# Patient Record
Sex: FEMALE | Race: WHITE | NOT HISPANIC OR LATINO | Employment: FULL TIME | URBAN - METROPOLITAN AREA
[De-identification: names, ages, dates, MRNs, and addresses within clinical notes are randomized per-mention and may not be internally consistent; named-entity substitution may affect disease eponyms.]

---

## 2023-11-16 ENCOUNTER — OFFICE VISIT (OUTPATIENT)
Dept: GASTROENTEROLOGY | Facility: CLINIC | Age: 23
End: 2023-11-16
Payer: COMMERCIAL

## 2023-11-16 VITALS — HEIGHT: 63 IN | HEART RATE: 78 BPM | WEIGHT: 127 LBS | BODY MASS INDEX: 22.5 KG/M2

## 2023-11-16 DIAGNOSIS — K62.5 RECTAL BLEEDING: Primary | ICD-10-CM

## 2023-11-16 DIAGNOSIS — K92.1 HEMATOCHEZIA: ICD-10-CM

## 2023-11-16 PROCEDURE — 1036F TOBACCO NON-USER: CPT | Performed by: INTERNAL MEDICINE

## 2023-11-16 PROCEDURE — 99204 OFFICE O/P NEW MOD 45 MIN: CPT | Performed by: INTERNAL MEDICINE

## 2023-11-16 RX ORDER — POLYETHYLENE GLYCOL 3350 17 G/17G
17 POWDER, FOR SOLUTION ORAL ONCE
COMMUNITY
Start: 2023-02-16 | End: 2023-11-16 | Stop reason: ALTCHOICE

## 2023-11-16 RX ORDER — MULTIVITAMIN
1 TABLET ORAL
COMMUNITY
End: 2023-11-16 | Stop reason: ALTCHOICE

## 2023-11-16 RX ORDER — HYDROXYZINE HYDROCHLORIDE 50 MG/1
50 TABLET, FILM COATED ORAL EVERY 8 HOURS PRN
COMMUNITY
Start: 2023-11-07 | End: 2024-02-01 | Stop reason: WASHOUT

## 2023-11-16 ASSESSMENT — ENCOUNTER SYMPTOMS: SHORTNESS OF BREATH: 0

## 2023-11-16 NOTE — PROGRESS NOTES
REASON FOR VISIT:  Hemorrhoids  PCP (requesting provider): No primary care provider on file..    HPI:  Kyra Behnfeldt is a 23 y.o. female being evaluated for hemorrhoids.    The patient notes that since January she has been having bright red blood in her stool. She went to see GI NP and was felt to have internal hemorrhoids. She notes that she continued to have intermittent red blood in the stool. She now notes that the blood is now daily. She eats high fiber diet, drinks plenty of water, and avoids straining. Blood is in toilet bowel and also with wiping. It is bright red blood. She does not feel hemorrhoids. Occasional rectal pain. No abdominal pain. She has a daily bowel movement. No prior colonoscopy. No regular NSAIDs. She has been using Miralax and prior mild constipation. The daily bleeding had been ongoing for 2 months. Weight is stable. She is a RN at [employer].    PSurgHx: No abdominal surgeries     FamHx: No GI cancer    PAST MEDICAL HISTORY  No past medical history on file.    PAST SURGICAL HISTORY  No past surgical history on file.    FAMILY HISTORY  No family history on file.    SOCIAL HISTORY   has no history on file for tobacco use, alcohol use, and drug use.    REVIEW OF SYSTEMS  Review of Systems   Respiratory:  Negative for shortness of breath.    Cardiovascular:  Negative for chest pain.   All other systems reviewed and are negative.    A 10+ point review of systems was otherwise negative except as noted and per HPI.    ALLERGIES  Not on File    MEDICATIONS  Current Outpatient Medications   Medication Instructions    Gavilax 17 g, oral, Once    hydrOXYzine HCL (ATARAX) 50 mg, oral, Every 8 hours PRN    multivitamin tablet 1 tablet, oral, Daily RT       VITALS  There were no vitals filed for this visit.   There is no height or weight on file to calculate BMI.    PHYSICAL EXAM  CONSTITUTIONAL: NAD, appears stated age  EYES: anicteric sclera, sclera clear  HEAD: normocephalic, atraumatic   NECK:  "supple   PULMONARY: CTAB  CARDIOVASCULAR: RRR, no M/R/G appreciated   ABDOMEN: soft, NTND, +BS, no rebound or guarding   MUSCULOSKELETAL: no edema  SKIN: no jaundice   PSYCHIATRIC: AOx3, appropriate insight and judgement  RECTAL: Sandra chaperone, no visible hemorrhoids or fistula or fissure, normal rectal tone, internal fullness possibly hemorrhoid, no hard palpable lesions, clean vault    LABS  No results found for: \"WBC\", \"HGB\", \"PLT\"  No results found for: \"NA\", \"K\", \"CL\", \"CO2\", \"BUN\", \"CREATININE\", \"CALCIUM\", \"PROT\", \"BILITOT\", \"ALKPHOS\", \"ALT\", \"AST\", \"GLUCOSE\"  No results found for: \"LIPASE\", \"CRP\"    ASSESSMENT/PLAN  Kyra Behnfeldt is a 23 y.o. female being evaluated for hemorrhoids. She has persistent daily rectal bleeding despite medical management of hemorrhoids. She may have some palpable internal hemorrhoids on rectal exam today but nothing obvious to explain this amount of bleeding. Other differential would include colitis, juvenile polyp, and less likely malignancy given her age. She will need a colonoscopy for further evaluation.    -Plan for colonoscopy  -The procedure(s) including risks/benefits, diet restrictions, prep, and sedation were discussed with the patient    Follow-up will be at the time of the colonoscopy.    Signature: Mane Wang MD  "

## 2023-11-27 ENCOUNTER — APPOINTMENT (OUTPATIENT)
Dept: GASTROENTEROLOGY | Facility: CLINIC | Age: 23
End: 2023-11-27
Payer: COMMERCIAL

## 2023-12-07 ENCOUNTER — OFFICE VISIT (OUTPATIENT)
Dept: GASTROENTEROLOGY | Facility: EXTERNAL LOCATION | Age: 23
End: 2023-12-07
Payer: COMMERCIAL

## 2023-12-07 DIAGNOSIS — D12.0 BENIGN NEOPLASM OF CECUM: Primary | ICD-10-CM

## 2023-12-07 DIAGNOSIS — K52.9 NONINFECTIOUS GASTROENTERITIS, UNSPECIFIED TYPE: ICD-10-CM

## 2023-12-07 DIAGNOSIS — K62.5 RECTAL BLEEDING: ICD-10-CM

## 2023-12-07 DIAGNOSIS — K92.1 HEMATOCHEZIA: ICD-10-CM

## 2023-12-07 PROCEDURE — 45380 COLONOSCOPY AND BIOPSY: CPT | Performed by: INTERNAL MEDICINE

## 2023-12-07 PROCEDURE — 45385 COLONOSCOPY W/LESION REMOVAL: CPT | Performed by: INTERNAL MEDICINE

## 2023-12-07 PROCEDURE — 1036F TOBACCO NON-USER: CPT | Performed by: INTERNAL MEDICINE

## 2023-12-07 PROCEDURE — 88305 TISSUE EXAM BY PATHOLOGIST: CPT | Performed by: STUDENT IN AN ORGANIZED HEALTH CARE EDUCATION/TRAINING PROGRAM

## 2023-12-07 PROCEDURE — 88305 TISSUE EXAM BY PATHOLOGIST: CPT

## 2023-12-11 ENCOUNTER — LAB REQUISITION (OUTPATIENT)
Dept: LAB | Facility: HOSPITAL | Age: 23
End: 2023-12-11
Payer: COMMERCIAL

## 2023-12-18 ENCOUNTER — OFFICE VISIT (OUTPATIENT)
Dept: GASTROENTEROLOGY | Facility: CLINIC | Age: 23
End: 2023-12-18
Payer: COMMERCIAL

## 2023-12-18 ENCOUNTER — LAB (OUTPATIENT)
Dept: LAB | Facility: LAB | Age: 23
End: 2023-12-18
Payer: COMMERCIAL

## 2023-12-18 VITALS — HEART RATE: 98 BPM | HEIGHT: 63 IN | BODY MASS INDEX: 21.97 KG/M2 | WEIGHT: 124 LBS

## 2023-12-18 DIAGNOSIS — K62.5 RECTAL BLEEDING: ICD-10-CM

## 2023-12-18 DIAGNOSIS — Z51.81 THERAPEUTIC DRUG MONITORING: ICD-10-CM

## 2023-12-18 DIAGNOSIS — K50.10 CROHN'S DISEASE OF COLON WITHOUT COMPLICATION (MULTI): ICD-10-CM

## 2023-12-18 DIAGNOSIS — K50.10 CROHN'S DISEASE OF COLON WITHOUT COMPLICATION (MULTI): Primary | ICD-10-CM

## 2023-12-18 LAB
ALBUMIN SERPL BCP-MCNC: 4.4 G/DL (ref 3.4–5)
ALP SERPL-CCNC: 73 U/L (ref 33–110)
ALT SERPL W P-5'-P-CCNC: 16 U/L (ref 7–45)
ANION GAP SERPL CALC-SCNC: 10 MMOL/L (ref 10–20)
AST SERPL W P-5'-P-CCNC: 19 U/L (ref 9–39)
BASOPHILS # BLD AUTO: 0.07 X10*3/UL (ref 0–0.1)
BASOPHILS NFR BLD AUTO: 1.1 %
BILIRUB SERPL-MCNC: 0.5 MG/DL (ref 0–1.2)
BUN SERPL-MCNC: 10 MG/DL (ref 6–23)
CALCIUM SERPL-MCNC: 9.7 MG/DL (ref 8.6–10.3)
CHLORIDE SERPL-SCNC: 106 MMOL/L (ref 98–107)
CO2 SERPL-SCNC: 27 MMOL/L (ref 21–32)
CREAT SERPL-MCNC: 0.86 MG/DL (ref 0.5–1.05)
CRP SERPL-MCNC: <0.1 MG/DL
EOSINOPHIL # BLD AUTO: 0.09 X10*3/UL (ref 0–0.7)
EOSINOPHIL NFR BLD AUTO: 1.4 %
ERYTHROCYTE [DISTWIDTH] IN BLOOD BY AUTOMATED COUNT: 12.3 % (ref 11.5–14.5)
GFR SERPL CREATININE-BSD FRML MDRD: >90 ML/MIN/1.73M*2
GLUCOSE SERPL-MCNC: 85 MG/DL (ref 74–99)
HCT VFR BLD AUTO: 45.3 % (ref 36–46)
HGB BLD-MCNC: 15 G/DL (ref 12–16)
IMM GRANULOCYTES # BLD AUTO: 0.02 X10*3/UL (ref 0–0.7)
IMM GRANULOCYTES NFR BLD AUTO: 0.3 % (ref 0–0.9)
LABORATORY COMMENT REPORT: NORMAL
LYMPHOCYTES # BLD AUTO: 1.48 X10*3/UL (ref 1.2–4.8)
LYMPHOCYTES NFR BLD AUTO: 22.3 %
MCH RBC QN AUTO: 28.6 PG (ref 26–34)
MCHC RBC AUTO-ENTMCNC: 33.1 G/DL (ref 32–36)
MCV RBC AUTO: 87 FL (ref 80–100)
MONOCYTES # BLD AUTO: 0.38 X10*3/UL (ref 0.1–1)
MONOCYTES NFR BLD AUTO: 5.7 %
NEUTROPHILS # BLD AUTO: 4.6 X10*3/UL (ref 1.2–7.7)
NEUTROPHILS NFR BLD AUTO: 69.2 %
NRBC BLD-RTO: 0 /100 WBCS (ref 0–0)
PATH REPORT.COMMENTS IMP SPEC: NORMAL
PATH REPORT.FINAL DX SPEC: NORMAL
PATH REPORT.GROSS SPEC: NORMAL
PATH REPORT.RELEVANT HX SPEC: NORMAL
PATH REPORT.TOTAL CANCER: NORMAL
PLATELET # BLD AUTO: 217 X10*3/UL (ref 150–450)
POTASSIUM SERPL-SCNC: 4.1 MMOL/L (ref 3.5–5.3)
PROT SERPL-MCNC: 6.7 G/DL (ref 6.4–8.2)
RBC # BLD AUTO: 5.24 X10*6/UL (ref 4–5.2)
SODIUM SERPL-SCNC: 139 MMOL/L (ref 136–145)
WBC # BLD AUTO: 6.6 X10*3/UL (ref 4.4–11.3)

## 2023-12-18 PROCEDURE — 86140 C-REACTIVE PROTEIN: CPT

## 2023-12-18 PROCEDURE — 99215 OFFICE O/P EST HI 40 MIN: CPT | Performed by: INTERNAL MEDICINE

## 2023-12-18 PROCEDURE — 85025 COMPLETE CBC W/AUTO DIFF WBC: CPT

## 2023-12-18 PROCEDURE — 1036F TOBACCO NON-USER: CPT | Performed by: INTERNAL MEDICINE

## 2023-12-18 PROCEDURE — 83516 IMMUNOASSAY NONANTIBODY: CPT

## 2023-12-18 PROCEDURE — 86706 HEP B SURFACE ANTIBODY: CPT

## 2023-12-18 PROCEDURE — 87340 HEPATITIS B SURFACE AG IA: CPT

## 2023-12-18 PROCEDURE — 36415 COLL VENOUS BLD VENIPUNCTURE: CPT

## 2023-12-18 PROCEDURE — 86704 HEP B CORE ANTIBODY TOTAL: CPT

## 2023-12-18 PROCEDURE — 80053 COMPREHEN METABOLIC PANEL: CPT

## 2023-12-18 PROCEDURE — 86481 TB AG RESPONSE T-CELL SUSP: CPT

## 2023-12-18 RX ORDER — PREDNISONE 10 MG/1
TABLET ORAL
Qty: 82 TABLET | Refills: 0 | Status: SHIPPED | OUTPATIENT
Start: 2023-12-18 | End: 2024-01-18

## 2023-12-18 ASSESSMENT — ENCOUNTER SYMPTOMS: SHORTNESS OF BREATH: 0

## 2023-12-18 NOTE — PROGRESS NOTES
REASON FOR VISIT:  Crohn's disease  PCP (requesting provider): No primary care provider on file..    HPI:  Kyra Behnfeldt is a 23 y.o. female following for Crohn's colitis (diagnosed 12/2023). Colonoscopy showed moderate inflammation at hepatic flexure and rectum with biopsies showing chronic active colitis consistent with IBD as well as single SSA (12/2023). Given the distribution and skip lesions, the diagnosis is most consistent with Crohn's colitis.    The patient reports daily blood in the stool. She has now started to notice dull rectal pain.  She did have some abdominal cramping as well. Bowel movements have increased to 2-3 times per day.  Stool is more loose.  No NSAIDs.  No weight loss.  No EIMs. No family history of IBD. No recent blood work.    PSurgHx: No abdominal surgeries      FamHx: No GI cancer    Prior Endoscopy:  -Colonoscopy (12/2023): Excellent prep, normal TI (normal TI biopsies), 7 mm cecal polyp (SSA, locazlied moderate inflammation in proximal transverse distal to hepatic flexure (path showed chronic active colitis) and distal 5 cm of rectum (path showed chronic active colitis), otherwise normal colon (other segmental colonic biopsies normal).    PAST MEDICAL HISTORY  No past medical history on file.    PAST SURGICAL HISTORY  No past surgical history on file.    FAMILY HISTORY  No family history on file.    SOCIAL HISTORY   reports that she has never smoked. She has never been exposed to tobacco smoke. She has never used smokeless tobacco. No history on file for alcohol use and drug use.    REVIEW OF SYSTEMS  Review of Systems   Respiratory:  Negative for shortness of breath.    Cardiovascular:  Negative for chest pain.   All other systems reviewed and are negative.    A 10+ point review of systems was otherwise negative except as noted and per HPI.    ALLERGIES  No Known Allergies    MEDICATIONS  Current Outpatient Medications   Medication Instructions    hydrOXYzine HCL (ATARAX) 50 mg,  "oral, Every 8 hours PRN       VITALS  Vitals:    12/18/23 1422   Pulse: 98      Body mass index is 21.97 kg/m².    PHYSICAL EXAM  CONSTITUTIONAL: NAD, appears stated age  EYES: anicteric sclera, sclera clear  HEAD: normocephalic, atraumatic   NECK: supple   PULMONARY: CTAB  CARDIOVASCULAR: RRR, no M/R/G appreciated   ABDOMEN: soft, NTND, +BS, no rebound or guarding   MUSCULOSKELETAL: no edema  SKIN: no jaundice   PSYCHIATRIC: AOx3, appropriate insight and judgement    LABS  No results found for: \"WBC\", \"HGB\", \"PLT\"  No results found for: \"NA\", \"K\", \"CL\", \"CO2\", \"BUN\", \"CREATININE\", \"CALCIUM\", \"PROT\", \"BILITOT\", \"ALKPHOS\", \"ALT\", \"AST\", \"GLUCOSE\"  No results found for: \"LIPASE\", \"CRP\"    ASSESSMENT/PLAN  Kyra Behnfeldt is a 23 y.o. female with a past medical history of Crohn's colitis (diagnosed 12/2023). Colonoscopy showed moderate inflammation at hepatic flexure and rectum with biopsies showing chronic active colitis consistent with IBD as well as single SSA (12/2023). Given the distribution and skip lesions, the diagnosis is most consistent with Crohn's colitis.  She is having more pronounced GI symptoms of IBD so will start steroid therapy.  We discussed maintenance therapy options including anti-TNF (Humira/Remicade) versus Entyvio and she plans to give this some thought.  Either option is reasonable especially since this is a colitis phenotype of Crohn's.  We will obtain pre-biologic labs and pursue initiation of a biologic once back.    -Check labs (CBC, CMP, CRP, Tspot, hepatitis B serologies, TTG IgA)  -Start Prednisone taper  -Discussed biologic therapy as above including risks of infusion/injection reaction, immunosuppression, increased risk of skin cancer or lymphoma, theoretical risk of PML, hepatotoxicity, and neurotoxicity (will plan to make decision regarding initiation of therapy once labs return)    Follow-up in the office in 3-4 months.    Signature: Mane Wang MD  "

## 2023-12-18 NOTE — PATIENT INSTRUCTIONS
Check labs including labs in preparation for initiation of biologic therapy.  Once these labs are back, then we will move forward with getting insurance approval for biologic therapy (either anti-TNF with injection Humira or infusion Remicade versus integrin blocker infusion Entyvio).  Start Prednisone taper.  Follow-up in the office in 3-4 months after induction therapy complete.  Call or message with questions or concerns in the interim.

## 2023-12-19 LAB
HBV CORE AB SER QL: NONREACTIVE
HBV SURFACE AB SER-ACNC: 295.6 MIU/ML
HBV SURFACE AG SERPL QL IA: NONREACTIVE
TTG IGA SER IA-ACNC: <1 U/ML

## 2023-12-21 LAB
NIL(NEG) CONTROL SPOT COUNT: NORMAL
PANEL A SPOT COUNT: 0
PANEL B SPOT COUNT: 1
POS CONTROL SPOT COUNT: NORMAL
T-SPOT. TB INTERPRETATION: NEGATIVE

## 2023-12-28 ENCOUNTER — TELEPHONE (OUTPATIENT)
Dept: GASTROENTEROLOGY | Facility: CLINIC | Age: 23
End: 2023-12-28
Payer: COMMERCIAL

## 2024-01-02 ENCOUNTER — TELEPHONE (OUTPATIENT)
Dept: GASTROENTEROLOGY | Facility: CLINIC | Age: 24
End: 2024-01-02
Payer: COMMERCIAL

## 2024-01-02 ENCOUNTER — DOCUMENTATION (OUTPATIENT)
Dept: GASTROENTEROLOGY | Facility: CLINIC | Age: 24
End: 2024-01-02
Payer: COMMERCIAL

## 2024-01-02 DIAGNOSIS — K50.10 CROHN'S DISEASE OF LARGE INTESTINE WITHOUT COMPLICATION (MULTI): Primary | ICD-10-CM

## 2024-01-02 NOTE — TELEPHONE ENCOUNTER
----- Message from Ruthie Braxton MA sent at 1/2/2024 11:30 AM EST -----  Regarding: FW: YARELIS  Hi Dr. Malin,  I hope your holidays were good! I'm sorry to bother you with this but could you please help out with this patient?     Thanks so much!         ----- Message -----  From: Ronald Fuentes MD  Sent: 1/2/2024  10:28 AM EST  To: Mane Wang MD; Ruthie Braxton MA  Subject: RE: HUMIRA                                       Good morning!    I am on call this week - please send this to another one of our partners.    Thanks    ----- Message -----  From: Ruthie Braxton MA  Sent: 1/2/2024   9:53 AM EST  To: Ronald Fuentes MD; Mane Wang MD  Subject: HUMIRA                                            Good Morning,    This patient was seen on 12/18 in the office and a Colonoscopy on 12/7. She called stating she is not sleeping since she's been on the Predisone and the Hydroxyzine is not working. She wanted to know if she can get something to help her sleep and also start Humira?     Please advise.    Thank You so much,    [place]

## 2024-01-02 NOTE — PROGRESS NOTES
Phone - dx'd in December with Crohn's colitis, now on Prednisone taper (goes from 30mg daily to 20 mg daily tomorrow).   Only mild relief in symptoms, and has significant insomnia.   Dr. Wang had discussed Humira with her, and hepatitis serologies and T spot negative - I will inquire if Humira has been approved

## 2024-01-03 ENCOUNTER — SPECIALTY PHARMACY (OUTPATIENT)
Dept: PHARMACY | Facility: CLINIC | Age: 24
End: 2024-01-03

## 2024-01-03 RX ORDER — ADALIMUMAB 80MG/0.8ML
KIT SUBCUTANEOUS
Qty: 3 EACH | Refills: 0 | Status: SHIPPED | OUTPATIENT
Start: 2024-01-03 | End: 2024-01-17 | Stop reason: SDUPTHER

## 2024-01-10 ENCOUNTER — SPECIALTY PHARMACY (OUTPATIENT)
Dept: PHARMACY | Facility: CLINIC | Age: 24
End: 2024-01-10

## 2024-01-11 DIAGNOSIS — K50.10 CROHN'S DISEASE OF LARGE INTESTINE WITHOUT COMPLICATION (MULTI): ICD-10-CM

## 2024-01-11 RX ORDER — ADALIMUMAB 80MG/0.8ML
KIT SUBCUTANEOUS
Qty: 3 EACH | Refills: 0 | Status: SHIPPED | OUTPATIENT
Start: 2024-01-11 | End: 2024-02-09

## 2024-01-12 ENCOUNTER — TELEPHONE (OUTPATIENT)
Dept: GASTROENTEROLOGY | Facility: CLINIC | Age: 24
End: 2024-01-12
Payer: COMMERCIAL

## 2024-01-12 DIAGNOSIS — K50.10 CROHN'S DISEASE OF COLON WITHOUT COMPLICATION (MULTI): Primary | ICD-10-CM

## 2024-01-12 RX ORDER — BUDESONIDE 3 MG/1
CAPSULE, COATED PELLETS ORAL
Qty: 180 CAPSULE | Refills: 0 | Status: SHIPPED | OUTPATIENT
Start: 2024-01-12 | End: 2024-02-01 | Stop reason: WASHOUT

## 2024-01-12 NOTE — TELEPHONE ENCOUNTER
Amanda Nunez called stating she does not think the steroid is helping anymore due to having symptoms again. She stated she has less than a week left. Please advise       Thank You

## 2024-01-17 DIAGNOSIS — K50.10 CROHN'S DISEASE OF LARGE INTESTINE WITHOUT COMPLICATION (MULTI): ICD-10-CM

## 2024-01-17 RX ORDER — ADALIMUMAB 80MG/0.8ML
KIT SUBCUTANEOUS
Qty: 3 EACH | Refills: 0 | Status: SHIPPED | OUTPATIENT
Start: 2024-01-17 | End: 2024-01-19 | Stop reason: SDUPTHER

## 2024-01-19 ENCOUNTER — SPECIALTY PHARMACY (OUTPATIENT)
Dept: PHARMACY | Facility: CLINIC | Age: 24
End: 2024-01-19

## 2024-01-19 DIAGNOSIS — K50.10 CROHN'S DISEASE OF LARGE INTESTINE WITHOUT COMPLICATION (MULTI): ICD-10-CM

## 2024-01-19 RX ORDER — ADALIMUMAB 80MG/0.8ML
KIT SUBCUTANEOUS
Qty: 3 EACH | Refills: 0 | Status: ON HOLD | OUTPATIENT
Start: 2024-01-19 | End: 2024-02-26

## 2024-01-22 ENCOUNTER — SPECIALTY PHARMACY (OUTPATIENT)
Dept: PHARMACY | Facility: CLINIC | Age: 24
End: 2024-01-22

## 2024-01-30 ENCOUNTER — HOSPITAL ENCOUNTER (EMERGENCY)
Facility: HOSPITAL | Age: 24
Discharge: HOME | End: 2024-01-30
Attending: STUDENT IN AN ORGANIZED HEALTH CARE EDUCATION/TRAINING PROGRAM
Payer: COMMERCIAL

## 2024-01-30 VITALS
SYSTOLIC BLOOD PRESSURE: 135 MMHG | HEART RATE: 92 BPM | RESPIRATION RATE: 16 BRPM | DIASTOLIC BLOOD PRESSURE: 94 MMHG | TEMPERATURE: 98.8 F | HEIGHT: 63 IN | BODY MASS INDEX: 22.15 KG/M2 | WEIGHT: 125 LBS | OXYGEN SATURATION: 100 %

## 2024-01-30 DIAGNOSIS — R19.7 DIARRHEA, UNSPECIFIED TYPE: Primary | ICD-10-CM

## 2024-01-30 DIAGNOSIS — K50.911 ACUTE CROHN'S DISEASE WITH RECTAL BLEEDING (MULTI): Primary | ICD-10-CM

## 2024-01-30 LAB
ALBUMIN SERPL BCP-MCNC: 3.8 G/DL (ref 3.4–5)
ALP SERPL-CCNC: 79 U/L (ref 33–110)
ALT SERPL W P-5'-P-CCNC: 10 U/L (ref 7–45)
ANION GAP SERPL CALC-SCNC: 11 MMOL/L (ref 10–20)
APPEARANCE UR: CLEAR
AST SERPL W P-5'-P-CCNC: 11 U/L (ref 9–39)
BASOPHILS # BLD AUTO: 0.04 X10*3/UL (ref 0–0.1)
BASOPHILS NFR BLD AUTO: 0.3 %
BILIRUB SERPL-MCNC: 0.4 MG/DL (ref 0–1.2)
BILIRUB UR STRIP.AUTO-MCNC: NEGATIVE MG/DL
BUN SERPL-MCNC: 9 MG/DL (ref 6–23)
CALCIUM SERPL-MCNC: 9.1 MG/DL (ref 8.6–10.3)
CHLORIDE SERPL-SCNC: 103 MMOL/L (ref 98–107)
CO2 SERPL-SCNC: 28 MMOL/L (ref 21–32)
COLOR UR: ABNORMAL
CREAT SERPL-MCNC: 0.98 MG/DL (ref 0.5–1.05)
EGFRCR SERPLBLD CKD-EPI 2021: 83 ML/MIN/1.73M*2
EOSINOPHIL # BLD AUTO: 0.15 X10*3/UL (ref 0–0.7)
EOSINOPHIL NFR BLD AUTO: 1.2 %
ERYTHROCYTE [DISTWIDTH] IN BLOOD BY AUTOMATED COUNT: 12.3 % (ref 11.5–14.5)
FLUAV RNA RESP QL NAA+PROBE: NOT DETECTED
FLUBV RNA RESP QL NAA+PROBE: NOT DETECTED
GLUCOSE SERPL-MCNC: 148 MG/DL (ref 74–99)
GLUCOSE UR STRIP.AUTO-MCNC: NEGATIVE MG/DL
HCG UR QL IA.RAPID: NEGATIVE
HCT VFR BLD AUTO: 40.1 % (ref 36–46)
HGB BLD-MCNC: 13.8 G/DL (ref 12–16)
IMM GRANULOCYTES # BLD AUTO: 0.05 X10*3/UL (ref 0–0.7)
IMM GRANULOCYTES NFR BLD AUTO: 0.4 % (ref 0–0.9)
KETONES UR STRIP.AUTO-MCNC: NEGATIVE MG/DL
LACTATE SERPL-SCNC: 1 MMOL/L (ref 0.4–2)
LEUKOCYTE ESTERASE UR QL STRIP.AUTO: NEGATIVE
LYMPHOCYTES # BLD AUTO: 1.52 X10*3/UL (ref 1.2–4.8)
LYMPHOCYTES NFR BLD AUTO: 12.6 %
MAGNESIUM SERPL-MCNC: 1.82 MG/DL (ref 1.6–2.4)
MCH RBC QN AUTO: 29.5 PG (ref 26–34)
MCHC RBC AUTO-ENTMCNC: 34.4 G/DL (ref 32–36)
MCV RBC AUTO: 86 FL (ref 80–100)
MONOCYTES # BLD AUTO: 1.28 X10*3/UL (ref 0.1–1)
MONOCYTES NFR BLD AUTO: 10.6 %
NEUTROPHILS # BLD AUTO: 9.05 X10*3/UL (ref 1.2–7.7)
NEUTROPHILS NFR BLD AUTO: 74.9 %
NITRITE UR QL STRIP.AUTO: NEGATIVE
NRBC BLD-RTO: 0 /100 WBCS (ref 0–0)
PH UR STRIP.AUTO: 6 [PH]
PHOSPHATE SERPL-MCNC: 3.3 MG/DL (ref 2.5–4.9)
PLATELET # BLD AUTO: 311 X10*3/UL (ref 150–450)
POTASSIUM SERPL-SCNC: 3.7 MMOL/L (ref 3.5–5.3)
PROT SERPL-MCNC: 6.8 G/DL (ref 6.4–8.2)
PROT UR STRIP.AUTO-MCNC: NEGATIVE MG/DL
RBC # BLD AUTO: 4.68 X10*6/UL (ref 4–5.2)
RBC # UR STRIP.AUTO: NEGATIVE /UL
SARS-COV-2 RNA RESP QL NAA+PROBE: NOT DETECTED
SODIUM SERPL-SCNC: 138 MMOL/L (ref 136–145)
SP GR UR STRIP.AUTO: 1
UROBILINOGEN UR STRIP.AUTO-MCNC: <2 MG/DL
WBC # BLD AUTO: 12.1 X10*3/UL (ref 4.4–11.3)

## 2024-01-30 PROCEDURE — 2500000004 HC RX 250 GENERAL PHARMACY W/ HCPCS (ALT 636 FOR OP/ED): Performed by: STUDENT IN AN ORGANIZED HEALTH CARE EDUCATION/TRAINING PROGRAM

## 2024-01-30 PROCEDURE — 83735 ASSAY OF MAGNESIUM: CPT | Performed by: STUDENT IN AN ORGANIZED HEALTH CARE EDUCATION/TRAINING PROGRAM

## 2024-01-30 PROCEDURE — 81003 URINALYSIS AUTO W/O SCOPE: CPT | Performed by: STUDENT IN AN ORGANIZED HEALTH CARE EDUCATION/TRAINING PROGRAM

## 2024-01-30 PROCEDURE — 99284 EMERGENCY DEPT VISIT MOD MDM: CPT | Mod: 25

## 2024-01-30 PROCEDURE — 83605 ASSAY OF LACTIC ACID: CPT | Performed by: STUDENT IN AN ORGANIZED HEALTH CARE EDUCATION/TRAINING PROGRAM

## 2024-01-30 PROCEDURE — 87636 SARSCOV2 & INF A&B AMP PRB: CPT | Performed by: STUDENT IN AN ORGANIZED HEALTH CARE EDUCATION/TRAINING PROGRAM

## 2024-01-30 PROCEDURE — 36415 COLL VENOUS BLD VENIPUNCTURE: CPT | Performed by: STUDENT IN AN ORGANIZED HEALTH CARE EDUCATION/TRAINING PROGRAM

## 2024-01-30 PROCEDURE — 85025 COMPLETE CBC W/AUTO DIFF WBC: CPT | Performed by: STUDENT IN AN ORGANIZED HEALTH CARE EDUCATION/TRAINING PROGRAM

## 2024-01-30 PROCEDURE — 96374 THER/PROPH/DIAG INJ IV PUSH: CPT

## 2024-01-30 PROCEDURE — 81025 URINE PREGNANCY TEST: CPT | Performed by: STUDENT IN AN ORGANIZED HEALTH CARE EDUCATION/TRAINING PROGRAM

## 2024-01-30 PROCEDURE — 84100 ASSAY OF PHOSPHORUS: CPT | Performed by: STUDENT IN AN ORGANIZED HEALTH CARE EDUCATION/TRAINING PROGRAM

## 2024-01-30 PROCEDURE — 80053 COMPREHEN METABOLIC PANEL: CPT | Performed by: STUDENT IN AN ORGANIZED HEALTH CARE EDUCATION/TRAINING PROGRAM

## 2024-01-30 PROCEDURE — 96361 HYDRATE IV INFUSION ADD-ON: CPT

## 2024-01-30 RX ORDER — CYCLOBENZAPRINE HCL 10 MG
5 TABLET ORAL 2 TIMES DAILY PRN
Qty: 5 TABLET | Refills: 0 | Status: ON HOLD | OUTPATIENT
Start: 2024-01-30 | End: 2024-02-05 | Stop reason: SDUPTHER

## 2024-01-30 RX ORDER — PREDNISONE 20 MG/1
40 TABLET ORAL DAILY
Qty: 14 TABLET | Refills: 0 | Status: SHIPPED | OUTPATIENT
Start: 2024-01-30 | End: 2024-02-01 | Stop reason: WASHOUT

## 2024-01-30 RX ADMIN — METHYLPREDNISOLONE SODIUM SUCCINATE 125 MG: 125 INJECTION, POWDER, FOR SOLUTION INTRAMUSCULAR; INTRAVENOUS at 20:34

## 2024-01-30 RX ADMIN — SODIUM CHLORIDE, POTASSIUM CHLORIDE, SODIUM LACTATE AND CALCIUM CHLORIDE 1000 ML: 600; 310; 30; 20 INJECTION, SOLUTION INTRAVENOUS at 19:18

## 2024-01-30 ASSESSMENT — PAIN DESCRIPTION - PAIN TYPE: TYPE: ACUTE PAIN

## 2024-01-30 ASSESSMENT — PAIN SCALES - GENERAL
PAINLEVEL_OUTOF10: 7
PAINLEVEL_OUTOF10: 4

## 2024-01-30 ASSESSMENT — LIFESTYLE VARIABLES
EVER HAD A DRINK FIRST THING IN THE MORNING TO STEADY YOUR NERVES TO GET RID OF A HANGOVER: NO
EVER FELT BAD OR GUILTY ABOUT YOUR DRINKING: NO
HAVE YOU EVER FELT YOU SHOULD CUT DOWN ON YOUR DRINKING: NO
HAVE PEOPLE ANNOYED YOU BY CRITICIZING YOUR DRINKING: NO

## 2024-01-30 ASSESSMENT — PAIN - FUNCTIONAL ASSESSMENT: PAIN_FUNCTIONAL_ASSESSMENT: 0-10

## 2024-01-30 ASSESSMENT — PAIN DESCRIPTION - DESCRIPTORS: DESCRIPTORS: SHARP;CRAMPING

## 2024-01-30 ASSESSMENT — PAIN DESCRIPTION - LOCATION: LOCATION: ABDOMEN

## 2024-01-31 NOTE — DISCHARGE INSTRUCTIONS
Please return to the emergency department if you have uncontrolled pain, lightheadedness, or shortness of breath.  Please follow-up with your GI doctor tomorrow.

## 2024-01-31 NOTE — ED PROVIDER NOTES
External Records Reviewed: GI note from 12/18/2023    HPI  Kyra Behnfeldt is a 23 y.o. female with history of Crohn's disease who is presenting with concern for Crohn's flare.  Patient states she has had worsening lower abdominal cramping as well as diarrhea every few hours for the last 2 days.  Patient states there is blood in her diarrhea.  Patient notes she was started on prednisone in the middle of December and had improvement in similar symptoms however when she started to taper the prednisone down she noted return of the blood in her diarrhea.  Patient states about a week and a half ago she was started on budesonide because she was not tolerating the prednisone well.  Patient states she thinks her insurance has just approved her for Humira to start in 3 days.  Patient notes over the last 2 days the diarrhea has become overwhelming and she was not sure she was can make it until Friday with her symptoms.  She denies any fevers or chills, chest pain, shortness of breath.  She denies any nausea or vomiting.    PMH  No past medical history on file.    Meds  Current Outpatient Medications   Medication Instructions    adalimumab (HUMIRA PEN) 40 mg, subcutaneous, Every 14 days    adalimumab (HUMIRA PEN) 40 mg, subcutaneous, Every 14 days    adalimumab (Humira,CF, Pen Crohns-UC-HS) 80 mg/0.8 mL pen injector kit pen-injector adalimumab (Humira) dose instructions: On day 1, inject 160 mg under the skin. On day 15, inject 80 mg under skin. Change sites each time.    adalimumab (Humira,CF, Pen Crohns-UC-HS) 80 mg/0.8 mL pen injector kit pen-injector adalimumab (Humira) dose instructions: On day 1, inject 160 mg under the skin. On day 15, inject 80 mg under skin. Change sites each time.    budesonide EC (Entocort EC) 3 mg 24 hr capsule Take 3 capsules (9 mg) by mouth once daily in the morning for 30 days, THEN 2 capsules (6 mg) once daily in the morning for 30 days, THEN 1 capsule (3 mg) once daily in the morning.     "hydrOXYzine HCL (ATARAX) 50 mg, oral, Every 8 hours PRN       Allergies  No Known Allergies     SHx  Social History     Tobacco Use    Smoking status: Never     Passive exposure: Never    Smokeless tobacco: Never       ------------------------------------------------------------------------------------------------------------------------------------------    /86 (BP Location: Right arm, Patient Position: Sitting)   Pulse (!) 112   Temp 37.1 °C (98.8 °F) (Temporal)   Resp 18   Ht 1.6 m (5' 3\")   Wt 56.7 kg (125 lb)   SpO2 97%   BMI 22.14 kg/m²     Physical Exam  Constitutional:       General: She is not in acute distress.  HENT:      Head: Normocephalic and atraumatic.      Mouth/Throat:      Mouth: Mucous membranes are moist.   Eyes:      Extraocular Movements: Extraocular movements intact.      Conjunctiva/sclera: Conjunctivae normal.      Pupils: Pupils are equal, round, and reactive to light.   Cardiovascular:      Rate and Rhythm: Normal rate and regular rhythm.      Heart sounds: No murmur heard.     No gallop.   Pulmonary:      Effort: Pulmonary effort is normal. No respiratory distress.      Breath sounds: Normal breath sounds. No wheezing.   Abdominal:      General: There is no distension.      Palpations: Abdomen is soft.      Tenderness: There is no abdominal tenderness. There is no guarding.   Musculoskeletal:         General: No swelling or signs of injury. Normal range of motion.   Skin:     General: Skin is warm and dry.      Findings: No lesion or rash.   Neurological:      General: No focal deficit present.      Mental Status: She is alert and oriented to person, place, and time. Mental status is at baseline.   Psychiatric:         Mood and Affect: Mood normal.         Judgment: Judgment normal.          ------------------------------------------------------------------------------------------------------------------------------------------    Medical Decision Making: Patient is a " 23-year-old female presenting with concern for Crohn's flare.  Patient is tachycardic but otherwise hemodynamically stable and nontoxic-appearing on arrival.  She has no tenderness to palpation of the abdomen which is very reassuring.  Patient's diarrhea with blood is most consistent with a Crohn's flare especially given the change in her medications.  Patient will be given fluids and lab work will be ordered especially evaluate her hemoglobin as she has had quite a bit of blood in her stool.  Patient has no anemia symptoms including lightheadedness or shortness of breath which is reassuring.  Other considerations include viral infection like COVID or flu, diverticulitis, C. difficile.  Patient's lab work is fairly reassuring.  She does have a mild leukocytosis with a white blood cell count of 12.1 however other lab work is unremarkable including normal lactate normal magnesium and normal phosphorus.  She has no IGNACIA with a normal creatinine.  Flu and COVID swabs were negative and UA showed no signs of infection.  Urine pregnancy test was negative.  Patient will be given Solu-Medrol for her Crohn's flare.  She will also be provided a prescription for prednisone and a muscle relaxer to help with her cramps.  Patient plans to follow-up with her GI in the morning as she is hoping to start Humira in the next few days.  Patient felt comfortable with this plan and return precautions were discussed.  Tachycardia improved with fluids and she otherwise remained hemodynamically stable.  Patient was discharged home in improved condition.    Diagnoses as of 01/30/24 2016   Acute Crohn's disease with rectal bleeding (CMS/[facility])     Marylou Hastings MD  Emergency Medicine       Marylou Hastings MD  01/30/24 2018

## 2024-02-01 ENCOUNTER — OFFICE VISIT (OUTPATIENT)
Dept: GASTROENTEROLOGY | Facility: CLINIC | Age: 24
End: 2024-02-01
Payer: COMMERCIAL

## 2024-02-01 ENCOUNTER — HOSPITAL ENCOUNTER (OUTPATIENT)
Dept: RADIOLOGY | Facility: HOSPITAL | Age: 24
Discharge: HOME | End: 2024-02-01
Payer: COMMERCIAL

## 2024-02-01 VITALS — HEART RATE: 82 BPM | HEIGHT: 63 IN | WEIGHT: 122 LBS | BODY MASS INDEX: 21.62 KG/M2

## 2024-02-01 DIAGNOSIS — R19.7 DIARRHEA, UNSPECIFIED TYPE: ICD-10-CM

## 2024-02-01 DIAGNOSIS — K50.10 CROHN'S DISEASE OF COLON WITHOUT COMPLICATION (MULTI): Primary | ICD-10-CM

## 2024-02-01 DIAGNOSIS — R10.9 ABDOMINAL PAIN, UNSPECIFIED ABDOMINAL LOCATION: ICD-10-CM

## 2024-02-01 DIAGNOSIS — K50.10 CROHN'S DISEASE OF COLON WITHOUT COMPLICATION (MULTI): ICD-10-CM

## 2024-02-01 PROCEDURE — 2550000001 HC RX 255 CONTRASTS: Performed by: INTERNAL MEDICINE

## 2024-02-01 PROCEDURE — 74177 CT ABD & PELVIS W/CONTRAST: CPT | Performed by: RADIOLOGY

## 2024-02-01 PROCEDURE — 74177 CT ABD & PELVIS W/CONTRAST: CPT

## 2024-02-01 PROCEDURE — 99215 OFFICE O/P EST HI 40 MIN: CPT | Performed by: INTERNAL MEDICINE

## 2024-02-01 PROCEDURE — 1036F TOBACCO NON-USER: CPT | Performed by: INTERNAL MEDICINE

## 2024-02-01 RX ORDER — PREDNISONE 10 MG/1
TABLET ORAL
Qty: 98 TABLET | Refills: 0 | Status: ON HOLD | OUTPATIENT
Start: 2024-02-01 | End: 2024-02-05 | Stop reason: SDUPTHER

## 2024-02-01 RX ORDER — MESALAMINE 1000 MG/1
1000 SUPPOSITORY RECTAL 2 TIMES DAILY
Qty: 60 SUPPOSITORY | Refills: 11 | Status: SHIPPED | OUTPATIENT
Start: 2024-02-01 | End: 2024-02-26 | Stop reason: HOSPADM

## 2024-02-01 RX ADMIN — IOHEXOL 75 ML: 350 INJECTION, SOLUTION INTRAVENOUS at 17:13

## 2024-02-01 ASSESSMENT — ENCOUNTER SYMPTOMS: SHORTNESS OF BREATH: 0

## 2024-02-01 NOTE — PROGRESS NOTES
REASON FOR VISIT:  Crohn's   PCP (requesting provider): No primary care provider on file..    HPI:  Kyra Behnfeldt is a 23 y.o. female following for Crohn's colitis (diagnosed 12/2023). Colonoscopy showed moderate inflammation at hepatic flexure and rectum with biopsies showing chronic active colitis consistent with IBD as well as single SSA (12/2023). Given the distribution and skip lesions, the diagnosis is most consistent with Crohn's colitis.  She is planned for initiation of Humira but unfortunately has been having ongoing flare symptoms and was initially on Budeonide now on Prednisone. Recent ED visit with dose of Solumedrol.    The patient reports she has been having a lot of abdominal cramping as well.  The stool is loose and there is blood with the stool. She has lost about 5 lbs of weight. She got a dose of Solumedrol in the ED as above. She is on Prednisone 40 mg daily. She has not had any imaging. She stopped the Budesonide. She reports she will be receiving the Humira tomorrow. She has not had stool testing for C. Diff. She does tenesmus. She has not tried topical therapy. No NSAIDs.Chec    PSurgHx: No abdominal surgeries      FamHx: No GI cancer     Prior Endoscopy:  -Colonoscopy (12/2023): Excellent prep, normal TI (normal TI biopsies), 7 mm cecal polyp (SSA, locazlied moderate inflammation in proximal transverse distal to hepatic flexure (path showed chronic active colitis) and distal 5 cm of rectum (path showed chronic active colitis), otherwise normal colon (other segmental colonic biopsies normal).    PAST MEDICAL HISTORY  No past medical history on file.    PAST SURGICAL HISTORY  No past surgical history on file.    FAMILY HISTORY  No family history on file.    SOCIAL HISTORY   reports that she has never smoked. She has never been exposed to tobacco smoke. She has never used smokeless tobacco. No history on file for alcohol use and drug use.    REVIEW OF SYSTEMS  Review of Systems   Respiratory:   Negative for shortness of breath.    Cardiovascular:  Negative for chest pain.   All other systems reviewed and are negative.    A 10+ point review of systems was otherwise negative except as noted and per HPI.    ALLERGIES  No Known Allergies    MEDICATIONS  Current Outpatient Medications   Medication Instructions    adalimumab (HUMIRA PEN) 40 mg, subcutaneous, Every 14 days    adalimumab (HUMIRA PEN) 40 mg, subcutaneous, Every 14 days    adalimumab (Humira,CF, Pen Crohns-UC-HS) 80 mg/0.8 mL pen injector kit pen-injector adalimumab (Humira) dose instructions: On day 1, inject 160 mg under the skin. On day 15, inject 80 mg under skin. Change sites each time.    adalimumab (Humira,CF, Pen Crohns-UC-HS) 80 mg/0.8 mL pen injector kit pen-injector adalimumab (Humira) dose instructions: On day 1, inject 160 mg under the skin. On day 15, inject 80 mg under skin. Change sites each time.    budesonide EC (Entocort EC) 3 mg 24 hr capsule Take 3 capsules (9 mg) by mouth once daily in the morning for 30 days, THEN 2 capsules (6 mg) once daily in the morning for 30 days, THEN 1 capsule (3 mg) once daily in the morning.    cyclobenzaprine (FLEXERIL) 5 mg, oral, 2 times daily PRN    hydrOXYzine HCL (ATARAX) 50 mg, oral, Every 8 hours PRN    predniSONE (DELTASONE) 40 mg, oral, Daily       VITALS  Vitals:    02/01/24 1445   Pulse: 82      Body mass index is 21.61 kg/m².    PHYSICAL EXAM  CONSTITUTIONAL: NAD, appears stated age  EYES: anicteric sclera, sclera clear  HEAD: normocephalic, atraumatic   NECK: supple   PULMONARY: CTAB  CARDIOVASCULAR: RRR, no M/R/G appreciated   ABDOMEN: soft, minimal TTP lower abdomen, +BS, no rebound or guarding   MUSCULOSKELETAL: no edema  SKIN: no jaundice   PSYCHIATRIC: AOx3, appropriate insight and judgement    LABS  WBC (x10*3/uL)   Date Value   01/30/2024 12.1 (H)   12/18/2023 6.6     Hemoglobin (g/dL)   Date Value   01/30/2024 13.8   12/18/2023 15.0     Platelets (x10*3/uL)   Date Value    01/30/2024 311   12/18/2023 217     Sodium (mmol/L)   Date Value   01/30/2024 138   12/18/2023 139     Potassium (mmol/L)   Date Value   01/30/2024 3.7   12/18/2023 4.1     Chloride (mmol/L)   Date Value   01/30/2024 103   12/18/2023 106     Bicarbonate (mmol/L)   Date Value   01/30/2024 28   12/18/2023 27     Urea Nitrogen (mg/dL)   Date Value   01/30/2024 9   12/18/2023 10     Creatinine (mg/dL)   Date Value   01/30/2024 0.98   12/18/2023 0.86     Calcium (mg/dL)   Date Value   01/30/2024 9.1   12/18/2023 9.7     Total Protein (g/dL)   Date Value   01/30/2024 6.8   12/18/2023 6.7     Bilirubin, Total (mg/dL)   Date Value   01/30/2024 0.4   12/18/2023 0.5     Alkaline Phosphatase (U/L)   Date Value   01/30/2024 79   12/18/2023 73     ALT (U/L)   Date Value   01/30/2024 10   12/18/2023 16     AST (U/L)   Date Value   01/30/2024 11   12/18/2023 19     Glucose (mg/dL)   Date Value   01/30/2024 148 (H)   12/18/2023 85     C-Reactive Protein (mg/dL)   Date Value   12/18/2023 <0.10       ASSESSMENT/PLAN  Kyra Behnfeldt is a 23 y.o. female following for Crohn's colitis (diagnosed 12/2023). Colonoscopy showed moderate inflammation at hepatic flexure and rectum with biopsies showing chronic active colitis consistent with IBD as well as single SSA (12/2023). Given the distribution and skip lesions, the diagnosis is most consistent with Crohn's colitis.  She will be starting Humira hopefully tomorrow as we have now been struggling this past month with worsening flare symptoms requiring ED visit for IV steroids.  We have her on high dose Prednisone and will trial adding Canasa suppositories to help with some of the rectal symptoms even though there is some disease higher up in the colon as well.  She has never had imaging and given our struggles this is indicated to ensure there are no significant complications such as fistulizing disease or abscess.  We will also ensure she does not have superimposed C. Diff and get a  baseline fecal calprotecin.  If her condition worsens, then she will likely need hospitalization for IV steroids.    -Check stat CT A/P w/ contrast  -Continue Prednisone 40 mg for 2 weeks and then decrease by 10 mg every week thereafter  -Start Canasa 1 g suppositories BID x 30 days   -Check stool studies (fecal calprotectin, C. Diff, stool pathogen panel, stool O&P)  -Patient reports she will receive Humira tomorrow and advised her to start this ASAP  -If clinically worsens, then will likely need admission with IV steroids     Follow-up in the office in 1 month.    Signature: Mane Wang MD

## 2024-02-01 NOTE — PATIENT INSTRUCTIONS
Check stool testing for infection and inflammation.    Start Humira as soon as able.    Continue Prednisone 40 mg daily for 2 weeks then decrease by 10 mg daily thereafter.    Start Canasa suppositories twice daily for the next 1 month.    Schedule CT scan.    Follow-up in the office in 1 month.  If worsening despite the above, then we will need to have you go back to the hospital for admission with IV steroids.

## 2024-02-02 ENCOUNTER — HOSPITAL ENCOUNTER (INPATIENT)
Facility: HOSPITAL | Age: 24
LOS: 4 days | Discharge: HOME | DRG: 387 | End: 2024-02-06
Attending: EMERGENCY MEDICINE | Admitting: INTERNAL MEDICINE
Payer: COMMERCIAL

## 2024-02-02 ENCOUNTER — APPOINTMENT (OUTPATIENT)
Dept: RADIOLOGY | Facility: HOSPITAL | Age: 24
End: 2024-02-02
Payer: COMMERCIAL

## 2024-02-02 DIAGNOSIS — R10.84 GENERALIZED ABDOMINAL PAIN: ICD-10-CM

## 2024-02-02 DIAGNOSIS — K50.10 CROHN'S DISEASE OF COLON WITHOUT COMPLICATION (MULTI): Primary | ICD-10-CM

## 2024-02-02 DIAGNOSIS — R19.7 DIARRHEA, UNSPECIFIED TYPE: ICD-10-CM

## 2024-02-02 DIAGNOSIS — Z78.9 FAILURE OF OUTPATIENT TREATMENT: ICD-10-CM

## 2024-02-02 DIAGNOSIS — R10.9 ABDOMINAL PAIN, UNSPECIFIED ABDOMINAL LOCATION: ICD-10-CM

## 2024-02-02 DIAGNOSIS — K50.111 CROHN'S DISEASE OF COLON WITH RECTAL BLEEDING (MULTI): ICD-10-CM

## 2024-02-02 DIAGNOSIS — K50.911 ACUTE CROHN'S DISEASE WITH RECTAL BLEEDING (MULTI): ICD-10-CM

## 2024-02-02 PROBLEM — E87.6 HYPOKALEMIA: Status: ACTIVE | Noted: 2024-02-02

## 2024-02-02 LAB
ALBUMIN SERPL BCP-MCNC: 3.6 G/DL (ref 3.4–5)
ALP SERPL-CCNC: 73 U/L (ref 33–110)
ALT SERPL W P-5'-P-CCNC: 11 U/L (ref 7–45)
ANION GAP SERPL CALC-SCNC: 11 MMOL/L (ref 10–20)
APPEARANCE UR: CLEAR
AST SERPL W P-5'-P-CCNC: 11 U/L (ref 9–39)
BASOPHILS # BLD AUTO: 0.07 X10*3/UL (ref 0–0.1)
BASOPHILS NFR BLD AUTO: 0.6 %
BILIRUB SERPL-MCNC: 0.2 MG/DL (ref 0–1.2)
BILIRUB UR STRIP.AUTO-MCNC: NEGATIVE MG/DL
BUN SERPL-MCNC: 13 MG/DL (ref 6–23)
C DIF TOX TCDA+TCDB STL QL NAA+PROBE: NOT DETECTED
CALCIUM SERPL-MCNC: 8.8 MG/DL (ref 8.6–10.3)
CHLORIDE SERPL-SCNC: 103 MMOL/L (ref 98–107)
CO2 SERPL-SCNC: 27 MMOL/L (ref 21–32)
COLOR UR: YELLOW
CREAT SERPL-MCNC: 0.87 MG/DL (ref 0.5–1.05)
CRP SERPL-MCNC: 1.31 MG/DL
EGFRCR SERPLBLD CKD-EPI 2021: >90 ML/MIN/1.73M*2
EOSINOPHIL # BLD AUTO: 0.15 X10*3/UL (ref 0–0.7)
EOSINOPHIL NFR BLD AUTO: 1.3 %
ERYTHROCYTE [DISTWIDTH] IN BLOOD BY AUTOMATED COUNT: 12.2 % (ref 11.5–14.5)
GLUCOSE SERPL-MCNC: 94 MG/DL (ref 74–99)
GLUCOSE UR STRIP.AUTO-MCNC: NEGATIVE MG/DL
HCT VFR BLD AUTO: 36 % (ref 36–46)
HGB BLD-MCNC: 12.1 G/DL (ref 12–16)
HOLD SPECIMEN: NORMAL
IMM GRANULOCYTES # BLD AUTO: 0.05 X10*3/UL (ref 0–0.7)
IMM GRANULOCYTES NFR BLD AUTO: 0.4 % (ref 0–0.9)
KETONES UR STRIP.AUTO-MCNC: NEGATIVE MG/DL
LACTATE SERPL-SCNC: 1.4 MMOL/L (ref 0.4–2)
LEUKOCYTE ESTERASE UR QL STRIP.AUTO: NEGATIVE
LYMPHOCYTES # BLD AUTO: 1.5 X10*3/UL (ref 1.2–4.8)
LYMPHOCYTES NFR BLD AUTO: 13.2 %
MAGNESIUM SERPL-MCNC: 1.78 MG/DL (ref 1.6–2.4)
MCH RBC QN AUTO: 29.2 PG (ref 26–34)
MCHC RBC AUTO-ENTMCNC: 33.6 G/DL (ref 32–36)
MCV RBC AUTO: 87 FL (ref 80–100)
MONOCYTES # BLD AUTO: 1.01 X10*3/UL (ref 0.1–1)
MONOCYTES NFR BLD AUTO: 8.9 %
MUCOUS THREADS #/AREA URNS AUTO: NORMAL /LPF
NEUTROPHILS # BLD AUTO: 8.61 X10*3/UL (ref 1.2–7.7)
NEUTROPHILS NFR BLD AUTO: 75.6 %
NITRITE UR QL STRIP.AUTO: NEGATIVE
NRBC BLD-RTO: 0 /100 WBCS (ref 0–0)
PH UR STRIP.AUTO: 5 [PH]
PLATELET # BLD AUTO: 312 X10*3/UL (ref 150–450)
POTASSIUM SERPL-SCNC: 3 MMOL/L (ref 3.5–5.3)
PROT SERPL-MCNC: 6.6 G/DL (ref 6.4–8.2)
PROT UR STRIP.AUTO-MCNC: NEGATIVE MG/DL
RBC # BLD AUTO: 4.15 X10*6/UL (ref 4–5.2)
RBC # UR STRIP.AUTO: ABNORMAL /UL
RBC #/AREA URNS AUTO: NORMAL /HPF
SODIUM SERPL-SCNC: 138 MMOL/L (ref 136–145)
SP GR UR STRIP.AUTO: 1.02
SQUAMOUS #/AREA URNS AUTO: NORMAL /HPF
UROBILINOGEN UR STRIP.AUTO-MCNC: <2 MG/DL
WBC # BLD AUTO: 11.4 X10*3/UL (ref 4.4–11.3)
WBC #/AREA URNS AUTO: NORMAL /HPF

## 2024-02-02 PROCEDURE — 85025 COMPLETE CBC W/AUTO DIFF WBC: CPT | Performed by: EMERGENCY MEDICINE

## 2024-02-02 PROCEDURE — 99222 1ST HOSP IP/OBS MODERATE 55: CPT | Performed by: INTERNAL MEDICINE

## 2024-02-02 PROCEDURE — 83605 ASSAY OF LACTIC ACID: CPT | Performed by: EMERGENCY MEDICINE

## 2024-02-02 PROCEDURE — 99285 EMERGENCY DEPT VISIT HI MDM: CPT | Performed by: EMERGENCY MEDICINE

## 2024-02-02 PROCEDURE — 1200000002 HC GENERAL ROOM WITH TELEMETRY DAILY

## 2024-02-02 PROCEDURE — 86140 C-REACTIVE PROTEIN: CPT | Performed by: INTERNAL MEDICINE

## 2024-02-02 PROCEDURE — 36415 COLL VENOUS BLD VENIPUNCTURE: CPT | Performed by: EMERGENCY MEDICINE

## 2024-02-02 PROCEDURE — 2500000004 HC RX 250 GENERAL PHARMACY W/ HCPCS (ALT 636 FOR OP/ED): Performed by: EMERGENCY MEDICINE

## 2024-02-02 PROCEDURE — 87329 GIARDIA AG IA: CPT | Performed by: INTERNAL MEDICINE

## 2024-02-02 PROCEDURE — 87328 CRYPTOSPORIDIUM AG IA: CPT | Performed by: INTERNAL MEDICINE

## 2024-02-02 PROCEDURE — 83735 ASSAY OF MAGNESIUM: CPT | Performed by: STUDENT IN AN ORGANIZED HEALTH CARE EDUCATION/TRAINING PROGRAM

## 2024-02-02 PROCEDURE — 83993 ASSAY FOR CALPROTECTIN FECAL: CPT | Performed by: INTERNAL MEDICINE

## 2024-02-02 PROCEDURE — 96374 THER/PROPH/DIAG INJ IV PUSH: CPT

## 2024-02-02 PROCEDURE — 87506 IADNA-DNA/RNA PROBE TQ 6-11: CPT | Mod: PORLAB | Performed by: EMERGENCY MEDICINE

## 2024-02-02 PROCEDURE — 96361 HYDRATE IV INFUSION ADD-ON: CPT

## 2024-02-02 PROCEDURE — 81001 URINALYSIS AUTO W/SCOPE: CPT | Performed by: EMERGENCY MEDICINE

## 2024-02-02 PROCEDURE — 2500000004 HC RX 250 GENERAL PHARMACY W/ HCPCS (ALT 636 FOR OP/ED): Performed by: STUDENT IN AN ORGANIZED HEALTH CARE EDUCATION/TRAINING PROGRAM

## 2024-02-02 PROCEDURE — 99223 1ST HOSP IP/OBS HIGH 75: CPT | Performed by: STUDENT IN AN ORGANIZED HEALTH CARE EDUCATION/TRAINING PROGRAM

## 2024-02-02 PROCEDURE — 87493 C DIFF AMPLIFIED PROBE: CPT | Performed by: EMERGENCY MEDICINE

## 2024-02-02 PROCEDURE — 2500000001 HC RX 250 WO HCPCS SELF ADMINISTERED DRUGS (ALT 637 FOR MEDICARE OP): Performed by: STUDENT IN AN ORGANIZED HEALTH CARE EDUCATION/TRAINING PROGRAM

## 2024-02-02 PROCEDURE — 80053 COMPREHEN METABOLIC PANEL: CPT | Performed by: EMERGENCY MEDICINE

## 2024-02-02 RX ORDER — GUAIFENESIN 600 MG/1
600 TABLET, EXTENDED RELEASE ORAL EVERY 12 HOURS PRN
Status: DISCONTINUED | OUTPATIENT
Start: 2024-02-02 | End: 2024-02-03

## 2024-02-02 RX ORDER — ACETAMINOPHEN 325 MG/1
650 TABLET ORAL EVERY 4 HOURS PRN
Status: DISCONTINUED | OUTPATIENT
Start: 2024-02-02 | End: 2024-02-06 | Stop reason: HOSPADM

## 2024-02-02 RX ORDER — DICYCLOMINE HYDROCHLORIDE 10 MG/1
10 CAPSULE ORAL 4 TIMES DAILY PRN
Status: DISCONTINUED | OUTPATIENT
Start: 2024-02-02 | End: 2024-02-06 | Stop reason: HOSPADM

## 2024-02-02 RX ORDER — ONDANSETRON 4 MG/1
4 TABLET, FILM COATED ORAL EVERY 8 HOURS PRN
Status: DISCONTINUED | OUTPATIENT
Start: 2024-02-02 | End: 2024-02-06 | Stop reason: HOSPADM

## 2024-02-02 RX ORDER — PANTOPRAZOLE SODIUM 40 MG/1
40 TABLET, DELAYED RELEASE ORAL
Status: DISCONTINUED | OUTPATIENT
Start: 2024-02-03 | End: 2024-02-06 | Stop reason: HOSPADM

## 2024-02-02 RX ORDER — BISACODYL 5 MG
10 TABLET, DELAYED RELEASE (ENTERIC COATED) ORAL DAILY PRN
Status: DISCONTINUED | OUTPATIENT
Start: 2024-02-02 | End: 2024-02-06 | Stop reason: HOSPADM

## 2024-02-02 RX ORDER — ONDANSETRON HYDROCHLORIDE 2 MG/ML
4 INJECTION, SOLUTION INTRAVENOUS EVERY 8 HOURS PRN
Status: DISCONTINUED | OUTPATIENT
Start: 2024-02-02 | End: 2024-02-06 | Stop reason: HOSPADM

## 2024-02-02 RX ORDER — HYDROXYZINE HYDROCHLORIDE 50 MG/1
50 TABLET, FILM COATED ORAL EVERY 8 HOURS PRN
COMMUNITY
End: 2024-03-18 | Stop reason: HOSPADM

## 2024-02-02 RX ORDER — POLYETHYLENE GLYCOL 3350 17 G/17G
17 POWDER, FOR SOLUTION ORAL DAILY
Status: DISCONTINUED | OUTPATIENT
Start: 2024-02-02 | End: 2024-02-03

## 2024-02-02 RX ORDER — BISACODYL 10 MG/1
10 SUPPOSITORY RECTAL DAILY PRN
Status: DISCONTINUED | OUTPATIENT
Start: 2024-02-02 | End: 2024-02-06 | Stop reason: HOSPADM

## 2024-02-02 RX ORDER — POTASSIUM CHLORIDE 20 MEQ/1
40 TABLET, EXTENDED RELEASE ORAL ONCE
Status: COMPLETED | OUTPATIENT
Start: 2024-02-02 | End: 2024-02-02

## 2024-02-02 RX ORDER — PANTOPRAZOLE SODIUM 40 MG/10ML
40 INJECTION, POWDER, LYOPHILIZED, FOR SOLUTION INTRAVENOUS
Status: DISCONTINUED | OUTPATIENT
Start: 2024-02-03 | End: 2024-02-06 | Stop reason: HOSPADM

## 2024-02-02 RX ORDER — TALC
3 POWDER (GRAM) TOPICAL DAILY
Status: DISCONTINUED | OUTPATIENT
Start: 2024-02-02 | End: 2024-02-06 | Stop reason: HOSPADM

## 2024-02-02 RX ORDER — MESALAMINE 1000 MG/1
1000 SUPPOSITORY RECTAL 2 TIMES DAILY
Status: ON HOLD | COMMUNITY
End: 2024-02-02 | Stop reason: ENTERED-IN-ERROR

## 2024-02-02 RX ADMIN — POTASSIUM CHLORIDE 40 MEQ: 1500 TABLET, EXTENDED RELEASE ORAL at 16:09

## 2024-02-02 RX ADMIN — Medication 3 MG: at 22:12

## 2024-02-02 RX ADMIN — METHYLPREDNISOLONE SODIUM SUCCINATE 125 MG: 125 INJECTION, POWDER, FOR SOLUTION INTRAMUSCULAR; INTRAVENOUS at 10:36

## 2024-02-02 RX ADMIN — SODIUM CHLORIDE 1000 ML: 9 INJECTION, SOLUTION INTRAVENOUS at 10:37

## 2024-02-02 SDOH — SOCIAL STABILITY: SOCIAL INSECURITY: WERE YOU ABLE TO COMPLETE ALL THE BEHAVIORAL HEALTH SCREENINGS?: YES

## 2024-02-02 SDOH — SOCIAL STABILITY: SOCIAL INSECURITY: HAVE YOU HAD THOUGHTS OF HARMING ANYONE ELSE?: NO

## 2024-02-02 ASSESSMENT — LIFESTYLE VARIABLES
HOW MANY STANDARD DRINKS CONTAINING ALCOHOL DO YOU HAVE ON A TYPICAL DAY: 1 OR 2
HOW OFTEN DO YOU HAVE A DRINK CONTAINING ALCOHOL: 2-4 TIMES A MONTH
SKIP TO QUESTIONS 9-10: 1
SUBSTANCE_ABUSE_PAST_12_MONTHS: YES
AUDIT-C TOTAL SCORE: 2
AUDIT-C TOTAL SCORE: 2
HOW OFTEN DO YOU HAVE 6 OR MORE DRINKS ON ONE OCCASION: NEVER
PRESCIPTION_ABUSE_PAST_12_MONTHS: NO

## 2024-02-02 ASSESSMENT — COGNITIVE AND FUNCTIONAL STATUS - GENERAL
MOBILITY SCORE: 24
PATIENT BASELINE BEDBOUND: NO
DAILY ACTIVITIY SCORE: 24

## 2024-02-02 ASSESSMENT — ENCOUNTER SYMPTOMS
ARTHRALGIAS: 0
EYE PAIN: 0
HEMATURIA: 0
BLOOD IN STOOL: 1
SORE THROAT: 0
SHORTNESS OF BREATH: 0
SLEEP DISTURBANCE: 0
BACK PAIN: 0
AGITATION: 0
COLOR CHANGE: 0
CONSTIPATION: 0
LIGHT-HEADEDNESS: 0
VOMITING: 0
DIZZINESS: 0
COUGH: 0
CHILLS: 0
DYSURIA: 0
DIARRHEA: 1
RECTAL PAIN: 1
TREMORS: 0
CONFUSION: 0
UNEXPECTED WEIGHT CHANGE: 1
ABDOMINAL DISTENTION: 0
WEAKNESS: 0
SEIZURES: 0
POLYDIPSIA: 0
FEVER: 0
PHOTOPHOBIA: 0
NAUSEA: 0
ABDOMINAL PAIN: 1
PALPITATIONS: 0

## 2024-02-02 ASSESSMENT — ACTIVITIES OF DAILY LIVING (ADL)
JUDGMENT_ADEQUATE_SAFELY_COMPLETE_DAILY_ACTIVITIES: YES
DRESSING YOURSELF: INDEPENDENT
TOILETING: INDEPENDENT
WALKS IN HOME: INDEPENDENT
HEARING - RIGHT EAR: FUNCTIONAL
FEEDING YOURSELF: INDEPENDENT
ADEQUATE_TO_COMPLETE_ADL: YES
GROOMING: INDEPENDENT
PATIENT'S MEMORY ADEQUATE TO SAFELY COMPLETE DAILY ACTIVITIES?: YES
BATHING: INDEPENDENT
HEARING - LEFT EAR: FUNCTIONAL
LACK_OF_TRANSPORTATION: NO

## 2024-02-02 ASSESSMENT — PATIENT HEALTH QUESTIONNAIRE - PHQ9
1. LITTLE INTEREST OR PLEASURE IN DOING THINGS: NOT AT ALL
SUM OF ALL RESPONSES TO PHQ9 QUESTIONS 1 & 2: 0
2. FEELING DOWN, DEPRESSED OR HOPELESS: NOT AT ALL

## 2024-02-02 ASSESSMENT — COLUMBIA-SUICIDE SEVERITY RATING SCALE - C-SSRS
2. HAVE YOU ACTUALLY HAD ANY THOUGHTS OF KILLING YOURSELF?: NO
1. IN THE PAST MONTH, HAVE YOU WISHED YOU WERE DEAD OR WISHED YOU COULD GO TO SLEEP AND NOT WAKE UP?: NO
6. HAVE YOU EVER DONE ANYTHING, STARTED TO DO ANYTHING, OR PREPARED TO DO ANYTHING TO END YOUR LIFE?: NO

## 2024-02-02 ASSESSMENT — PAIN - FUNCTIONAL ASSESSMENT
PAIN_FUNCTIONAL_ASSESSMENT: 0-10
PAIN_FUNCTIONAL_ASSESSMENT: 0-10

## 2024-02-02 ASSESSMENT — PAIN SCALES - GENERAL
PAINLEVEL_OUTOF10: 3
PAINLEVEL_OUTOF10: 8

## 2024-02-02 ASSESSMENT — PAIN DESCRIPTION - LOCATION: LOCATION: ABDOMEN

## 2024-02-02 NOTE — H&P
History Of Present Illness:  Kyra Behnfeldt is a 23 y.o. female with PMHx s/f semi-recent diagnosis of Crohn's disease, presenting with continued symptoms despite PO steroids. Patient reports that she was diagnosed with Crohn's in December of last year following multiple evaluations and eventually a colonoscopy which revealed inflammatory changes consistent with Crohn's.  Patient reports that following diagnosis in December, she was started on prednisone; most recently she was in the ER on Tuesday and after a dose of IV steroid she noted marked improvement and was discharged home.  She was started on an oral steroid taper and had close follow-up with GI (Dr. Wang).  She reports that following this appointment, she was going to start Humira; however, it had not arrived yet.  Her symptoms became more significant over the last few days, associated with upwards of 20 bowel movements per day, with each having blood associated.  She has lost approximately 7 pounds over the last week.  She is not having any nausea, vomiting; does have some mild diffuse abdominal pain/cramping.  No fevers, chills.  Patient is feeling better following steroids given in the ED and did want to try some soft foods.  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 She was resting comfortably in bed during my evaluation with friends at bedside.     ED Course (Summary):   Vitals on presentation: T97.7, /79, HR 98, RR 18, SpO2 100% RA  CBC: WBC 11.4, Hgb 12.1, platelets 312  CMP: Glucose 94, sodium 138, testing 3.0, BUN 13, serum creatinine 0.87, mag 1.78  Lactate 1.4  CRP 1.31  UA negative for infection.  C. difficile PCR negative.  Stool pathogen panel is in process.  CT on 2/1 showed wall thickening of multiple colonic loops with no other acute findings     ED Course:  Diagnoses as of 02/02/24 2047   Crohn's disease of colon with rectal bleeding (CMS/HCC)   Failure of outpatient treatment      Relevant Results  Results for orders placed or performed during the hospital encounter of 02/02/24 (from the past 24 hour(s))   CBC and Auto Differential   Result Value Ref Range    WBC 11.4 (H) 4.4 - 11.3 x10*3/uL    nRBC 0.0 0.0 - 0.0 /100 WBCs    RBC 4.15 4.00 - 5.20 x10*6/uL    Hemoglobin 12.1 12.0 - 16.0 g/dL    Hematocrit 36.0 36.0 - 46.0 %    MCV 87 80 - 100 fL    MCH 29.2 26.0 - 34.0 pg    MCHC 33.6 32.0 - 36.0 g/dL    RDW 12.2 11.5 - 14.5 %    Platelets 312 150 - 450 x10*3/uL    Neutrophils % 75.6 40.0 - 80.0 %    Immature Granulocytes %, Automated 0.4 0.0 - 0.9 %    Lymphocytes % 13.2 13.0 - 44.0 %    Monocytes % 8.9 2.0 - 10.0 %    Eosinophils % 1.3 0.0 - 6.0 %    Basophils % 0.6 0.0 - 2.0 %    Neutrophils Absolute 8.61 (H) 1.20 - 7.70 x10*3/uL    Immature Granulocytes Absolute, Automated 0.05 0.00 - 0.70 x10*3/uL    Lymphocytes Absolute 1.50 1.20 - 4.80 x10*3/uL    Monocytes Absolute 1.01 (H) 0.10 - 1.00 x10*3/uL    Eosinophils Absolute 0.15 0.00 - 0.70 x10*3/uL    Basophils Absolute 0.07 0.00 - 0.10 x10*3/uL   Comprehensive metabolic panel   Result Value Ref Range    Glucose 94 74 - 99 mg/dL    Sodium 138 136 - 145 mmol/L    Potassium 3.0 (L) 3.5 - 5.3 mmol/L    Chloride 103 98 - 107 mmol/L    Bicarbonate 27 21 - 32 mmol/L    Anion Gap 11 10 - 20 mmol/L    Urea Nitrogen 13 6 - 23 mg/dL    Creatinine 0.87 0.50 - 1.05 mg/dL    eGFR >90 >60 mL/min/1.73m*2    Calcium 8.8 8.6 - 10.3 mg/dL    Albumin 3.6 3.4 - 5.0 g/dL    Alkaline Phosphatase 73 33 - 110 U/L    Total Protein 6.6 6.4 - 8.2 g/dL    AST 11 9 - 39 U/L    Bilirubin, Total 0.2 0.0 - 1.2 mg/dL    ALT 11 7 - 45 U/L   Lactate   Result Value Ref Range    Lactate 1.4 0.4 - 2.0 mmol/L   C-reactive protein   Result Value Ref Range    C-Reactive Protein 1.31 (H) <1.00 mg/dL   Magnesium   Result Value Ref Range    Magnesium 1.78 1.60 - 2.40 mg/dL   Urinalysis with Reflex Culture and Microscopic   Result Value Ref Range    Color, Urine Yellow Straw,  Yellow    Appearance, Urine Clear Clear    Specific Gravity, Urine 1.024 1.005 - 1.035    pH, Urine 5.0 5.0, 5.5, 6.0, 6.5, 7.0, 7.5, 8.0    Protein, Urine NEGATIVE NEGATIVE mg/dL    Glucose, Urine NEGATIVE NEGATIVE mg/dL    Blood, Urine SMALL (1+) (A) NEGATIVE    Ketones, Urine NEGATIVE NEGATIVE mg/dL    Bilirubin, Urine NEGATIVE NEGATIVE    Urobilinogen, Urine <2.0 <2.0 mg/dL    Nitrite, Urine NEGATIVE NEGATIVE    Leukocyte Esterase, Urine NEGATIVE NEGATIVE   Urinalysis Microscopic   Result Value Ref Range    WBC, Urine 1-5 1-5, NONE /HPF    RBC, Urine 1-2 NONE, 1-2, 3-5 /HPF    Squamous Epithelial Cells, Urine 1-9 (SPARSE) Reference range not established. /HPF    Mucus, Urine 1+ Reference range not established. /LPF   Stool   Result Value Ref Range    Extra Tube Hold for add-ons.    Stool   Result Value Ref Range    Extra Tube Hold for add-ons.    Stool   Result Value Ref Range    Extra Tube Hold for add-ons.    C. difficile, PCR    Specimen: Stool   Result Value Ref Range    C. difficile, PCR Not Detected Not Detected      CT abdomen pelvis w IV contrast    Result Date: 2/1/2024  Interpreted By:  Gregorio Jones, STUDY: CT ABDOMEN PELVIS W IV CONTRAST;  2/1/2024 5:12 pm   INDICATION: Signs/Symptoms:Crohn's disease, worsening diarrhea and abdominal pain.   COMPARISON: None.   ACCESSION NUMBER(S): [id number]   ORDERING CLINICIAN: WALDEMAR BABIN   TECHNIQUE: CT of the abdomen and pelvis was performed.  Standard contiguous axial images were obtained at 3 mm slice thickness through the abdomen and pelvis. Coronal and sagittal reconstructions at 3 mm slice thickness were performed.   75 ml of contrast Omnipaque 350 were administered intravenously without immediate complication.   FINDINGS: Lung bases clear.   Unremarkable liver, spleen, pancreas, adrenals, and bilateral kidneys.   No definite stone in the gallbladder.   Normal appendix.   Multiple fluid-filled small bowel loops are noted in the mid to lower abdomen.  There is also apparent circumferential wall thickening of multiple colonic loops. These are nonspecific finding which can be seen with acute enterocolitis.   No free air or free fluid in the abdomen.   No abdominal aortic aneurysm.   No adenopathy.   Sections through the pelvis demonstrate a grossly unremarkable urinary bladder. Trace free fluid in the cul-de-sac. No adnexal mass.   No acute osseous abnormality in the regional skeleton. Bilateral pars defects of L5 with grade 1-2 anterolisthesis of L5 on S1.       1. Questionable acute enterocolitis. 2. No other acute finding in the abdomen and pelvis. 3. Normal appendix.   Signed by: Gregorio Jones 2/1/2024 6:21 PM Dictation workstation:   [id number]     Scheduled medications:  melatonin, 3 mg, oral, Daily  [START ON 2/3/2024] methylPREDNISolone sodium succinate (PF), 60 mg, intravenous, q24h  [START ON 2/3/2024] pantoprazole, 40 mg, oral, Daily before breakfast   Or  [START ON 2/3/2024] pantoprazole, 40 mg, intravenous, Daily before breakfast  polyethylene glycol, 17 g, oral, Daily      Continuous medications:     PRN medications:  PRN medications: acetaminophen, bisacodyl, bisacodyl, dicyclomine, guaiFENesin, ondansetron **OR** ondansetron      Past Medical History  She has no past medical history on file.    Surgical History  She has no past surgical history on file.     Social History  She reports that she has never smoked. She has never been exposed to tobacco smoke. She has never used smokeless tobacco. No history on file for alcohol use and drug use.    Family History  No family history on file.     Allergies  Patient has no known allergies.    Code Status  Full Code     Review of Systems   Constitutional:  Positive for unexpected weight change (~7 lb weight loss). Negative for chills and fever.   Eyes:  Negative for photophobia and visual disturbance.   Respiratory:  Negative for cough and shortness of breath.    Cardiovascular:  Negative for chest pain,  palpitations and leg swelling.   Gastrointestinal:  Positive for abdominal pain and blood in stool. Negative for nausea and vomiting.   Endocrine: Negative for polydipsia and polyuria.   Genitourinary:  Negative for decreased urine volume, dysuria, hematuria and urgency.   Skin:  Negative for color change and rash.   Neurological:  Negative for dizziness, tremors, syncope, weakness and light-headedness.   Psychiatric/Behavioral:  Negative for confusion and sleep disturbance.    All other systems reviewed and are negative.      Last Recorded Vitals  /80 (BP Location: Right arm, Patient Position: Lying)   Pulse 89   Temp 37.1 °C (98.7 °F) (Temporal)   Resp 18   Wt 53.5 kg (118 lb)   SpO2 99%      Physical Exam  Vitals and nursing note reviewed.   Constitutional:       General: She is awake. She is not in acute distress.     Appearance: Normal appearance. She is well-developed. She is not ill-appearing or toxic-appearing.   HENT:      Head: Normocephalic and atraumatic.      Right Ear: External ear normal.      Left Ear: External ear normal.      Nose: Nose normal.      Mouth/Throat:      Mouth: Mucous membranes are moist.   Eyes:      Extraocular Movements: Extraocular movements intact.      Pupils: Pupils are equal, round, and reactive to light.   Cardiovascular:      Rate and Rhythm: Normal rate and regular rhythm.      Pulses: Normal pulses.      Heart sounds: No murmur heard.     No friction rub. No gallop.   Pulmonary:      Effort: Pulmonary effort is normal. No respiratory distress.      Breath sounds: No wheezing, rhonchi or rales.   Abdominal:      General: Bowel sounds are normal. There is no distension.      Palpations: Abdomen is soft. There is no hepatomegaly, splenomegaly or mass.      Tenderness: There is no abdominal tenderness.   Musculoskeletal:         General: No deformity or signs of injury. Normal range of motion.      Cervical back: Normal range of motion and neck supple. No rigidity  or tenderness.      Right lower leg: No edema.      Left lower leg: No edema.   Skin:     General: Skin is warm and dry.      Coloration: Skin is not pale.      Findings: No erythema, lesion or rash.   Neurological:      General: No focal deficit present.      Mental Status: She is alert and oriented to person, place, and time.      Cranial Nerves: No cranial nerve deficit.   Psychiatric:         Mood and Affect: Mood normal.         Behavior: Behavior normal. Behavior is cooperative.         Thought Content: Thought content normal.         Judgment: Judgment normal.       Assessment/Plan   Principal Problem:    Crohn's disease of colon without complication (CMS/HCC)  Active Problems:    Hypokalemia    CC (Crohn's colitis), with rectal bleeding (CMS/HCC)    Generalized abdominal pain    Crohn's disease with persistent symptoms (despite PO steroids); generalized abdominal pain  -Patient was given 125 mg IV Solu-Medrol in the ED.  Will be started on 60 mg IV tomorrow.   -If remainder of patient's stool pathogen panels and labs reveal no acute infection, patient is okay to start on Humira tomorrow  -Continue supportive care.  Patient is okay to have a diet at this time.  -Appreciate GI input    Weight loss 2/2 above  -Patient has lost approximately 7 pounds since onset of symptoms    Hypokalemia  -Potassium 3.0 on presentation, ordered replacement  -Magnesium checked, within normal limits.         Daniela Hand PA-C    Dragon dictation software was used to dictate this note and thus there may be minor errors in translation/transcription including garbled speech or misspellings. Please contact for clarification if needed.

## 2024-02-02 NOTE — CONSULTS
"Inpatient consult to Gastroenterology  Consult performed by: Dayton Cruz MD  Consult ordered by: Zoe Daugherty MD        Reason For Consult  \"Crohn's flare failing prednisone\"        [facility] Gastroenterology Consultation Note    ASSESSMENT and PLAN:       Kyra Behnfeldt is a 23 y.o. female with a significant past medical history of Crohn's disease who presented with abdominal pain and rectal bleeding. GI was consulted for \"Crohn's flare failing prednisone\".       Crohn's disease  Recent diagnosis of Crohn's and she has had persistent symptoms since the time of diagnosis that have not responded to outpatient management (PO steroids). She was planned to start on Humira, but the medication has not arrived yet. No evidence of infectious complications on available labs and recent CT had not other significant findings. Recommend IV steroids and initiation of Humira once the medication is available. Will also order additional labs including inflammatory markers (to establish baseline) and stool studies to further rule out infections. She was already given a high dose of solumedrol (125 mg) by the ER today. She should start on 60 mg of solumedrol per day tomorrow. If she is doing well with IV steroids then eventually could transition to PO steroids and then consider discharge (likely early next week) to complete steroid taper and continue Humira.  - labs ordered  - start IV steroids (Methylprednisolone 60 mg per day) tomorrow, defer to [facility] to order this medication tomorrow  - start Humira as soon as the medication is available assuming that no infectious process has been discovered (patient may bring own from home)  - if symptoms fail to improve on IV steroids or worsen then repeat imaging should be done and transfer to [facility] should also be considered        There is no inpatient GI coverage overnight (after 1600 today) and this weekend. Dr. Galeano will provide coverage again starting on 2/5/2024, but if " "there is a need for further GI evaluation or procedure prior to that then the patient should be transferred.        Dayton Cruz MD        Gastroenterology    [facility]    Clinical   [facility]        HISTORY OF PRESENT ILLNESS:     History Of Present Illness:    Kyra Behnfeldt is a 23 y.o. female with a significant past medical history of Crohn's disease who presented with abdominal pain and rectal bleeding. GI was consulted for \"Crohn's flare failing prednisone\".      Today she says that since the time of diagnosis she has not had much improvement in her symptoms. She continues to have diarrhea, blood in her stool, as well as abdominal pain. These had briefly/mildly improved while on Prednisone, but returned once she tapered below 20 mg per day and ultimately her symptoms never went away. She describes diarrhea with upwards of 20 BMs per day that are mostly small. There is red blood with every BM and BMs are usually accompanied by rectal pain. She also has pain across the upper part of her abdomen which has been aching. No fevers/chills. She has lost weight over the last couple weeks.    She was initially seen by GI (Maris Mendoza) in February 2023 for rectal bleeding that was suspected to be due to hemorrhoids. She was then seen by GI (Dr. Wang) again in November 2023 for continued bleeding. Colonoscopy was recommended and this showed inflammatory changes consistent with Crohn's disease as detailed below. She was then started on steroids. She was subsequently seen in the ER on 1/30 and was given a dose of IV methylprednisolone and discharged. She then followed with Dr. Wang yesterday who reported that she would be starting Humira. He had ordered a CT as well as stool testing and started her on topical steroids (Canasa) as well as the prednisone that she was already on.    CT on 2/1 showed wall thickening " "of multiple colonic loops with no other acute findings. Labs have shown a negative C diff. Other stool studies to rule out infectious causes (Pathogen panel, Giardia, O&P) have been ordered multiple times, but have not been done. CRP was normal in December. Calprotectin was ordered multiple time, but has not been done. Testing for Hep B and TB were negative.      Endoscopy History:  12/7/2023 - Colonoscopy: normal TI (normal mucosa on path), 7 mm cecal polyp (SSA on path), moderate inflammatory changes with edema/erosions/erythema/friability/deep ulcerations in the proximal transverse colon and distal rectum with normal mucosa in the remainder of the colon (chronic/active colitis without granulomas on path)      Review of systems:     Patient denies any heartburn/GERD, N/V, dysphagia, odynophagia, constipation, hematemesis, or melena.    I performed a complete 10 point review of systems and it is negative except as noted in HPI or above. All other systems have been reviewed and are negative.        PAST HISTORIES:       Past Medical History:  Crohn's disease    Past Surgical History:  She has no past surgical history on file.      Social History:  She reports that she has never smoked. She has never been exposed to tobacco smoke. She has never used smokeless tobacco. No history on file for alcohol use and drug use.    Family History:  No known GI disease, specifically denies pancreatitis, Crohn's, colon cancer, gastroesophageal cancer, or ulcerative colitis.    No family history on file.     Allergies:  Patient has no known allergies.      OBJECTIVE:       Last Recorded Vitals:  Vitals:    02/02/24 0945   BP: 132/79   Pulse: 98   Resp: 18   Temp: 36.5 °C (97.7 °F)   TempSrc: Temporal   SpO2: 100%   Weight: 53.5 kg (118 lb)   Height: 1.6 m (5' 3\")     /79   Pulse 98   Temp 36.5 °C (97.7 °F) (Temporal)   Resp 18   Ht 1.6 m (5' 3\")   Wt 53.5 kg (118 lb)   LMP 01/30/2024   SpO2 100%   BMI 20.90 kg/m²  "     Physical Exam:    Physical Exam  Vitals reviewed.   Constitutional:       General: She is not in acute distress.     Appearance: She is not ill-appearing.   HENT:      Head: Normocephalic and atraumatic.   Eyes:      General: No scleral icterus.  Cardiovascular:      Rate and Rhythm: Regular rhythm. Tachycardia present.      Pulses: Normal pulses.      Heart sounds: Normal heart sounds. No murmur heard.  Pulmonary:      Effort: Pulmonary effort is normal. No respiratory distress.      Breath sounds: Normal breath sounds. No wheezing.   Abdominal:      General: Bowel sounds are normal.      Palpations: Abdomen is soft.      Tenderness: There is abdominal tenderness (diffuse). There is no guarding or rebound.   Musculoskeletal:         General: No swelling or deformity.   Skin:     General: Skin is warm and dry.      Coloration: Skin is not jaundiced.      Findings: No rash.   Neurological:      General: No focal deficit present.      Mental Status: She is alert and oriented to person, place, and time.   Psychiatric:         Mood and Affect: Mood normal.         Behavior: Behavior normal.         Thought Content: Thought content normal.         Judgment: Judgment normal.           Inpatient Medications:     PRN medications: acetaminophen    Outpatient Medications:  Prior to Admission medications    Medication Sig Start Date End Date Taking? Authorizing Provider   adalimumab (Humira Pen) 40 mg/0.4 mL pen injector kit pen-injector Inject 1 Pen (40 mg) under the skin every 14 (fourteen) days. 1/17/24   Mane Wang MD   adalimumab (Humira Pen) 40 mg/0.4 mL pen injector kit pen-injector Inject 1 Pen (40 mg) under the skin every 14 (fourteen) days. 1/19/24   Mane Wang MD   adalimumab (Humira,CF, Pen Crohns-UC-HS) 80 mg/0.8 mL pen injector kit pen-injector adalimumab (Humira) dose instructions: On day 1, inject 160 mg under the skin. On day 15, inject 80 mg under skin. Change sites each time. 1/11/24 2/9/24   Mane Wang MD   adalimumab (Humira,CF, Pen Crohns-UC-HS) 80 mg/0.8 mL pen injector kit pen-injector adalimumab (Humira) dose instructions: On day 1, inject 160 mg under the skin. On day 15, inject 80 mg under skin. Change sites each time. 1/19/24 2/17/24  Mane Wang MD   cyclobenzaprine (Flexeril) 10 mg tablet Take 0.5 tablets (5 mg) by mouth 2 times a day as needed for muscle spasms for up to 10 doses. 1/30/24   Marylou Hastings MD   mesalamine (Canasa) 1,000 mg suppository Insert 1 suppository (1,000 mg) into the rectum 2 times a day. Use as directed 2/1/24 1/31/25  Mane Wang MD   predniSONE (Deltasone) 10 mg tablet Take 4 tablets (40 mg) by mouth once daily for 14 days, THEN 3 tablets (30 mg) once daily for 7 days, THEN 2 tablets (20 mg) once daily for 7 days, THEN 1 tablet (10 mg) once daily for 7 days. Take by mouth as directed. 2/1/24 3/7/24  Mane Wang MD   budesonide EC (Entocort EC) 3 mg 24 hr capsule Take 3 capsules (9 mg) by mouth once daily in the morning for 30 days, THEN 2 capsules (6 mg) once daily in the morning for 30 days, THEN 1 capsule (3 mg) once daily in the morning.  Patient not taking: Reported on 2/1/2024 1/12/24 2/1/24  Mane Wang MD   hydrOXYzine HCL (Atarax) 50 mg tablet Take 1 tablet (50 mg) by mouth every 8 hours if needed. 11/7/23 2/1/24  Historical Provider, MD   predniSONE (Deltasone) 20 mg tablet Take 2 tablets (40 mg) by mouth once daily for 7 days. 1/30/24 2/1/24  Marylou Hastings MD       LABS AND IMAGING:     Labs:  Recent labs reviewed in the EMR.    Lab Results   Component Value Date    WBC 11.4 (H) 02/02/2024    HGB 12.1 02/02/2024    MCV 87 02/02/2024     02/02/2024       Lab Results   Component Value Date     02/02/2024    K 3.0 (L) 02/02/2024     02/02/2024    BUN 13 02/02/2024    CREATININE 0.87 02/02/2024       Lab Results   Component Value Date    BILITOT 0.2 02/02/2024    ALKPHOS 73 02/02/2024    AST 11 02/02/2024    ALT 11  "02/02/2024       No results found for: \"CRP\", \"CALPS\"      Imaging:  CT abdomen pelvis w IV contrast    Result Date: 2/1/2024  Interpreted By:  Gregorio Jones, STUDY: CT ABDOMEN PELVIS W IV CONTRAST;  2/1/2024 5:12 pm   INDICATION: Signs/Symptoms:Crohn's disease, worsening diarrhea and abdominal pain.   COMPARISON: None.   ACCESSION NUMBER(S): [id number]   ORDERING CLINICIAN: WALDEMAR BABIN   TECHNIQUE: CT of the abdomen and pelvis was performed.  Standard contiguous axial images were obtained at 3 mm slice thickness through the abdomen and pelvis. Coronal and sagittal reconstructions at 3 mm slice thickness were performed.   75 ml of contrast Omnipaque 350 were administered intravenously without immediate complication.   FINDINGS: Lung bases clear.   Unremarkable liver, spleen, pancreas, adrenals, and bilateral kidneys.   No definite stone in the gallbladder.   Normal appendix.   Multiple fluid-filled small bowel loops are noted in the mid to lower abdomen. There is also apparent circumferential wall thickening of multiple colonic loops. These are nonspecific finding which can be seen with acute enterocolitis.   No free air or free fluid in the abdomen.   No abdominal aortic aneurysm.   No adenopathy.   Sections through the pelvis demonstrate a grossly unremarkable urinary bladder. Trace free fluid in the cul-de-sac. No adnexal mass.   No acute osseous abnormality in the regional skeleton. Bilateral pars defects of L5 with grade 1-2 anterolisthesis of L5 on S1.       1. Questionable acute enterocolitis. 2. No other acute finding in the abdomen and pelvis. 3. Normal appendix.   Signed by: Gregorio Jones 2/1/2024 6:21 PM Dictation workstation:   [id number]         "

## 2024-02-02 NOTE — PROGRESS NOTES
Kyra Behnfeldt is a 23 y.o. female admitted for Crohn's disease of colon without complication (CMS/[facility]). Pharmacy reviewed the patient's [id number] medications and allergies for accuracy.    The list below reflects the PTA list prior to pharmacy medication history. A summary a changes to the PTA medication list has been listed below. Please review each medication in order reconciliation for additional clarification and justification.    Medications added: HUMIRA PEN 40MG EVERY 14 DAYS,     Medications modified: mesalamine prn    Medications to be removed: BUDESONIDE  Medications of concern: Multiple humira doses on record, patient has not started yet      Prior to Admission Medications   Prescriptions Last Dose Informant Patient Reported? Taking?   adalimumab (Humira Pen) 40 mg/0.4 mL pen injector kit pen-injector   No No   Sig: Inject 1 Pen (40 mg) under the skin every 14 (fourteen) days.   adalimumab (Humira Pen) 40 mg/0.4 mL pen injector kit pen-injector   No No   Sig: Inject 1 Pen (40 mg) under the skin every 14 (fourteen) days.   adalimumab (Humira,CF, Pen Crohns-UC-HS) 80 mg/0.8 mL pen injector kit pen-injector   No No   Sig: adalimumab (Humira) dose instructions: On day 1, inject 160 mg under the skin. On day 15, inject 80 mg under skin. Change sites each time.   adalimumab (Humira,CF, Pen Crohns-UC-HS) 80 mg/0.8 mL pen injector kit pen-injector   No No   Sig: adalimumab (Humira) dose instructions: On day 1, inject 160 mg under the skin. On day 15, inject 80 mg under skin. Change sites each time.   cyclobenzaprine (Flexeril) 10 mg tablet   No No   Sig: Take 0.5 tablets (5 mg) by mouth 2 times a day as needed for muscle spasms for up to 10 doses.   mesalamine (Canasa) 1,000 mg suppository   No No   Sig: Insert 1 suppository (1,000 mg) into the rectum 2 times a day. Use as directed   predniSONE (Deltasone) 10 mg tablet   No No   Sig: Take 4 tablets (40 mg) by mouth once daily for 14 days, THEN 3 tablets  (30 mg) once daily for 7 days, THEN 2 tablets (20 mg) once daily for 7 days, THEN 1 tablet (10 mg) once daily for 7 days. Take by mouth as directed.      Facility-Administered Medications: None       Юлия Suero CPhT

## 2024-02-02 NOTE — CARE PLAN
Problem: Pain  Goal: Takes deep breaths with improved pain control throughout the shift  Outcome: Progressing  Goal: Turns in bed with improved pain control throughout the shift  Outcome: Progressing  Goal: Walks with improved pain control throughout the shift  Outcome: Progressing  Goal: Performs ADL's with improved pain control throughout shift  Outcome: Progressing  Goal: Participates in PT with improved pain control throughout the shift  Outcome: Progressing  Goal: Free from opioid side effects throughout the shift  Outcome: Progressing  Goal: Free from acute confusion related to pain meds throughout the shift  Outcome: Progressing     Problem: Safety - Adult  Goal: Free from fall injury  Outcome: Progressing     Problem: Discharge Planning  Goal: Discharge to home or other facility with appropriate resources  Outcome: Progressing     Problem: Chronic Conditions and Co-morbidities  Goal: Patient's chronic conditions and co-morbidity symptoms are monitored and maintained or improved  Outcome: Progressing

## 2024-02-02 NOTE — ED PROVIDER NOTES
HPI   Chief Complaint   Patient presents with    Abdominal Pain     Crohn's flare up x 1 week       HPI  Kyra Behnfeldt is a 23 y.o. female who presents for Crohns flare up. States that she has been having bloody bowel movements 2-3 times every hour with associated 8/10 cramping pain. She has had a 8lbs weight lose since Sunday with no improvement in her symptoms even after being on prednisone for the past 3 days. States that she just saw her gastroenterologist yesterday 02/01/24 and was suppose to start Humira today to see if symptoms could be controlled. The pain so bad today she decided to come in for further treatment and evaluation.  She is unable to eat solid food secondary to the symptoms and has had a 5 to 7 pound weight loss over the course of the symptoms.                  Stambaugh Coma Scale Score: 15                  Current Facility-Administered Medications on File Prior to Encounter   Medication Dose Route Frequency Provider Last Rate Last Admin    [COMPLETED] iohexol (OMNIPaque) 350 mg iodine/mL solution 75 mL  75 mL intravenous Once in imaging Mane Wang MD   75 mL at 02/01/24 1716     Current Outpatient Medications on File Prior to Encounter   Medication Sig Dispense Refill    adalimumab (Humira Pen) 40 mg/0.4 mL pen injector kit pen-injector Inject 1 Pen (40 mg) under the skin every 14 (fourteen) days. 2 each 5    adalimumab (Humira Pen) 40 mg/0.4 mL pen injector kit pen-injector Inject 1 Pen (40 mg) under the skin every 14 (fourteen) days. 2 each 5    adalimumab (Humira,CF, Pen Crohns-UC-HS) 80 mg/0.8 mL pen injector kit pen-injector adalimumab (Humira) dose instructions: On day 1, inject 160 mg under the skin. On day 15, inject 80 mg under skin. Change sites each time. 3 each 0    adalimumab (Humira,CF, Pen Crohns-UC-HS) 80 mg/0.8 mL pen injector kit pen-injector adalimumab (Humira) dose instructions: On day 1, inject 160 mg under the skin. On day 15, inject 80 mg under skin. Change sites  each time. 3 each 0    cyclobenzaprine (Flexeril) 10 mg tablet Take 0.5 tablets (5 mg) by mouth 2 times a day as needed for muscle spasms for up to 10 doses. 5 tablet 0    mesalamine (Canasa) 1,000 mg suppository Insert 1 suppository (1,000 mg) into the rectum 2 times a day. Use as directed 60 suppository 11    predniSONE (Deltasone) 10 mg tablet Take 4 tablets (40 mg) by mouth once daily for 14 days, THEN 3 tablets (30 mg) once daily for 7 days, THEN 2 tablets (20 mg) once daily for 7 days, THEN 1 tablet (10 mg) once daily for 7 days. Take by mouth as directed. 98 tablet 0    [DISCONTINUED] budesonide EC (Entocort EC) 3 mg 24 hr capsule Take 3 capsules (9 mg) by mouth once daily in the morning for 30 days, THEN 2 capsules (6 mg) once daily in the morning for 30 days, THEN 1 capsule (3 mg) once daily in the morning. (Patient not taking: Reported on 2/1/2024) 180 capsule 0    [DISCONTINUED] hydrOXYzine HCL (Atarax) 50 mg tablet Take 1 tablet (50 mg) by mouth every 8 hours if needed.      [DISCONTINUED] predniSONE (Deltasone) 20 mg tablet Take 2 tablets (40 mg) by mouth once daily for 7 days. 14 tablet 0        No Known Allergies    Review of Systems   Constitutional:  Positive for unexpected weight change (8 lbs weight loss since Sunday 1/28/24). Negative for chills and fever.   HENT:  Negative for ear pain and sore throat.    Eyes:  Negative for pain and visual disturbance.   Respiratory:  Negative for cough and shortness of breath.    Cardiovascular:  Negative for chest pain and palpitations.   Gastrointestinal:  Positive for abdominal pain, blood in stool, diarrhea and rectal pain. Negative for abdominal distention, constipation, nausea and vomiting.   Genitourinary:  Negative for dysuria and hematuria.   Musculoskeletal:  Negative for arthralgias and back pain.   Skin:  Negative for color change and rash.   Neurological:  Negative for dizziness, seizures, syncope and light-headedness.   Psychiatric/Behavioral:   Negative for agitation, behavioral problems and confusion.    All other systems reviewed and are negative.        PMH: Reviewed, documented below in note. Pertinents in HPI  PSH: Reviewed and documented below in note. Pertinents in HPI  SH: No tobacco alcohol or illicits   Fam: Reviewed, noncontributory to patients current complaint  MEDS: Reviewed and documented below in note. Pertinents in HPI  ALLERGIES: Reviewed and documented below in note.    Patient History   No past medical history on file.  No past surgical history on file.  No family history on file.  Social History     Tobacco Use    Smoking status: Never     Passive exposure: Never    Smokeless tobacco: Never   Substance Use Topics    Alcohol use: Not on file    Drug use: Not on file       Physical Exam   ED Triage Vitals [02/02/24 0945]   Temperature Heart Rate Respirations BP   36.5 °C (97.7 °F) 98 18 132/79      Pulse Ox Temp Source Heart Rate Source Patient Position   100 % Temporal -- --      BP Location FiO2 (%)     -- --       Physical Exam  Vitals and nursing note reviewed.   Constitutional:       General: She is not in acute distress.     Appearance: She is well-developed.   HENT:      Head: Normocephalic and atraumatic.   Eyes:      Extraocular Movements: Extraocular movements intact.      Conjunctiva/sclera: Conjunctivae normal.   Cardiovascular:      Rate and Rhythm: Normal rate and regular rhythm.      Heart sounds: No murmur heard.  Pulmonary:      Effort: Pulmonary effort is normal. No respiratory distress.      Breath sounds: Normal breath sounds.   Abdominal:      Palpations: Abdomen is soft.      Tenderness: There is abdominal tenderness in the epigastric area, suprapubic area and left lower quadrant. There is no guarding. Negative signs include Brown's sign and McBurney's sign.   Musculoskeletal:         General: No swelling.      Cervical back: Neck supple.   Skin:     General: Skin is warm and dry.      Capillary Refill: Capillary  refill takes less than 2 seconds.   Neurological:      General: No focal deficit present.      Mental Status: She is alert and oriented to person, place, and time.   Psychiatric:         Mood and Affect: Mood normal.       Labs Reviewed   CBC WITH AUTO DIFFERENTIAL - Abnormal       Result Value    WBC 11.4 (*)     nRBC 0.0      RBC 4.15      Hemoglobin 12.1      Hematocrit 36.0      MCV 87      MCH 29.2      MCHC 33.6      RDW 12.2      Platelets 312      Neutrophils % 75.6      Immature Granulocytes %, Automated 0.4      Lymphocytes % 13.2      Monocytes % 8.9      Eosinophils % 1.3      Basophils % 0.6      Neutrophils Absolute 8.61 (*)     Immature Granulocytes Absolute, Automated 0.05      Lymphocytes Absolute 1.50      Monocytes Absolute 1.01 (*)     Eosinophils Absolute 0.15      Basophils Absolute 0.07     COMPREHENSIVE METABOLIC PANEL - Abnormal    Glucose 94      Sodium 138      Potassium 3.0 (*)     Chloride 103      Bicarbonate 27      Anion Gap 11      Urea Nitrogen 13      Creatinine 0.87      eGFR >90      Calcium 8.8      Albumin 3.6      Alkaline Phosphatase 73      Total Protein 6.6      AST 11      Bilirubin, Total 0.2      ALT 11     URINALYSIS WITH REFLEX CULTURE AND MICROSCOPIC - Abnormal    Color, Urine Yellow      Appearance, Urine Clear      Specific Gravity, Urine 1.024      pH, Urine 5.0      Protein, Urine NEGATIVE      Glucose, Urine NEGATIVE      Blood, Urine SMALL (1+) (*)     Ketones, Urine NEGATIVE      Bilirubin, Urine NEGATIVE      Urobilinogen, Urine <2.0      Nitrite, Urine NEGATIVE      Leukocyte Esterase, Urine NEGATIVE     C. DIFFICILE, PCR - Normal    C. difficile, PCR Not Detected      Narrative:     This test is an FDA-cleared real-time PCR assay for detection of toxigenic C. difficile DNA from unprocessed liquid or unformed stool specimens that have not undergone nucleic acid extraction in symptomatic patients with potential C. difficile infection (CDI). A positive result  may indicate colonization, and clinical assessment is required for the diagnosis of CDI. This test cannot be performed on formed stools or used as a test of cure, and should not be performed more than once per 7 days.   LACTATE - Normal    Lactate 1.4      Narrative:     Venipuncture immediately after or during the administration of Metamizole may lead to falsely low results. Testing should be performed immediately                  prior to Metamizole dosing.   STOOL PATHOGEN PANEL, PCR   STOOL    Extra Tube Hold for add-ons.     STOOL    Extra Tube Hold for add-ons.     STOOL    Extra Tube Hold for add-ons.     URINALYSIS WITH REFLEX CULTURE AND MICROSCOPIC    Narrative:     The following orders were created for panel order Urinalysis with Reflex Culture and Microscopic.                  Procedure                               Abnormality         Status                                     ---------                               -----------         ------                                     Urinalysis with Reflex C...[[id number]]  Abnormal            Final result                               Extra Urine Gray Tube[[id number]]                            In process                                                   Please view results for these tests on the individual orders.   EXTRA URINE GRAY TUBE   CALPROTECTIN STOOL   OVA/PARA + GIARDIA/CRYPTOSPORIDIUM ANTIGEN    Narrative:     The following orders were created for panel order Ova/Para + Giardia/Cryptosporidium Antigen.                  Procedure                               Abnormality         Status                                     ---------                               -----------         ------                                     Ova and Parasite Examina...[[id number]]                      In process                                 Giardia Antigen[[id number]]                                  In process                                 Cryptosporidium  Antigen,...[[id number]]                      In process                                                   Please view results for these tests on the individual orders.   OVA AND PARASITE EXAMINATION   GIARDIA ANTIGEN   CRYPTOSPORIDIUM ANTIGEN, STOOL   URINALYSIS MICROSCOPIC WITH REFLEX CULTURE    WBC, Urine 1-5      RBC, Urine 1-2      Squamous Epithelial Cells, Urine 1-9 (SPARSE)      Mucus, Urine 1+       Pain Management Panel           No data to display              No orders to display     ED Course & MDM   Diagnoses as of 02/02/24 1329   Crohn's disease of colon with rectal bleeding (CMS/HCC)   Failure of outpatient treatment           Medical Decision Making    Patient presents to the emergency department secondary to continued symptoms despite outpatient management of her Crohn's disease.  This is her third visit to a healthcare professional for the same symptoms.  She is unable to take in solid foods without increased pain and diarrhea.  On my exam patient is afebrile and stable.  Laboratory workup was performed to evaluate for acute electrolyte abnormality.  This shows a mild leukocytosis.  C. difficile and stool PCR are sent.  C. difficile is negative at this time.  Patient was given IV Solu-Medrol.  At this time patient has failed outpatient management of her Crohn's disease.  She would benefit from hospitalization and IV steroids.  Patient was discussed with the hospitalist for admission.  Procedure  Procedures            The diagnosis and plan of care was also discussed with the patient. All questions were answered. The patient was receptive and agreeable to the plan of care. The patient was instructed to return to the emergency department if any symptoms recurred, worsened, or if there were any additional concerns.    *Disclaimer: This note was dictated by speech recognition. Minor errors in transcription may be present. Please call with questions.    Gunnar Malloy [facility]  Medical student on  Emergency Medicine service    Patient was examined by myself.  Note was edited by myself.  Medical student did perform initial history of present illness and physical exam.  Medical decision making was dictated by myself and plan of care was formed by myself with discussion with the medical student.  Zoe Daugherty MD  02/02/24 [id number]       Zoe Daugherty MD  02/26/24 9847

## 2024-02-03 LAB
ANION GAP SERPL CALC-SCNC: 11 MMOL/L (ref 10–20)
BUN SERPL-MCNC: 12 MG/DL (ref 6–23)
C COLI+JEJ+UPSA DNA STL QL NAA+PROBE: NOT DETECTED
CALCIUM SERPL-MCNC: 8.5 MG/DL (ref 8.6–10.3)
CHLORIDE SERPL-SCNC: 108 MMOL/L (ref 98–107)
CO2 SERPL-SCNC: 25 MMOL/L (ref 21–32)
CREAT SERPL-MCNC: 0.7 MG/DL (ref 0.5–1.05)
EC STX1 GENE STL QL NAA+PROBE: NOT DETECTED
EC STX2 GENE STL QL NAA+PROBE: NOT DETECTED
EGFRCR SERPLBLD CKD-EPI 2021: >90 ML/MIN/1.73M*2
ERYTHROCYTE [DISTWIDTH] IN BLOOD BY AUTOMATED COUNT: 12 % (ref 11.5–14.5)
GLUCOSE SERPL-MCNC: 103 MG/DL (ref 74–99)
HCT VFR BLD AUTO: 32.1 % (ref 36–46)
HGB BLD-MCNC: 10.6 G/DL (ref 12–16)
IRON SATN MFR SERPL: 6 % (ref 25–45)
IRON SERPL-MCNC: 18 UG/DL (ref 35–150)
MAGNESIUM SERPL-MCNC: 1.85 MG/DL (ref 1.6–2.4)
MCH RBC QN AUTO: 28.6 PG (ref 26–34)
MCHC RBC AUTO-ENTMCNC: 33 G/DL (ref 32–36)
MCV RBC AUTO: 87 FL (ref 80–100)
NOROVIRUS GI + GII RNA STL NAA+PROBE: NOT DETECTED
NRBC BLD-RTO: 0 /100 WBCS (ref 0–0)
PLATELET # BLD AUTO: 304 X10*3/UL (ref 150–450)
POTASSIUM SERPL-SCNC: 3.6 MMOL/L (ref 3.5–5.3)
RBC # BLD AUTO: 3.71 X10*6/UL (ref 4–5.2)
RV RNA STL NAA+PROBE: NOT DETECTED
SALMONELLA DNA STL QL NAA+PROBE: NOT DETECTED
SHIGELLA DNA SPEC QL NAA+PROBE: NOT DETECTED
SODIUM SERPL-SCNC: 140 MMOL/L (ref 136–145)
TIBC SERPL-MCNC: 278 UG/DL (ref 240–445)
UIBC SERPL-MCNC: 260 UG/DL (ref 110–370)
V CHOLERAE DNA STL QL NAA+PROBE: NOT DETECTED
WBC # BLD AUTO: 9.6 X10*3/UL (ref 4.4–11.3)
Y ENTEROCOL DNA STL QL NAA+PROBE: NOT DETECTED

## 2024-02-03 PROCEDURE — 2500000004 HC RX 250 GENERAL PHARMACY W/ HCPCS (ALT 636 FOR OP/ED): Performed by: STUDENT IN AN ORGANIZED HEALTH CARE EDUCATION/TRAINING PROGRAM

## 2024-02-03 PROCEDURE — 2500000004 HC RX 250 GENERAL PHARMACY W/ HCPCS (ALT 636 FOR OP/ED): Performed by: INTERNAL MEDICINE

## 2024-02-03 PROCEDURE — 80048 BASIC METABOLIC PNL TOTAL CA: CPT | Performed by: STUDENT IN AN ORGANIZED HEALTH CARE EDUCATION/TRAINING PROGRAM

## 2024-02-03 PROCEDURE — 2500000001 HC RX 250 WO HCPCS SELF ADMINISTERED DRUGS (ALT 637 FOR MEDICARE OP): Performed by: STUDENT IN AN ORGANIZED HEALTH CARE EDUCATION/TRAINING PROGRAM

## 2024-02-03 PROCEDURE — 1200000002 HC GENERAL ROOM WITH TELEMETRY DAILY

## 2024-02-03 PROCEDURE — 83540 ASSAY OF IRON: CPT | Performed by: INTERNAL MEDICINE

## 2024-02-03 PROCEDURE — 36415 COLL VENOUS BLD VENIPUNCTURE: CPT | Performed by: STUDENT IN AN ORGANIZED HEALTH CARE EDUCATION/TRAINING PROGRAM

## 2024-02-03 PROCEDURE — 83735 ASSAY OF MAGNESIUM: CPT | Performed by: STUDENT IN AN ORGANIZED HEALTH CARE EDUCATION/TRAINING PROGRAM

## 2024-02-03 PROCEDURE — 85027 COMPLETE CBC AUTOMATED: CPT | Performed by: STUDENT IN AN ORGANIZED HEALTH CARE EDUCATION/TRAINING PROGRAM

## 2024-02-03 PROCEDURE — 99232 SBSQ HOSP IP/OBS MODERATE 35: CPT | Performed by: INTERNAL MEDICINE

## 2024-02-03 RX ORDER — POTASSIUM CHLORIDE 750 MG/1
40 TABLET, FILM COATED, EXTENDED RELEASE ORAL ONCE
Status: DISCONTINUED | OUTPATIENT
Start: 2024-02-03 | End: 2024-02-03

## 2024-02-03 RX ORDER — POTASSIUM CHLORIDE 750 MG/1
40 TABLET, FILM COATED, EXTENDED RELEASE ORAL ONCE
Status: COMPLETED | OUTPATIENT
Start: 2024-02-03 | End: 2024-02-03

## 2024-02-03 RX ADMIN — DICYCLOMINE HYDROCHLORIDE 10 MG: 10 CAPSULE ORAL at 09:47

## 2024-02-03 RX ADMIN — Medication 3 MG: at 22:40

## 2024-02-03 RX ADMIN — PANTOPRAZOLE SODIUM 40 MG: 40 TABLET, DELAYED RELEASE ORAL at 05:52

## 2024-02-03 RX ADMIN — METHYLPREDNISOLONE SODIUM SUCCINATE 60 MG: 125 INJECTION, POWDER, FOR SOLUTION INTRAMUSCULAR; INTRAVENOUS at 09:47

## 2024-02-03 RX ADMIN — POTASSIUM CHLORIDE 40 MEQ: 750 TABLET, FILM COATED, EXTENDED RELEASE ORAL at 09:47

## 2024-02-03 RX ADMIN — DICYCLOMINE HYDROCHLORIDE 10 MG: 10 CAPSULE ORAL at 00:16

## 2024-02-03 ASSESSMENT — COGNITIVE AND FUNCTIONAL STATUS - GENERAL
MOBILITY SCORE: 24
DAILY ACTIVITIY SCORE: 24

## 2024-02-03 ASSESSMENT — PAIN SCALES - GENERAL
PAINLEVEL_OUTOF10: 0 - NO PAIN
PAINLEVEL_OUTOF10: 3

## 2024-02-03 ASSESSMENT — PAIN - FUNCTIONAL ASSESSMENT: PAIN_FUNCTIONAL_ASSESSMENT: 0-10

## 2024-02-03 NOTE — CARE PLAN
The patient's goals for the shift include    Problem: Pain  Goal: Takes deep breaths with improved pain control throughout the shift  Outcome: Progressing  Goal: Turns in bed with improved pain control throughout the shift  Outcome: Progressing  Goal: Walks with improved pain control throughout the shift  Outcome: Progressing  Goal: Performs ADL's with improved pain control throughout shift  Outcome: Progressing  Goal: Participates in PT with improved pain control throughout the shift  Outcome: Progressing  Goal: Free from opioid side effects throughout the shift  Outcome: Progressing  Goal: Free from acute confusion related to pain meds throughout the shift  Outcome: Progressing     Problem: Safety - Adult  Goal: Free from fall injury  Outcome: Progressing     Problem: Discharge Planning  Goal: Discharge to home or other facility with appropriate resources  Outcome: Progressing     Problem: Chronic Conditions and Co-morbidities  Goal: Patient's chronic conditions and co-morbidity symptoms are monitored and maintained or improved  Outcome: Progressing

## 2024-02-03 NOTE — PROGRESS NOTES
Kyra Behnfeldt is a 23 y.o. female on day 1 of admission presenting with Crohn's disease of colon without complication (CMS/HCC).    Subjective   Kyra Behnfeldt is a 23 y.o. female with PMHx s/f semi-recent diagnosis of Crohn's disease, presenting with continued symptoms despite PO steroids. Patient reports that she was diagnosed with Crohn's in December of last year following multiple evaluations and eventually a colonoscopy which revealed inflammatory changes consistent with Crohn's.  Patient reports that following diagnosis in December, she was started on prednisone; most recently she was in the ER on Tuesday and after a dose of IV steroid she noted marked improvement and was discharged home.  She was started on an oral steroid taper and had close follow-up with GI (Dr. Wang).  She reports that following this appointment, she was going to start Humira; however, it had not arrived yet.  Her symptoms became more significant over the last few days, associated with upwards of 20 bowel movements per day, with each having blood associated.  She has lost approximately 7 pounds over the last week.  She is not having any nausea, vomiting; does have some mild diffuse abdominal pain/cramping.  No fevers, chills.  Patient is feeling better following steroids given in the ED and did want to try some soft foods.  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 She was resting comfortably in bed during my evaluation with friends at bedside.      ED Course (Summary):   Vitals on presentation: T97.7, /79, HR 98, RR 18, SpO2 100% RA  CBC: WBC 11.4, Hgb 12.1, platelets 312  CMP: Glucose 94, sodium 138, testing 3.0, BUN 13, serum creatinine 0.87, mag 1.78  Lactate 1.4  CRP 1.31  UA negative for infection.  C. difficile PCR negative.  Stool pathogen panel is in process.  CT on 2/1 showed wall thickening of multiple colonic loops with no other acute findings     2/3: Patient still with  "jovita bloody stools painful cramping.  Down to about 7 out of 10 in terms of pain and symptoms according to the patient still pretty significant.  Continue IV Solu-Medrol 60 mg daily.  Also patient stool studies so far negative C. difficile and antigen panels.  Low likelihood of Giardia and O&P of being positive so would be okay with starting the Humira.  Patient concerned about dropping hemoglobin we will continue to monitor this if hemoglobin continues to drop may use some IV iron.  Will check iron studies.  Continue with supportive care GI to round on patient on Monday.       Objective     Physical Exam    EXAM:    Constitutional: Patient slightly pale in coloration    Head and facial: No abnormalities    Neck: Normal range of motion no JVD noted    Lungs: Normal breath sounds    Heart: Regular heart rate and junior    Abdomen: Tenderness to gentle palpation.  Positive bowel sounds slightly hyperactive.    Pelvis/: No flank tenderness noted    Extremities: No gross abnormalities noted    Neurologic: No focal deficits    Dermatologic: Skin normal in appearance perhaps slightly pale    Psychiatric/behavioral: Pleasant and appropriate      Last Recorded Vitals  Blood pressure 111/75, pulse 81, temperature 36.4 °C (97.5 °F), temperature source Temporal, resp. rate 16, height 1.6 m (5' 3\"), weight 52.7 kg (116 lb 3.2 oz), last menstrual period 01/30/2024, SpO2 99 %.  Intake/Output last 3 Shifts:  I/O last 3 completed shifts:  In: 1590 (30.2 mL/kg) [P.O.:590; IV Piggyback:1000]  Out: - (0 mL/kg)   Weight: 52.7 kg     Relevant Results  Scheduled medications  melatonin, 3 mg, oral, Daily  methylPREDNISolone sodium succinate (PF), 60 mg, intravenous, q24h  pantoprazole, 40 mg, oral, Daily before breakfast   Or  pantoprazole, 40 mg, intravenous, Daily before breakfast  potassium chloride CR, 40 mEq, oral, Once      Continuous medications     PRN medications  PRN medications: acetaminophen, bisacodyl, bisacodyl, " dicyclomine, ondansetron **OR** ondansetron       Labs from the last few days have been reviewed.    Assessment/Plan   Principal Problem:    Crohn's disease of colon without complication (CMS/HCC)  Active Problems:    Hypokalemia    CC (Crohn's colitis), with rectal bleeding (CMS/HCC)    Generalized abdominal pain   Anemia of blood loss    GI consultation  Chart reviewed  Follows outpatient with Dr. Wang of GI  IV Solu-Medrol 60 mg daily  Patient may begin Humira  Check iron studies  Protonix daily  Supportive care  Supplemental electrolytes  Melatonin at bedtime  As needed Tylenol, Zofran, Bentyl, Dulcolax,  Anticipate in hospital through the weekend  Stool studies negative for PCR and C. difficile  Follow Giardia and O&P studies  No overt evidence of infection   check labs in a.m.  See orders for complete plan         I spent 30 minutes in the professional and overall care of this patient.      Ari Harris MD

## 2024-02-04 LAB
ANION GAP SERPL CALC-SCNC: 10 MMOL/L (ref 10–20)
BASOPHILS # BLD AUTO: 0.06 X10*3/UL (ref 0–0.1)
BASOPHILS NFR BLD AUTO: 0.6 %
BUN SERPL-MCNC: 11 MG/DL (ref 6–23)
CALCIUM SERPL-MCNC: 8.5 MG/DL (ref 8.6–10.3)
CHLORIDE SERPL-SCNC: 105 MMOL/L (ref 98–107)
CO2 SERPL-SCNC: 26 MMOL/L (ref 21–32)
CREAT SERPL-MCNC: 0.71 MG/DL (ref 0.5–1.05)
EGFRCR SERPLBLD CKD-EPI 2021: >90 ML/MIN/1.73M*2
EOSINOPHIL # BLD AUTO: 0.11 X10*3/UL (ref 0–0.7)
EOSINOPHIL NFR BLD AUTO: 1.1 %
ERYTHROCYTE [DISTWIDTH] IN BLOOD BY AUTOMATED COUNT: 12 % (ref 11.5–14.5)
GLUCOSE SERPL-MCNC: 85 MG/DL (ref 74–99)
HCT VFR BLD AUTO: 34.7 % (ref 36–46)
HGB BLD-MCNC: 11.7 G/DL (ref 12–16)
IMM GRANULOCYTES # BLD AUTO: 0.15 X10*3/UL (ref 0–0.7)
IMM GRANULOCYTES NFR BLD AUTO: 1.5 % (ref 0–0.9)
LYMPHOCYTES # BLD AUTO: 3.06 X10*3/UL (ref 1.2–4.8)
LYMPHOCYTES NFR BLD AUTO: 31.5 %
MAGNESIUM SERPL-MCNC: 1.9 MG/DL (ref 1.6–2.4)
MCH RBC QN AUTO: 28.9 PG (ref 26–34)
MCHC RBC AUTO-ENTMCNC: 33.7 G/DL (ref 32–36)
MCV RBC AUTO: 86 FL (ref 80–100)
MONOCYTES # BLD AUTO: 0.83 X10*3/UL (ref 0.1–1)
MONOCYTES NFR BLD AUTO: 8.5 %
NEUTROPHILS # BLD AUTO: 5.51 X10*3/UL (ref 1.2–7.7)
NEUTROPHILS NFR BLD AUTO: 56.8 %
NRBC BLD-RTO: 0 /100 WBCS (ref 0–0)
PLATELET # BLD AUTO: 305 X10*3/UL (ref 150–450)
POTASSIUM SERPL-SCNC: 3.3 MMOL/L (ref 3.5–5.3)
RBC # BLD AUTO: 4.05 X10*6/UL (ref 4–5.2)
SODIUM SERPL-SCNC: 138 MMOL/L (ref 136–145)
WBC # BLD AUTO: 9.7 X10*3/UL (ref 4.4–11.3)

## 2024-02-04 PROCEDURE — 2500000001 HC RX 250 WO HCPCS SELF ADMINISTERED DRUGS (ALT 637 FOR MEDICARE OP): Performed by: STUDENT IN AN ORGANIZED HEALTH CARE EDUCATION/TRAINING PROGRAM

## 2024-02-04 PROCEDURE — 99233 SBSQ HOSP IP/OBS HIGH 50: CPT | Performed by: INTERNAL MEDICINE

## 2024-02-04 PROCEDURE — 80048 BASIC METABOLIC PNL TOTAL CA: CPT | Performed by: INTERNAL MEDICINE

## 2024-02-04 PROCEDURE — 85025 COMPLETE CBC W/AUTO DIFF WBC: CPT | Performed by: INTERNAL MEDICINE

## 2024-02-04 PROCEDURE — 2500000004 HC RX 250 GENERAL PHARMACY W/ HCPCS (ALT 636 FOR OP/ED): Performed by: STUDENT IN AN ORGANIZED HEALTH CARE EDUCATION/TRAINING PROGRAM

## 2024-02-04 PROCEDURE — 2500000004 HC RX 250 GENERAL PHARMACY W/ HCPCS (ALT 636 FOR OP/ED): Performed by: INTERNAL MEDICINE

## 2024-02-04 PROCEDURE — 36415 COLL VENOUS BLD VENIPUNCTURE: CPT | Performed by: INTERNAL MEDICINE

## 2024-02-04 PROCEDURE — 1200000002 HC GENERAL ROOM WITH TELEMETRY DAILY

## 2024-02-04 PROCEDURE — 83735 ASSAY OF MAGNESIUM: CPT | Performed by: INTERNAL MEDICINE

## 2024-02-04 RX ORDER — POTASSIUM CHLORIDE 20 MEQ/1
40 TABLET, EXTENDED RELEASE ORAL 2 TIMES DAILY
Status: COMPLETED | OUTPATIENT
Start: 2024-02-04 | End: 2024-02-05

## 2024-02-04 RX ADMIN — METHYLPREDNISOLONE SODIUM SUCCINATE 60 MG: 125 INJECTION, POWDER, FOR SOLUTION INTRAMUSCULAR; INTRAVENOUS at 09:11

## 2024-02-04 RX ADMIN — Medication 3 MG: at 21:05

## 2024-02-04 RX ADMIN — PANTOPRAZOLE SODIUM 40 MG: 40 TABLET, DELAYED RELEASE ORAL at 04:47

## 2024-02-04 RX ADMIN — POTASSIUM CHLORIDE 40 MEQ: 1500 TABLET, EXTENDED RELEASE ORAL at 16:51

## 2024-02-04 ASSESSMENT — COGNITIVE AND FUNCTIONAL STATUS - GENERAL
MOBILITY SCORE: 24
DAILY ACTIVITIY SCORE: 24

## 2024-02-04 ASSESSMENT — PAIN SCALES - GENERAL
PAINLEVEL_OUTOF10: 3
PAINLEVEL_OUTOF10: 0 - NO PAIN

## 2024-02-04 ASSESSMENT — PAIN - FUNCTIONAL ASSESSMENT: PAIN_FUNCTIONAL_ASSESSMENT: 0-10

## 2024-02-04 NOTE — PROGRESS NOTES
Kyra Behnfeldt is a 23 y.o. female on day 2 of admission presenting with Crohn's disease of colon without complication (CMS/HCC).    Subjective   Kyra Behnfeldt is a 23 y.o. female with PMHx s/f semi-recent diagnosis of Crohn's disease, presenting with continued symptoms despite PO steroids. Patient reports that she was diagnosed with Crohn's in December of last year following multiple evaluations and eventually a colonoscopy which revealed inflammatory changes consistent with Crohn's.  Patient reports that following diagnosis in December, she was started on prednisone; most recently she was in the ER on Tuesday and after a dose of IV steroid she noted marked improvement and was discharged home.  She was started on an oral steroid taper and had close follow-up with GI (Dr. Wang).  She reports that following this appointment, she was going to start Humira; however, it had not arrived yet.  Her symptoms became more significant over the last few days, associated with upwards of 20 bowel movements per day, with each having blood associated.  She has lost approximately 7 pounds over the last week.  She is not having any nausea, vomiting; does have some mild diffuse abdominal pain/cramping.  No fevers, chills.  Patient is feeling better following steroids given in the ED and did want to try some soft foods.  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 She was resting comfortably in bed during my evaluation with friends at bedside.      ED Course (Summary):   Vitals on presentation: T97.7, /79, HR 98, RR 18, SpO2 100% RA  CBC: WBC 11.4, Hgb 12.1, platelets 312  CMP: Glucose 94, sodium 138, testing 3.0, BUN 13, serum creatinine 0.87, mag 1.78  Lactate 1.4  CRP 1.31  UA negative for infection.  C. difficile PCR negative.  Stool pathogen panel is in process.  CT on 2/1 showed wall thickening of multiple colonic loops with no other acute findings     2/3: Patient still with  "jovita bloody stools painful cramping.  Down to about 7 out of 10 in terms of pain and symptoms according to the patient still pretty significant.  Continue IV Solu-Medrol 60 mg daily.  Also patient stool studies so far negative C. difficile and antigen panels.  Low likelihood of Giardia and O&P of being positive so would be okay with starting the Humira.  Patient concerned about dropping hemoglobin we will continue to monitor this if hemoglobin continues to drop may use some IV iron.  Will check iron studies.  Continue with supportive care GI to round on patient on Monday.     2/4: Patient states she has had the best night of the week so far last night.  Still with crampy diarrhea with blood about 7 times but doing much better.  Volume is much decreased.  And symptoms appear to be decreasing.  Skin coloration slightly pale will draw off the IV this morning and check labs.  If hemoglobin continues to drop may give IV iron.  Hopefully symptoms will continue to improve and perhaps be able to go home tomorrow.  Objective     Physical Exam    EXAM:    Constitutional: Patient slightly pale in coloration    Head and facial: No abnormalities    Neck: Normal range of motion no JVD noted    Lungs: Normal breath sounds    Heart: Regular heart rate and junior    Abdomen: Tenderness to gentle palpation.  Positive bowel sounds slightly hyperactive.    Pelvis/: No flank tenderness noted    Extremities: No gross abnormalities noted    Neurologic: No focal deficits    Dermatologic: Skin normal in appearance perhaps slightly pale    Psychiatric/behavioral: Pleasant and appropriate      Last Recorded Vitals  Blood pressure 110/71, pulse 70, temperature 35.9 °C (96.7 °F), temperature source Temporal, resp. rate 17, height 1.6 m (5' 3\"), weight 52.7 kg (116 lb 3.2 oz), last menstrual period 01/30/2024, SpO2 98 %.  Intake/Output last 3 Shifts:  I/O last 3 completed shifts:  In: 710 (13.5 mL/kg) [P.O.:710]  Out: - (0 mL/kg)   Weight: " 52.7 kg     Relevant Results  Scheduled medications  melatonin, 3 mg, oral, Daily  methylPREDNISolone sodium succinate (PF), 60 mg, intravenous, q24h  pantoprazole, 40 mg, oral, Daily before breakfast   Or  pantoprazole, 40 mg, intravenous, Daily before breakfast      Continuous medications     PRN medications  PRN medications: acetaminophen, bisacodyl, bisacodyl, dicyclomine, ondansetron **OR** ondansetron       Labs from the last few days have been reviewed.    Assessment/Plan   Principal Problem:    Crohn's disease of colon without complication (CMS/HCC)  Active Problems:    Hypokalemia    CC (Crohn's colitis), with rectal bleeding (CMS/HCC)    Generalized abdominal pain   Anemia of blood loss    GI consultation  Chart reviewed  Follows outpatient with Dr. Wang of GI  IV Solu-Medrol 60 mg daily  Status post 160 mg of Humira 2/3  consider IV iron  Protonix daily  Supportive care  Supplemental electrolytes  Melatonin at bedtime  As needed Tylenol, Zofran, Bentyl, Dulcolax,  Anticipate in hospital through the weekend  Stool studies negative for PCR and C. difficile  Follow Giardia and O&P studies  No overt evidence of infection   check labs in a.m.  See orders for complete plan         I spent 30 minutes in the professional and overall care of this patient.      Ari Harris MD

## 2024-02-04 NOTE — CARE PLAN
The patient's goals for the shift include      The clinical goals for the shift include Patient will be free from pain by end of shift.    Over the shift, the patient states abdominal discomfort is a 3/10 on a 0-10 pain scale which she states is a tolerable level.

## 2024-02-05 LAB
ANION GAP SERPL CALC-SCNC: 11 MMOL/L (ref 10–20)
BASOPHILS # BLD AUTO: 0.05 X10*3/UL (ref 0–0.1)
BASOPHILS NFR BLD AUTO: 0.4 %
BUN SERPL-MCNC: 17 MG/DL (ref 6–23)
CALCIUM SERPL-MCNC: 8.5 MG/DL (ref 8.6–10.3)
CHLORIDE SERPL-SCNC: 105 MMOL/L (ref 98–107)
CO2 SERPL-SCNC: 27 MMOL/L (ref 21–32)
CREAT SERPL-MCNC: 0.61 MG/DL (ref 0.5–1.05)
CRYPTOSP AG STL QL IA: NEGATIVE
EGFRCR SERPLBLD CKD-EPI 2021: >90 ML/MIN/1.73M*2
EOSINOPHIL # BLD AUTO: 0.1 X10*3/UL (ref 0–0.7)
EOSINOPHIL NFR BLD AUTO: 0.8 %
ERYTHROCYTE [DISTWIDTH] IN BLOOD BY AUTOMATED COUNT: 11.9 % (ref 11.5–14.5)
G LAMBLIA AG STL QL IA: NEGATIVE
GLUCOSE SERPL-MCNC: 90 MG/DL (ref 74–99)
HCT VFR BLD AUTO: 32 % (ref 36–46)
HGB BLD-MCNC: 10.5 G/DL (ref 12–16)
IMM GRANULOCYTES # BLD AUTO: 0.16 X10*3/UL (ref 0–0.7)
IMM GRANULOCYTES NFR BLD AUTO: 1.3 % (ref 0–0.9)
LYMPHOCYTES # BLD AUTO: 3.11 X10*3/UL (ref 1.2–4.8)
LYMPHOCYTES NFR BLD AUTO: 24.5 %
MAGNESIUM SERPL-MCNC: 1.92 MG/DL (ref 1.6–2.4)
MCH RBC QN AUTO: 27.9 PG (ref 26–34)
MCHC RBC AUTO-ENTMCNC: 32.8 G/DL (ref 32–36)
MCV RBC AUTO: 85 FL (ref 80–100)
MONOCYTES # BLD AUTO: 1.22 X10*3/UL (ref 0.1–1)
MONOCYTES NFR BLD AUTO: 9.6 %
NEUTROPHILS # BLD AUTO: 8.03 X10*3/UL (ref 1.2–7.7)
NEUTROPHILS NFR BLD AUTO: 63.4 %
NRBC BLD-RTO: 0 /100 WBCS (ref 0–0)
O+P STL MICRO: NEGATIVE
PLATELET # BLD AUTO: 369 X10*3/UL (ref 150–450)
POTASSIUM SERPL-SCNC: 3.8 MMOL/L (ref 3.5–5.3)
RBC # BLD AUTO: 3.77 X10*6/UL (ref 4–5.2)
SODIUM SERPL-SCNC: 139 MMOL/L (ref 136–145)
WBC # BLD AUTO: 12.7 X10*3/UL (ref 4.4–11.3)

## 2024-02-05 PROCEDURE — RXMED WILLOW AMBULATORY MEDICATION CHARGE

## 2024-02-05 PROCEDURE — 85025 COMPLETE CBC W/AUTO DIFF WBC: CPT | Performed by: INTERNAL MEDICINE

## 2024-02-05 PROCEDURE — 2500000004 HC RX 250 GENERAL PHARMACY W/ HCPCS (ALT 636 FOR OP/ED): Performed by: STUDENT IN AN ORGANIZED HEALTH CARE EDUCATION/TRAINING PROGRAM

## 2024-02-05 PROCEDURE — 2500000004 HC RX 250 GENERAL PHARMACY W/ HCPCS (ALT 636 FOR OP/ED): Performed by: INTERNAL MEDICINE

## 2024-02-05 PROCEDURE — 99232 SBSQ HOSP IP/OBS MODERATE 35: CPT | Performed by: PHYSICIAN ASSISTANT

## 2024-02-05 PROCEDURE — 99232 SBSQ HOSP IP/OBS MODERATE 35: CPT | Performed by: INTERNAL MEDICINE

## 2024-02-05 PROCEDURE — 36415 COLL VENOUS BLD VENIPUNCTURE: CPT | Performed by: INTERNAL MEDICINE

## 2024-02-05 PROCEDURE — 83735 ASSAY OF MAGNESIUM: CPT | Performed by: INTERNAL MEDICINE

## 2024-02-05 PROCEDURE — 2500000001 HC RX 250 WO HCPCS SELF ADMINISTERED DRUGS (ALT 637 FOR MEDICARE OP): Performed by: STUDENT IN AN ORGANIZED HEALTH CARE EDUCATION/TRAINING PROGRAM

## 2024-02-05 PROCEDURE — 80048 BASIC METABOLIC PNL TOTAL CA: CPT | Performed by: INTERNAL MEDICINE

## 2024-02-05 PROCEDURE — 1200000002 HC GENERAL ROOM WITH TELEMETRY DAILY

## 2024-02-05 RX ORDER — PREDNISONE 10 MG/1
TABLET ORAL
Qty: 98 TABLET | Refills: 0 | Status: ON HOLD | OUTPATIENT
Start: 2024-02-05 | End: 2024-02-26 | Stop reason: SDUPTHER

## 2024-02-05 RX ORDER — ACETAMINOPHEN 325 MG/1
650 TABLET ORAL EVERY 4 HOURS PRN
Qty: 30 TABLET | Refills: 1
Start: 2024-02-05

## 2024-02-05 RX ORDER — CYCLOBENZAPRINE HCL 10 MG
10 TABLET ORAL 2 TIMES DAILY PRN
Qty: 30 TABLET | Refills: 1 | Status: SHIPPED | OUTPATIENT
Start: 2024-02-05 | End: 2024-03-18 | Stop reason: HOSPADM

## 2024-02-05 RX ADMIN — METHYLPREDNISOLONE SODIUM SUCCINATE 60 MG: 125 INJECTION, POWDER, FOR SOLUTION INTRAMUSCULAR; INTRAVENOUS at 09:11

## 2024-02-05 RX ADMIN — PANTOPRAZOLE SODIUM 40 MG: 40 TABLET, DELAYED RELEASE ORAL at 06:34

## 2024-02-05 RX ADMIN — DICYCLOMINE HYDROCHLORIDE 10 MG: 10 CAPSULE ORAL at 20:37

## 2024-02-05 RX ADMIN — POTASSIUM CHLORIDE 40 MEQ: 1500 TABLET, EXTENDED RELEASE ORAL at 20:37

## 2024-02-05 RX ADMIN — POTASSIUM CHLORIDE 40 MEQ: 1500 TABLET, EXTENDED RELEASE ORAL at 09:12

## 2024-02-05 ASSESSMENT — PAIN SCALES - GENERAL
PAINLEVEL_OUTOF10: 0 - NO PAIN

## 2024-02-05 ASSESSMENT — COGNITIVE AND FUNCTIONAL STATUS - GENERAL
DAILY ACTIVITIY SCORE: 24
DAILY ACTIVITIY SCORE: 24
MOBILITY SCORE: 24
MOBILITY SCORE: 24

## 2024-02-05 ASSESSMENT — PAIN - FUNCTIONAL ASSESSMENT
PAIN_FUNCTIONAL_ASSESSMENT: 0-10

## 2024-02-05 ASSESSMENT — ACTIVITIES OF DAILY LIVING (ADL): LACK_OF_TRANSPORTATION: NO

## 2024-02-05 NOTE — PROGRESS NOTES
02/05/24 1110   Discharge Planning   Living Arrangements Friends   Support Systems Friends/neighbors   Assistance Needed Independent   Type of Residence Private residence   Home or Post Acute Services None   Patient expects to be discharged to: Home   Does the patient need discharge transport arranged? No   Financial Resource Strain   How hard is it for you to pay for the very basics like food, housing, medical care, and heating? Not hard   Housing Stability   In the last 12 months, was there a time when you were not able to pay the mortgage or rent on time? N   In the last 12 months, was there a time when you did not have a steady place to sleep or slept in a shelter (including now)? N   Transportation Needs   In the past 12 months, has lack of transportation kept you from medical appointments or from getting medications? no   In the past 12 months, has lack of transportation kept you from meetings, work, or from getting things needed for daily living? No     Patient is from home with roommate. Patient is independent with ambulation, self care, driving, shopping, and meals.  Patient plans to return home with no needs upon discharge. [facility] will continue to follow for needs if they arise.

## 2024-02-05 NOTE — PROGRESS NOTES
Kyra Behnfeldt is a 23 y.o. female on day 3 of admission presenting with Crohn's disease of colon without complication (CMS/HCC).    Subjective   Kyra Behnfeldt is a 23 y.o. female with PMHx s/f semi-recent diagnosis of Crohn's disease, presenting with continued symptoms despite PO steroids. Patient reports that she was diagnosed with Crohn's in December of last year following multiple evaluations and eventually a colonoscopy which revealed inflammatory changes consistent with Crohn's.  Patient reports that following diagnosis in December, she was started on prednisone; most recently she was in the ER on Tuesday and after a dose of IV steroid she noted marked improvement and was discharged home.  She was started on an oral steroid taper and had close follow-up with GI (Dr. Wang).  She reports that following this appointment, she was going to start Humira; however, it had not arrived yet.  Her symptoms became more significant over the last few days, associated with upwards of 20 bowel movements per day, with each having blood associated.  She has lost approximately 7 pounds over the last week.  She is not having any nausea, vomiting; does have some mild diffuse abdominal pain/cramping.  No fevers, chills.  Patient is feeling better following steroids given in the ED and did want to try some soft foods.  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 She was resting comfortably in bed during my evaluation with friends at bedside.      ED Course (Summary):   Vitals on presentation: T97.7, /79, HR 98, RR 18, SpO2 100% RA  CBC: WBC 11.4, Hgb 12.1, platelets 312  CMP: Glucose 94, sodium 138, testing 3.0, BUN 13, serum creatinine 0.87, mag 1.78  Lactate 1.4  CRP 1.31  UA negative for infection.  C. difficile PCR negative.  Stool pathogen panel is in process.  CT on 2/1 showed wall thickening of multiple colonic loops with no other acute findings     2/3: Patient still with  "jovita bloody stools painful cramping.  Down to about 7 out of 10 in terms of pain and symptoms according to the patient still pretty significant.  Continue IV Solu-Medrol 60 mg daily.  Also patient stool studies so far negative C. difficile and antigen panels.  Low likelihood of Giardia and O&P of being positive so would be okay with starting the Humira.  Patient concerned about dropping hemoglobin we will continue to monitor this if hemoglobin continues to drop may use some IV iron.  Will check iron studies.  Continue with supportive care GI to round on patient on Monday.     2/4: Patient states she has had the best night of the week so far last night.  Still with crampy diarrhea with blood about 7 times but doing much better.  Volume is much decreased.  And symptoms appear to be decreasing.  Skin coloration slightly pale will draw off the IV this morning and check labs.  If hemoglobin continues to drop may give IV iron.  Hopefully symptoms will continue to improve and perhaps be able to go home tomorrow.  2/5: Patient's GI symptoms are improving significantly although still present.  Awaiting input from GI hopefully can discharge home today.  Anticipate discharge home.    Objective     Physical Exam    EXAM:    Constitutional: Patient slightly pale in coloration    Head and facial: No abnormalities    Neck: Normal range of motion no JVD noted    Lungs: Normal breath sounds    Heart: Regular heart rate and junior    Abdomen: Tenderness to gentle palpation.  Positive bowel sounds slightly hyperactive.    Pelvis/: No flank tenderness noted    Extremities: No gross abnormalities noted    Neurologic: No focal deficits    Dermatologic: Skin normal in appearance perhaps slightly pale    Psychiatric/behavioral: Pleasant and appropriate      Last Recorded Vitals  Blood pressure 129/90, pulse 76, temperature 36.7 °C (98.1 °F), temperature source Temporal, resp. rate 16, height 1.6 m (5' 3\"), weight 54 kg (119 lb), last " menstrual period 01/30/2024, SpO2 100 %.  Intake/Output last 3 Shifts:  I/O last 3 completed shifts:  In: 1380 (25.6 mL/kg) [P.O.:1380]  Out: - (0 mL/kg)   Weight: 54 kg     Relevant Results  Scheduled medications  melatonin, 3 mg, oral, Daily  methylPREDNISolone sodium succinate (PF), 60 mg, intravenous, q24h  pantoprazole, 40 mg, oral, Daily before breakfast   Or  pantoprazole, 40 mg, intravenous, Daily before breakfast  potassium chloride CR, 40 mEq, oral, BID      Continuous medications     PRN medications  PRN medications: acetaminophen, bisacodyl, bisacodyl, dicyclomine, ondansetron **OR** ondansetron       Labs from the last few days have been reviewed.    Assessment/Plan   Principal Problem:    Crohn's disease of colon without complication (CMS/HCC)  Active Problems:    Hypokalemia    CC (Crohn's colitis), with rectal bleeding (CMS/HCC)    Generalized abdominal pain   Anemia of blood loss      Chart reviewed  Follows outpatient with Dr. Wang of GI  IV Solu-Medrol 60 mg daily  Status post 160 mg of Humira 2/3  Protonix daily  Supportive care  Supplemental electrolytes as needed  Melatonin at bedtime  As needed Tylenol, Zofran, Bentyl, Dulcolax,  Anticipate in hospital through the weekend  Stool studies negative for PCR and C. difficile  Follow Giardia and O&P studies  No overt evidence of infection   GI to see today anticipate discharge home today  See orders for complete plan         I spent 30 minutes in the professional and overall care of this patient.      Ari Harris MD

## 2024-02-05 NOTE — CARE PLAN
The clinical goals for the shift include Patient will remain free from pain for the length of my shift.    Over the shift, the patient did make progress toward the following goals.     Problem: Pain  Goal: Takes deep breaths with improved pain control throughout the shift  Outcome: Progressing  Goal: Turns in bed with improved pain control throughout the shift  Outcome: Progressing  Goal: Walks with improved pain control throughout the shift  Outcome: Progressing  Goal: Performs ADL's with improved pain control throughout shift  Outcome: Progressing  Goal: Participates in PT with improved pain control throughout the shift  Outcome: Progressing  Goal: Free from opioid side effects throughout the shift  Outcome: Progressing  Goal: Free from acute confusion related to pain meds throughout the shift  Outcome: Progressing     Problem: Safety - Adult  Goal: Free from fall injury  Outcome: Progressing     Problem: Discharge Planning  Goal: Discharge to home or other facility with appropriate resources  Outcome: Progressing     Problem: Chronic Conditions and Co-morbidities  Goal: Patient's chronic conditions and co-morbidity symptoms are monitored and maintained or improved  Outcome: Progressing

## 2024-02-05 NOTE — DISCHARGE SUMMARY
Discharge Diagnosis  Crohn's disease of colon without complication (CMS/HCC)      Crohn's disease of colon without complication (CMS/HCC)    Hypokalemia-resolved  CC (Crohn's colitis), with rectal bleeding (CMS/HCC)  Generalized abdominal pain  Anemia of blood loss consider   over-the-counter iron supplementation    Issues Requiring Follow-Up  See after visit summary sheet for complete plan follow-up with Dr. Wang as soon as possible.  Slow taper on prednisone continue Humira.    Discharge Meds     Your medication list        START taking these medications        Instructions Last Dose Given Next Dose Due   acetaminophen 325 mg tablet  Commonly known as: Tylenol      Take 2 tablets (650 mg) by mouth every 4 hours if needed for mild pain (1 - 3).              CHANGE how you take these medications        Instructions Last Dose Given Next Dose Due   cyclobenzaprine 10 mg tablet  Commonly known as: Flexeril  What changed:   how much to take  additional instructions      Take 1 tablet (10 mg) by mouth 2 times a day as needed for muscle spasms. Take 1/2-1 tab twice a day as needed for cramping       mesalamine 1,000 mg suppository  Commonly known as: Canasa  What changed: additional instructions      Insert 1 suppository (1,000 mg) into the rectum 2 times a day. Use as directed       predniSONE 10 mg tablet  Commonly known as: Deltasone  Start taking on: February 5, 2024  What changed: See the new instructions.      Take 4 tablets (40 mg) by mouth once daily for 14 days, THEN 3 tablets (30 mg) once daily for 7 days, THEN 2 tablets (20 mg) once daily for 7 days, THEN 1 tablet (10 mg) once daily for 7 days. Take by mouth as directed.              CONTINUE taking these medications        Instructions Last Dose Given Next Dose Due   adalimumab 40 mg/0.8 mL syringe kit prefilled syringe  Commonly known as: Humira           Humira(CF) Pen Crohns-[facility] 80 mg/0.8 mL pen injector kit pen-injector  Generic drug: adalimumab       adalimumab (Humira) dose instructions: On day 1, inject 160 mg under the skin. On day 15, inject 80 mg under skin. Change sites each time.       adalimumab 40 mg/0.4 mL pen injector kit pen-injector  Commonly known as: Humira Pen      Inject 1 Pen (40 mg) under the skin every 14 (fourteen) days.       Humira(CF) Pen Crohns-[facility] 80 mg/0.8 mL pen injector kit pen-injector  Generic drug: adalimumab      adalimumab (Humira) dose instructions: On day 1, inject 160 mg under the skin. On day 15, inject 80 mg under skin. Change sites each time.       hydrOXYzine HCL 50 mg tablet  Commonly known as: Atarax                     Where to Get Your Medications        These medications were sent to [facility]  [address] [place], [place] [id number]      Hours: 8AM to 6PM Mon-Fri, 8AM to 2PM Sat, 8AM to 12PM Sun Phone: [phone]   cyclobenzaprine 10 mg tablet  predniSONE 10 mg tablet       Information about where to get these medications is not yet available    Ask your nurse or doctor about these medications  acetaminophen 325 mg tablet         Test Results Pending At Discharge  Pending Labs       Order Current Status    Calprotectin, Fecal In process    Ova and Parasite Examination In process    Ova/Para + Giardia/Cryptosporidium Antigen In process            Hospital Course   Kyra Behnfeldt is a 23 y.o. female with PMHx s/f semi-recent diagnosis of Crohn's disease, presenting with continued symptoms despite PO steroids. Patient reports that she was diagnosed with Crohn's in December of last year following multiple evaluations and eventually a colonoscopy which revealed inflammatory changes consistent with Crohn's.  Patient reports that following diagnosis in December, she was started on prednisone; most recently she was in the ER on Tuesday and after a dose of IV steroid she noted marked improvement and was discharged home.  She was started on an oral steroid taper and had close follow-up with GI (  Felipe).  She reports that following this appointment, she was going to start Humira; however, it had not arrived yet.  Her symptoms became more significant over the last few days, associated with upwards of 20 bowel movements per day, with each having blood associated.  She has lost approximately 7 pounds over the last week.  She is not having any nausea, vomiting; does have some mild diffuse abdominal pain/cramping.  No fevers, chills.  Patient is feeling better following steroids given in the ED and did want to try some soft foods.  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 She was resting comfortably in bed during my evaluation with friends at bedside.      ED Course (Summary):   Vitals on presentation: T97.7, /79, HR 98, RR 18, SpO2 100% RA  CBC: WBC 11.4, Hgb 12.1, platelets 312  CMP: Glucose 94, sodium 138, testing 3.0, BUN 13, serum creatinine 0.87, mag 1.78  Lactate 1.4  CRP 1.31  UA negative for infection.  C. difficile PCR negative.  Stool pathogen panel is in process.  CT on 2/1 showed wall thickening of multiple colonic loops with no other acute findings      2/3: Patient still with jovita bloody stools painful cramping.  Down to about 7 out of 10 in terms of pain and symptoms according to the patient still pretty significant.  Continue IV Solu-Medrol 60 mg daily.  Also patient stool studies so far negative C. difficile and antigen panels.  Low likelihood of Giardia and O&P of being positive so would be okay with starting the Humira.  Patient concerned about dropping hemoglobin we will continue to monitor this if hemoglobin continues to drop may use some IV iron.  Will check iron studies.  Continue with supportive care GI to round on patient on Monday.        2/4: Patient states she has had the best night of the week so far last night.  Still with crampy diarrhea with blood about 7 times but doing much better.  Volume is much decreased.  And symptoms  appear to be decreasing.  Skin coloration slightly pale will draw off the IV this morning and check labs.  If hemoglobin continues to drop may give IV iron.  Hopefully symptoms will continue to improve and perhaps be able to go home tomorrow.  2/5: Patient's GI symptoms are improving significantly although still present.  Awaiting input from GI hopefully can discharge home today.  Anticipate discharge home.    Patient seen by GI physicians assistant.  Spoke to Dr. Galeano who recommends slow prednisone taper and continuing Humira     pertinent Physical Exam At Time of Discharge  Physical Exam  See today's progress note for more physical exam findings  Outpatient Follow-Up  Future Appointments   Date Time Provider Department Center   3/13/2024  3:45 PM Mane Wang MD [id number] Aly Harris MD

## 2024-02-05 NOTE — PROGRESS NOTES
"Kyra Behnfeldt is a 23 y.o. female on day 3 of admission presenting with Crohn's disease of colon without complication (CMS/HCC).    Subjective   Patient seen and examined. She states that she is starting to notice improvement in her symptoms. She received her 4th dose of IV solumedrol. She is still having very frequent diarrhea with some blood, but overall reports significant improvement. Her abdominal pain is improved, but still slightly present. She is tolerating PO intake. She received her 1st home dose of humira on 2/3.    10 point ROS obtained and negative other than discussed above.       Objective     Physical Exam  Vitals reviewed.   Constitutional:       General: She is not in acute distress.     Appearance: Normal appearance.   Cardiovascular:      Rate and Rhythm: Normal rate and regular rhythm.   Pulmonary:      Effort: Pulmonary effort is normal.      Breath sounds: Normal breath sounds.   Abdominal:      General: Bowel sounds are normal. There is no distension.      Palpations: Abdomen is soft.      Tenderness: There is abdominal tenderness (mild diffuse TTP). There is no guarding or rebound.   Neurological:      General: No focal deficit present.      Mental Status: She is alert and oriented to person, place, and time.   Psychiatric:         Mood and Affect: Mood normal.         Behavior: Behavior normal.         Last Recorded Vitals  Blood pressure 117/77, pulse 89, temperature 36.6 °C (97.8 °F), resp. rate 16, height 1.6 m (5' 3\"), weight 54 kg (119 lb), last menstrual period 01/30/2024, SpO2 100 %.  Intake/Output last 3 Shifts:  I/O last 3 completed shifts:  In: 1380 (25.6 mL/kg) [P.O.:1380]  Out: - (0 mL/kg)   Weight: 54 kg     Relevant Results      CT abdomen pelvis w IV contrast    Result Date: 2/1/2024  Interpreted By:  Gregorio Jones, STUDY: CT ABDOMEN PELVIS W IV CONTRAST;  2/1/2024 5:12 pm   INDICATION: Signs/Symptoms:Crohn's disease, worsening diarrhea and abdominal pain.   COMPARISON: " "None.   ACCESSION NUMBER(S): [id number]   ORDERING CLINICIAN: WALDEMAR BABIN   TECHNIQUE: CT of the abdomen and pelvis was performed.  Standard contiguous axial images were obtained at 3 mm slice thickness through the abdomen and pelvis. Coronal and sagittal reconstructions at 3 mm slice thickness were performed.   75 ml of contrast Omnipaque 350 were administered intravenously without immediate complication.   FINDINGS: Lung bases clear.   Unremarkable liver, spleen, pancreas, adrenals, and bilateral kidneys.   No definite stone in the gallbladder.   Normal appendix.   Multiple fluid-filled small bowel loops are noted in the mid to lower abdomen. There is also apparent circumferential wall thickening of multiple colonic loops. These are nonspecific finding which can be seen with acute enterocolitis.   No free air or free fluid in the abdomen.   No abdominal aortic aneurysm.   No adenopathy.   Sections through the pelvis demonstrate a grossly unremarkable urinary bladder. Trace free fluid in the cul-de-sac. No adnexal mass.   No acute osseous abnormality in the regional skeleton. Bilateral pars defects of L5 with grade 1-2 anterolisthesis of L5 on S1.       1. Questionable acute enterocolitis. 2. No other acute finding in the abdomen and pelvis. 3. Normal appendix.   Signed by: Gregorio Jones 2/1/2024 6:21 PM Dictation workstation:   [id number]    * Cannot find OR log *  Last relevant procedure: Colonoscopy on 12/7/23                         Assessment/Plan   Principal Problem:    Crohn's disease of colon without complication (CMS/HCC)  Active Problems:    Hypokalemia    CC (Crohn's colitis), with rectal bleeding (CMS/HCC)    Generalized abdominal pain    Kyra Behnfeldt is a 23 y.o. female with a significant past medical history of Crohn's disease who presented with abdominal pain and rectal bleeding. GI was consulted for \"Crohn's flare failing prednisone\".        Crohn's disease  Recent diagnosis of Crohn's and " she has had persistent symptoms since the time of diagnosis that have not responded to outpatient management (PO steroids). She was planned to start on Humira, and was able to take her 1st home dose over the weekend. No evidence of infectious complications on available labs and recent CT had not other significant findings. CRP was elevated at 1.31, calprotectin pending. She has received 4 doses of IV steroids with improvement, although still with pretty persistent diarrhea. Pain is significantly improved per patient. Still with some hematochezia.  -Extensively discussed with patient and Dr. Galeano. In the past, patient did OK until prednsione was weaned to about 20mg. Dr. Galeano recommends patient be discharged on prednisone 40mg daily until she can arrange follow up or touch base with her GI provider, Dr. Wang. However, he recommended consideration of a longer taper to allow Humira to be loaded as patient has had worsening symptoms on lower doses of prednisone.       Case was reviewed with Dr. Galeano.      Madhavi Acosta PA-C

## 2024-02-06 ENCOUNTER — PHARMACY VISIT (OUTPATIENT)
Dept: PHARMACY | Facility: CLINIC | Age: 24
End: 2024-02-06
Payer: COMMERCIAL

## 2024-02-06 VITALS
HEIGHT: 63 IN | RESPIRATION RATE: 16 BRPM | SYSTOLIC BLOOD PRESSURE: 115 MMHG | TEMPERATURE: 98.3 F | HEART RATE: 90 BPM | DIASTOLIC BLOOD PRESSURE: 79 MMHG | BODY MASS INDEX: 20.91 KG/M2 | OXYGEN SATURATION: 100 % | WEIGHT: 118 LBS

## 2024-02-06 LAB — CALPROTECTIN STL-MCNT: 1320 UG/G

## 2024-02-06 PROCEDURE — 2500000004 HC RX 250 GENERAL PHARMACY W/ HCPCS (ALT 636 FOR OP/ED): Performed by: STUDENT IN AN ORGANIZED HEALTH CARE EDUCATION/TRAINING PROGRAM

## 2024-02-06 PROCEDURE — 99239 HOSP IP/OBS DSCHRG MGMT >30: CPT | Performed by: INTERNAL MEDICINE

## 2024-02-06 PROCEDURE — 99232 SBSQ HOSP IP/OBS MODERATE 35: CPT | Performed by: PHYSICIAN ASSISTANT

## 2024-02-06 RX ADMIN — METHYLPREDNISOLONE SODIUM SUCCINATE 60 MG: 125 INJECTION, POWDER, FOR SOLUTION INTRAMUSCULAR; INTRAVENOUS at 09:41

## 2024-02-06 RX ADMIN — PANTOPRAZOLE SODIUM 40 MG: 40 TABLET, DELAYED RELEASE ORAL at 06:14

## 2024-02-06 NOTE — DISCHARGE SUMMARY
Discharge Diagnosis  Crohn's disease of colon without complication (CMS/HCC)       Crohn's disease of colon without complication (CMS/HCC)     Hypokalemia-resolved  CC (Crohn's colitis), with rectal bleeding (CMS/HCC)  Generalized abdominal pain  Anemia of blood loss consider   over-the-counter iron supplementation     Issues Requiring Follow-Up  See after visit summary sheet for complete plan follow-up with Dr. Wang as soon as possible.  Slow taper on prednisone continue Humira.     Discharge Meds      Your medication list          START taking these medications         Instructions Last Dose Given Next Dose Due   acetaminophen 325 mg tablet  Commonly known as: Tylenol       Take 2 tablets (650 mg) by mouth every 4 hours if needed for mild pain (1 - 3).                    CHANGE how you take these medications         Instructions Last Dose Given Next Dose Due   cyclobenzaprine 10 mg tablet  Commonly known as: Flexeril  What changed:   how much to take  additional instructions       Take 1 tablet (10 mg) by mouth 2 times a day as needed for muscle spasms. Take 1/2-1 tab twice a day as needed for cramping          mesalamine 1,000 mg suppository  Commonly known as: Canasa  What changed: additional instructions       Insert 1 suppository (1,000 mg) into the rectum 2 times a day. Use as directed          predniSONE 10 mg tablet  Commonly known as: Deltasone  Start taking on: February 5, 2024  What changed: See the new instructions.       Take 4 tablets (40 mg) by mouth once daily for 14 days, THEN 3 tablets (30 mg) once daily for 7 days, THEN 2 tablets (20 mg) once daily for 7 days, THEN 1 tablet (10 mg) once daily for 7 days. Take by mouth as directed.                    CONTINUE taking these medications         Instructions Last Dose Given Next Dose Due   adalimumab 40 mg/0.8 mL syringe kit prefilled syringe  Commonly known as: Humira               Humira(CF) Pen Crohns-[facility] 80 mg/0.8 mL pen injector kit  pen-injector  Generic drug: adalimumab       adalimumab (Humira) dose instructions: On day 1, inject 160 mg under the skin. On day 15, inject 80 mg under skin. Change sites each time.          adalimumab 40 mg/0.4 mL pen injector kit pen-injector  Commonly known as: Humira Pen       Inject 1 Pen (40 mg) under the skin every 14 (fourteen) days.          Humira(CF) Pen Crohns-[facility] 80 mg/0.8 mL pen injector kit pen-injector  Generic drug: adalimumab       adalimumab (Humira) dose instructions: On day 1, inject 160 mg under the skin. On day 15, inject 80 mg under skin. Change sites each time.          hydrOXYzine HCL 50 mg tablet  Commonly known as: Atarax                             Where to Get Your Medications          These medications were sent to [facility]  [address] [place] [id number]        Hours: 8AM to 6PM Mon-Fri, 8AM to 2PM Sat, 8AM to 12PM Sun Phone: [phone]   cyclobenzaprine 10 mg tablet  predniSONE 10 mg tablet         Information about where to get these medications is not yet available    Ask your nurse or doctor about these medications  acetaminophen 325 mg tablet            Test Results Pending At Discharge  Pending Labs         Order Current Status     Calprotectin, Fecal In process     Ova and Parasite Examination In process     Ova/Para + Giardia/Cryptosporidium Antigen In process                Hospital Course   Kyra Behnfeldt is a 23 y.o. female with PMHx s/f semi-recent diagnosis of Crohn's disease, presenting with continued symptoms despite PO steroids. Patient reports that she was diagnosed with Crohn's in December of last year following multiple evaluations and eventually a colonoscopy which revealed inflammatory changes consistent with Crohn's.  Patient reports that following diagnosis in December, she was started on prednisone; most recently she was in the ER on Tuesday and after a dose of IV steroid she noted marked improvement and was discharged home.  She  was started on an oral steroid taper and had close follow-up with GI (Dr. Wang).  She reports that following this appointment, she was going to start Humira; however, it had not arrived yet.  Her symptoms became more significant over the last few days, associated with upwards of 20 bowel movements per day, with each having blood associated.  She has lost approximately 7 pounds over the last week.  She is not having any nausea, vomiting; does have some mild diffuse abdominal pain/cramping.  No fevers, chills.  Patient is feeling better following steroids given in the ED and did want to try some soft foods.  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 She was resting comfortably in bed during my evaluation with friends at bedside.      ED Course (Summary):   Vitals on presentation: T97.7, /79, HR 98, RR 18, SpO2 100% RA  CBC: WBC 11.4, Hgb 12.1, platelets 312  CMP: Glucose 94, sodium 138, testing 3.0, BUN 13, serum creatinine 0.87, mag 1.78  Lactate 1.4  CRP 1.31  UA negative for infection.  C. difficile PCR negative.  Stool pathogen panel is in process.  CT on 2/1 showed wall thickening of multiple colonic loops with no other acute findings      2/3: Patient still with jovita bloody stools painful cramping.  Down to about 7 out of 10 in terms of pain and symptoms according to the patient still pretty significant.  Continue IV Solu-Medrol 60 mg daily.  Also patient stool studies so far negative C. difficile and antigen panels.  Low likelihood of Giardia and O&P of being positive so would be okay with starting the Humira.  Patient concerned about dropping hemoglobin we will continue to monitor this if hemoglobin continues to drop may use some IV iron.  Will check iron studies.  Continue with supportive care GI to round on patient on Monday.        2/4: Patient states she has had the best night of the week so far last night.  Still with crampy diarrhea with blood about 7  times but doing much better.  Volume is much decreased.  And symptoms appear to be decreasing.  Skin coloration slightly pale will draw off the IV this morning and check labs.  If hemoglobin continues to drop may give IV iron.  Hopefully symptoms will continue to improve and perhaps be able to go home tomorrow.  2/5: Patient's GI symptoms are improving significantly although still present.  Awaiting input from GI hopefully can discharge home today.  Anticipate discharge home.    2/6: GI wished patient to stay 1 more day to receive IV Medrol this morning then okay to discharge home.  Patient seen this morning a little anxious about going home but feels she should be able to go home with current regiment.  Did have a little bit more cramping last night but have provided extra doses of Flexeril for her to go home with.  Recommend off work for a week likely should be able to return to work next Tuesday.    35 minutes spent in the care of this patient.     Patient seen by GI physicians assistant.  Spoke to Dr. Galeano who recommends slow prednisone taper and continuing Humira      pertinent Physical Exam At Time of Discharge  Physical Exam  See today's progress note for more physical exam findings  Outpatient Follow-Up         Future Appointments   Date Time Provider Department Center   3/13/2024  3:45 PM Mane Wang MD [id number] [facility]

## 2024-02-06 NOTE — PROGRESS NOTES
"Kyra Behnfeldt is a 23 y.o. female on day 4 of admission presenting with Crohn's disease of colon without complication (CMS/HCC).    Subjective   Patient seen and examined. She had some increased abdominal pain overnight, but resolved this morning. She continues to have frequent diarrhea, although small volume. Tenesmus/rectal pain still present. Still with some bright red blood per rectum. No labs drawn today.  No N/V. Tolerating diet.     10 point ROS obtained and negative other than discussed above.       Objective     Physical Exam  Vitals reviewed.   Constitutional:       General: She is not in acute distress.     Appearance: Normal appearance.   Cardiovascular:      Rate and Rhythm: Normal rate and regular rhythm.   Pulmonary:      Effort: Pulmonary effort is normal.      Breath sounds: Normal breath sounds.   Abdominal:      General: Bowel sounds are normal. There is no distension.      Palpations: Abdomen is soft.      Tenderness: There is no abdominal tenderness (mild diffuse TTP). There is no guarding or rebound.   Neurological:      General: No focal deficit present.      Mental Status: She is alert and oriented to person, place, and time.   Psychiatric:         Mood and Affect: Mood normal.         Behavior: Behavior normal.         Last Recorded Vitals  Blood pressure 115/79, pulse 90, temperature 36.8 °C (98.3 °F), resp. rate 16, height 1.6 m (5' 3\"), weight 53.5 kg (118 lb), last menstrual period 01/30/2024, SpO2 100 %.  Intake/Output last 3 Shifts:  I/O last 3 completed shifts:  In: 953 (17.7 mL/kg) [P.O.:953]  Out: - (0 mL/kg)   Weight: 54 kg     Relevant Results      Scheduled medications  melatonin, 3 mg, oral, Daily  methylPREDNISolone sodium succinate (PF), 60 mg, intravenous, q24h  pantoprazole, 40 mg, oral, Daily before breakfast   Or  pantoprazole, 40 mg, intravenous, Daily before breakfast      Continuous medications     PRN medications  PRN medications: acetaminophen, bisacodyl, bisacodyl, " dicyclomine, ondansetron **OR** ondansetron    No results found for this or any previous visit (from the past 24 hour(s)).    CT abdomen pelvis w IV contrast    Result Date: 2/1/2024  Interpreted By:  Gregorio Jones, STUDY: CT ABDOMEN PELVIS W IV CONTRAST;  2/1/2024 5:12 pm   INDICATION: Signs/Symptoms:Crohn's disease, worsening diarrhea and abdominal pain.   COMPARISON: None.   ACCESSION NUMBER(S): [id number]   ORDERING CLINICIAN: WALDEMAR BABIN   TECHNIQUE: CT of the abdomen and pelvis was performed.  Standard contiguous axial images were obtained at 3 mm slice thickness through the abdomen and pelvis. Coronal and sagittal reconstructions at 3 mm slice thickness were performed.   75 ml of contrast Omnipaque 350 were administered intravenously without immediate complication.   FINDINGS: Lung bases clear.   Unremarkable liver, spleen, pancreas, adrenals, and bilateral kidneys.   No definite stone in the gallbladder.   Normal appendix.   Multiple fluid-filled small bowel loops are noted in the mid to lower abdomen. There is also apparent circumferential wall thickening of multiple colonic loops. These are nonspecific finding which can be seen with acute enterocolitis.   No free air or free fluid in the abdomen.   No abdominal aortic aneurysm.   No adenopathy.   Sections through the pelvis demonstrate a grossly unremarkable urinary bladder. Trace free fluid in the cul-de-sac. No adnexal mass.   No acute osseous abnormality in the regional skeleton. Bilateral pars defects of L5 with grade 1-2 anterolisthesis of L5 on S1.       1. Questionable acute enterocolitis. 2. No other acute finding in the abdomen and pelvis. 3. Normal appendix.   Signed by: Gregorio Jones 2/1/2024 6:21 PM Dictation workstation:   [id number]    * Cannot find OR log *  Last relevant procedure: Colonoscopy on 12/7/23                         Assessment/Plan   Principal Problem:    Crohn's disease of colon without complication (CMS/HCC)  Active  "Problems:    Hypokalemia    CC (Crohn's colitis), with rectal bleeding (CMS/HCC)    Generalized abdominal pain    Kyra Behnfeldt is a 23 y.o. female with a significant past medical history of Crohn's disease who presented with abdominal pain and rectal bleeding. GI was consulted for \"Crohn's flare failing prednisone\".        Crohn's disease  Recent diagnosis of Crohn's and she has had persistent symptoms since the time of diagnosis that have not responded to outpatient management (PO steroids). She was planned to start on Humira, and was able to take her 1st home dose over the weekend. No evidence of infectious complications on available labs and recent CT had not other significant findings. CRP was elevated at 1.31, calprotectin pending. She has received 5 doses of IV steroids with improvement, although still with pretty persistent diarrhea and rectal bleeding. Pain has resolved this morning, exam benign.  -Extensively discussed with patient and Dr. Galeano. In the past, patient did OK until prednsione was weaned to about 20mg. Dr. Galeano recommends patient be discharged on prednisone 40mg daily until she can arrange close follow up or touch base with her GI provider, Dr. Wang. He recommended continuing on a longer prednisone taper until Humira can be loaded.  -patient asked about dicyclomine. Per Dr. Galeano, OK to use PRN VERY CAUTIOUSLY       Case was reviewed with Dr. Galeano.      Madhavi Acosta PA-C      "

## 2024-02-06 NOTE — NURSING NOTE
Patient discharged- IV removed and discharge instructions given. Patient received meds to beds from pharmacy. Patient left ambulatory with friend via private vehicle.

## 2024-02-07 ENCOUNTER — PATIENT OUTREACH (OUTPATIENT)
Dept: CARE COORDINATION | Facility: CLINIC | Age: 24
End: 2024-02-07
Payer: COMMERCIAL

## 2024-02-07 NOTE — PROGRESS NOTES
[facility]  Admitted 2/2/2024  Discharged 2/6/2024  Dx: Crohn's disease of colon without complication     Outreach call to patient to support a smooth transition of care from recent admission.  Patient states, she is doing good so far. She states, the bowel movements have decreased. Reviewed medications. Patient no questions at this time. 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    Coreen Leroy RN/CM   [facility]  [phone]

## 2024-02-14 ENCOUNTER — TELEPHONE (OUTPATIENT)
Dept: GASTROENTEROLOGY | Facility: CLINIC | Age: 24
End: 2024-02-14
Payer: COMMERCIAL

## 2024-02-14 NOTE — TELEPHONE ENCOUNTER
Amanda Nunez called stating she was released from the hospital last Tuesday and she's feeling better than she was pre hospital but hasn't improved anymore?   She is also concerned the steroids are keeping her awake, stated she slept 3 hours in 2 days.       Please advise

## 2024-02-24 ENCOUNTER — HOSPITAL ENCOUNTER (INPATIENT)
Facility: HOSPITAL | Age: 24
LOS: 1 days | Discharge: HOME | DRG: 387 | End: 2024-02-26
Attending: EMERGENCY MEDICINE | Admitting: INTERNAL MEDICINE
Payer: COMMERCIAL

## 2024-02-24 DIAGNOSIS — K50.10 CROHN'S DISEASE OF LARGE INTESTINE WITHOUT COMPLICATION (MULTI): ICD-10-CM

## 2024-02-24 DIAGNOSIS — K50.919 CROHN'S DISEASE WITH COMPLICATION, UNSPECIFIED GASTROINTESTINAL TRACT LOCATION (MULTI): Primary | ICD-10-CM

## 2024-02-24 DIAGNOSIS — R19.7 DIARRHEA, UNSPECIFIED TYPE: ICD-10-CM

## 2024-02-24 DIAGNOSIS — R10.9 ABDOMINAL PAIN, UNSPECIFIED ABDOMINAL LOCATION: ICD-10-CM

## 2024-02-24 DIAGNOSIS — K50.10 CROHN'S DISEASE OF COLON WITHOUT COMPLICATION (MULTI): ICD-10-CM

## 2024-02-24 DIAGNOSIS — K92.1 HEMATOCHEZIA: ICD-10-CM

## 2024-02-24 PROBLEM — K50.118 CROHN'S COLITIS, OTHER COMPLICATION (MULTI): Status: ACTIVE | Noted: 2024-02-24

## 2024-02-24 PROBLEM — K50.111: Status: ACTIVE | Noted: 2024-02-24

## 2024-02-24 LAB
ALBUMIN SERPL BCP-MCNC: 3.7 G/DL (ref 3.4–5)
ALP SERPL-CCNC: 57 U/L (ref 33–110)
ALT SERPL W P-5'-P-CCNC: 14 U/L (ref 7–45)
ANION GAP SERPL CALC-SCNC: 13 MMOL/L (ref 10–20)
APPEARANCE UR: CLEAR
AST SERPL W P-5'-P-CCNC: 11 U/L (ref 9–39)
B-HCG SERPL-ACNC: <2 MIU/ML
BASOPHILS # BLD AUTO: 0.03 X10*3/UL (ref 0–0.1)
BASOPHILS NFR BLD AUTO: 0.2 %
BILIRUB SERPL-MCNC: 0.2 MG/DL (ref 0–1.2)
BILIRUB UR STRIP.AUTO-MCNC: NEGATIVE MG/DL
BUN SERPL-MCNC: 15 MG/DL (ref 6–23)
CALCIUM SERPL-MCNC: 9 MG/DL (ref 8.6–10.3)
CHLORIDE SERPL-SCNC: 100 MMOL/L (ref 98–107)
CO2 SERPL-SCNC: 26 MMOL/L (ref 21–32)
COLOR UR: COLORLESS
CREAT SERPL-MCNC: 0.95 MG/DL (ref 0.5–1.05)
EGFRCR SERPLBLD CKD-EPI 2021: 86 ML/MIN/1.73M*2
EOSINOPHIL # BLD AUTO: 0.06 X10*3/UL (ref 0–0.7)
EOSINOPHIL NFR BLD AUTO: 0.5 %
ERYTHROCYTE [DISTWIDTH] IN BLOOD BY AUTOMATED COUNT: 13.1 % (ref 11.5–14.5)
GLUCOSE SERPL-MCNC: 117 MG/DL (ref 74–99)
GLUCOSE UR STRIP.AUTO-MCNC: NEGATIVE MG/DL
HCT VFR BLD AUTO: 30.6 % (ref 36–46)
HGB BLD-MCNC: 9.6 G/DL (ref 12–16)
IMM GRANULOCYTES # BLD AUTO: 0.05 X10*3/UL (ref 0–0.7)
IMM GRANULOCYTES NFR BLD AUTO: 0.4 % (ref 0–0.9)
KETONES UR STRIP.AUTO-MCNC: NEGATIVE MG/DL
LACTATE SERPL-SCNC: 1.1 MMOL/L (ref 0.4–2)
LEUKOCYTE ESTERASE UR QL STRIP.AUTO: NEGATIVE
LIPASE SERPL-CCNC: 48 U/L (ref 9–82)
LYMPHOCYTES # BLD AUTO: 0.7 X10*3/UL (ref 1.2–4.8)
LYMPHOCYTES NFR BLD AUTO: 5.8 %
MCH RBC QN AUTO: 28.4 PG (ref 26–34)
MCHC RBC AUTO-ENTMCNC: 31.4 G/DL (ref 32–36)
MCV RBC AUTO: 91 FL (ref 80–100)
MONOCYTES # BLD AUTO: 0.24 X10*3/UL (ref 0.1–1)
MONOCYTES NFR BLD AUTO: 2 %
NEUTROPHILS # BLD AUTO: 10.96 X10*3/UL (ref 1.2–7.7)
NEUTROPHILS NFR BLD AUTO: 91.1 %
NITRITE UR QL STRIP.AUTO: NEGATIVE
NRBC BLD-RTO: 0 /100 WBCS (ref 0–0)
PH UR STRIP.AUTO: 6 [PH]
PLATELET # BLD AUTO: 350 X10*3/UL (ref 150–450)
POTASSIUM SERPL-SCNC: 3.2 MMOL/L (ref 3.5–5.3)
PROT SERPL-MCNC: 6.6 G/DL (ref 6.4–8.2)
PROT UR STRIP.AUTO-MCNC: NEGATIVE MG/DL
RBC # BLD AUTO: 3.38 X10*6/UL (ref 4–5.2)
RBC # UR STRIP.AUTO: ABNORMAL /UL
RBC #/AREA URNS AUTO: NORMAL /HPF
SODIUM SERPL-SCNC: 136 MMOL/L (ref 136–145)
SP GR UR STRIP.AUTO: 1
UROBILINOGEN UR STRIP.AUTO-MCNC: <2 MG/DL
WBC # BLD AUTO: 12 X10*3/UL (ref 4.4–11.3)
WBC #/AREA URNS AUTO: NORMAL /HPF

## 2024-02-24 PROCEDURE — 83605 ASSAY OF LACTIC ACID: CPT

## 2024-02-24 PROCEDURE — G0378 HOSPITAL OBSERVATION PER HR: HCPCS

## 2024-02-24 PROCEDURE — 36415 COLL VENOUS BLD VENIPUNCTURE: CPT

## 2024-02-24 PROCEDURE — 84702 CHORIONIC GONADOTROPIN TEST: CPT

## 2024-02-24 PROCEDURE — 96374 THER/PROPH/DIAG INJ IV PUSH: CPT

## 2024-02-24 PROCEDURE — 2500000002 HC RX 250 W HCPCS SELF ADMINISTERED DRUGS (ALT 637 FOR MEDICARE OP, ALT 636 FOR OP/ED): Performed by: EMERGENCY MEDICINE

## 2024-02-24 PROCEDURE — 2500000001 HC RX 250 WO HCPCS SELF ADMINISTERED DRUGS (ALT 637 FOR MEDICARE OP): Performed by: INTERNAL MEDICINE

## 2024-02-24 PROCEDURE — 99222 1ST HOSP IP/OBS MODERATE 55: CPT

## 2024-02-24 PROCEDURE — 2500000004 HC RX 250 GENERAL PHARMACY W/ HCPCS (ALT 636 FOR OP/ED): Performed by: INTERNAL MEDICINE

## 2024-02-24 PROCEDURE — 81001 URINALYSIS AUTO W/SCOPE: CPT

## 2024-02-24 PROCEDURE — 2500000004 HC RX 250 GENERAL PHARMACY W/ HCPCS (ALT 636 FOR OP/ED): Performed by: EMERGENCY MEDICINE

## 2024-02-24 PROCEDURE — 85025 COMPLETE CBC W/AUTO DIFF WBC: CPT

## 2024-02-24 PROCEDURE — 99285 EMERGENCY DEPT VISIT HI MDM: CPT | Mod: 25

## 2024-02-24 PROCEDURE — 2500000004 HC RX 250 GENERAL PHARMACY W/ HCPCS (ALT 636 FOR OP/ED)

## 2024-02-24 PROCEDURE — 96361 HYDRATE IV INFUSION ADD-ON: CPT

## 2024-02-24 PROCEDURE — 84075 ASSAY ALKALINE PHOSPHATASE: CPT

## 2024-02-24 PROCEDURE — 83690 ASSAY OF LIPASE: CPT

## 2024-02-24 RX ORDER — ACETAMINOPHEN 650 MG/1
650 SUPPOSITORY RECTAL EVERY 4 HOURS PRN
Status: DISCONTINUED | OUTPATIENT
Start: 2024-02-24 | End: 2024-02-25

## 2024-02-24 RX ORDER — ONDANSETRON 4 MG/1
4 TABLET, FILM COATED ORAL EVERY 8 HOURS PRN
Status: DISCONTINUED | OUTPATIENT
Start: 2024-02-24 | End: 2024-02-26 | Stop reason: HOSPADM

## 2024-02-24 RX ORDER — PANTOPRAZOLE SODIUM 40 MG/1
40 TABLET, DELAYED RELEASE ORAL
Status: DISCONTINUED | OUTPATIENT
Start: 2024-02-25 | End: 2024-02-26 | Stop reason: HOSPADM

## 2024-02-24 RX ORDER — SODIUM CHLORIDE 9 MG/ML
100 INJECTION, SOLUTION INTRAVENOUS CONTINUOUS
Status: DISCONTINUED | OUTPATIENT
Start: 2024-02-24 | End: 2024-02-24

## 2024-02-24 RX ORDER — POTASSIUM CHLORIDE 20 MEQ/1
20 TABLET, EXTENDED RELEASE ORAL DAILY
Status: DISCONTINUED | OUTPATIENT
Start: 2024-02-24 | End: 2024-02-26 | Stop reason: HOSPADM

## 2024-02-24 RX ORDER — ACETAMINOPHEN 325 MG/1
650 TABLET ORAL EVERY 4 HOURS PRN
Status: DISCONTINUED | OUTPATIENT
Start: 2024-02-24 | End: 2024-02-26 | Stop reason: HOSPADM

## 2024-02-24 RX ORDER — MESALAMINE 1000 MG/1
1000 SUPPOSITORY RECTAL 2 TIMES DAILY
Status: DISCONTINUED | OUTPATIENT
Start: 2024-02-24 | End: 2024-02-26 | Stop reason: HOSPADM

## 2024-02-24 RX ORDER — HYDROXYZINE HYDROCHLORIDE 25 MG/1
50 TABLET, FILM COATED ORAL EVERY 8 HOURS PRN
Status: DISCONTINUED | OUTPATIENT
Start: 2024-02-24 | End: 2024-02-26 | Stop reason: HOSPADM

## 2024-02-24 RX ORDER — ONDANSETRON HYDROCHLORIDE 2 MG/ML
4 INJECTION, SOLUTION INTRAVENOUS EVERY 8 HOURS PRN
Status: DISCONTINUED | OUTPATIENT
Start: 2024-02-24 | End: 2024-02-26 | Stop reason: HOSPADM

## 2024-02-24 RX ORDER — PANTOPRAZOLE SODIUM 40 MG/10ML
40 INJECTION, POWDER, LYOPHILIZED, FOR SOLUTION INTRAVENOUS
Status: DISCONTINUED | OUTPATIENT
Start: 2024-02-25 | End: 2024-02-26 | Stop reason: HOSPADM

## 2024-02-24 RX ORDER — ACETAMINOPHEN 160 MG/5ML
650 SOLUTION ORAL EVERY 4 HOURS PRN
Status: DISCONTINUED | OUTPATIENT
Start: 2024-02-24 | End: 2024-02-25

## 2024-02-24 RX ORDER — TALC
3 POWDER (GRAM) TOPICAL NIGHTLY
Status: DISCONTINUED | OUTPATIENT
Start: 2024-02-24 | End: 2024-02-26 | Stop reason: HOSPADM

## 2024-02-24 RX ORDER — SODIUM CHLORIDE 9 MG/ML
100 INJECTION, SOLUTION INTRAVENOUS CONTINUOUS
Status: DISCONTINUED | OUTPATIENT
Start: 2024-02-24 | End: 2024-02-26

## 2024-02-24 RX ADMIN — POTASSIUM CHLORIDE 20 MEQ: 1500 TABLET, EXTENDED RELEASE ORAL at 16:56

## 2024-02-24 RX ADMIN — SODIUM CHLORIDE 100 ML/HR: 9 INJECTION, SOLUTION INTRAVENOUS at 23:14

## 2024-02-24 RX ADMIN — HYDROXYZINE HYDROCHLORIDE 50 MG: 25 TABLET, FILM COATED ORAL at 23:14

## 2024-02-24 RX ADMIN — SODIUM CHLORIDE 1000 ML: 9 INJECTION, SOLUTION INTRAVENOUS at 15:29

## 2024-02-24 RX ADMIN — METHYLPREDNISOLONE SODIUM SUCCINATE 60 MG: 125 INJECTION, POWDER, FOR SOLUTION INTRAMUSCULAR; INTRAVENOUS at 16:56

## 2024-02-24 SDOH — SOCIAL STABILITY: SOCIAL INSECURITY: ARE THERE ANY APPARENT SIGNS OF INJURIES/BEHAVIORS THAT COULD BE RELATED TO ABUSE/NEGLECT?: NO

## 2024-02-24 SDOH — SOCIAL STABILITY: SOCIAL INSECURITY: HAS ANYONE EVER THREATENED TO HURT YOUR FAMILY OR YOUR PETS?: NO

## 2024-02-24 SDOH — SOCIAL STABILITY: SOCIAL INSECURITY: ABUSE: ADULT

## 2024-02-24 SDOH — SOCIAL STABILITY: SOCIAL INSECURITY: DO YOU FEEL ANYONE HAS EXPLOITED OR TAKEN ADVANTAGE OF YOU FINANCIALLY OR OF YOUR PERSONAL PROPERTY?: NO

## 2024-02-24 SDOH — SOCIAL STABILITY: SOCIAL INSECURITY: ARE YOU OR HAVE YOU BEEN THREATENED OR ABUSED PHYSICALLY, EMOTIONALLY, OR SEXUALLY BY ANYONE?: NO

## 2024-02-24 SDOH — SOCIAL STABILITY: SOCIAL INSECURITY: DOES ANYONE TRY TO KEEP YOU FROM HAVING/CONTACTING OTHER FRIENDS OR DOING THINGS OUTSIDE YOUR HOME?: NO

## 2024-02-24 SDOH — SOCIAL STABILITY: SOCIAL INSECURITY: DO YOU FEEL UNSAFE GOING BACK TO THE PLACE WHERE YOU ARE LIVING?: NO

## 2024-02-24 SDOH — SOCIAL STABILITY: SOCIAL INSECURITY: HAVE YOU HAD THOUGHTS OF HARMING ANYONE ELSE?: NO

## 2024-02-24 SDOH — SOCIAL STABILITY: SOCIAL INSECURITY: WERE YOU ABLE TO COMPLETE ALL THE BEHAVIORAL HEALTH SCREENINGS?: YES

## 2024-02-24 ASSESSMENT — COGNITIVE AND FUNCTIONAL STATUS - GENERAL
DAILY ACTIVITIY SCORE: 24
MOBILITY SCORE: 24
PATIENT BASELINE BEDBOUND: NO

## 2024-02-24 ASSESSMENT — LIFESTYLE VARIABLES
HOW OFTEN DO YOU HAVE 6 OR MORE DRINKS ON ONE OCCASION: NEVER
PRESCIPTION_ABUSE_PAST_12_MONTHS: NO
SKIP TO QUESTIONS 9-10: 1
SUBSTANCE_ABUSE_PAST_12_MONTHS: YES
AUDIT-C TOTAL SCORE: 2
HOW OFTEN DO YOU HAVE A DRINK CONTAINING ALCOHOL: 2-4 TIMES A MONTH
AUDIT-C TOTAL SCORE: 2
HOW MANY STANDARD DRINKS CONTAINING ALCOHOL DO YOU HAVE ON A TYPICAL DAY: 1 OR 2

## 2024-02-24 ASSESSMENT — ACTIVITIES OF DAILY LIVING (ADL)
FEEDING YOURSELF: INDEPENDENT
BATHING: INDEPENDENT
GROOMING: INDEPENDENT
ADEQUATE_TO_COMPLETE_ADL: YES
ASSISTIVE_DEVICE: EYEGLASSES
JUDGMENT_ADEQUATE_SAFELY_COMPLETE_DAILY_ACTIVITIES: YES
WALKS IN HOME: INDEPENDENT
LACK_OF_TRANSPORTATION: NO
HEARING - RIGHT EAR: FUNCTIONAL
TOILETING: INDEPENDENT
DRESSING YOURSELF: INDEPENDENT
PATIENT'S MEMORY ADEQUATE TO SAFELY COMPLETE DAILY ACTIVITIES?: YES
HEARING - LEFT EAR: FUNCTIONAL

## 2024-02-24 ASSESSMENT — ENCOUNTER SYMPTOMS
PSYCHIATRIC NEGATIVE: 1
ENDOCRINE NEGATIVE: 1
ALLERGIC/IMMUNOLOGIC NEGATIVE: 1
EYES NEGATIVE: 1
HEMATOLOGIC/LYMPHATIC NEGATIVE: 1
ABDOMINAL PAIN: 1
FATIGUE: 1
BLOOD IN STOOL: 1
LIGHT-HEADEDNESS: 1
MUSCULOSKELETAL NEGATIVE: 1
CARDIOVASCULAR NEGATIVE: 1
RESPIRATORY NEGATIVE: 1
DIARRHEA: 1

## 2024-02-24 ASSESSMENT — COLUMBIA-SUICIDE SEVERITY RATING SCALE - C-SSRS
6. HAVE YOU EVER DONE ANYTHING, STARTED TO DO ANYTHING, OR PREPARED TO DO ANYTHING TO END YOUR LIFE?: NO
2. HAVE YOU ACTUALLY HAD ANY THOUGHTS OF KILLING YOURSELF?: NO
1. IN THE PAST MONTH, HAVE YOU WISHED YOU WERE DEAD OR WISHED YOU COULD GO TO SLEEP AND NOT WAKE UP?: NO

## 2024-02-24 ASSESSMENT — PAIN - FUNCTIONAL ASSESSMENT
PAIN_FUNCTIONAL_ASSESSMENT: 0-10
PAIN_FUNCTIONAL_ASSESSMENT: 0-10

## 2024-02-24 ASSESSMENT — PAIN SCALES - GENERAL
PAINLEVEL_OUTOF10: 0 - NO PAIN

## 2024-02-24 ASSESSMENT — PATIENT HEALTH QUESTIONNAIRE - PHQ9
2. FEELING DOWN, DEPRESSED OR HOPELESS: NOT AT ALL
1. LITTLE INTEREST OR PLEASURE IN DOING THINGS: NOT AT ALL
SUM OF ALL RESPONSES TO PHQ9 QUESTIONS 1 & 2: 0

## 2024-02-24 ASSESSMENT — VISUAL ACUITY: OU: 1

## 2024-02-24 NOTE — ED TRIAGE NOTES
C/O HEMATOCHEZIA/DIARRHEA, PT HAD ABD PAIN LAST NIGHT THAT HAS SUBSIDED, HX OF CROHN'S, DENIES DYSURIA/HEMATURIA/CP/SOB/PALPITATIONS/N/V/F/CHILLS, AMBULATED TO TRIAGE GAIT STEADY

## 2024-02-24 NOTE — ED PROVIDER NOTES
HPI   Chief Complaint   Patient presents with    Abdominal Pain       HPI  HISTORY OF PRESENT ILLNESS:  23 y.o. female presenting to the ED with complaint of a Crohn's flare. Diagnosed with Crohn's in December of last year, after which she was started on prednisone. Was in the ED on 1/30 and give IV steroids which significantly improved her symptoms and she was discharged home on an oral steroid taper and close GI follow up. Symptoms worsened in the following week, and she eventually came back to the ED and was admitted from 2/2-2/6 for a Crohn's flare, during admission was given IV solumetrol, had negative stool studies and started on Humira. Symptoms improved and she was discharged home. After discharge, symptoms were significantly improved for about 2 weeks. Has had difficulty sleeping due to the steroids.  In the past few 3 to 4 days, her symptoms flared again.  She reports having about 10 bowel movements daily with jovita blood.  Had some abdominal pain last night, which resolved, as she states that now she is only having pain with bowel movements.  She feels she has lost some weight over the past week.  No nausea or vomiting.  No pain today in the ER.  No fevers or chills.  No dizziness or lightheadedness, no syncope.  No urinary symptoms.  Denies chance of pregnancy.  Denies any pain in the ED currently.  Follows with Dr. Wang for GI.    12 point review of systems was performed and is negative unless otherwise specified in HPI.    PMH: Crohn's disease  Family history: noncontributory  Social history: non smoker, no ETOH, no illicit substances  No past medical history on file.      Labs Reviewed   LACTATE   LIPASE   COMPREHENSIVE METABOLIC PANEL   CBC WITH AUTO DIFFERENTIAL   URINALYSIS WITH REFLEX CULTURE AND MICROSCOPIC    Narrative:     The following orders were created for panel order Urinalysis with Reflex Culture and Microscopic.  Procedure                               Abnormality         Status                      ---------                               -----------         ------                     Urinalysis with Reflex C...[[id number]]                                                 Extra Urine Gray Tube[[id number]]                                                         Please view results for these tests on the individual orders.   HUMAN CHORIONIC GONADOTROPIN, SERUM QUANTITATIVE   URINALYSIS WITH REFLEX CULTURE AND MICROSCOPIC   EXTRA URINE GRAY TUBE     No orders to display              Jeanette Coma Scale Score: 15                     Patient History   No past medical history on file.  No past surgical history on file.  No family history on file.  Social History     Tobacco Use    Smoking status: Never     Passive exposure: Never    Smokeless tobacco: Never   Substance Use Topics    Alcohol use: Not on file    Drug use: Not on file       Physical Exam   ED Triage Vitals [02/24/24 1513]   Temperature Heart Rate Respirations BP   36.2 °C (97.2 °F) (!) 106 20 136/85      Pulse Ox Temp src Heart Rate Source Patient Position   100 % -- -- --      BP Location FiO2 (%)     -- --       Physical Exam  Constitutional:       General: She is not in acute distress.     Appearance: She is not toxic-appearing.      Comments: Pale   HENT:      Head: Normocephalic and atraumatic.      Nose: No congestion.      Mouth/Throat:      Mouth: Mucous membranes are moist.   Eyes:      General: No scleral icterus.     Pupils: Pupils are equal, round, and reactive to light.   Cardiovascular:      Rate and Rhythm: Normal rate and regular rhythm.      Pulses: Normal pulses.   Pulmonary:      Effort: Pulmonary effort is normal. No respiratory distress.   Abdominal:      General: There is no distension.      Palpations: Abdomen is soft. There is no mass or pulsatile mass.      Tenderness: There is no abdominal tenderness. There is no guarding.   Musculoskeletal:         General: Normal range of motion.      Cervical back: Normal range of  motion and neck supple. No rigidity.      Right lower leg: No edema.      Left lower leg: No edema.   Skin:     General: Skin is warm and dry.      Capillary Refill: Capillary refill takes less than 2 seconds.   Neurological:      General: No focal deficit present.      Mental Status: She is alert and oriented to person, place, and time.      Gait: Gait normal.   Psychiatric:         Mood and Affect: Mood normal.         Behavior: Behavior normal.         Judgment: Judgment normal.     ED Course & MDM        Medical Decision Making  ED course / MDM     Summary:  Patient presented with a Crohn's flare, symptoms worsened in the past few days. Tachycardic in triage at 106, vitals otherwise stable. Patient appears pale, but is overall well appearing, ambulates unassisted, no acute distress. Abdomen soft and nontender, no guarding or rigidity. IV established, labs drawn. Has no pain or nausea, declined pain or nausea medications. Given IV fluids.  Initial labs show a leukocytosis of 12, stable from 2 weeks ago, slightly decreased hemoglobin 9.6, hypokalemia 3.2 which was repleted, no other significant electrolyte abnormalities. Normal kidney and liver function. UA noninfected.  Patient case discussed with ED attending Dr. Castro, who also saw and evaluated the patient, and also managed the remainder of the patient's care in the ED.    This note has been transcribed using voice recognition and may contain grammatical errors, misplaced words, incorrect words, incorrect phrases or other errors.   Procedure  Procedures     Carla Zhao PA-C  02/24/24 1640

## 2024-02-25 PROBLEM — K50.10 CROHN'S DISEASE OF COLON WITHOUT COMPLICATION (MULTI): Status: RESOLVED | Noted: 2024-02-02 | Resolved: 2024-02-25

## 2024-02-25 PROBLEM — K50.919 CROHN'S DISEASE WITH COMPLICATION, UNSPECIFIED GASTROINTESTINAL TRACT LOCATION (MULTI): Status: ACTIVE | Noted: 2024-02-25

## 2024-02-25 PROBLEM — E87.6 HYPOKALEMIA: Status: RESOLVED | Noted: 2024-02-02 | Resolved: 2024-02-25

## 2024-02-25 PROBLEM — D12.0 BENIGN NEOPLASM OF CECUM: Status: ACTIVE | Noted: 2024-02-25

## 2024-02-25 PROBLEM — K50.118 CROHN'S COLITIS, OTHER COMPLICATION (MULTI): Status: RESOLVED | Noted: 2024-02-24 | Resolved: 2024-02-25

## 2024-02-25 PROBLEM — K64.8 INTERNAL HEMORRHOIDS: Status: ACTIVE | Noted: 2023-02-16

## 2024-02-25 LAB
ALBUMIN SERPL BCP-MCNC: 3.2 G/DL (ref 3.4–5)
ALP SERPL-CCNC: 48 U/L (ref 33–110)
ALT SERPL W P-5'-P-CCNC: 11 U/L (ref 7–45)
ANION GAP SERPL CALC-SCNC: 10 MMOL/L (ref 10–20)
AST SERPL W P-5'-P-CCNC: 7 U/L (ref 9–39)
BILIRUB SERPL-MCNC: 0.1 MG/DL (ref 0–1.2)
BUN SERPL-MCNC: 14 MG/DL (ref 6–23)
CALCIUM SERPL-MCNC: 8.3 MG/DL (ref 8.6–10.3)
CHLORIDE SERPL-SCNC: 107 MMOL/L (ref 98–107)
CO2 SERPL-SCNC: 26 MMOL/L (ref 21–32)
CREAT SERPL-MCNC: 0.81 MG/DL (ref 0.5–1.05)
EGFRCR SERPLBLD CKD-EPI 2021: >90 ML/MIN/1.73M*2
ERYTHROCYTE [DISTWIDTH] IN BLOOD BY AUTOMATED COUNT: 13 % (ref 11.5–14.5)
GLUCOSE SERPL-MCNC: 135 MG/DL (ref 74–99)
HCT VFR BLD AUTO: 23.9 % (ref 36–46)
HCT VFR BLD AUTO: 26.4 % (ref 36–46)
HGB BLD-MCNC: 7.4 G/DL (ref 12–16)
HGB BLD-MCNC: 8.1 G/DL (ref 12–16)
HOLD SPECIMEN: NORMAL
MCH RBC QN AUTO: 28.1 PG (ref 26–34)
MCHC RBC AUTO-ENTMCNC: 31 G/DL (ref 32–36)
MCV RBC AUTO: 91 FL (ref 80–100)
NRBC BLD-RTO: 0 /100 WBCS (ref 0–0)
PLATELET # BLD AUTO: 308 X10*3/UL (ref 150–450)
POTASSIUM SERPL-SCNC: 4.2 MMOL/L (ref 3.5–5.3)
PROT SERPL-MCNC: 5.7 G/DL (ref 6.4–8.2)
RBC # BLD AUTO: 2.63 X10*6/UL (ref 4–5.2)
SODIUM SERPL-SCNC: 139 MMOL/L (ref 136–145)
WBC # BLD AUTO: 10.7 X10*3/UL (ref 4.4–11.3)

## 2024-02-25 PROCEDURE — 36415 COLL VENOUS BLD VENIPUNCTURE: CPT | Performed by: INTERNAL MEDICINE

## 2024-02-25 PROCEDURE — 2500000001 HC RX 250 WO HCPCS SELF ADMINISTERED DRUGS (ALT 637 FOR MEDICARE OP): Performed by: NURSE PRACTITIONER

## 2024-02-25 PROCEDURE — 36415 COLL VENOUS BLD VENIPUNCTURE: CPT | Performed by: NURSE PRACTITIONER

## 2024-02-25 PROCEDURE — 84075 ASSAY ALKALINE PHOSPHATASE: CPT | Performed by: INTERNAL MEDICINE

## 2024-02-25 PROCEDURE — 2500000002 HC RX 250 W HCPCS SELF ADMINISTERED DRUGS (ALT 637 FOR MEDICARE OP, ALT 636 FOR OP/ED): Performed by: INTERNAL MEDICINE

## 2024-02-25 PROCEDURE — 2500000001 HC RX 250 WO HCPCS SELF ADMINISTERED DRUGS (ALT 637 FOR MEDICARE OP): Performed by: INTERNAL MEDICINE

## 2024-02-25 PROCEDURE — 1100000001 HC PRIVATE ROOM DAILY

## 2024-02-25 PROCEDURE — 2500000004 HC RX 250 GENERAL PHARMACY W/ HCPCS (ALT 636 FOR OP/ED): Performed by: INTERNAL MEDICINE

## 2024-02-25 PROCEDURE — 2500000004 HC RX 250 GENERAL PHARMACY W/ HCPCS (ALT 636 FOR OP/ED): Performed by: NURSE PRACTITIONER

## 2024-02-25 PROCEDURE — 99222 1ST HOSP IP/OBS MODERATE 55: CPT | Performed by: INTERNAL MEDICINE

## 2024-02-25 PROCEDURE — 85014 HEMATOCRIT: CPT | Performed by: NURSE PRACTITIONER

## 2024-02-25 PROCEDURE — 99232 SBSQ HOSP IP/OBS MODERATE 35: CPT | Performed by: NURSE PRACTITIONER

## 2024-02-25 PROCEDURE — 85027 COMPLETE CBC AUTOMATED: CPT | Performed by: INTERNAL MEDICINE

## 2024-02-25 RX ORDER — LORAZEPAM 0.5 MG/1
0.5 TABLET ORAL ONCE
Status: COMPLETED | OUTPATIENT
Start: 2024-02-25 | End: 2024-02-25

## 2024-02-25 RX ADMIN — METHYLPREDNISOLONE SODIUM SUCCINATE 40 MG: 40 INJECTION, POWDER, FOR SOLUTION INTRAMUSCULAR; INTRAVENOUS at 18:02

## 2024-02-25 RX ADMIN — HYDROXYZINE HYDROCHLORIDE 50 MG: 25 TABLET, FILM COATED ORAL at 23:32

## 2024-02-25 RX ADMIN — LORAZEPAM 0.5 MG: 0.5 TABLET ORAL at 04:10

## 2024-02-25 RX ADMIN — METHYLPREDNISOLONE SODIUM SUCCINATE 40 MG: 40 INJECTION, POWDER, FOR SOLUTION INTRAMUSCULAR; INTRAVENOUS at 04:10

## 2024-02-25 RX ADMIN — PANTOPRAZOLE SODIUM 40 MG: 40 TABLET, DELAYED RELEASE ORAL at 06:34

## 2024-02-25 ASSESSMENT — ENCOUNTER SYMPTOMS
UNEXPECTED WEIGHT CHANGE: 0
CHILLS: 0
SORE THROAT: 0
ADENOPATHY: 0
DIFFICULTY URINATING: 0
HEADACHES: 0
ARTHRALGIAS: 0
ROS GI COMMENTS: AS PER HPI
SHORTNESS OF BREATH: 0
BRUISES/BLEEDS EASILY: 0
EYE REDNESS: 0
FATIGUE: 0
MYALGIAS: 0
FEVER: 0
NERVOUS/ANXIOUS: 0

## 2024-02-25 ASSESSMENT — PAIN SCALES - GENERAL
PAINLEVEL_OUTOF10: 0 - NO PAIN
PAINLEVEL_OUTOF10: 0 - NO PAIN

## 2024-02-25 ASSESSMENT — PAIN - FUNCTIONAL ASSESSMENT: PAIN_FUNCTIONAL_ASSESSMENT: 0-10

## 2024-02-25 ASSESSMENT — COGNITIVE AND FUNCTIONAL STATUS - GENERAL
MOBILITY SCORE: 24
DAILY ACTIVITIY SCORE: 24

## 2024-02-25 ASSESSMENT — ACTIVITIES OF DAILY LIVING (ADL): LACK_OF_TRANSPORTATION: NO

## 2024-02-25 NOTE — PROGRESS NOTES
Transitional Care Coordination Progress Note:  Plan per Medical/Surgical team: treatment of colitis, Crohns with IV solumedrol, IV fluids, clear liquid diet, GI consult  Status: Observation  Payor source: [employer]  Discharge disposition: Home with room mate   Potential Barriers: awaiting GI clearance, pain control & tolerating advanced diet   ADOD: 2/25/2024  MELVI Mcgovern RN, BSN Transitional Care Coordinator ED# [phone]      02/25/24 0633   Discharge Planning   Living Arrangements Friends   Support Systems Parent;Friends/neighbors   Assistance Needed GI work up   Type of Residence Private residence   Number of Stairs to Enter Residence 3   Number of Stairs Within Residence 12   Home or Post Acute Services None   Patient expects to be discharged to: Home with room mate   Does the patient need discharge transport arranged? Yes   RoundTrip coordination needed? Yes   Has discharge transport been arranged? No   Financial Resource Strain   How hard is it for you to pay for the very basics like food, housing, medical care, and heating? Not hard   Housing Stability   In the last 12 months, was there a time when you were not able to pay the mortgage or rent on time? N   In the last 12 months, how many places have you lived? 1   In the last 12 months, was there a time when you did not have a steady place to sleep or slept in a shelter (including now)? N   Transportation Needs   In the past 12 months, has lack of transportation kept you from medical appointments or from getting medications? no   In the past 12 months, has lack of transportation kept you from meetings, work, or from getting things needed for daily living? No

## 2024-02-25 NOTE — PROGRESS NOTES
Home with room mate      02/25/24 0632   Current Planned Discharge Disposition   Current Planned Discharge Disposition Home

## 2024-02-25 NOTE — H&P
History Of Present Illness     Kyra Behnfeldt is a 23 y.o. female presenting to the ED with complaint of a Crohn's flare. Diagnosed with Crohn's in December of last year, after which she was started on prednisone. Was in the ED on 1/30 and give IV steroids which significantly improved her symptoms and she was discharged home on an oral steroid taper and close GI follow up. Symptoms worsened in the following week, and she eventually came back to the ED and was admitted from 2/2-2/6 for a Crohn's flare, during admission was given IV solumetrol, had negative stool studies and started on Humira. Symptoms improved and she was discharged home. After discharge, symptoms were significantly improved for about 2 weeks. Has had difficulty sleeping due to the steroids.  In the past few 3 to 4 days, her symptoms flared again.  She reports having about 10 bowel movements daily with jovita blood.  Had some abdominal pain last night, which resolved, as she states that now she is only having pain with bowel movements.  She feels she has lost some weight over the past week.  No nausea or vomiting.  No pain today in the ER.  No fevers or chills.  No dizziness or lightheadedness, no syncope.  No urinary symptoms.  Denies chance of pregnancy.  Denies any pain in the ED currently. Follows with Dr. Wang for GI.     ED work up: Tachycardic , vitals otherwise stable.  IV Leukocytosis of 12, hemoglobin 9.6, hypokalemia 3.2 which was repleted, no other significant electrolyte abnormalities. Normal kidney and liver function. UA noninfected. Received IVF Bolus, solumedrol and potassium supplementation.       Past Medical History  Crohns disease    Surgical History  She has no past surgical history on file.     Social History  She reports that she has never smoked. She has never been exposed to tobacco smoke. She has never used smokeless tobacco. No history on file for alcohol use and drug use.    Family History  No family history on  file.     Allergies  Patient has no known allergies.    Review of Systems   Constitutional:  Positive for fatigue.   HENT: Negative.     Eyes: Negative.    Respiratory: Negative.     Cardiovascular: Negative.    Gastrointestinal:  Positive for abdominal pain, blood in stool and diarrhea.   Endocrine: Negative.    Genitourinary: Negative.    Musculoskeletal: Negative.    Skin: Negative.    Allergic/Immunologic: Negative.    Neurological:  Positive for light-headedness.   Hematological: Negative.    Psychiatric/Behavioral: Negative.     All other systems reviewed and are negative.       Physical Exam  Vitals and nursing note reviewed.   Constitutional:       Appearance: Normal appearance.   HENT:      Head: Normocephalic.      Nose: Nose normal.      Mouth/Throat:      Lips: Pink.      Mouth: Mucous membranes are moist.      Pharynx: Oropharynx is clear.   Eyes:      General: Lids are normal. Lids are everted, no foreign bodies appreciated. Vision grossly intact. Gaze aligned appropriately.      Extraocular Movements: Extraocular movements intact.      Conjunctiva/sclera: Conjunctivae normal.   Neck:      Trachea: Trachea normal.   Cardiovascular:      Rate and Rhythm: Normal rate.      Pulses: Normal pulses.      Heart sounds: Normal heart sounds.   Pulmonary:      Effort: Pulmonary effort is normal.      Breath sounds: Normal breath sounds.   Abdominal:      General: Abdomen is flat. Bowel sounds are normal.      Palpations: Abdomen is soft.      Tenderness: There is abdominal tenderness.   Musculoskeletal:         General: Normal range of motion.      Cervical back: Full passive range of motion without pain.   Feet:      Right foot:      Skin integrity: Skin integrity normal.      Left foot:      Skin integrity: Skin integrity normal.   Skin:     General: Skin is warm and dry.      Capillary Refill: Capillary refill takes less than 2 seconds.      Coloration: Skin is pale.   Neurological:      General: No focal  deficit present.      Mental Status: She is alert and oriented to person, place, and time. Mental status is at baseline.   Psychiatric:         Attention and Perception: Attention and perception normal.         Mood and Affect: Mood and affect normal.         Speech: Speech normal.         Behavior: Behavior normal. Behavior is cooperative.         Thought Content: Thought content normal.         Cognition and Memory: Cognition normal.         Judgment: Judgment normal.          Last Recorded Vitals  /66   Pulse (!) 103   Temp 36.2 °C (97.2 °F)   Resp 14   Wt 51.3 kg (113 lb)   SpO2 100%     Relevant Results    Scheduled medications  mesalamine, 1,000 mg, rectal, BID  [START ON 2/25/2024] methylPREDNISolone sodium succinate (PF), 40 mg, intravenous, q12h  potassium chloride CR, 20 mEq, oral, Daily      Continuous medications  sodium chloride 0.9%, 100 mL/hr      PRN medications  PRN medications: hydrOXYzine HCL  Results for orders placed or performed during the hospital encounter of 02/24/24 (from the past 24 hour(s))   Lactate   Result Value Ref Range    Lactate 1.1 0.4 - 2.0 mmol/L   Lipase   Result Value Ref Range    Lipase 48 9 - 82 U/L   Comprehensive Metabolic Panel   Result Value Ref Range    Glucose 117 (H) 74 - 99 mg/dL    Sodium 136 136 - 145 mmol/L    Potassium 3.2 (L) 3.5 - 5.3 mmol/L    Chloride 100 98 - 107 mmol/L    Bicarbonate 26 21 - 32 mmol/L    Anion Gap 13 10 - 20 mmol/L    Urea Nitrogen 15 6 - 23 mg/dL    Creatinine 0.95 0.50 - 1.05 mg/dL    eGFR 86 >60 mL/min/1.73m*2    Calcium 9.0 8.6 - 10.3 mg/dL    Albumin 3.7 3.4 - 5.0 g/dL    Alkaline Phosphatase 57 33 - 110 U/L    Total Protein 6.6 6.4 - 8.2 g/dL    AST 11 9 - 39 U/L    Bilirubin, Total 0.2 0.0 - 1.2 mg/dL    ALT 14 7 - 45 U/L   CBC and Auto Differential   Result Value Ref Range    WBC 12.0 (H) 4.4 - 11.3 x10*3/uL    nRBC 0.0 0.0 - 0.0 /100 WBCs    RBC 3.38 (L) 4.00 - 5.20 x10*6/uL    Hemoglobin 9.6 (L) 12.0 - 16.0 g/dL     Hematocrit 30.6 (L) 36.0 - 46.0 %    MCV 91 80 - 100 fL    MCH 28.4 26.0 - 34.0 pg    MCHC 31.4 (L) 32.0 - 36.0 g/dL    RDW 13.1 11.5 - 14.5 %    Platelets 350 150 - 450 x10*3/uL    Neutrophils % 91.1 40.0 - 80.0 %    Immature Granulocytes %, Automated 0.4 0.0 - 0.9 %    Lymphocytes % 5.8 13.0 - 44.0 %    Monocytes % 2.0 2.0 - 10.0 %    Eosinophils % 0.5 0.0 - 6.0 %    Basophils % 0.2 0.0 - 2.0 %    Neutrophils Absolute 10.96 (H) 1.20 - 7.70 x10*3/uL    Immature Granulocytes Absolute, Automated 0.05 0.00 - 0.70 x10*3/uL    Lymphocytes Absolute 0.70 (L) 1.20 - 4.80 x10*3/uL    Monocytes Absolute 0.24 0.10 - 1.00 x10*3/uL    Eosinophils Absolute 0.06 0.00 - 0.70 x10*3/uL    Basophils Absolute 0.03 0.00 - 0.10 x10*3/uL   Human Chorionic Gonadotropin, Serum Quantitative   Result Value Ref Range    HCG, Beta-Quantitative <2 <5 mIU/mL   Urinalysis with Reflex Culture and Microscopic   Result Value Ref Range    Color, Urine Colorless (N) Straw, Yellow    Appearance, Urine Clear Clear    Specific Gravity, Urine 1.001 (N) 1.005 - 1.035    pH, Urine 6.0 5.0, 5.5, 6.0, 6.5, 7.0, 7.5, 8.0    Protein, Urine NEGATIVE NEGATIVE mg/dL    Glucose, Urine NEGATIVE NEGATIVE mg/dL    Blood, Urine SMALL (1+) (A) NEGATIVE    Ketones, Urine NEGATIVE NEGATIVE mg/dL    Bilirubin, Urine NEGATIVE NEGATIVE    Urobilinogen, Urine <2.0 <2.0 mg/dL    Nitrite, Urine NEGATIVE NEGATIVE    Leukocyte Esterase, Urine NEGATIVE NEGATIVE   Urinalysis Microscopic   Result Value Ref Range    WBC, Urine NONE 1-5, NONE /HPF    RBC, Urine NONE NONE, 1-2, 3-5 /HPF        Assessment/Plan   Principal Problem:    Crohn's colitis, other complication (CMS/[facility])  Active Problems:    Crohn's colitis, with rectal bleeding (CMS/HCC)    Crohn's flare  - Recent colonoscopy 12/2023: moderate inflammation at hepatic flexure and rectum with biopsies showing chronic active colitis consistent with IBD   - Recently started on Humira and prednisone outpatient  - Presents with 10  bowel movements daily with jovita blood  - H&H 9.6/30.6   - Monitor with daily CBC- transfuse is hemoglobin less than 7  - Continue IV steroids  - Continue IVF  - NPO until seen by GI  - Appreciate GI consultation    Hypokalemia  -K 3.2, replaced  -Monitor CMP daily    VTE prophylaxis  - SCDs/ ambulation    Interdisciplinary rounding completed with Attending Provider, Bedside RN and TCC. Labs/ results/ plan of care will be discussed and reviewed with Hospitalist team.         LUZ Santamaria-CNP

## 2024-02-25 NOTE — PROGRESS NOTES
02/25/24 0632   [facility] Disability Status   Are you deaf or do you have serious difficulty hearing? N   Are you blind or do you have serious difficulty seeing, even when wearing glasses? N   Because of a physical, mental, or emotional condition, do you have serious difficulty concentrating, remembering, or making decisions? (5 years old or older) N   Do you have serious difficulty walking or climbing stairs? N   Do you have serious difficulty dressing or bathing? N   Because of a physical, mental, or emotional condition, do you have serious difficulty doing errands alone such as visiting the doctor? N

## 2024-02-25 NOTE — PROGRESS NOTES
"Kyra Behnfeldt is a 23 y.o. female on day 0 of admission presenting with Crohn's colitis, with rectal bleeding (CMS/HCC).    Subjective   Admitted for crohn flare   Hgb lower this am > trending   IVF running   Stools seem to be slowing up to 3 - 4 from 8 to 10  will trial advance diet per GI  GI input includes need for > than 48 hours of IV steroids     Objective     Physical Exam  Constitutional:       Appearance: Normal appearance.      Comments: pale   HENT:      Mouth/Throat:      Mouth: Mucous membranes are moist.   Cardiovascular:      Rate and Rhythm: Regular rhythm.   Pulmonary:      Effort: Pulmonary effort is normal.      Breath sounds: Normal breath sounds.   Abdominal:      Palpations: Abdomen is soft.   Musculoskeletal:         General: Normal range of motion.   Skin:     General: Skin is warm and dry.   Neurological:      General: No focal deficit present.      Mental Status: She is alert and oriented to person, place, and time.         Last Recorded Vitals  Blood pressure 105/67, pulse 83, temperature 37.1 °C (98.7 °F), temperature source Temporal, resp. rate 18, height 1.61 m (5' 3.39\"), weight 51.3 kg (113 lb), last menstrual period 01/30/2024, SpO2 98 %.  Intake/Output last 3 Shifts:  I/O last 3 completed shifts:  In: 1706.7 (33.3 mL/kg) [I.V.:706.7 (13.8 mL/kg); IV Piggyback:1000]  Out: - (0 mL/kg)   Weight: 51.3 kg     Relevant Results  Scheduled medications  melatonin, 3 mg, oral, Nightly  [Held by provider] mesalamine, 1,000 mg, rectal, BID  methylPREDNISolone sodium succinate (PF), 40 mg, intravenous, q12h  pantoprazole, 40 mg, oral, Daily before breakfast   Or  pantoprazole, 40 mg, intravenous, Daily before breakfast  potassium chloride CR, 20 mEq, oral, Daily      Continuous medications  sodium chloride 0.9%, 100 mL/hr, Last Rate: 100 mL/hr (02/25/24 0618)      PRN medications  PRN medications: acetaminophen **OR** [DISCONTINUED] acetaminophen **OR** [DISCONTINUED] acetaminophen, hydrOXYzine " HCL, ondansetron **OR** ondansetron     Results for orders placed or performed during the hospital encounter of 02/24/24 (from the past 24 hour(s))   Lactate   Result Value Ref Range    Lactate 1.1 0.4 - 2.0 mmol/L   Lipase   Result Value Ref Range    Lipase 48 9 - 82 U/L   Comprehensive Metabolic Panel   Result Value Ref Range    Glucose 117 (H) 74 - 99 mg/dL    Sodium 136 136 - 145 mmol/L    Potassium 3.2 (L) 3.5 - 5.3 mmol/L    Chloride 100 98 - 107 mmol/L    Bicarbonate 26 21 - 32 mmol/L    Anion Gap 13 10 - 20 mmol/L    Urea Nitrogen 15 6 - 23 mg/dL    Creatinine 0.95 0.50 - 1.05 mg/dL    eGFR 86 >60 mL/min/1.73m*2    Calcium 9.0 8.6 - 10.3 mg/dL    Albumin 3.7 3.4 - 5.0 g/dL    Alkaline Phosphatase 57 33 - 110 U/L    Total Protein 6.6 6.4 - 8.2 g/dL    AST 11 9 - 39 U/L    Bilirubin, Total 0.2 0.0 - 1.2 mg/dL    ALT 14 7 - 45 U/L   CBC and Auto Differential   Result Value Ref Range    WBC 12.0 (H) 4.4 - 11.3 x10*3/uL    nRBC 0.0 0.0 - 0.0 /100 WBCs    RBC 3.38 (L) 4.00 - 5.20 x10*6/uL    Hemoglobin 9.6 (L) 12.0 - 16.0 g/dL    Hematocrit 30.6 (L) 36.0 - 46.0 %    MCV 91 80 - 100 fL    MCH 28.4 26.0 - 34.0 pg    MCHC 31.4 (L) 32.0 - 36.0 g/dL    RDW 13.1 11.5 - 14.5 %    Platelets 350 150 - 450 x10*3/uL    Neutrophils % 91.1 40.0 - 80.0 %    Immature Granulocytes %, Automated 0.4 0.0 - 0.9 %    Lymphocytes % 5.8 13.0 - 44.0 %    Monocytes % 2.0 2.0 - 10.0 %    Eosinophils % 0.5 0.0 - 6.0 %    Basophils % 0.2 0.0 - 2.0 %    Neutrophils Absolute 10.96 (H) 1.20 - 7.70 x10*3/uL    Immature Granulocytes Absolute, Automated 0.05 0.00 - 0.70 x10*3/uL    Lymphocytes Absolute 0.70 (L) 1.20 - 4.80 x10*3/uL    Monocytes Absolute 0.24 0.10 - 1.00 x10*3/uL    Eosinophils Absolute 0.06 0.00 - 0.70 x10*3/uL    Basophils Absolute 0.03 0.00 - 0.10 x10*3/uL   Human Chorionic Gonadotropin, Serum Quantitative   Result Value Ref Range    HCG, Beta-Quantitative <2 <5 mIU/mL   Urinalysis with Reflex Culture and Microscopic   Result  Value Ref Range    Color, Urine Colorless (N) Straw, Yellow    Appearance, Urine Clear Clear    Specific Gravity, Urine 1.001 (N) 1.005 - 1.035    pH, Urine 6.0 5.0, 5.5, 6.0, 6.5, 7.0, 7.5, 8.0    Protein, Urine NEGATIVE NEGATIVE mg/dL    Glucose, Urine NEGATIVE NEGATIVE mg/dL    Blood, Urine SMALL (1+) (A) NEGATIVE    Ketones, Urine NEGATIVE NEGATIVE mg/dL    Bilirubin, Urine NEGATIVE NEGATIVE    Urobilinogen, Urine <2.0 <2.0 mg/dL    Nitrite, Urine NEGATIVE NEGATIVE    Leukocyte Esterase, Urine NEGATIVE NEGATIVE   Urinalysis Microscopic   Result Value Ref Range    WBC, Urine NONE 1-5, NONE /HPF    RBC, Urine NONE NONE, 1-2, 3-5 /HPF   Extra Urine Gray Tube   Result Value Ref Range    Extra Tube Hold for add-ons.    Comprehensive metabolic panel   Result Value Ref Range    Glucose 135 (H) 74 - 99 mg/dL    Sodium 139 136 - 145 mmol/L    Potassium 4.2 3.5 - 5.3 mmol/L    Chloride 107 98 - 107 mmol/L    Bicarbonate 26 21 - 32 mmol/L    Anion Gap 10 10 - 20 mmol/L    Urea Nitrogen 14 6 - 23 mg/dL    Creatinine 0.81 0.50 - 1.05 mg/dL    eGFR >90 >60 mL/min/1.73m*2    Calcium 8.3 (L) 8.6 - 10.3 mg/dL    Albumin 3.2 (L) 3.4 - 5.0 g/dL    Alkaline Phosphatase 48 33 - 110 U/L    Total Protein 5.7 (L) 6.4 - 8.2 g/dL    AST 7 (L) 9 - 39 U/L    Bilirubin, Total 0.1 0.0 - 1.2 mg/dL    ALT 11 7 - 45 U/L   CBC   Result Value Ref Range    WBC 10.7 4.4 - 11.3 x10*3/uL    nRBC 0.0 0.0 - 0.0 /100 WBCs    RBC 2.63 (L) 4.00 - 5.20 x10*6/uL    Hemoglobin 7.4 (L) 12.0 - 16.0 g/dL    Hematocrit 23.9 (L) 36.0 - 46.0 %    MCV 91 80 - 100 fL    MCH 28.1 26.0 - 34.0 pg    MCHC 31.0 (L) 32.0 - 36.0 g/dL    RDW 13.0 11.5 - 14.5 %    Platelets 308 150 - 450 x10*3/uL   Hemoglobin and hematocrit, blood   Result Value Ref Range    Hemoglobin 8.1 (L) 12.0 - 16.0 g/dL    Hematocrit 26.4 (L) 36.0 - 46.0 %          Assessment/Plan   Principal Problem:    Crohn's colitis, with rectal bleeding (CMS/HCC)  Active Problems:    Generalized abdominal  pain    Crohn's flare  - Recent colonoscopy 12/2023: moderate inflammation at hepatic flexure and rectum with biopsies showing chronic active colitis consistent with IBD   - Recently started on Humira and prednisone outpatient  - Presents with 10 bowel movements daily with jovita blood now overnight 3 to 4   - H&H 9.6/30.6 > 7.4/23.9   - Monitor with daily CBC- transfuse is hemoglobin less than 7  - Continue IV steroids  - Continue IVF  - Appreciate GI consultation     Hypokalemia  -K replete PRN   -Monitor CMP daily     VTE prophylaxis  - SCDs/ ambulation    Admit to medicine     Shea Pickens, APRN-CNP

## 2024-02-25 NOTE — CONSULTS
Reason For Consult  Crohn's    History Of Present Illness  Kyra Behnfeldt is a 23 y.o. female with PMHx of Crohn's colitis presenting with Crohn's flare.  She was recently diagnosed with Crohn's in December due to having chronic GI bleeding and diarrhea.  Colonoscopy 12/7 showed two areas of inflammation, one in the rectum and one in the proximal transverse colon.  She was started on prednisone and then on budesonide, but symptoms continued.  She was set up with Humira, but there were some issues getting this started and she ended up admitted to [facility] from 2/2-2/6 and got IV steroids.  She felt somewhat better but was still having bloody stools.  She started Humira on 2/3 and then did a second loading dose on 2/17.  Then recently the symptoms got worse again, having 8-10 bloody stools so came back to the hospital.  She got IV steroids yesterday and today, and does feel better, had 3 stools since being admitted to observation.     Past Medical History  As per HPI.    Surgical History  She has no past surgical history on file.     Social History  She reports that she has never smoked. She has never been exposed to tobacco smoke. She has never used smokeless tobacco. No history on file for alcohol use and drug use.    Family History  No family history on file.     Allergies  Patient has no known allergies.    Review of Systems  Review of Systems   Constitutional:  Negative for chills, fatigue, fever and unexpected weight change.   HENT:  Negative for sore throat.    Eyes:  Negative for redness and visual disturbance.   Respiratory:  Negative for shortness of breath.    Cardiovascular:  Negative for chest pain.   Gastrointestinal:         As per HPI   Genitourinary:  Negative for difficulty urinating.   Musculoskeletal:  Negative for arthralgias and myalgias.   Skin:  Negative for rash.   Neurological:  Negative for headaches.   Hematological:  Negative for adenopathy. Does not bruise/bleed easily.  "  Psychiatric/Behavioral:  The patient is not nervous/anxious.    All other systems reviewed and are negative.       Physical Exam  Physical Exam  Constitutional:       General: She is not in acute distress.  HENT:      Mouth/Throat:      Mouth: Mucous membranes are moist.   Eyes:      Extraocular Movements: Extraocular movements intact.   Cardiovascular:      Rate and Rhythm: Normal rate and regular rhythm.   Pulmonary:      Breath sounds: Normal breath sounds.   Abdominal:      General: Bowel sounds are normal. There is no distension.      Palpations: Abdomen is soft.      Tenderness: There is no abdominal tenderness. There is no guarding or rebound.   Musculoskeletal:         General: No swelling.   Skin:     General: Skin is warm and dry.   Neurological:      General: No focal deficit present.      Mental Status: She is alert.   Psychiatric:         Mood and Affect: Mood normal.         Behavior: Behavior normal.          Last Recorded Vitals  Blood pressure 105/67, pulse 83, temperature 37.1 °C (98.7 °F), temperature source Temporal, resp. rate 18, height 1.61 m (5' 3.39\"), weight 51.3 kg (113 lb), last menstrual period 01/30/2024, SpO2 98 %.    Relevant Results    Scheduled medications  melatonin, 3 mg, oral, Nightly  [Held by provider] mesalamine, 1,000 mg, rectal, BID  methylPREDNISolone sodium succinate (PF), 40 mg, intravenous, q12h  pantoprazole, 40 mg, oral, Daily before breakfast   Or  pantoprazole, 40 mg, intravenous, Daily before breakfast  potassium chloride CR, 20 mEq, oral, Daily      Continuous medications  sodium chloride 0.9%, 100 mL/hr, Last Rate: 100 mL/hr (02/25/24 0618)      PRN medications  PRN medications: acetaminophen **OR** [DISCONTINUED] acetaminophen **OR** [DISCONTINUED] acetaminophen, hydrOXYzine HCL, ondansetron **OR** ondansetron    Results for orders placed or performed during the hospital encounter of 02/24/24 (from the past 24 hour(s))   Lactate   Result Value Ref Range    " Lactate 1.1 0.4 - 2.0 mmol/L   Lipase   Result Value Ref Range    Lipase 48 9 - 82 U/L   Comprehensive Metabolic Panel   Result Value Ref Range    Glucose 117 (H) 74 - 99 mg/dL    Sodium 136 136 - 145 mmol/L    Potassium 3.2 (L) 3.5 - 5.3 mmol/L    Chloride 100 98 - 107 mmol/L    Bicarbonate 26 21 - 32 mmol/L    Anion Gap 13 10 - 20 mmol/L    Urea Nitrogen 15 6 - 23 mg/dL    Creatinine 0.95 0.50 - 1.05 mg/dL    eGFR 86 >60 mL/min/1.73m*2    Calcium 9.0 8.6 - 10.3 mg/dL    Albumin 3.7 3.4 - 5.0 g/dL    Alkaline Phosphatase 57 33 - 110 U/L    Total Protein 6.6 6.4 - 8.2 g/dL    AST 11 9 - 39 U/L    Bilirubin, Total 0.2 0.0 - 1.2 mg/dL    ALT 14 7 - 45 U/L   CBC and Auto Differential   Result Value Ref Range    WBC 12.0 (H) 4.4 - 11.3 x10*3/uL    nRBC 0.0 0.0 - 0.0 /100 WBCs    RBC 3.38 (L) 4.00 - 5.20 x10*6/uL    Hemoglobin 9.6 (L) 12.0 - 16.0 g/dL    Hematocrit 30.6 (L) 36.0 - 46.0 %    MCV 91 80 - 100 fL    MCH 28.4 26.0 - 34.0 pg    MCHC 31.4 (L) 32.0 - 36.0 g/dL    RDW 13.1 11.5 - 14.5 %    Platelets 350 150 - 450 x10*3/uL    Neutrophils % 91.1 40.0 - 80.0 %    Immature Granulocytes %, Automated 0.4 0.0 - 0.9 %    Lymphocytes % 5.8 13.0 - 44.0 %    Monocytes % 2.0 2.0 - 10.0 %    Eosinophils % 0.5 0.0 - 6.0 %    Basophils % 0.2 0.0 - 2.0 %    Neutrophils Absolute 10.96 (H) 1.20 - 7.70 x10*3/uL    Immature Granulocytes Absolute, Automated 0.05 0.00 - 0.70 x10*3/uL    Lymphocytes Absolute 0.70 (L) 1.20 - 4.80 x10*3/uL    Monocytes Absolute 0.24 0.10 - 1.00 x10*3/uL    Eosinophils Absolute 0.06 0.00 - 0.70 x10*3/uL    Basophils Absolute 0.03 0.00 - 0.10 x10*3/uL   Human Chorionic Gonadotropin, Serum Quantitative   Result Value Ref Range    HCG, Beta-Quantitative <2 <5 mIU/mL   Urinalysis with Reflex Culture and Microscopic   Result Value Ref Range    Color, Urine Colorless (N) Straw, Yellow    Appearance, Urine Clear Clear    Specific Gravity, Urine 1.001 (N) 1.005 - 1.035    pH, Urine 6.0 5.0, 5.5, 6.0, 6.5, 7.0,  7.5, 8.0    Protein, Urine NEGATIVE NEGATIVE mg/dL    Glucose, Urine NEGATIVE NEGATIVE mg/dL    Blood, Urine SMALL (1+) (A) NEGATIVE    Ketones, Urine NEGATIVE NEGATIVE mg/dL    Bilirubin, Urine NEGATIVE NEGATIVE    Urobilinogen, Urine <2.0 <2.0 mg/dL    Nitrite, Urine NEGATIVE NEGATIVE    Leukocyte Esterase, Urine NEGATIVE NEGATIVE   Urinalysis Microscopic   Result Value Ref Range    WBC, Urine NONE 1-5, NONE /HPF    RBC, Urine NONE NONE, 1-2, 3-5 /HPF   Extra Urine Gray Tube   Result Value Ref Range    Extra Tube Hold for add-ons.    Comprehensive metabolic panel   Result Value Ref Range    Glucose 135 (H) 74 - 99 mg/dL    Sodium 139 136 - 145 mmol/L    Potassium 4.2 3.5 - 5.3 mmol/L    Chloride 107 98 - 107 mmol/L    Bicarbonate 26 21 - 32 mmol/L    Anion Gap 10 10 - 20 mmol/L    Urea Nitrogen 14 6 - 23 mg/dL    Creatinine 0.81 0.50 - 1.05 mg/dL    eGFR >90 >60 mL/min/1.73m*2    Calcium 8.3 (L) 8.6 - 10.3 mg/dL    Albumin 3.2 (L) 3.4 - 5.0 g/dL    Alkaline Phosphatase 48 33 - 110 U/L    Total Protein 5.7 (L) 6.4 - 8.2 g/dL    AST 7 (L) 9 - 39 U/L    Bilirubin, Total 0.1 0.0 - 1.2 mg/dL    ALT 11 7 - 45 U/L   CBC   Result Value Ref Range    WBC 10.7 4.4 - 11.3 x10*3/uL    nRBC 0.0 0.0 - 0.0 /100 WBCs    RBC 2.63 (L) 4.00 - 5.20 x10*6/uL    Hemoglobin 7.4 (L) 12.0 - 16.0 g/dL    Hematocrit 23.9 (L) 36.0 - 46.0 %    MCV 91 80 - 100 fL    MCH 28.1 26.0 - 34.0 pg    MCHC 31.0 (L) 32.0 - 36.0 g/dL    RDW 13.0 11.5 - 14.5 %    Platelets 308 150 - 450 x10*3/uL       CT abdomen pelvis w IV contrast    Result Date: 2/1/2024  Interpreted By:  Gregorio Jones, STUDY: CT ABDOMEN PELVIS W IV CONTRAST;  2/1/2024 5:12 pm   INDICATION: Signs/Symptoms:Crohn's disease, worsening diarrhea and abdominal pain.   COMPARISON: None.   ACCESSION NUMBER(S): [id number]   ORDERING CLINICIAN: WALDEMAR BABIN   TECHNIQUE: CT of the abdomen and pelvis was performed.  Standard contiguous axial images were obtained at 3 mm slice thickness through  the abdomen and pelvis. Coronal and sagittal reconstructions at 3 mm slice thickness were performed.   75 ml of contrast Omnipaque 350 were administered intravenously without immediate complication.   FINDINGS: Lung bases clear.   Unremarkable liver, spleen, pancreas, adrenals, and bilateral kidneys.   No definite stone in the gallbladder.   Normal appendix.   Multiple fluid-filled small bowel loops are noted in the mid to lower abdomen. There is also apparent circumferential wall thickening of multiple colonic loops. These are nonspecific finding which can be seen with acute enterocolitis.   No free air or free fluid in the abdomen.   No abdominal aortic aneurysm.   No adenopathy.   Sections through the pelvis demonstrate a grossly unremarkable urinary bladder. Trace free fluid in the cul-de-sac. No adnexal mass.   No acute osseous abnormality in the regional skeleton. Bilateral pars defects of L5 with grade 1-2 anterolisthesis of L5 on S1.       1. Questionable acute enterocolitis. 2. No other acute finding in the abdomen and pelvis. 3. Normal appendix.   Signed by: Gregorio Jones 2/1/2024 6:21 PM Dictation workstation:   [id number]        Assessment/Plan     23 y.o. female with PMHx of Crohn's colitis presenting with Crohn's flare. She was just diagnosed in December and has had trouble controlling the symptoms since diagnosis, despite oral prednisone.  Started Humira 2/3 (got loading doses 2/3 and 2/17) but was still having 8-10 bloody stools on 40mg prednisone daily.  Admitted for IV steroids and seems to be feeling better today.    - continue IV solumedrol, will change to 20mg Q8hrs  - it is a little soon to see if Humira will work for her, so would continue this as planned  - no need to repeat stool studies or imaging at this time, however could consider MRE if there is question about small bowel involvement of her Crohn's  - changed diet to regular  - anticipate she will need >48 hours of IV steroids given  that she has failed outpatient oral prednisone  - Dr. Marin to follow for GI starting tomorrow      Ronald Fuentes MD

## 2024-02-26 VITALS
RESPIRATION RATE: 18 BRPM | DIASTOLIC BLOOD PRESSURE: 74 MMHG | BODY MASS INDEX: 20.02 KG/M2 | TEMPERATURE: 99 F | WEIGHT: 113 LBS | OXYGEN SATURATION: 100 % | HEIGHT: 63 IN | HEART RATE: 88 BPM | SYSTOLIC BLOOD PRESSURE: 118 MMHG

## 2024-02-26 DIAGNOSIS — K50.10 CROHN'S DISEASE OF LARGE INTESTINE WITHOUT COMPLICATION (MULTI): ICD-10-CM

## 2024-02-26 LAB
ANION GAP SERPL CALC-SCNC: 11 MMOL/L (ref 10–20)
BUN SERPL-MCNC: 14 MG/DL (ref 6–23)
CALCIUM SERPL-MCNC: 8.8 MG/DL (ref 8.6–10.3)
CHLORIDE SERPL-SCNC: 105 MMOL/L (ref 98–107)
CO2 SERPL-SCNC: 28 MMOL/L (ref 21–32)
CREAT SERPL-MCNC: 0.78 MG/DL (ref 0.5–1.05)
EGFRCR SERPLBLD CKD-EPI 2021: >90 ML/MIN/1.73M*2
ERYTHROCYTE [DISTWIDTH] IN BLOOD BY AUTOMATED COUNT: 12.9 % (ref 11.5–14.5)
GLUCOSE SERPL-MCNC: 105 MG/DL (ref 74–99)
HCT VFR BLD AUTO: 25.6 % (ref 36–46)
HGB BLD-MCNC: 7.8 G/DL (ref 12–16)
MCH RBC QN AUTO: 27.9 PG (ref 26–34)
MCHC RBC AUTO-ENTMCNC: 30.5 G/DL (ref 32–36)
MCV RBC AUTO: 91 FL (ref 80–100)
NRBC BLD-RTO: 0 /100 WBCS (ref 0–0)
PLATELET # BLD AUTO: 310 X10*3/UL (ref 150–450)
POTASSIUM SERPL-SCNC: 3.9 MMOL/L (ref 3.5–5.3)
RBC # BLD AUTO: 2.8 X10*6/UL (ref 4–5.2)
SODIUM SERPL-SCNC: 140 MMOL/L (ref 136–145)
WBC # BLD AUTO: 11.4 X10*3/UL (ref 4.4–11.3)

## 2024-02-26 PROCEDURE — 2500000002 HC RX 250 W HCPCS SELF ADMINISTERED DRUGS (ALT 637 FOR MEDICARE OP, ALT 636 FOR OP/ED): Performed by: NURSE PRACTITIONER

## 2024-02-26 PROCEDURE — 99232 SBSQ HOSP IP/OBS MODERATE 35: CPT

## 2024-02-26 PROCEDURE — 80048 BASIC METABOLIC PNL TOTAL CA: CPT | Performed by: NURSE PRACTITIONER

## 2024-02-26 PROCEDURE — 85027 COMPLETE CBC AUTOMATED: CPT | Performed by: NURSE PRACTITIONER

## 2024-02-26 PROCEDURE — 36415 COLL VENOUS BLD VENIPUNCTURE: CPT | Performed by: NURSE PRACTITIONER

## 2024-02-26 PROCEDURE — 2500000004 HC RX 250 GENERAL PHARMACY W/ HCPCS (ALT 636 FOR OP/ED): Performed by: NURSE PRACTITIONER

## 2024-02-26 RX ORDER — METHOCARBAMOL 500 MG/1
500 TABLET, FILM COATED ORAL 3 TIMES DAILY PRN
COMMUNITY
End: 2024-02-26 | Stop reason: HOSPADM

## 2024-02-26 RX ORDER — ADALIMUMAB 80MG/0.8ML
KIT SUBCUTANEOUS
Qty: 3 EACH | Refills: 0 | Status: SHIPPED | OUTPATIENT
Start: 2024-02-26 | End: 2024-03-07 | Stop reason: HOSPADM

## 2024-02-26 RX ORDER — PREDNISONE 10 MG/1
TABLET ORAL
Qty: 98 TABLET | Refills: 0 | Status: SHIPPED | OUTPATIENT
Start: 2024-02-26 | End: 2024-03-07 | Stop reason: HOSPADM

## 2024-02-26 RX ADMIN — METHYLPREDNISOLONE SODIUM SUCCINATE 40 MG: 40 INJECTION, POWDER, FOR SOLUTION INTRAMUSCULAR; INTRAVENOUS at 17:56

## 2024-02-26 RX ADMIN — METHYLPREDNISOLONE SODIUM SUCCINATE 40 MG: 40 INJECTION, POWDER, FOR SOLUTION INTRAMUSCULAR; INTRAVENOUS at 06:04

## 2024-02-26 RX ADMIN — POTASSIUM CHLORIDE 20 MEQ: 1500 TABLET, EXTENDED RELEASE ORAL at 12:29

## 2024-02-26 ASSESSMENT — PAIN SCALES - GENERAL: PAINLEVEL_OUTOF10: 0 - NO PAIN

## 2024-02-26 NOTE — PROGRESS NOTES
Pharmacy Medication History Review    Kyra Behnfeldt is a 23 y.o. female admitted for Crohn's colitis, with rectal bleeding (CMS/[facility]). Pharmacy reviewed the patient's [id number] medications and allergies for accuracy.    The list below reflectives the updated PTA list. Please review each medication in order reconciliation for additional clarification and justification.  Prior to Admission Medications   Prescriptions Last Dose Informant     acetaminophen (Tylenol) 325 mg tablet Past Week      Sig: Take 2 tablets (650 mg) by mouth every 4 hours if needed for mild pain (1 - 3).   adalimumab (Humira Pen) 40 mg/0.4 mL pen injector kit pen-injector Past Week      Sig: Inject 1 Pen (40 mg) under the skin every 14 (fourteen) days.             cyclobenzaprine (Flexeril) 10 mg tablet Past Week      Sig: Take 1 tablet (10 mg) by mouth 2 times a day as needed for muscle spasms.   hydrOXYzine HCL (Atarax) 50 mg tablet Past Week      Sig: Take 1 tablet (50 mg) by mouth every 8 hours if needed for itching.   mesalamine (Canasa) 1,000 mg suppository Past Week      Sig: Insert 1 suppository (1,000 mg) into the rectum 2 times a day. Use as directed   methocarbamol (Robaxin) 500 mg tablet 2/24/2024      Sig: Take 1 tablet (500 mg) by mouth 3 times a day as needed for muscle spasms.   predniSONE (Deltasone) 10 mg tablet 2/24/2024      Sig: Take 4 tablets (40 mg) by mouth once daily for 14 days, THEN 3 tablets (30 mg) once daily for 7 days, THEN 2 tablets (20 mg) once daily for 7 days, THEN 1 tablet (10 mg) once daily for 7 days. Take by mouth as directed.      Facility-Administered Medications: None      Allergies  Reviewed by Jose Lala RN on 2/24/2024   No Known Allergies         Below are additional concerns with the patient's PTA list.  Patient verified all medications and doses.    Matilde Aguirre CPhT

## 2024-02-26 NOTE — CARE PLAN
The patient's goals for the shift include      The clinical goals for the shift include remain comfortable      Problem: Nutrition  Goal: Less than 5 days NPO/clear liquids  Outcome: Progressing  Goal: Oral intake greater than 50%  Outcome: Progressing  Goal: Oral intake greater 75%  Outcome: Progressing  Goal: Consume prescribed supplement  Outcome: Progressing  Goal: Adequate PO fluid intake  Outcome: Progressing  Goal: Nutrition support goals are met within 48 hrs  Outcome: Progressing  Goal: Nutrition support is meeting 75% of nutrient needs  Outcome: Progressing  Goal: Tube feed tolerance  Outcome: Progressing  Goal: BG  mg/dL  Outcome: Progressing  Goal: Lab values WNL  Outcome: Progressing  Goal: Electrolytes WNL  Outcome: Progressing  Goal: Promote healing  Outcome: Progressing  Goal: Maintain stable weight  Outcome: Progressing  Goal: Reduce weight from edema/fluid  Outcome: Progressing  Goal: Gradual weight gain  Outcome: Progressing  Goal: Improve ostomy output  Outcome: Progressing     Problem: Pain - Adult  Goal: Verbalizes/displays adequate comfort level or baseline comfort level  Outcome: Progressing     Problem: Safety - Adult  Goal: Free from fall injury  Outcome: Progressing     Problem: Discharge Planning  Goal: Discharge to home or other facility with appropriate resources  Outcome: Progressing     Problem: Chronic Conditions and Co-morbidities  Goal: Patient's chronic conditions and co-morbidity symptoms are monitored and maintained or improved  Outcome: Progressing

## 2024-02-26 NOTE — PROGRESS NOTES
"Kyra Behnfeldt is a 23 y.o. female on day 1 of admission presenting with Crohn's colitis, with rectal bleeding (CMS/HCC).    Subjective   Patient reports significant improvement.  Had 2 formed stools streaked with blood over the past day.  Does still have rectal pain when moving bowels.  Bentyl did not seem to help.  She feels strongly that the divided doses of solumedrol have helped much more than one large dose.  She prefers to take oral prednisone this way when discharged.       Objective     Physical Exam  Constitutional:       Appearance: Normal appearance.   HENT:      Mouth/Throat:      Mouth: Mucous membranes are dry.      Pharynx: Oropharynx is clear.   Cardiovascular:      Rate and Rhythm: Normal rate and regular rhythm.   Pulmonary:      Effort: Pulmonary effort is normal.      Breath sounds: Normal breath sounds. No wheezing or rhonchi.   Abdominal:      General: Abdomen is flat. Bowel sounds are normal. There is no distension.      Palpations: Abdomen is soft. There is no hepatomegaly.      Tenderness: There is no abdominal tenderness. There is no guarding or rebound. Negative signs include Brown's sign.      Hernia: No hernia is present.   Musculoskeletal:         General: Normal range of motion.   Skin:     General: Skin is warm and dry.   Neurological:      General: No focal deficit present.      Mental Status: She is alert and oriented to person, place, and time.   Psychiatric:         Mood and Affect: Mood normal.         Behavior: Behavior normal.         Last Recorded Vitals  Blood pressure 116/73, pulse 91, temperature 37.4 °C (99.3 °F), temperature source Temporal, resp. rate 18, height 1.61 m (5' 3.39\"), weight 51.3 kg (113 lb), last menstrual period 01/30/2024, SpO2 99 %.  Intake/Output last 3 Shifts:  I/O last 3 completed shifts:  In: 706.7 (13.8 mL/kg) [I.V.:706.7 (13.8 mL/kg)]  Out: - (0 mL/kg)   Weight: 51.3 kg     Relevant Results      CT abdomen pelvis w IV contrast    Result Date: " 2/1/2024  Interpreted By:  Gregorio Jones, STUDY: CT ABDOMEN PELVIS W IV CONTRAST;  2/1/2024 5:12 pm   INDICATION: Signs/Symptoms:Crohn's disease, worsening diarrhea and abdominal pain.   COMPARISON: None.   ACCESSION NUMBER(S): [id number]   ORDERING CLINICIAN: WALDEMAR BABIN   TECHNIQUE: CT of the abdomen and pelvis was performed.  Standard contiguous axial images were obtained at 3 mm slice thickness through the abdomen and pelvis. Coronal and sagittal reconstructions at 3 mm slice thickness were performed.   75 ml of contrast Omnipaque 350 were administered intravenously without immediate complication.   FINDINGS: Lung bases clear.   Unremarkable liver, spleen, pancreas, adrenals, and bilateral kidneys.   No definite stone in the gallbladder.   Normal appendix.   Multiple fluid-filled small bowel loops are noted in the mid to lower abdomen. There is also apparent circumferential wall thickening of multiple colonic loops. These are nonspecific finding which can be seen with acute enterocolitis.   No free air or free fluid in the abdomen.   No abdominal aortic aneurysm.   No adenopathy.   Sections through the pelvis demonstrate a grossly unremarkable urinary bladder. Trace free fluid in the cul-de-sac. No adnexal mass.   No acute osseous abnormality in the regional skeleton. Bilateral pars defects of L5 with grade 1-2 anterolisthesis of L5 on S1.       1. Questionable acute enterocolitis. 2. No other acute finding in the abdomen and pelvis. 3. Normal appendix.   Signed by: Gregorio Jones 2/1/2024 6:21 PM Dictation workstation:   [id number]    * Cannot find OR log *  Last relevant procedure: Colonoscopy on 12/7/23                         Assessment/Plan   Principal Problem:    Crohn's colitis, with rectal bleeding (CMS/HCC)  Active Problems:    Generalized abdominal pain    23 y.o. female with PMHx of Crohn's colitis presenting with Crohn's flare. She was just diagnosed in December and has had trouble controlling  the symptoms since diagnosis, despite oral prednisone.  Started Humira 2/3 (got loading doses 2/3 and 2/17) but was still having 8-10 bloody stools on 40mg prednisone daily.  Admitted for IV steroids and is clinically improving, down to 2 formed blood streaked stools daily with rectal discomfort when moving bowels.     - discharge w/ 40 mg prednisone taper, decreasing by 10 mg every 2 weeks  - it is a little soon to see if Humira will work for her, so would continue this as planned  - no need to repeat stool studies or imaging at this time, however could consider MRE if there is question about small bowel involvement of her Crohn's  - changed diet to regular  - follow up with Dr. Wang  Patient okay for discharge from GI standpoint    Plan reviewed with Dr. Marin and she is in agreement             LUZ Hickey-CNP

## 2024-02-26 NOTE — PROGRESS NOTES
Kyra Behnfeldt is a 23 y.o. female on day 1 of admission presenting with Crohn's colitis, with rectal bleeding (CMS/HCC).      Subjective   Pt transferred to our service  Chart reviewed  Tells me today is good day  Less abdo pains less bleeding, streaking  Tolerating PO       Objective     Last Recorded Vitals  /73 (BP Location: Right arm, Patient Position: Sitting)   Pulse 91   Temp 37.4 °C (99.3 °F) (Temporal)   Resp 18   Wt 51.3 kg (113 lb)   SpO2 99%   Intake/Output last 3 Shifts:  No intake or output data in the 24 hours ending 02/26/24 1157    Admission Weight  Weight: 51.3 kg (113 lb) (02/24/24 1513)    Daily Weight  02/24/24 : 51.3 kg (113 lb)    Image Results  CT abdomen pelvis w IV contrast  Narrative: Interpreted By:  Gregorio Jones,   STUDY:  CT ABDOMEN PELVIS W IV CONTRAST;  2/1/2024 5:12 pm      INDICATION:  Signs/Symptoms:Crohn's disease, worsening diarrhea and abdominal pain.      COMPARISON:  None.      ACCESSION NUMBER(S):  [id number]      ORDERING CLINICIAN:  WALDEMAR BABIN      TECHNIQUE:  CT of the abdomen and pelvis was performed.  Standard contiguous  axial images were obtained at 3 mm slice thickness through the  abdomen and pelvis. Coronal and sagittal reconstructions at 3 mm  slice thickness were performed.      75 ml of contrast Omnipaque 350 were administered intravenously  without immediate complication.      FINDINGS:  Lung bases clear.      Unremarkable liver, spleen, pancreas, adrenals, and bilateral kidneys.      No definite stone in the gallbladder.      Normal appendix.      Multiple fluid-filled small bowel loops are noted in the mid to lower  abdomen. There is also apparent circumferential wall thickening of  multiple colonic loops. These are nonspecific finding which can be  seen with acute enterocolitis.      No free air or free fluid in the abdomen.      No abdominal aortic aneurysm.      No adenopathy.      Sections through the pelvis demonstrate a grossly  unremarkable  urinary bladder. Trace free fluid in the cul-de-sac. No adnexal mass.      No acute osseous abnormality in the regional skeleton. Bilateral pars  defects of L5 with grade 1-2 anterolisthesis of L5 on S1.      Impression: 1. Questionable acute enterocolitis.  2. No other acute finding in the abdomen and pelvis.  3. Normal appendix.      Signed by: Gregorio Jones 2/1/2024 6:21 PM  Dictation workstation:   [id number]      Physical Exam  Constitutional:       Appearance: Normal appearance.   Cardiovascular:      Rate and Rhythm: Normal rate and regular rhythm.   Pulmonary:      Effort: Pulmonary effort is normal.   Abdominal:      General: Bowel sounds are normal.      Palpations: Abdomen is soft.   Skin:     Capillary Refill: Capillary refill takes less than 2 seconds.   Neurological:      General: No focal deficit present.      Mental Status: She is alert and oriented to person, place, and time.         Relevant Results             Results for orders placed or performed during the hospital encounter of 02/24/24 (from the past 24 hour(s))   Basic Metabolic Panel   Result Value Ref Range    Glucose 105 (H) 74 - 99 mg/dL    Sodium 140 136 - 145 mmol/L    Potassium 3.9 3.5 - 5.3 mmol/L    Chloride 105 98 - 107 mmol/L    Bicarbonate 28 21 - 32 mmol/L    Anion Gap 11 10 - 20 mmol/L    Urea Nitrogen 14 6 - 23 mg/dL    Creatinine 0.78 0.50 - 1.05 mg/dL    eGFR >90 >60 mL/min/1.73m*2    Calcium 8.8 8.6 - 10.3 mg/dL   CBC   Result Value Ref Range    WBC 11.4 (H) 4.4 - 11.3 x10*3/uL    nRBC 0.0 0.0 - 0.0 /100 WBCs    RBC 2.80 (L) 4.00 - 5.20 x10*6/uL    Hemoglobin 7.8 (L) 12.0 - 16.0 g/dL    Hematocrit 25.6 (L) 36.0 - 46.0 %    MCV 91 80 - 100 fL    MCH 27.9 26.0 - 34.0 pg    MCHC 30.5 (L) 32.0 - 36.0 g/dL    RDW 12.9 11.5 - 14.5 %    Platelets 310 150 - 450 x10*3/uL       No current facility-administered medications on file prior to encounter.     Current Outpatient Medications on File Prior to Encounter    Medication Sig Dispense Refill    acetaminophen (Tylenol) 325 mg tablet Take 2 tablets (650 mg) by mouth every 4 hours if needed for mild pain (1 - 3). 30 tablet 1    cyclobenzaprine (Flexeril) 10 mg tablet Take 1 tablet (10 mg) by mouth 2 times a day as needed for muscle spasms. 30 tablet 1    hydrOXYzine HCL (Atarax) 50 mg tablet Take 1 tablet (50 mg) by mouth every 8 hours if needed for itching.      mesalamine (Canasa) 1,000 mg suppository Insert 1 suppository (1,000 mg) into the rectum 2 times a day. Use as directed 60 suppository 11    methocarbamol (Robaxin) 500 mg tablet Take 1 tablet (500 mg) by mouth 3 times a day as needed for muscle spasms.      predniSONE (Deltasone) 10 mg tablet Take 4 tablets (40 mg) by mouth once daily for 14 days, THEN 3 tablets (30 mg) once daily for 7 days, THEN 2 tablets (20 mg) once daily for 7 days, THEN 1 tablet (10 mg) once daily for 7 days. Take by mouth as directed. 98 tablet 0    [DISCONTINUED] adalimumab (Humira Pen) 40 mg/0.4 mL pen injector kit pen-injector Inject 1 Pen (40 mg) under the skin every 14 (fourteen) days. 2 each 5    [DISCONTINUED] adalimumab (Humira) 40 mg/0.8 mL syringe kit prefilled syringe Inject 1 each (40 mg) under the skin every 14 (fourteen) days. START DATE 1/19/24         Assessment/Plan      Principal Problem:    Crohn's colitis, with rectal bleeding (CMS/HCC)  Active Problems:    Generalized abdominal pain    -GI on board, input noted. Cont IV Steroids and supportive care  -trend labs    -Continue current treatment as ordered. Will make adjustments as necessary.        VTE Prophylaxis  -See Orders      Patient case and plan of care discussed with Dr. KATYA Loo.    LUZ Martins - CNP  -In collaboration with Dr. KATYA Loo    [facility]  Office: [phone]  Fax: [phone]  I have reviewed the above note obtained and documented by the NP/PA and I personally participated in the key components. I  have discussed the case and management of the patient's care. Changes made to the note, and all key components of history and physical/progress note done by me.  dw nursing  Haim Loo MD

## 2024-03-01 ENCOUNTER — APPOINTMENT (OUTPATIENT)
Dept: CARDIOLOGY | Facility: HOSPITAL | Age: 24
DRG: 386 | End: 2024-03-01
Payer: COMMERCIAL

## 2024-03-01 ENCOUNTER — HOSPITAL ENCOUNTER (INPATIENT)
Facility: HOSPITAL | Age: 24
LOS: 6 days | Discharge: ANOTHER HOSPITAL | DRG: 386 | End: 2024-03-07
Attending: EMERGENCY MEDICINE | Admitting: FAMILY MEDICINE
Payer: COMMERCIAL

## 2024-03-01 DIAGNOSIS — R10.84 GENERALIZED ABDOMINAL PAIN: ICD-10-CM

## 2024-03-01 DIAGNOSIS — K50.119 CROHN'S DISEASE OF COLON WITH COMPLICATION (MULTI): Primary | ICD-10-CM

## 2024-03-01 DIAGNOSIS — K50.111 CROHN'S COLITIS, WITH RECTAL BLEEDING (MULTI): ICD-10-CM

## 2024-03-01 LAB
ALBUMIN SERPL BCP-MCNC: 3.3 G/DL (ref 3.4–5)
ALP SERPL-CCNC: 60 U/L (ref 33–110)
ALT SERPL W P-5'-P-CCNC: 12 U/L (ref 7–45)
ANION GAP SERPL CALC-SCNC: 12 MMOL/L (ref 10–20)
AST SERPL W P-5'-P-CCNC: 10 U/L (ref 9–39)
BASOPHILS # BLD AUTO: 0.02 X10*3/UL (ref 0–0.1)
BASOPHILS NFR BLD AUTO: 0.2 %
BILIRUB SERPL-MCNC: 0.2 MG/DL (ref 0–1.2)
BUN SERPL-MCNC: 17 MG/DL (ref 6–23)
CALCIUM SERPL-MCNC: 8.1 MG/DL (ref 8.6–10.3)
CHLORIDE SERPL-SCNC: 99 MMOL/L (ref 98–107)
CMV IGG AVIDITY SERPL IA-RTO: NONREACTIVE %
CO2 SERPL-SCNC: 27 MMOL/L (ref 21–32)
CREAT SERPL-MCNC: 0.87 MG/DL (ref 0.5–1.05)
EGFRCR SERPLBLD CKD-EPI 2021: >90 ML/MIN/1.73M*2
EOSINOPHIL # BLD AUTO: 0.22 X10*3/UL (ref 0–0.7)
EOSINOPHIL NFR BLD AUTO: 1.8 %
ERYTHROCYTE [DISTWIDTH] IN BLOOD BY AUTOMATED COUNT: 12.9 % (ref 11.5–14.5)
GLUCOSE SERPL-MCNC: 129 MG/DL (ref 74–99)
HCT VFR BLD AUTO: 23.6 % (ref 36–46)
HCT VFR BLD AUTO: 26.8 % (ref 36–46)
HGB BLD-MCNC: 7.3 G/DL (ref 12–16)
HGB BLD-MCNC: 8.2 G/DL (ref 12–16)
IMM GRANULOCYTES # BLD AUTO: 0.13 X10*3/UL (ref 0–0.7)
IMM GRANULOCYTES NFR BLD AUTO: 1.1 % (ref 0–0.9)
LYMPHOCYTES # BLD AUTO: 2.83 X10*3/UL (ref 1.2–4.8)
LYMPHOCYTES NFR BLD AUTO: 23.6 %
MCH RBC QN AUTO: 26.9 PG (ref 26–34)
MCHC RBC AUTO-ENTMCNC: 30.6 G/DL (ref 32–36)
MCV RBC AUTO: 88 FL (ref 80–100)
MONOCYTES # BLD AUTO: 1.05 X10*3/UL (ref 0.1–1)
MONOCYTES NFR BLD AUTO: 8.8 %
NEUTROPHILS # BLD AUTO: 7.74 X10*3/UL (ref 1.2–7.7)
NEUTROPHILS NFR BLD AUTO: 64.5 %
NRBC BLD-RTO: 0.3 /100 WBCS (ref 0–0)
PLATELET # BLD AUTO: 373 X10*3/UL (ref 150–450)
POTASSIUM SERPL-SCNC: 3 MMOL/L (ref 3.5–5.3)
POTASSIUM SERPL-SCNC: 3.8 MMOL/L (ref 3.5–5.3)
PROT SERPL-MCNC: 6.2 G/DL (ref 6.4–8.2)
RBC # BLD AUTO: 3.05 X10*6/UL (ref 4–5.2)
SODIUM SERPL-SCNC: 135 MMOL/L (ref 136–145)
WBC # BLD AUTO: 12 X10*3/UL (ref 4.4–11.3)

## 2024-03-01 PROCEDURE — 0DBM8ZX EXCISION OF DESCENDING COLON, VIA NATURAL OR ARTIFICIAL OPENING ENDOSCOPIC, DIAGNOSTIC: ICD-10-PCS | Performed by: FAMILY MEDICINE

## 2024-03-01 PROCEDURE — 2500000001 HC RX 250 WO HCPCS SELF ADMINISTERED DRUGS (ALT 637 FOR MEDICARE OP)

## 2024-03-01 PROCEDURE — 84132 ASSAY OF SERUM POTASSIUM: CPT | Performed by: FAMILY MEDICINE

## 2024-03-01 PROCEDURE — 96361 HYDRATE IV INFUSION ADD-ON: CPT

## 2024-03-01 PROCEDURE — 87493 C DIFF AMPLIFIED PROBE: CPT | Mod: AHULAB | Performed by: INTERNAL MEDICINE

## 2024-03-01 PROCEDURE — 36415 COLL VENOUS BLD VENIPUNCTURE: CPT | Performed by: EMERGENCY MEDICINE

## 2024-03-01 PROCEDURE — 36415 COLL VENOUS BLD VENIPUNCTURE: CPT | Performed by: FAMILY MEDICINE

## 2024-03-01 PROCEDURE — 85014 HEMATOCRIT: CPT | Performed by: FAMILY MEDICINE

## 2024-03-01 PROCEDURE — 96374 THER/PROPH/DIAG INJ IV PUSH: CPT

## 2024-03-01 PROCEDURE — 99222 1ST HOSP IP/OBS MODERATE 55: CPT

## 2024-03-01 PROCEDURE — 86644 CMV ANTIBODY: CPT | Mod: AHULAB | Performed by: INTERNAL MEDICINE

## 2024-03-01 PROCEDURE — 85025 COMPLETE CBC W/AUTO DIFF WBC: CPT | Performed by: EMERGENCY MEDICINE

## 2024-03-01 PROCEDURE — 86645 CMV ANTIBODY IGM: CPT | Performed by: INTERNAL MEDICINE

## 2024-03-01 PROCEDURE — 2500000004 HC RX 250 GENERAL PHARMACY W/ HCPCS (ALT 636 FOR OP/ED): Performed by: INTERNAL MEDICINE

## 2024-03-01 PROCEDURE — 1100000001 HC PRIVATE ROOM DAILY

## 2024-03-01 PROCEDURE — 93005 ELECTROCARDIOGRAM TRACING: CPT

## 2024-03-01 PROCEDURE — 87506 IADNA-DNA/RNA PROBE TQ 6-11: CPT | Mod: AHULAB | Performed by: INTERNAL MEDICINE

## 2024-03-01 PROCEDURE — 2500000001 HC RX 250 WO HCPCS SELF ADMINISTERED DRUGS (ALT 637 FOR MEDICARE OP): Performed by: FAMILY MEDICINE

## 2024-03-01 PROCEDURE — 80053 COMPREHEN METABOLIC PANEL: CPT | Performed by: EMERGENCY MEDICINE

## 2024-03-01 PROCEDURE — 2500000004 HC RX 250 GENERAL PHARMACY W/ HCPCS (ALT 636 FOR OP/ED): Performed by: PHYSICIAN ASSISTANT

## 2024-03-01 PROCEDURE — 2500000002 HC RX 250 W HCPCS SELF ADMINISTERED DRUGS (ALT 637 FOR MEDICARE OP, ALT 636 FOR OP/ED): Performed by: PHYSICIAN ASSISTANT

## 2024-03-01 PROCEDURE — 99285 EMERGENCY DEPT VISIT HI MDM: CPT | Mod: 25

## 2024-03-01 PROCEDURE — 0DBL8ZX EXCISION OF TRANSVERSE COLON, VIA NATURAL OR ARTIFICIAL OPENING ENDOSCOPIC, DIAGNOSTIC: ICD-10-PCS | Performed by: FAMILY MEDICINE

## 2024-03-01 RX ORDER — GUAIFENESIN 600 MG/1
600 TABLET, EXTENDED RELEASE ORAL EVERY 12 HOURS PRN
Status: DISCONTINUED | OUTPATIENT
Start: 2024-03-01 | End: 2024-03-07 | Stop reason: HOSPADM

## 2024-03-01 RX ORDER — HYDROXYZINE HYDROCHLORIDE 25 MG/1
50 TABLET, FILM COATED ORAL EVERY 8 HOURS PRN
Status: DISCONTINUED | OUTPATIENT
Start: 2024-03-01 | End: 2024-03-07 | Stop reason: HOSPADM

## 2024-03-01 RX ORDER — TALC
3 POWDER (GRAM) TOPICAL DAILY
Status: DISCONTINUED | OUTPATIENT
Start: 2024-03-01 | End: 2024-03-07 | Stop reason: HOSPADM

## 2024-03-01 RX ORDER — MORPHINE SULFATE 4 MG/ML
4 INJECTION, SOLUTION INTRAMUSCULAR; INTRAVENOUS ONCE
Status: COMPLETED | OUTPATIENT
Start: 2024-03-01 | End: 2024-03-01

## 2024-03-01 RX ORDER — FAMOTIDINE 20 MG/1
20 TABLET, FILM COATED ORAL 2 TIMES DAILY
Status: DISCONTINUED | OUTPATIENT
Start: 2024-03-01 | End: 2024-03-07 | Stop reason: HOSPADM

## 2024-03-01 RX ORDER — FAMOTIDINE 10 MG/ML
20 INJECTION INTRAVENOUS 2 TIMES DAILY
Status: DISCONTINUED | OUTPATIENT
Start: 2024-03-01 | End: 2024-03-07 | Stop reason: HOSPADM

## 2024-03-01 RX ORDER — ONDANSETRON HYDROCHLORIDE 2 MG/ML
4 INJECTION, SOLUTION INTRAVENOUS EVERY 6 HOURS PRN
Status: DISCONTINUED | OUTPATIENT
Start: 2024-03-01 | End: 2024-03-07 | Stop reason: HOSPADM

## 2024-03-01 RX ORDER — DICYCLOMINE HYDROCHLORIDE 10 MG/1
10 CAPSULE ORAL 4 TIMES DAILY PRN
Status: DISCONTINUED | OUTPATIENT
Start: 2024-03-01 | End: 2024-03-07 | Stop reason: HOSPADM

## 2024-03-01 RX ORDER — ALPRAZOLAM 0.5 MG/1
0.5 TABLET ORAL 3 TIMES DAILY PRN
Status: DISCONTINUED | OUTPATIENT
Start: 2024-03-01 | End: 2024-03-07 | Stop reason: HOSPADM

## 2024-03-01 RX ORDER — MORPHINE SULFATE 2 MG/ML
2 INJECTION, SOLUTION INTRAMUSCULAR; INTRAVENOUS EVERY 4 HOURS PRN
Status: DISCONTINUED | OUTPATIENT
Start: 2024-03-01 | End: 2024-03-07 | Stop reason: HOSPADM

## 2024-03-01 RX ORDER — MORPHINE SULFATE 4 MG/ML
4 INJECTION, SOLUTION INTRAMUSCULAR; INTRAVENOUS EVERY 4 HOURS PRN
Status: DISCONTINUED | OUTPATIENT
Start: 2024-03-01 | End: 2024-03-07 | Stop reason: HOSPADM

## 2024-03-01 RX ORDER — ACETAMINOPHEN 325 MG/1
650 TABLET ORAL EVERY 4 HOURS PRN
Status: DISCONTINUED | OUTPATIENT
Start: 2024-03-01 | End: 2024-03-07 | Stop reason: HOSPADM

## 2024-03-01 RX ORDER — POTASSIUM CHLORIDE 20 MEQ/1
40 TABLET, EXTENDED RELEASE ORAL ONCE
Status: DISCONTINUED | OUTPATIENT
Start: 2024-03-01 | End: 2024-03-01

## 2024-03-01 RX ORDER — POTASSIUM CHLORIDE 20 MEQ/1
40 TABLET, EXTENDED RELEASE ORAL ONCE
Status: COMPLETED | OUTPATIENT
Start: 2024-03-01 | End: 2024-03-01

## 2024-03-01 RX ORDER — SODIUM CHLORIDE, SODIUM LACTATE, POTASSIUM CHLORIDE, CALCIUM CHLORIDE 600; 310; 30; 20 MG/100ML; MG/100ML; MG/100ML; MG/100ML
100 INJECTION, SOLUTION INTRAVENOUS CONTINUOUS
Status: ACTIVE | OUTPATIENT
Start: 2024-03-01 | End: 2024-03-01

## 2024-03-01 RX ORDER — ONDANSETRON HYDROCHLORIDE 2 MG/ML
4 INJECTION, SOLUTION INTRAVENOUS ONCE
Status: COMPLETED | OUTPATIENT
Start: 2024-03-01 | End: 2024-03-01

## 2024-03-01 RX ORDER — CYCLOBENZAPRINE HCL 5 MG
10 TABLET ORAL 2 TIMES DAILY PRN
Status: DISCONTINUED | OUTPATIENT
Start: 2024-03-01 | End: 2024-03-07 | Stop reason: HOSPADM

## 2024-03-01 RX ADMIN — SODIUM CHLORIDE, POTASSIUM CHLORIDE, SODIUM LACTATE AND CALCIUM CHLORIDE 1000 ML: 600; 310; 30; 20 INJECTION, SOLUTION INTRAVENOUS at 02:32

## 2024-03-01 RX ADMIN — DICYCLOMINE HYDROCHLORIDE 10 MG: 10 CAPSULE ORAL at 18:40

## 2024-03-01 RX ADMIN — ALPRAZOLAM 0.5 MG: 0.5 TABLET ORAL at 22:31

## 2024-03-01 RX ADMIN — METHYLPREDNISOLONE SODIUM SUCCINATE 20 MG: 40 INJECTION, POWDER, LYOPHILIZED, FOR SOLUTION INTRAMUSCULAR; INTRAVENOUS at 22:19

## 2024-03-01 RX ADMIN — METHYLPREDNISOLONE SODIUM SUCCINATE 20 MG: 40 INJECTION, POWDER, LYOPHILIZED, FOR SOLUTION INTRAMUSCULAR; INTRAVENOUS at 13:31

## 2024-03-01 RX ADMIN — POTASSIUM CHLORIDE 40 MEQ: 1500 TABLET, EXTENDED RELEASE ORAL at 02:32

## 2024-03-01 RX ADMIN — MORPHINE SULFATE 4 MG: 4 INJECTION, SOLUTION INTRAMUSCULAR; INTRAVENOUS at 03:44

## 2024-03-01 RX ADMIN — ONDANSETRON 4 MG: 2 INJECTION INTRAMUSCULAR; INTRAVENOUS at 03:44

## 2024-03-01 RX ADMIN — METHYLPREDNISOLONE SODIUM SUCCINATE 40 MG: 40 INJECTION, POWDER, FOR SOLUTION INTRAMUSCULAR; INTRAVENOUS at 03:04

## 2024-03-01 RX ADMIN — HYDROXYZINE HYDROCHLORIDE 50 MG: 25 TABLET ORAL at 05:55

## 2024-03-01 SDOH — SOCIAL STABILITY: SOCIAL INSECURITY: DOES ANYONE TRY TO KEEP YOU FROM HAVING/CONTACTING OTHER FRIENDS OR DOING THINGS OUTSIDE YOUR HOME?: NO

## 2024-03-01 SDOH — SOCIAL STABILITY: SOCIAL INSECURITY: HAVE YOU HAD THOUGHTS OF HARMING ANYONE ELSE?: NO

## 2024-03-01 SDOH — SOCIAL STABILITY: SOCIAL INSECURITY: ARE YOU OR HAVE YOU BEEN THREATENED OR ABUSED PHYSICALLY, EMOTIONALLY, OR SEXUALLY BY ANYONE?: NO

## 2024-03-01 SDOH — SOCIAL STABILITY: SOCIAL INSECURITY: ABUSE: ADULT

## 2024-03-01 SDOH — SOCIAL STABILITY: SOCIAL INSECURITY: DO YOU FEEL UNSAFE GOING BACK TO THE PLACE WHERE YOU ARE LIVING?: NO

## 2024-03-01 SDOH — SOCIAL STABILITY: SOCIAL INSECURITY: ARE THERE ANY APPARENT SIGNS OF INJURIES/BEHAVIORS THAT COULD BE RELATED TO ABUSE/NEGLECT?: NO

## 2024-03-01 SDOH — SOCIAL STABILITY: SOCIAL INSECURITY: HAS ANYONE EVER THREATENED TO HURT YOUR FAMILY OR YOUR PETS?: NO

## 2024-03-01 SDOH — SOCIAL STABILITY: SOCIAL INSECURITY: DO YOU FEEL ANYONE HAS EXPLOITED OR TAKEN ADVANTAGE OF YOU FINANCIALLY OR OF YOUR PERSONAL PROPERTY?: NO

## 2024-03-01 SDOH — SOCIAL STABILITY: SOCIAL INSECURITY: WERE YOU ABLE TO COMPLETE ALL THE BEHAVIORAL HEALTH SCREENINGS?: YES

## 2024-03-01 ASSESSMENT — COGNITIVE AND FUNCTIONAL STATUS - GENERAL
MOBILITY SCORE: 24
DAILY ACTIVITIY SCORE: 24
DAILY ACTIVITIY SCORE: 24
MOBILITY SCORE: 24
DAILY ACTIVITIY SCORE: 24
MOBILITY SCORE: 24
PATIENT BASELINE BEDBOUND: NO

## 2024-03-01 ASSESSMENT — ACTIVITIES OF DAILY LIVING (ADL)
PATIENT'S MEMORY ADEQUATE TO SAFELY COMPLETE DAILY ACTIVITIES?: YES
JUDGMENT_ADEQUATE_SAFELY_COMPLETE_DAILY_ACTIVITIES: YES
ADEQUATE_TO_COMPLETE_ADL: YES
FEEDING YOURSELF: INDEPENDENT
HEARING - RIGHT EAR: FUNCTIONAL
LACK_OF_TRANSPORTATION: NO
DRESSING YOURSELF: INDEPENDENT
WALKS IN HOME: INDEPENDENT
LACK_OF_TRANSPORTATION: NO
HEARING - RIGHT EAR: FUNCTIONAL
JUDGMENT_ADEQUATE_SAFELY_COMPLETE_DAILY_ACTIVITIES: YES
LACK_OF_TRANSPORTATION: NO
TOILETING: INDEPENDENT
DRESSING YOURSELF: INDEPENDENT
FEEDING YOURSELF: INDEPENDENT
HEARING - LEFT EAR: FUNCTIONAL
TOILETING: INDEPENDENT
WALKS IN HOME: INDEPENDENT
ADEQUATE_TO_COMPLETE_ADL: YES
BATHING: INDEPENDENT
GROOMING: INDEPENDENT
PATIENT'S MEMORY ADEQUATE TO SAFELY COMPLETE DAILY ACTIVITIES?: YES
BATHING: INDEPENDENT
HEARING - LEFT EAR: FUNCTIONAL
GROOMING: INDEPENDENT

## 2024-03-01 ASSESSMENT — LIFESTYLE VARIABLES
HOW MANY STANDARD DRINKS CONTAINING ALCOHOL DO YOU HAVE ON A TYPICAL DAY: PATIENT DOES NOT DRINK
SKIP TO QUESTIONS 9-10: 1
HOW OFTEN DO YOU HAVE 6 OR MORE DRINKS ON ONE OCCASION: NEVER
HOW OFTEN DO YOU HAVE A DRINK CONTAINING ALCOHOL: NEVER
AUDIT-C TOTAL SCORE: 0
AUDIT-C TOTAL SCORE: 0

## 2024-03-01 ASSESSMENT — PAIN DESCRIPTION - LOCATION
LOCATION: ABDOMEN
LOCATION: ABDOMEN

## 2024-03-01 ASSESSMENT — ENCOUNTER SYMPTOMS
APPETITE CHANGE: 0
TROUBLE SWALLOWING: 0
COUGH: 0
RECTAL PAIN: 0
ABDOMINAL PAIN: 1
ANAL BLEEDING: 1
BLOOD IN STOOL: 1
VOMITING: 0
DIARRHEA: 1
NAUSEA: 0
ABDOMINAL DISTENTION: 0
FATIGUE: 0
FEVER: 0
CONSTIPATION: 0
SHORTNESS OF BREATH: 0
CHILLS: 0

## 2024-03-01 ASSESSMENT — PAIN SCALES - GENERAL
PAINLEVEL_OUTOF10: 0 - NO PAIN
PAINLEVEL_OUTOF10: 7
PAINLEVEL_OUTOF10: 6
PAINLEVEL_OUTOF10: 0 - NO PAIN

## 2024-03-01 ASSESSMENT — PAIN - FUNCTIONAL ASSESSMENT
PAIN_FUNCTIONAL_ASSESSMENT: 0-10

## 2024-03-01 NOTE — CARE PLAN
The patient's goals for the shift include Pain relief    The clinical goals for the shift include Pain management      Problem: Pain  Goal: My pain/discomfort is manageable  Outcome: Progressing     Problem: Safety  Goal: Patient will be injury free during hospitalization  Outcome: Progressing  Goal: I will remain free of falls  Outcome: Progressing     Problem: Daily Care  Goal: Daily care needs are met  Outcome: Progressing     Problem: Psychosocial Needs  Goal: Demonstrates ability to cope with hospitalization/illness  Outcome: Progressing

## 2024-03-01 NOTE — PROGRESS NOTES
Pharmacy Medication History Review    Kyra Behnfeldt is a 23 y.o. female admitted for Crohn's disease of colon with complication (CMS/Formerly [facility]). Pharmacy reviewed the patient's [id number] medications and allergies for accuracy.    The list below reflectives the updated PTA list. Please review each medication in order reconciliation for additional clarification and justification.       The list below reflectives the updated allergy list. Please review each documented allergy for additional clarification and justification.  Allergies  Reviewed by Haim Loo MD on 3/1/2024   No Known Allergies         Below are additional concerns with the patient's PTA list.  Prior to Admission Medications   Prescriptions Last Dose Informant   acetaminophen (Tylenol) 325 mg tablet Unknown    Sig: Take 2 tablets (650 mg) by mouth every 4 hours if needed for mild pain (1 - 3).   adalimumab (Humira Pen) 40 mg/0.4 mL pen injector kit pen-injector Past Week    Sig: Inject 1 Pen (40 mg) under the skin every 14 (fourteen) days.   Patient taking differently: Inject 1 Pen (40 mg) under the skin every 14 (fourteen) days. Next dose due on 3/2/24 taking on Saturdays.              cyclobenzaprine (Flexeril) 10 mg tablet Past Week    Sig: Take 1 tablet (10 mg) by mouth 2 times a day as needed for muscle spasms.   hydrOXYzine HCL (Atarax) 50 mg tablet Unknown    Sig: Take 1 tablet (50 mg) by mouth every 8 hours if needed for itching.   predniSONE (Deltasone) 10 mg tablet 2/29/2024    Sig: Take 4 tablets (40 mg) by mouth once daily for 14 days, THEN 3 tablets (30 mg) once daily for 7 days, THEN 2 tablets (20 mg) once daily for 7 days, THEN 1 tablet (10 mg) once daily for 7 days. Take by mouth as directed.      Facility-Administered Medications: None        Daniela Jesus CPhT

## 2024-03-01 NOTE — ED NOTES
Pt is a&ox4. Pt is here for increased bloody stools from Crohn's. Pt endorses abd pain and discomfort, denies chest pain or sob. Pt is ambulatory with no other apparent distress. Pt given 2mg of Morphine and Zofran with LR. Pt taken to floor in wheelchair with no complaints at this time. Vitals are stable with a NSR @100 no ectopy.     Claudia Richardson, JOY  03/01/24 0411

## 2024-03-01 NOTE — CONSULTS
Inpatient consult to gastroenterology  Consult performed by: LUZ Hickey-CNP  Consult ordered by: Haim Loo MD  Reason for consult: acute colitis with jovita red bleeding      Gastroenterology Consultation Note    ASSESSMENT and PLAN:     Kyra Behnfeldt is a 23 y.o. female with a past medical history of Crohn's colitis who presented with worsening rectal bleeding and abdominal pain.  GI consulted for colitis and jovita red bleeding    Worsening Crohn's flare. She was just diagnosed in December and has had trouble controlling the symptoms since diagnosis, despite oral prednisone.  Started Humira 2/3 (got loading doses 2/3 and 2/17) but was still having 8-10 bloody stools on 40mg prednisone daily. Recently admitted for IV steroids, clinically improved with 2 formed brown stools per day and was discharged on 2/26/24 with 40 mg prednisone taper. Abdominal pain has worsened and rectally bleeding  up to 14 times daily.    - 20 mg IV solumedrol TID  - soft  low fiber diet  - C. difficile and stool PCR ordered  - MRE ordered to assess for severity of Crohn's  -prn bentyl for pain  - continue to monitor H&H, transfuse for hgb < 7.0  - monitor GI blood loss  - ok for home dose of Humira tomorrow     Plan discussed with Dr. Marin.  She is in agreement.  Kellen Yadav CNP    HISTORY OF PRESENT ILLNESS:     History Of Present Illness:    Kyra Behnfeldt is a 23 y.o. female with PMHx of Crohn's colitis presenting with worsening Crohn's flare.  She was recently diagnosed with Crohn's in December due to having chronic GI bleeding and diarrhea.  Colonoscopy 12/7 showed two areas of inflammation, one in the rectum and one in the proximal transverse colon.  She was started on prednisone and then on budesonide, but symptoms continued.  She was set up with Humira, but there were some issues getting this started and she ended up admitted to [facility] from 2/2-2/6 and got IV steroids.  She felt somewhat better but was still  having bloody stools.  She started Humira on 2/3 and then did a second loading dose on 2/17.  Then recently the symptoms got worse again, having 8-10 bloody stools so came back to the hospital.  She was treated with IV steroids and clinically proved but when she returned home her symptoms worsened with rectal bleeding up to 14 times daily and severe worsening centralized abdominal pain/generalized abdominal cramping.  Her stool is liquid.  She denies emesis.  Denies any correlation to worsening symptoms.  States she has been following a bland gluten-free diet.  States her next dose of Humira is supposed to be tomorrow.    Relevant endoscopic evaluation results were personally reviewed.    Review of systems:   Review of Systems   Constitutional:  Negative for appetite change, chills, fatigue and fever.   HENT:  Negative for trouble swallowing.    Respiratory:  Negative for cough and shortness of breath.    Gastrointestinal:  Positive for abdominal pain (generalized), anal bleeding, blood in stool and diarrhea. Negative for abdominal distention, constipation, nausea, rectal pain and vomiting.      A 10+ point ROS was completed and otherwise negative except as noted and per HPI.    PAST HISTORIES:     Past Medical History:  Past Medical History:   Diagnosis Date    CC (Crohn's colitis) (CMS/[facility])        Past Surgical History:  History reviewed. No pertinent surgical history.     Family History:  No family history on file.     Social History:   reports that she has never smoked. She has never been exposed to tobacco smoke. She has never used smokeless tobacco. No history on file for alcohol use and drug use.    OBJECTIVE:     Last Recorded Vitals:  Vitals:    03/01/24 0030 03/01/24 0430 03/01/24 0431 03/01/24 0756   BP: 130/80  118/79 106/68   Pulse: 99  100 91   Resp: 16 18 18 18   Temp: 36.8 °C (98.2 °F) 36.4 °C (97.5 °F) 36.4 °C (97.6 °F) 36.3 °C (97.3 °F)   TempSrc:  Temporal     SpO2: 100%   98%   Weight: 49.9 kg  "(110 lb) 49.9 kg (110 lb 0.2 oz)     Height: 1.6 m (5' 3\") 1.6 m (5' 2.99\")         Physical Exam:  Physical Exam  Constitutional:       Appearance: Normal appearance. She is normal weight.   HENT:      Mouth/Throat:      Mouth: Mucous membranes are dry.      Pharynx: Oropharynx is clear.   Cardiovascular:      Rate and Rhythm: Normal rate and regular rhythm.   Pulmonary:      Effort: Pulmonary effort is normal.      Breath sounds: Normal breath sounds. No wheezing or rhonchi.   Abdominal:      General: Abdomen is flat. Bowel sounds are normal. There is no distension.      Palpations: Abdomen is soft. There is no hepatomegaly.      Tenderness: There is abdominal tenderness (midabdomen). There is no guarding or rebound. Negative signs include Brown's sign.      Hernia: No hernia is present.   Musculoskeletal:         General: Normal range of motion.   Skin:     General: Skin is warm and dry.   Neurological:      General: No focal deficit present.      Mental Status: She is alert and oriented to person, place, and time.   Psychiatric:         Mood and Affect: Mood normal.         Behavior: Behavior normal.           Allergies:  No Known Allergies    Inpatient Medications:  famotidine, 20 mg, oral, BID   Or  famotidine, 20 mg, intravenous, BID  melatonin, 3 mg, oral, Daily  methylPREDNISolone sodium succinate (PF), 20 mg, intravenous, q8h      lactated Ringer's, 100 mL/hr      PRN medications: acetaminophen, ALPRAZolam, cyclobenzaprine, dicyclomine, guaiFENesin, hydrOXYzine HCL, morphine, morphine, ondansetron    Outpatient Medications:  Prior to Admission medications    Medication Sig Start Date End Date Taking? Authorizing Provider   acetaminophen (Tylenol) 325 mg tablet Take 2 tablets (650 mg) by mouth every 4 hours if needed for mild pain (1 - 3). 2/5/24   Ari Harris MD   adalimumab (Humira Pen) 40 mg/0.4 mL pen injector kit pen-injector Inject 1 Pen (40 mg) under the skin every 14 (fourteen) days. 2/26/24   " Mane Wang MD   adalimumab (Humira,CF, Pen Crohns-[facility]) 80 mg/0.8 mL pen injector kit pen-injector adalimumab (Humira) dose instructions: On day 1, inject 160 mg under the skin. On day 15, inject 80 mg under skin. Change sites each time. 2/26/24 3/29/24  Haim Loo MD   cyclobenzaprine (Flexeril) 10 mg tablet Take 1 tablet (10 mg) by mouth 2 times a day as needed for muscle spasms. 2/5/24 3/7/24  Ari Harris MD   hydrOXYzine HCL (Atarax) 50 mg tablet Take 1 tablet (50 mg) by mouth every 8 hours if needed for itching.    Historical Provider, MD   predniSONE (Deltasone) 10 mg tablet Take 4 tablets (40 mg) by mouth once daily for 14 days, THEN 3 tablets (30 mg) once daily for 7 days, THEN 2 tablets (20 mg) once daily for 7 days, THEN 1 tablet (10 mg) once daily for 7 days. Take by mouth as directed. 2/26/24 4/1/24  Haim Loo MD   adalimumab (Humira Pen) 40 mg/0.4 mL pen injector kit pen-injector Inject 1 Pen (40 mg) under the skin every 14 (fourteen) days. 1/17/24 2/26/24  Mane Wang MD   adalimumab (Humira) 40 mg/0.8 mL syringe kit prefilled syringe Inject 1 each (40 mg) under the skin every 14 (fourteen) days. START DATE 1/19/24 2/26/24  Historical Provider, MD   adalimumab (Humira,CF, Pen [facility]ns[facility]) 80 mg/0.8 mL pen injector kit pen-injector adalimumab (Humira) dose instructions: On day 1, inject 160 mg under the skin. On day 15, inject 80 mg under skin. Change sites each time. 1/19/24 2/26/24  Mane Wang MD   mesalamine (Canasa) 1,000 mg suppository Insert 1 suppository (1,000 mg) into the rectum 2 times a day. Use as directed 2/1/24 2/26/24  Mane Wang MD   methocarbamol (Robaxin) 500 mg tablet Take 1 tablet (500 mg) by mouth 3 times a day as needed for muscle spasms.  2/26/24  Historical Provider, MD   predniSONE (Deltasone) 10 mg tablet Take 4 tablets (40 mg) by mouth once daily for 14 days, THEN 3 tablets (30 mg) once daily for 7 days, THEN 2 tablets (20 mg) once daily  for 7 days, THEN 1 tablet (10 mg) once daily for 7 days. Take by mouth as directed. 2/5/24 2/26/24  Ari Harris MD       LABS AND IMAGING:     Labs:  Results for orders placed or performed during the hospital encounter of 03/01/24 (from the past 24 hour(s))   CBC and Auto Differential   Result Value Ref Range    WBC 12.0 (H) 4.4 - 11.3 x10*3/uL    nRBC 0.3 (H) 0.0 - 0.0 /100 WBCs    RBC 3.05 (L) 4.00 - 5.20 x10*6/uL    Hemoglobin 8.2 (L) 12.0 - 16.0 g/dL    Hematocrit 26.8 (L) 36.0 - 46.0 %    MCV 88 80 - 100 fL    MCH 26.9 26.0 - 34.0 pg    MCHC 30.6 (L) 32.0 - 36.0 g/dL    RDW 12.9 11.5 - 14.5 %    Platelets 373 150 - 450 x10*3/uL    Neutrophils % 64.5 40.0 - 80.0 %    Immature Granulocytes %, Automated 1.1 (H) 0.0 - 0.9 %    Lymphocytes % 23.6 13.0 - 44.0 %    Monocytes % 8.8 2.0 - 10.0 %    Eosinophils % 1.8 0.0 - 6.0 %    Basophils % 0.2 0.0 - 2.0 %    Neutrophils Absolute 7.74 (H) 1.20 - 7.70 x10*3/uL    Immature Granulocytes Absolute, Automated 0.13 0.00 - 0.70 x10*3/uL    Lymphocytes Absolute 2.83 1.20 - 4.80 x10*3/uL    Monocytes Absolute 1.05 (H) 0.10 - 1.00 x10*3/uL    Eosinophils Absolute 0.22 0.00 - 0.70 x10*3/uL    Basophils Absolute 0.02 0.00 - 0.10 x10*3/uL   Comprehensive metabolic panel   Result Value Ref Range    Glucose 129 (H) 74 - 99 mg/dL    Sodium 135 (L) 136 - 145 mmol/L    Potassium 3.0 (L) 3.5 - 5.3 mmol/L    Chloride 99 98 - 107 mmol/L    Bicarbonate 27 21 - 32 mmol/L    Anion Gap 12 10 - 20 mmol/L    Urea Nitrogen 17 6 - 23 mg/dL    Creatinine 0.87 0.50 - 1.05 mg/dL    eGFR >90 >60 mL/min/1.73m*2    Calcium 8.1 (L) 8.6 - 10.3 mg/dL    Albumin 3.3 (L) 3.4 - 5.0 g/dL    Alkaline Phosphatase 60 33 - 110 U/L    Total Protein 6.2 (L) 6.4 - 8.2 g/dL    AST 10 9 - 39 U/L    Bilirubin, Total 0.2 0.0 - 1.2 mg/dL    ALT 12 7 - 45 U/L        Relevant imaging results were personally reviewed.

## 2024-03-01 NOTE — ED PROVIDER NOTES
Chief Complaint   Patient presents with    Abdominal Pain     Patient presents to the ED with c/o abdominal pain.  She has a hx of chrons disease.     HPI:   Kyra Behnfeldt is an 23 y.o. female with history of Crohn's disease that presents to the ED for reevaluation of abdominal pain.  Patient was admitted to this hospital from 2/24-26/2024 for Crohn's colitis with bloody stools.  He says that this is the worst pain she has had since getting diagnosed about a month ago.  She notes that since 2100 she has had 7 jovita blood BMs.  She said it is no longer formed stool that is just liquid blood.  She localizes pain to her lower abdomen but also states that she is now having pain in her left upper quadrant which is new.  Has not taken any medication for the pain.  Denies any vaginal bleeding, hematuria, hematemesis, hemoptysis, chest pain, shortness of breath.  LMP 1 week ago.    Medications: Humira, prednisone, cyclobenzaprine  Soc HX: Works as ED RN  No Known Allergies: NKDA  Past Medical History:   Diagnosis Date    CC (Crohn's colitis) (CMS/formerly [facility])      History reviewed. No pertinent surgical history.  No family history on file.     Physical Exam  Vitals and nursing note reviewed.   Constitutional:       General: She is not in acute distress.     Appearance: Normal appearance. She is not ill-appearing or toxic-appearing.   HENT:      Right Ear: External ear normal.      Left Ear: External ear normal.   Eyes:      Pupils: Pupils are equal, round, and reactive to light.   Cardiovascular:      Rate and Rhythm: Normal rate and regular rhythm.      Pulses: Normal pulses.      Heart sounds: Normal heart sounds. No murmur heard.  Pulmonary:      Effort: Pulmonary effort is normal. No respiratory distress.      Breath sounds: Normal breath sounds.   Abdominal:      General: Abdomen is flat. Bowel sounds are normal. There is no distension.      Palpations: Abdomen is soft.      Tenderness: There is abdominal tenderness in the  epigastric area, suprapubic area and left upper quadrant. There is no guarding or rebound.   Musculoskeletal:         General: No deformity or signs of injury. Normal range of motion.      Cervical back: Normal range of motion.   Lymphadenopathy:      Cervical: No cervical adenopathy.   Skin:     General: Skin is warm and dry.      Capillary Refill: Capillary refill takes less than 2 seconds.      Findings: No bruising or rash.   Neurological:      Mental Status: She is alert.      Cranial Nerves: No cranial nerve deficit.     VS: As documented in the triage note and EMR flowsheet from this visit were reviewed.    External Records Reviewed: I reviewed recent and relevant outside records including: Reviewed hospital discharge 2/26/2024.  She was admitted for Crohn's colitis with rectal bleeding GI was on board and advised continuing steroids.  Reviewed GI consult 2/26/2024 noted that she initially started on Humira but was still having 8-10 bloody stools on 40 of prednisone daily so she was admitted for IV steroids.  She was down to 2 formed blood-streaked stools daily moving bowels.  She was advised to be discharged with 40 mg prednisone taper decreasing by 10 mg every 2 weeks.  Advised to continue Humira.  Did not recommend repeating stool studies or imaging but said it they would consider MR ED if there is question about small bowel involvement of her Crohn's.      Medical Decision Making:   ED Course as of 03/01/24 0337   Fri Mar 01, 2024   0142 Vitals Reviewed: Afebrile. Normotensive. Not tachycardic nor tachypneic. No hypoxia.   [KA]   0211 Patient is a 23-year-old female who presents to the ED for Crohn's flare.  On exam her abdomen is tender in the epigastrium, suprapubic and left upper quadrant.  No guarding, rigidity nor rebound tenderness.  Through shared decision making, we decided against rectal exam.  I trust that she is accurately describing bloody BMs because she is one of my ED RNs.  Labs were  ordered prior to being seen by myself and show hemoglobin 8.2 which is improved from 7.4 a couple of days ago.  Potassium 3.  She says she can tolerate p.o. potassium I have ordered 40 mg p.o. potassium for repletion.  Will receive 1 bolus of LR.  I have not ordered any imaging because GI note recommended MR enterography if patient continued to have bloody BMs or worsening abdominal pain.  I feel be best for her to be admitted and seen by GI who can order that study rather than be exposing her to repeat radiation CT imaging in the ED.  Patient is agreeable to this plan. [KA]   0300 Spoke to Dr. Loo, hospitalist, who is agreed to accept patient back to his care.  Will place GI consult [KA]   0336 Patient says she missed her daily dose of prednisone.  Requesting Solu-Medrol instead.  I have ordered 40 mg IV sign Medrol.  She is also complaining of pain.  I have ordered 4 mg of IV morphine to be given with 4 mg IV Zofran. [KA]      ED Course User Index  [KA] Linnea Freeman PA-C         Diagnoses as of 03/01/24 0337   Crohn's disease of colon with complication (CMS/HCC)   Generalized abdominal pain      Chronic Medical Conditions Significantly Affecting Care: Crohn's colitis     Escalation of Care: Appropriate for hospitalization       Discussion of Management with Other Providers:  I discussed the patient/results with: Attending Edelmira    Counseling: Spoke with the patient and discussed today´s findings, in addition to providing specific details for the plan of care and expected course.  Patient was given the opportunity to ask questions.  Educated on the common potential side effects of medications prescribed.    The plan of care was mutually agreed upon with the patient. The patient and/or family were given the opportunity to ask questions. All questions asked today in the ED were answered to the best of my ability with today's information.    This patient was cared for in the setting of nationwide stress  on resources and staffing.    This report was transcribed using voice recognition software.  Every effort was made to ensure accuracy, however, inadvertently computerized transcription errors may be present.     Linnea Freeman PA-C  03/01/24 0339

## 2024-03-01 NOTE — H&P (VIEW-ONLY)
Inpatient consult to gastroenterology  Consult performed by: LUZ Hickey-CNP  Consult ordered by: Haim Loo MD  Reason for consult: acute colitis with ojvita red bleeding      Gastroenterology Consultation Note    ASSESSMENT and PLAN:     Kyra Behnfeldt is a 23 y.o. female with a past medical history of Crohn's colitis who presented with worsening rectal bleeding and abdominal pain.  GI consulted for colitis and jovita red bleeding    Worsening Crohn's flare. She was just diagnosed in December and has had trouble controlling the symptoms since diagnosis, despite oral prednisone.  Started Humira 2/3 (got loading doses 2/3 and 2/17) but was still having 8-10 bloody stools on 40mg prednisone daily. Recently admitted for IV steroids, clinically improved with 2 formed brown stools per day and was discharged on 2/26/24 with 40 mg prednisone taper. Abdominal pain has worsened and rectally bleeding  up to 14 times daily.    - 20 mg IV solumedrol TID  - soft  low fiber diet  - C. difficile and stool PCR ordered  - MRE ordered to assess for severity of Crohn's  -prn bentyl for pain  - continue to monitor H&H, transfuse for hgb < 7.0  - monitor GI blood loss  - ok for home dose of Humira tomorrow     Plan discussed with Dr. Marin.  She is in agreement.  Kellen Yadav CNP    HISTORY OF PRESENT ILLNESS:     History Of Present Illness:    Kyra Behnfeldt is a 23 y.o. female with PMHx of Crohn's colitis presenting with worsening Crohn's flare.  She was recently diagnosed with Crohn's in December due to having chronic GI bleeding and diarrhea.  Colonoscopy 12/7 showed two areas of inflammation, one in the rectum and one in the proximal transverse colon.  She was started on prednisone and then on budesonide, but symptoms continued.  She was set up with Humira, but there were some issues getting this started and she ended up admitted to [facility] from 2/2-2/6 and got IV steroids.  She felt somewhat better but was still  having bloody stools.  She started Humira on 2/3 and then did a second loading dose on 2/17.  Then recently the symptoms got worse again, having 8-10 bloody stools so came back to the hospital.  She was treated with IV steroids and clinically proved but when she returned home her symptoms worsened with rectal bleeding up to 14 times daily and severe worsening centralized abdominal pain/generalized abdominal cramping.  Her stool is liquid.  She denies emesis.  Denies any correlation to worsening symptoms.  States she has been following a bland gluten-free diet.  States her next dose of Humira is supposed to be tomorrow.    Relevant endoscopic evaluation results were personally reviewed.    Review of systems:   Review of Systems   Constitutional:  Negative for appetite change, chills, fatigue and fever.   HENT:  Negative for trouble swallowing.    Respiratory:  Negative for cough and shortness of breath.    Gastrointestinal:  Positive for abdominal pain (generalized), anal bleeding, blood in stool and diarrhea. Negative for abdominal distention, constipation, nausea, rectal pain and vomiting.      A 10+ point ROS was completed and otherwise negative except as noted and per HPI.    PAST HISTORIES:     Past Medical History:  Past Medical History:   Diagnosis Date    CC (Crohn's colitis) (CMS/[facility])        Past Surgical History:  History reviewed. No pertinent surgical history.     Family History:  No family history on file.     Social History:   reports that she has never smoked. She has never been exposed to tobacco smoke. She has never used smokeless tobacco. No history on file for alcohol use and drug use.    OBJECTIVE:     Last Recorded Vitals:  Vitals:    03/01/24 0030 03/01/24 0430 03/01/24 0431 03/01/24 0756   BP: 130/80  118/79 106/68   Pulse: 99  100 91   Resp: 16 18 18 18   Temp: 36.8 °C (98.2 °F) 36.4 °C (97.5 °F) 36.4 °C (97.6 °F) 36.3 °C (97.3 °F)   TempSrc:  Temporal     SpO2: 100%   98%   Weight: 49.9 kg  "(110 lb) 49.9 kg (110 lb 0.2 oz)     Height: 1.6 m (5' 3\") 1.6 m (5' 2.99\")         Physical Exam:  Physical Exam  Constitutional:       Appearance: Normal appearance. She is normal weight.   HENT:      Mouth/Throat:      Mouth: Mucous membranes are dry.      Pharynx: Oropharynx is clear.   Cardiovascular:      Rate and Rhythm: Normal rate and regular rhythm.   Pulmonary:      Effort: Pulmonary effort is normal.      Breath sounds: Normal breath sounds. No wheezing or rhonchi.   Abdominal:      General: Abdomen is flat. Bowel sounds are normal. There is no distension.      Palpations: Abdomen is soft. There is no hepatomegaly.      Tenderness: There is abdominal tenderness (midabdomen). There is no guarding or rebound. Negative signs include Brown's sign.      Hernia: No hernia is present.   Musculoskeletal:         General: Normal range of motion.   Skin:     General: Skin is warm and dry.   Neurological:      General: No focal deficit present.      Mental Status: She is alert and oriented to person, place, and time.   Psychiatric:         Mood and Affect: Mood normal.         Behavior: Behavior normal.           Allergies:  No Known Allergies    Inpatient Medications:  famotidine, 20 mg, oral, BID   Or  famotidine, 20 mg, intravenous, BID  melatonin, 3 mg, oral, Daily  methylPREDNISolone sodium succinate (PF), 20 mg, intravenous, q8h      lactated Ringer's, 100 mL/hr      PRN medications: acetaminophen, ALPRAZolam, cyclobenzaprine, dicyclomine, guaiFENesin, hydrOXYzine HCL, morphine, morphine, ondansetron    Outpatient Medications:  Prior to Admission medications    Medication Sig Start Date End Date Taking? Authorizing Provider   acetaminophen (Tylenol) 325 mg tablet Take 2 tablets (650 mg) by mouth every 4 hours if needed for mild pain (1 - 3). 2/5/24   Ari Harris MD   adalimumab (Humira Pen) 40 mg/0.4 mL pen injector kit pen-injector Inject 1 Pen (40 mg) under the skin every 14 (fourteen) days. 2/26/24   " Mane Wang MD   adalimumab (Humira,CF, Pen Crohns-[facility]) 80 mg/0.8 mL pen injector kit pen-injector adalimumab (Humira) dose instructions: On day 1, inject 160 mg under the skin. On day 15, inject 80 mg under skin. Change sites each time. 2/26/24 3/29/24  Haim Loo MD   cyclobenzaprine (Flexeril) 10 mg tablet Take 1 tablet (10 mg) by mouth 2 times a day as needed for muscle spasms. 2/5/24 3/7/24  Ari Harris MD   hydrOXYzine HCL (Atarax) 50 mg tablet Take 1 tablet (50 mg) by mouth every 8 hours if needed for itching.    Historical Provider, MD   predniSONE (Deltasone) 10 mg tablet Take 4 tablets (40 mg) by mouth once daily for 14 days, THEN 3 tablets (30 mg) once daily for 7 days, THEN 2 tablets (20 mg) once daily for 7 days, THEN 1 tablet (10 mg) once daily for 7 days. Take by mouth as directed. 2/26/24 4/1/24  Haim Loo MD   adalimumab (Humira Pen) 40 mg/0.4 mL pen injector kit pen-injector Inject 1 Pen (40 mg) under the skin every 14 (fourteen) days. 1/17/24 2/26/24  Mane Wang MD   adalimumab (Humira) 40 mg/0.8 mL syringe kit prefilled syringe Inject 1 each (40 mg) under the skin every 14 (fourteen) days. START DATE 1/19/24 2/26/24  Historical Provider, MD   adalimumab (Humira,CF, Pen Crohns-[facility]) 80 mg/0.8 mL pen injector kit pen-injector adalimumab (Humira) dose instructions: On day 1, inject 160 mg under the skin. On day 15, inject 80 mg under skin. Change sites each time. 1/19/24 2/26/24  Mane Wang MD   mesalamine (Canasa) 1,000 mg suppository Insert 1 suppository (1,000 mg) into the rectum 2 times a day. Use as directed 2/1/24 2/26/24  Mane Wang MD   methocarbamol (Robaxin) 500 mg tablet Take 1 tablet (500 mg) by mouth 3 times a day as needed for muscle spasms.  2/26/24  Historical Provider, MD   predniSONE (Deltasone) 10 mg tablet Take 4 tablets (40 mg) by mouth once daily for 14 days, THEN 3 tablets (30 mg) once daily for 7 days, THEN 2 tablets (20 mg) once daily  for 7 days, THEN 1 tablet (10 mg) once daily for 7 days. Take by mouth as directed. 2/5/24 2/26/24  Ari Harris MD       LABS AND IMAGING:     Labs:  Results for orders placed or performed during the hospital encounter of 03/01/24 (from the past 24 hour(s))   CBC and Auto Differential   Result Value Ref Range    WBC 12.0 (H) 4.4 - 11.3 x10*3/uL    nRBC 0.3 (H) 0.0 - 0.0 /100 WBCs    RBC 3.05 (L) 4.00 - 5.20 x10*6/uL    Hemoglobin 8.2 (L) 12.0 - 16.0 g/dL    Hematocrit 26.8 (L) 36.0 - 46.0 %    MCV 88 80 - 100 fL    MCH 26.9 26.0 - 34.0 pg    MCHC 30.6 (L) 32.0 - 36.0 g/dL    RDW 12.9 11.5 - 14.5 %    Platelets 373 150 - 450 x10*3/uL    Neutrophils % 64.5 40.0 - 80.0 %    Immature Granulocytes %, Automated 1.1 (H) 0.0 - 0.9 %    Lymphocytes % 23.6 13.0 - 44.0 %    Monocytes % 8.8 2.0 - 10.0 %    Eosinophils % 1.8 0.0 - 6.0 %    Basophils % 0.2 0.0 - 2.0 %    Neutrophils Absolute 7.74 (H) 1.20 - 7.70 x10*3/uL    Immature Granulocytes Absolute, Automated 0.13 0.00 - 0.70 x10*3/uL    Lymphocytes Absolute 2.83 1.20 - 4.80 x10*3/uL    Monocytes Absolute 1.05 (H) 0.10 - 1.00 x10*3/uL    Eosinophils Absolute 0.22 0.00 - 0.70 x10*3/uL    Basophils Absolute 0.02 0.00 - 0.10 x10*3/uL   Comprehensive metabolic panel   Result Value Ref Range    Glucose 129 (H) 74 - 99 mg/dL    Sodium 135 (L) 136 - 145 mmol/L    Potassium 3.0 (L) 3.5 - 5.3 mmol/L    Chloride 99 98 - 107 mmol/L    Bicarbonate 27 21 - 32 mmol/L    Anion Gap 12 10 - 20 mmol/L    Urea Nitrogen 17 6 - 23 mg/dL    Creatinine 0.87 0.50 - 1.05 mg/dL    eGFR >90 >60 mL/min/1.73m*2    Calcium 8.1 (L) 8.6 - 10.3 mg/dL    Albumin 3.3 (L) 3.4 - 5.0 g/dL    Alkaline Phosphatase 60 33 - 110 U/L    Total Protein 6.2 (L) 6.4 - 8.2 g/dL    AST 10 9 - 39 U/L    Bilirubin, Total 0.2 0.0 - 1.2 mg/dL    ALT 12 7 - 45 U/L        Relevant imaging results were personally reviewed.

## 2024-03-01 NOTE — H&P
History Of Present Illness  Kyra Behnfeldt is a 23 y.o. female presenting with hematochezia, and diarrhea and abdo pain   H/o crohns, on humira recently dc from hospital after starting on iv steroids, and transitioned to prednisone   However comes back due to worsening of symptoms, .     Past Medical History  She has a past medical history of CC (Crohn's colitis) (CMS/[facility]).    Surgical History  She has no past surgical history on file.     Social History  She reports that she has never smoked. She has never been exposed to tobacco smoke. She has never used smokeless tobacco. No history on file for alcohol use and drug use.    Family History  No family history on file.     Allergies  Patient has no known allergies.    Review of Systems     Physical Exam   No distress  Ao x 3  Chest clear  CVS regular  Abdo soft no distesnion  Ext no edema  Cns ao x 3, nonfocal  Heent normal muocsa  Neck supple     Last Recorded Vitals  /83   Pulse 89   Temp 36.7 °C (98.1 °F)   Resp 18   Wt 49.9 kg (110 lb 0.2 oz)   SpO2 100%     Relevant Results  Scheduled medications  famotidine, 20 mg, oral, BID   Or  famotidine, 20 mg, intravenous, BID  melatonin, 3 mg, oral, Daily  methylPREDNISolone sodium succinate (PF), 20 mg, intravenous, q8h      Continuous medications  lactated Ringer's, 100 mL/hr      PRN medications  PRN medications: acetaminophen, ALPRAZolam, cyclobenzaprine, dicyclomine, guaiFENesin, hydrOXYzine HCL, morphine, morphine, ondansetron  Results for orders placed or performed during the hospital encounter of 03/01/24 (from the past 96 hour(s))   CBC and Auto Differential   Result Value Ref Range    WBC 12.0 (H) 4.4 - 11.3 x10*3/uL    nRBC 0.3 (H) 0.0 - 0.0 /100 WBCs    RBC 3.05 (L) 4.00 - 5.20 x10*6/uL    Hemoglobin 8.2 (L) 12.0 - 16.0 g/dL    Hematocrit 26.8 (L) 36.0 - 46.0 %    MCV 88 80 - 100 fL    MCH 26.9 26.0 - 34.0 pg    MCHC 30.6 (L) 32.0 - 36.0 g/dL    RDW 12.9 11.5 - 14.5 %    Platelets 373 150 - 450  x10*3/uL    Neutrophils % 64.5 40.0 - 80.0 %    Immature Granulocytes %, Automated 1.1 (H) 0.0 - 0.9 %    Lymphocytes % 23.6 13.0 - 44.0 %    Monocytes % 8.8 2.0 - 10.0 %    Eosinophils % 1.8 0.0 - 6.0 %    Basophils % 0.2 0.0 - 2.0 %    Neutrophils Absolute 7.74 (H) 1.20 - 7.70 x10*3/uL    Immature Granulocytes Absolute, Automated 0.13 0.00 - 0.70 x10*3/uL    Lymphocytes Absolute 2.83 1.20 - 4.80 x10*3/uL    Monocytes Absolute 1.05 (H) 0.10 - 1.00 x10*3/uL    Eosinophils Absolute 0.22 0.00 - 0.70 x10*3/uL    Basophils Absolute 0.02 0.00 - 0.10 x10*3/uL   Comprehensive metabolic panel   Result Value Ref Range    Glucose 129 (H) 74 - 99 mg/dL    Sodium 135 (L) 136 - 145 mmol/L    Potassium 3.0 (L) 3.5 - 5.3 mmol/L    Chloride 99 98 - 107 mmol/L    Bicarbonate 27 21 - 32 mmol/L    Anion Gap 12 10 - 20 mmol/L    Urea Nitrogen 17 6 - 23 mg/dL    Creatinine 0.87 0.50 - 1.05 mg/dL    eGFR >90 >60 mL/min/1.73m*2    Calcium 8.1 (L) 8.6 - 10.3 mg/dL    Albumin 3.3 (L) 3.4 - 5.0 g/dL    Alkaline Phosphatase 60 33 - 110 U/L    Total Protein 6.2 (L) 6.4 - 8.2 g/dL    AST 10 9 - 39 U/L    Bilirubin, Total 0.2 0.0 - 1.2 mg/dL    ALT 12 7 - 45 U/L          Assessment/Plan   Principal Problem:    Crohn's disease of colon with complication (CMS/HCC)  Active Problems:    Hypokalemia    Cont with steroids, iv   Recheck labs  Bob Loo MD

## 2024-03-01 NOTE — PROGRESS NOTES
03/01/24 1118   Discharge Planning   Living Arrangements Friends   Support Systems Friends/neighbors   Assistance Needed independent with all   Type of Residence Private residence   Number of Stairs to Enter Residence 2   Number of Stairs Within Residence 12   Do you have animals or pets at home? No   Home or Post Acute Services None   Patient expects to be discharged to: home with room mate   Does the patient need discharge transport arranged? No   Financial Resource Strain   How hard is it for you to pay for the very basics like food, housing, medical care, and heating? Not hard   Housing Stability   In the last 12 months, was there a time when you were not able to pay the mortgage or rent on time? N   In the last 12 months, how many places have you lived? 1   In the last 12 months, was there a time when you did not have a steady place to sleep or slept in a shelter (including now)? N   Transportation Needs   In the past 12 months, has lack of transportation kept you from medical appointments or from getting medications? no   In the past 12 months, has lack of transportation kept you from meetings, work, or from getting things needed for daily living? No     Kyra Behnfeldt is a 23 y.o. female on day 0 of admission presenting with Crohn's disease of colon with complication (CMS/HCC).  I met with patient  She follows with maria luisa falcon  for her crohns  Lula Saldana RN

## 2024-03-02 LAB
ANION GAP SERPL CALC-SCNC: 13 MMOL/L (ref 10–20)
BUN SERPL-MCNC: 13 MG/DL (ref 6–23)
C COLI+JEJ+UPSA DNA STL QL NAA+PROBE: NOT DETECTED
C DIF TOX TCDA+TCDB STL QL NAA+PROBE: NOT DETECTED
CALCIUM SERPL-MCNC: 8.5 MG/DL (ref 8.6–10.3)
CHLORIDE SERPL-SCNC: 101 MMOL/L (ref 98–107)
CO2 SERPL-SCNC: 27 MMOL/L (ref 21–32)
CREAT SERPL-MCNC: 0.73 MG/DL (ref 0.5–1.05)
EC STX1 GENE STL QL NAA+PROBE: NOT DETECTED
EC STX2 GENE STL QL NAA+PROBE: NOT DETECTED
EGFRCR SERPLBLD CKD-EPI 2021: >90 ML/MIN/1.73M*2
ERYTHROCYTE [DISTWIDTH] IN BLOOD BY AUTOMATED COUNT: 13.2 % (ref 11.5–14.5)
GLUCOSE SERPL-MCNC: 113 MG/DL (ref 74–99)
HCT VFR BLD AUTO: 26.6 % (ref 36–46)
HGB BLD-MCNC: 8 G/DL (ref 12–16)
MCH RBC QN AUTO: 27.4 PG (ref 26–34)
MCHC RBC AUTO-ENTMCNC: 30.1 G/DL (ref 32–36)
MCV RBC AUTO: 91 FL (ref 80–100)
NOROVIRUS GI + GII RNA STL NAA+PROBE: NOT DETECTED
NRBC BLD-RTO: 0 /100 WBCS (ref 0–0)
PLATELET # BLD AUTO: 412 X10*3/UL (ref 150–450)
POTASSIUM SERPL-SCNC: 4.2 MMOL/L (ref 3.5–5.3)
RBC # BLD AUTO: 2.92 X10*6/UL (ref 4–5.2)
RV RNA STL NAA+PROBE: NOT DETECTED
SALMONELLA DNA STL QL NAA+PROBE: NOT DETECTED
SHIGELLA DNA SPEC QL NAA+PROBE: NOT DETECTED
SODIUM SERPL-SCNC: 137 MMOL/L (ref 136–145)
V CHOLERAE DNA STL QL NAA+PROBE: NOT DETECTED
WBC # BLD AUTO: 12.4 X10*3/UL (ref 4.4–11.3)
Y ENTEROCOL DNA STL QL NAA+PROBE: NOT DETECTED

## 2024-03-02 PROCEDURE — 2500000004 HC RX 250 GENERAL PHARMACY W/ HCPCS (ALT 636 FOR OP/ED): Performed by: INTERNAL MEDICINE

## 2024-03-02 PROCEDURE — 36415 COLL VENOUS BLD VENIPUNCTURE: CPT | Performed by: FAMILY MEDICINE

## 2024-03-02 PROCEDURE — 2500000001 HC RX 250 WO HCPCS SELF ADMINISTERED DRUGS (ALT 637 FOR MEDICARE OP): Performed by: FAMILY MEDICINE

## 2024-03-02 PROCEDURE — 99232 SBSQ HOSP IP/OBS MODERATE 35: CPT | Performed by: INTERNAL MEDICINE

## 2024-03-02 PROCEDURE — 85027 COMPLETE CBC AUTOMATED: CPT | Performed by: FAMILY MEDICINE

## 2024-03-02 PROCEDURE — 80048 BASIC METABOLIC PNL TOTAL CA: CPT | Performed by: FAMILY MEDICINE

## 2024-03-02 PROCEDURE — 2500000001 HC RX 250 WO HCPCS SELF ADMINISTERED DRUGS (ALT 637 FOR MEDICARE OP)

## 2024-03-02 PROCEDURE — 1100000001 HC PRIVATE ROOM DAILY

## 2024-03-02 PROCEDURE — 36415 COLL VENOUS BLD VENIPUNCTURE: CPT | Performed by: INTERNAL MEDICINE

## 2024-03-02 RX ADMIN — METHYLPREDNISOLONE SODIUM SUCCINATE 20 MG: 40 INJECTION, POWDER, LYOPHILIZED, FOR SOLUTION INTRAMUSCULAR; INTRAVENOUS at 06:29

## 2024-03-02 RX ADMIN — METHYLPREDNISOLONE SODIUM SUCCINATE 20 MG: 40 INJECTION, POWDER, LYOPHILIZED, FOR SOLUTION INTRAMUSCULAR; INTRAVENOUS at 21:21

## 2024-03-02 RX ADMIN — ALPRAZOLAM 0.5 MG: 0.5 TABLET ORAL at 22:15

## 2024-03-02 RX ADMIN — DICYCLOMINE HYDROCHLORIDE 10 MG: 10 CAPSULE ORAL at 14:01

## 2024-03-02 RX ADMIN — METHYLPREDNISOLONE SODIUM SUCCINATE 20 MG: 40 INJECTION, POWDER, LYOPHILIZED, FOR SOLUTION INTRAMUSCULAR; INTRAVENOUS at 14:01

## 2024-03-02 ASSESSMENT — COGNITIVE AND FUNCTIONAL STATUS - GENERAL
DAILY ACTIVITIY SCORE: 24
MOBILITY SCORE: 24
DAILY ACTIVITIY SCORE: 24
MOBILITY SCORE: 24

## 2024-03-02 ASSESSMENT — PAIN SCALES - GENERAL
PAINLEVEL_OUTOF10: 0 - NO PAIN
PAINLEVEL_OUTOF10: 0 - NO PAIN

## 2024-03-02 ASSESSMENT — PAIN - FUNCTIONAL ASSESSMENT
PAIN_FUNCTIONAL_ASSESSMENT: 0-10
PAIN_FUNCTIONAL_ASSESSMENT: 0-10

## 2024-03-02 NOTE — CARE PLAN
The patient's goals for the shift include Pain relief    The clinical goals for the shift include Patient will feel less anxious and maintain comfort throughout shift      Problem: Pain  Goal: My pain/discomfort is manageable  Outcome: Progressing     Problem: Safety  Goal: Patient will be injury free during hospitalization  Outcome: Progressing  Goal: I will remain free of falls  Outcome: Progressing     Problem: Daily Care  Goal: Daily care needs are met  Outcome: Progressing     Problem: Psychosocial Needs  Goal: Demonstrates ability to cope with hospitalization/illness  Outcome: Progressing  Goal: Collaborate with me, my family, and caregiver to identify my specific goals  Outcome: Progressing

## 2024-03-02 NOTE — PROGRESS NOTES
Kyra Behnfeldt is a 23 y.o. female     Continues to have bloody stool with mild discomfort    Review of Systems     Constitutional: no fever, no chills, not feeling poorly, not feeling tired   Cardiovascular: no chest pain   Respiratory: no cough, wheezing or shortness of breath a  Gastrointestinal: Diarrhea with bloody stool  Neurological: no headache,   All other systems have been reviewed and are negative for complaint.       Vitals:    03/02/24 1552   BP: 127/83   Pulse: 98   Resp: 16   Temp: 36.8 °C (98.2 °F)   SpO2: 100%        Scheduled medications  famotidine, 20 mg, oral, BID   Or  famotidine, 20 mg, intravenous, BID  melatonin, 3 mg, oral, Daily  methylPREDNISolone sodium succinate (PF), 20 mg, intravenous, q8h      Continuous medications     PRN medications  PRN medications: acetaminophen, ALPRAZolam, cyclobenzaprine, dicyclomine, guaiFENesin, hydrOXYzine HCL, morphine, morphine, ondansetron    Lab Review   Results from last 7 days   Lab Units 03/02/24  0646 03/01/24  1527 03/01/24  0038 02/26/24  0519   WBC AUTO x10*3/uL 12.4*  --  12.0* 11.4*   HEMOGLOBIN g/dL 8.0* 7.3* 8.2* 7.8*   HEMATOCRIT % 26.6* 23.6* 26.8* 25.6*   PLATELETS AUTO x10*3/uL 412  --  373 310     Results from last 7 days   Lab Units 03/02/24  0646 03/01/24  1527 03/01/24  0038 02/26/24  0519 02/25/24  0312   SODIUM mmol/L 137  --  135* 140 139   POTASSIUM mmol/L 4.2 3.8 3.0* 3.9 4.2   CHLORIDE mmol/L 101  --  99 105 107   CO2 mmol/L 27  --  27 28 26   BUN mg/dL 13  --  17 14 14   CREATININE mg/dL 0.73  --  0.87 0.78 0.81   CALCIUM mg/dL 8.5*  --  8.1* 8.8 8.3*   PROTEIN TOTAL g/dL  --   --  6.2*  --  5.7*   BILIRUBIN TOTAL mg/dL  --   --  0.2  --  0.1   ALK PHOS U/L  --   --  60  --  48   ALT U/L  --   --  12  --  11   AST U/L  --   --  10  --  7*   GLUCOSE mg/dL 113*  --  129* 105* 135*            MR enterography    (Results Pending)         Physical Exam     Constitutional   General appearance: Alert and in no acute distress.      Pulmonary   Respiratory assessment: No respiratory distress, normal respiratory rhythm and effort.    Auscultation of Lungs: Clear bilateral breath sounds.   Cardiovascular   Auscultation of heart: Apical pulse normal, heart rate and rhythm normal, normal S1 and S2, no murmurs and no pericardial rub.    Exam for edema: No peripheral edema.   Abdomen   Abdominal Exam: No bruits, normal bowel sounds, soft, non-tender, no abdominal mass palpated.    Liver and Spleen exam: No hepato-splenomegaly.   Musculoskeletal   Examination of gait: ROM intact  Skin inspection: Normal skin color and pigmentation, normal skin turgor and no visible rash.   Neurologic   Cranial nerves: Nerves 2-12 were intact, no focal neuro defects.     Assessment/Plan      #Acute Crohn's colitis with flare  Continue Solu-Medrol  Monitor CBC and electrolytes    #Anemia of chronic disease

## 2024-03-02 NOTE — PROGRESS NOTES
"Kyra Behnfeldt is a 23 y.o. female on day 1 of admission presenting with Crohn's disease of colon with complication (CMS/HCC).    Subjective   Difficult night last night with Bm every hour, small blood with each and preceding abdominal pain.  Tolerating diet this morning and denies pain.    C diff , stool pcr negative.    Objective         Last Recorded Vitals  Blood pressure 123/76, pulse 89, temperature 36.4 °C (97.5 °F), temperature source Temporal, resp. rate 15, height 1.6 m (5' 2.99\"), weight 49.9 kg (110 lb 0.2 oz), last menstrual period 01/30/2024, SpO2 100 %.  Intake/Output last 3 Shifts:  I/O last 3 completed shifts:  In: 2040 (40.9 mL/kg) [P.O.:1040; IV Piggyback:1000]  Out: - (0 mL/kg)   Weight: 49.9 kg     Relevant Results      CT abdomen pelvis w IV contrast    Result Date: 2/1/2024  Interpreted By:  Gregorio Jones, STUDY: CT ABDOMEN PELVIS W IV CONTRAST;  2/1/2024 5:12 pm   INDICATION: Signs/Symptoms:Crohn's disease, worsening diarrhea and abdominal pain.   COMPARISON: None.   ACCESSION NUMBER(S): [id number]   ORDERING CLINICIAN: WALDEMAR BABIN   TECHNIQUE: CT of the abdomen and pelvis was performed.  Standard contiguous axial images were obtained at 3 mm slice thickness through the abdomen and pelvis. Coronal and sagittal reconstructions at 3 mm slice thickness were performed.   75 ml of contrast Omnipaque 350 were administered intravenously without immediate complication.   FINDINGS: Lung bases clear.   Unremarkable liver, spleen, pancreas, adrenals, and bilateral kidneys.   No definite stone in the gallbladder.   Normal appendix.   Multiple fluid-filled small bowel loops are noted in the mid to lower abdomen. There is also apparent circumferential wall thickening of multiple colonic loops. These are nonspecific finding which can be seen with acute enterocolitis.   No free air or free fluid in the abdomen.   No abdominal aortic aneurysm.   No adenopathy.   Sections through the pelvis demonstrate a " grossly unremarkable urinary bladder. Trace free fluid in the cul-de-sac. No adnexal mass.   No acute osseous abnormality in the regional skeleton. Bilateral pars defects of L5 with grade 1-2 anterolisthesis of L5 on S1.       1. Questionable acute enterocolitis. 2. No other acute finding in the abdomen and pelvis. 3. Normal appendix.   Signed by: Gregorio Jones 2/1/2024 6:21 PM Dictation workstation:   [id number]    * Cannot find OR log *  Last relevant procedure: Colonoscopy on 12/7/23                      Assessment/Plan   23 y.o. female with Crohn's colitis diagnosed in Dec 2023 who presented with worsening rectal bleeding and abdominal pain.  Recent admissions for the same x 2, was improved on discharge to 2 bowel movements per day without bleeding  after 2 days of solumedrol , discharged on 40 mg prednisone with recurrent symptoms 24 hours after discharge, bloody diarrhea and abdominal pain.  She has received  2 loading doses of humira, today is her third dose which she will be receiving from home . C diff, stool PCR negative.    Rec  Cont solumedrol 20mg q8h  Diet as tolerated  Will check TPMT enzymes  May need endoscopic evaluation if no improvement  MRE ordered to evaluate small bowel disease.         Enriqueta Marin MD

## 2024-03-03 LAB
ANION GAP SERPL CALC-SCNC: 13 MMOL/L (ref 10–20)
BUN SERPL-MCNC: 13 MG/DL (ref 6–23)
CALCIUM SERPL-MCNC: 8.4 MG/DL (ref 8.6–10.3)
CHLORIDE SERPL-SCNC: 100 MMOL/L (ref 98–107)
CMV IGM SERPL-ACNC: <8 AU/ML
CO2 SERPL-SCNC: 27 MMOL/L (ref 21–32)
CREAT SERPL-MCNC: 0.66 MG/DL (ref 0.5–1.05)
EGFRCR SERPLBLD CKD-EPI 2021: >90 ML/MIN/1.73M*2
ERYTHROCYTE [DISTWIDTH] IN BLOOD BY AUTOMATED COUNT: 13.3 % (ref 11.5–14.5)
GLUCOSE SERPL-MCNC: 110 MG/DL (ref 74–99)
HCT VFR BLD AUTO: 27.6 % (ref 36–46)
HGB BLD-MCNC: 8.1 G/DL (ref 12–16)
MCH RBC QN AUTO: 26.7 PG (ref 26–34)
MCHC RBC AUTO-ENTMCNC: 29.3 G/DL (ref 32–36)
MCV RBC AUTO: 91 FL (ref 80–100)
NRBC BLD-RTO: 0 /100 WBCS (ref 0–0)
PLATELET # BLD AUTO: 400 X10*3/UL (ref 150–450)
POTASSIUM SERPL-SCNC: 4 MMOL/L (ref 3.5–5.3)
RBC # BLD AUTO: 3.03 X10*6/UL (ref 4–5.2)
SODIUM SERPL-SCNC: 136 MMOL/L (ref 136–145)
WBC # BLD AUTO: 13 X10*3/UL (ref 4.4–11.3)

## 2024-03-03 PROCEDURE — 99232 SBSQ HOSP IP/OBS MODERATE 35: CPT | Performed by: INTERNAL MEDICINE

## 2024-03-03 PROCEDURE — 36415 COLL VENOUS BLD VENIPUNCTURE: CPT | Performed by: FAMILY MEDICINE

## 2024-03-03 PROCEDURE — 80048 BASIC METABOLIC PNL TOTAL CA: CPT | Performed by: FAMILY MEDICINE

## 2024-03-03 PROCEDURE — 2500000001 HC RX 250 WO HCPCS SELF ADMINISTERED DRUGS (ALT 637 FOR MEDICARE OP): Performed by: FAMILY MEDICINE

## 2024-03-03 PROCEDURE — 1100000001 HC PRIVATE ROOM DAILY

## 2024-03-03 PROCEDURE — 85027 COMPLETE CBC AUTOMATED: CPT | Performed by: FAMILY MEDICINE

## 2024-03-03 PROCEDURE — 2500000004 HC RX 250 GENERAL PHARMACY W/ HCPCS (ALT 636 FOR OP/ED): Performed by: INTERNAL MEDICINE

## 2024-03-03 RX ADMIN — FAMOTIDINE 20 MG: 20 TABLET, FILM COATED ORAL at 20:29

## 2024-03-03 RX ADMIN — METHYLPREDNISOLONE SODIUM SUCCINATE 20 MG: 40 INJECTION, POWDER, LYOPHILIZED, FOR SOLUTION INTRAMUSCULAR; INTRAVENOUS at 05:32

## 2024-03-03 RX ADMIN — ALPRAZOLAM 0.5 MG: 0.5 TABLET ORAL at 23:28

## 2024-03-03 RX ADMIN — METHYLPREDNISOLONE SODIUM SUCCINATE 20 MG: 40 INJECTION, POWDER, LYOPHILIZED, FOR SOLUTION INTRAMUSCULAR; INTRAVENOUS at 21:04

## 2024-03-03 RX ADMIN — METHYLPREDNISOLONE SODIUM SUCCINATE 20 MG: 40 INJECTION, POWDER, LYOPHILIZED, FOR SOLUTION INTRAMUSCULAR; INTRAVENOUS at 14:16

## 2024-03-03 ASSESSMENT — PAIN - FUNCTIONAL ASSESSMENT
PAIN_FUNCTIONAL_ASSESSMENT: 0-10

## 2024-03-03 ASSESSMENT — COGNITIVE AND FUNCTIONAL STATUS - GENERAL
MOBILITY SCORE: 24
MOBILITY SCORE: 24
DAILY ACTIVITIY SCORE: 24
DAILY ACTIVITIY SCORE: 24

## 2024-03-03 ASSESSMENT — PAIN SCALES - GENERAL
PAINLEVEL_OUTOF10: 0 - NO PAIN
PAINLEVEL_OUTOF10: 0 - NO PAIN
PAINLEVEL_OUTOF10: 2
PAINLEVEL_OUTOF10: 0 - NO PAIN
PAINLEVEL_OUTOF10: 0 - NO PAIN

## 2024-03-03 NOTE — PROGRESS NOTES
"Kyra Behnfeldt is a 23 y.o. female on day 2 of admission presenting with Crohn's disease of colon with complication (CMS/HCC).    Subjective   Continues to have muliple bloody bowel movements overnight. Feels weak.   Tolerating diet .       Objective     Physical Exam  Vitals reviewed.   Constitutional:       General: She is awake.      Appearance: Normal appearance.   HENT:      Head: Normocephalic and atraumatic.      Nose: Nose normal.      Mouth/Throat:      Mouth: Mucous membranes are moist.   Eyes:      Pupils: Pupils are equal, round, and reactive to light.   Cardiovascular:      Rate and Rhythm: Normal rate.   Pulmonary:      Effort: Pulmonary effort is normal.   Abdominal:      General: Abdomen is flat.      Palpations: Abdomen is soft.   Neurological:      Mental Status: She is alert and oriented to person, place, and time. Mental status is at baseline.   Psychiatric:         Attention and Perception: Attention and perception normal.         Mood and Affect: Mood normal.         Behavior: Behavior normal.         Last Recorded Vitals  Blood pressure 112/77, pulse 72, temperature 36.2 °C (97.1 °F), temperature source Temporal, resp. rate 18, height 1.6 m (5' 2.99\"), weight 49.9 kg (110 lb 0.2 oz), last menstrual period 01/30/2024, SpO2 100 %.  Intake/Output last 3 Shifts:  I/O last 3 completed shifts:  In: 600 (12 mL/kg) [P.O.:600]  Out: - (0 mL/kg)   Weight: 49.9 kg     Relevant Results    Scheduled medications  famotidine, 20 mg, oral, BID   Or  famotidine, 20 mg, intravenous, BID  melatonin, 3 mg, oral, Daily  methylPREDNISolone sodium succinate (PF), 20 mg, intravenous, q8h      Continuous medications     PRN medications  PRN medications: acetaminophen, ALPRAZolam, cyclobenzaprine, dicyclomine, guaiFENesin, hydrOXYzine HCL, morphine, morphine, ondansetron               Assessment/Plan   Principal Problem:    Crohn's disease of colon with complication (CMS/HCC)  Active Problems:    Hypokalemia      23 " y.o. female with Crohn's colitis diagnosed in Dec 2023 who presented with worsening rectal bleeding and abdominal pain.  Recent admissions for the same x 2, was improved on discharge to 2 bowel movements per day without bleeding  after 2 days of solumedrol , discharged on 40 mg prednisone with recurrent symptoms 24 hours after discharge, bloody diarrhea and abdominal pain.  She has received  2 loading doses of humira, yesterday was her third which she took.  C diff, stool PCR negative.     Rec  Cont solumedrol 20mg q8h  Diet as tolerated  TPMT enzymes ordered   May need endoscopic evaluation if no improvement  MRE ordered to evaluate small bowel disease.    Dr. Wang will be on Gi consult tomorrow.      Enriqueta Marin MD

## 2024-03-03 NOTE — CARE PLAN
Problem: Pain  Goal: My pain/discomfort is manageable  Outcome: Progressing  Flowsheets (Taken 3/2/2024 2017)  Resident's pain/discomfort is manageable:   Identify and avoid pain triggers   Administer pain medication prior to activities that may trigger pain   Offer non-pharmacological pain management interventions   Include resident/family/caregiver in decisions related to pain management     Problem: Safety  Goal: Patient will be injury free during hospitalization  Outcome: Progressing  Goal: I will remain free of falls  Outcome: Progressing  Flowsheets (Taken 3/2/2024 2017)  Resident will remain free of falls:   Visual checks per facility policy   Consider transfer to room close to nurses' station     Problem: Daily Care  Goal: Daily care needs are met  Outcome: Progressing  Flowsheets (Taken 3/2/2024 2017)  Daily care needs are met:   Include patient/family/caregiver in decisions related to daily care   Encourage independent activity per ability   Assist patient with activities of daily living as needed     Problem: Psychosocial Needs  Goal: Demonstrates ability to cope with hospitalization/illness  Outcome: Progressing  Flowsheets (Taken 3/2/2024 2017)  Demonstrates ability to cope with hospitalization/illness:   Assist resident to identify and practice own strengths and abilities   Encourage participation in diversional activities   Reinforce positive adaptation of new coping behaviors  Goal: Collaborate with me, my family, and caregiver to identify my specific goals  Outcome: Progressing   The patient's goals for the shift include Pain relief    The clinical goals for the shift include to have less episodes of diarrhea or bloody stools throughout

## 2024-03-03 NOTE — CARE PLAN
The patient's goals for the shift include Pain relief    The clinical goals for the shift include patient will remain safe and ring for assistance this shift  Patient up and ambulating in room. No pain noted. No other complaints noted. Awaiting MRE testing

## 2024-03-03 NOTE — PROGRESS NOTES
Kyra Behnfeldt is a 23 y.o. female     Continues to have bloody stool with mild discomfort    Review of Systems     Constitutional: no fever, no chills, not feeling poorly, not feeling tired   Cardiovascular: no chest pain   Respiratory: no cough, wheezing or shortness of breath a  Gastrointestinal: Diarrhea with bloody stool  Neurological: no headache,   All other systems have been reviewed and are negative for complaint.       Vitals:    03/03/24 1204   BP: 101/64   Pulse: 95   Resp: 18   Temp: 36.2 °C (97.1 °F)   SpO2: 100%        Scheduled medications  famotidine, 20 mg, oral, BID   Or  famotidine, 20 mg, intravenous, BID  melatonin, 3 mg, oral, Daily  methylPREDNISolone sodium succinate (PF), 20 mg, intravenous, q8h      Continuous medications     PRN medications  PRN medications: acetaminophen, ALPRAZolam, cyclobenzaprine, dicyclomine, guaiFENesin, hydrOXYzine HCL, morphine, morphine, ondansetron    Lab Review   Results from last 7 days   Lab Units 03/03/24  0710 03/02/24  0646 03/01/24  1527 03/01/24  0038   WBC AUTO x10*3/uL 13.0* 12.4*  --  12.0*   HEMOGLOBIN g/dL 8.1* 8.0* 7.3* 8.2*   HEMATOCRIT % 27.6* 26.6* 23.6* 26.8*   PLATELETS AUTO x10*3/uL 400 412  --  373       Results from last 7 days   Lab Units 03/03/24  0710 03/02/24  0646 03/01/24  1527 03/01/24  0038   SODIUM mmol/L 136 137  --  135*   POTASSIUM mmol/L 4.0 4.2 3.8 3.0*   CHLORIDE mmol/L 100 101  --  99   CO2 mmol/L 27 27  --  27   BUN mg/dL 13 13  --  17   CREATININE mg/dL 0.66 0.73  --  0.87   CALCIUM mg/dL 8.4* 8.5*  --  8.1*   PROTEIN TOTAL g/dL  --   --   --  6.2*   BILIRUBIN TOTAL mg/dL  --   --   --  0.2   ALK PHOS U/L  --   --   --  60   ALT U/L  --   --   --  12   AST U/L  --   --   --  10   GLUCOSE mg/dL 110* 113*  --  129*              MR enterography    (Results Pending)         Physical Exam     Constitutional   General appearance: Alert and in no acute distress.     Pulmonary   Respiratory assessment: No respiratory distress,  normal respiratory rhythm and effort.    Auscultation of Lungs: Clear bilateral breath sounds.   Cardiovascular   Auscultation of heart: Apical pulse normal, heart rate and rhythm normal, normal S1 and S2, no murmurs and no pericardial rub.    Exam for edema: No peripheral edema.   Abdomen   Abdominal Exam: No bruits, normal bowel sounds, soft, non-tender, no abdominal mass palpated.    Liver and Spleen exam: No hepato-splenomegaly.   Musculoskeletal   Examination of gait: ROM intact  Skin inspection: Normal skin color and pigmentation, normal skin turgor and no visible rash.   Neurologic   Cranial nerves: Nerves 2-12 were intact, no focal neuro defects.     Assessment/Plan      #Acute Crohn's colitis with flare  Continue Solu-Medrol  Monitor CBC and electrolytes    #Anemia of chronic disease

## 2024-03-04 ENCOUNTER — ANESTHESIA (OUTPATIENT)
Dept: GASTROENTEROLOGY | Facility: HOSPITAL | Age: 24
DRG: 386 | End: 2024-03-04
Payer: COMMERCIAL

## 2024-03-04 ENCOUNTER — APPOINTMENT (OUTPATIENT)
Dept: GASTROENTEROLOGY | Facility: HOSPITAL | Age: 24
DRG: 386 | End: 2024-03-04
Payer: COMMERCIAL

## 2024-03-04 ENCOUNTER — ANESTHESIA EVENT (OUTPATIENT)
Dept: GASTROENTEROLOGY | Facility: HOSPITAL | Age: 24
DRG: 386 | End: 2024-03-04
Payer: COMMERCIAL

## 2024-03-04 ENCOUNTER — APPOINTMENT (OUTPATIENT)
Dept: RADIOLOGY | Facility: HOSPITAL | Age: 24
DRG: 386 | End: 2024-03-04
Payer: COMMERCIAL

## 2024-03-04 LAB
ABO GROUP (TYPE) IN BLOOD: NORMAL
ABO GROUP (TYPE) IN BLOOD: NORMAL
ANION GAP SERPL CALC-SCNC: 9 MMOL/L (ref 10–20)
ANTIBODY SCREEN: NORMAL
BUN SERPL-MCNC: 12 MG/DL (ref 6–23)
CALCIUM SERPL-MCNC: 8.2 MG/DL (ref 8.6–10.3)
CHLORIDE SERPL-SCNC: 102 MMOL/L (ref 98–107)
CO2 SERPL-SCNC: 31 MMOL/L (ref 21–32)
CREAT SERPL-MCNC: 0.63 MG/DL (ref 0.5–1.05)
CRP SERPL-MCNC: 0.62 MG/DL
EGFRCR SERPLBLD CKD-EPI 2021: >90 ML/MIN/1.73M*2
ERYTHROCYTE [DISTWIDTH] IN BLOOD BY AUTOMATED COUNT: 13.2 % (ref 11.5–14.5)
FERRITIN SERPL-MCNC: 17 NG/ML (ref 8–150)
GLUCOSE SERPL-MCNC: 105 MG/DL (ref 74–99)
HCT VFR BLD AUTO: 23.3 % (ref 36–46)
HGB BLD-MCNC: 7 G/DL (ref 12–16)
HOLD SPECIMEN: NORMAL
IRON SATN MFR SERPL: 8 % (ref 25–45)
IRON SERPL-MCNC: 20 UG/DL (ref 35–150)
MCH RBC QN AUTO: 26.7 PG (ref 26–34)
MCHC RBC AUTO-ENTMCNC: 30 G/DL (ref 32–36)
MCV RBC AUTO: 89 FL (ref 80–100)
NRBC BLD-RTO: 0.2 /100 WBCS (ref 0–0)
PLATELET # BLD AUTO: 363 X10*3/UL (ref 150–450)
POTASSIUM SERPL-SCNC: 4.3 MMOL/L (ref 3.5–5.3)
RBC # BLD AUTO: 2.62 X10*6/UL (ref 4–5.2)
RH FACTOR (ANTIGEN D): NORMAL
RH FACTOR (ANTIGEN D): NORMAL
SODIUM SERPL-SCNC: 138 MMOL/L (ref 136–145)
TIBC SERPL-MCNC: 261 UG/DL (ref 240–445)
UIBC SERPL-MCNC: 241 UG/DL (ref 110–370)
WBC # BLD AUTO: 13.5 X10*3/UL (ref 4.4–11.3)

## 2024-03-04 PROCEDURE — 80048 BASIC METABOLIC PNL TOTAL CA: CPT | Performed by: FAMILY MEDICINE

## 2024-03-04 PROCEDURE — 86901 BLOOD TYPING SEROLOGIC RH(D): CPT | Performed by: ANESTHESIOLOGY

## 2024-03-04 PROCEDURE — 2500000004 HC RX 250 GENERAL PHARMACY W/ HCPCS (ALT 636 FOR OP/ED): Performed by: INTERNAL MEDICINE

## 2024-03-04 PROCEDURE — 2500000004 HC RX 250 GENERAL PHARMACY W/ HCPCS (ALT 636 FOR OP/ED): Performed by: NURSE PRACTITIONER

## 2024-03-04 PROCEDURE — 88305 TISSUE EXAM BY PATHOLOGIST: CPT

## 2024-03-04 PROCEDURE — 74183 MRI ABD W/O CNTR FLWD CNTR: CPT

## 2024-03-04 PROCEDURE — 1100000001 HC PRIVATE ROOM DAILY

## 2024-03-04 PROCEDURE — 82728 ASSAY OF FERRITIN: CPT | Mod: AHULAB | Performed by: NURSE PRACTITIONER

## 2024-03-04 PROCEDURE — 76376 3D RENDER W/INTRP POSTPROCES: CPT | Performed by: RADIOLOGY

## 2024-03-04 PROCEDURE — A45331 PR SIGMOIDOSCOPY,BIOPSY: Performed by: NURSE ANESTHETIST, CERTIFIED REGISTERED

## 2024-03-04 PROCEDURE — 3700000002 HC GENERAL ANESTHESIA TIME - EACH INCREMENTAL 1 MINUTE

## 2024-03-04 PROCEDURE — 85027 COMPLETE CBC AUTOMATED: CPT | Performed by: FAMILY MEDICINE

## 2024-03-04 PROCEDURE — 2550000001 HC RX 255 CONTRASTS: Performed by: FAMILY MEDICINE

## 2024-03-04 PROCEDURE — 2500000004 HC RX 250 GENERAL PHARMACY W/ HCPCS (ALT 636 FOR OP/ED): Performed by: FAMILY MEDICINE

## 2024-03-04 PROCEDURE — 86140 C-REACTIVE PROTEIN: CPT | Performed by: INTERNAL MEDICINE

## 2024-03-04 PROCEDURE — 88342 IMHCHEM/IMCYTCHM 1ST ANTB: CPT

## 2024-03-04 PROCEDURE — 7100000010 HC PHASE TWO TIME - EACH INCREMENTAL 1 MINUTE

## 2024-03-04 PROCEDURE — 36415 COLL VENOUS BLD VENIPUNCTURE: CPT | Performed by: ANESTHESIOLOGY

## 2024-03-04 PROCEDURE — 88305 TISSUE EXAM BY PATHOLOGIST: CPT | Mod: TC,AHULAB | Performed by: INTERNAL MEDICINE

## 2024-03-04 PROCEDURE — 2500000001 HC RX 250 WO HCPCS SELF ADMINISTERED DRUGS (ALT 637 FOR MEDICARE OP): Performed by: FAMILY MEDICINE

## 2024-03-04 PROCEDURE — P9016 RBC LEUKOCYTES REDUCED: HCPCS

## 2024-03-04 PROCEDURE — 36430 TRANSFUSION BLD/BLD COMPNT: CPT

## 2024-03-04 PROCEDURE — 99221 1ST HOSP IP/OBS SF/LOW 40: CPT | Performed by: COLON & RECTAL SURGERY

## 2024-03-04 PROCEDURE — A9575 INJ GADOTERATE MEGLUMI 0.1ML: HCPCS | Performed by: FAMILY MEDICINE

## 2024-03-04 PROCEDURE — 72197 MRI PELVIS W/O & W/DYE: CPT | Performed by: RADIOLOGY

## 2024-03-04 PROCEDURE — 2500000004 HC RX 250 GENERAL PHARMACY W/ HCPCS (ALT 636 FOR OP/ED): Performed by: NURSE ANESTHETIST, CERTIFIED REGISTERED

## 2024-03-04 PROCEDURE — 2500000005 HC RX 250 GENERAL PHARMACY W/O HCPCS: Performed by: NURSE ANESTHETIST, CERTIFIED REGISTERED

## 2024-03-04 PROCEDURE — 86920 COMPATIBILITY TEST SPIN: CPT

## 2024-03-04 PROCEDURE — 45331 SIGMOIDOSCOPY AND BIOPSY: CPT | Performed by: INTERNAL MEDICINE

## 2024-03-04 PROCEDURE — A45331 PR SIGMOIDOSCOPY,BIOPSY: Performed by: ANESTHESIOLOGY

## 2024-03-04 PROCEDURE — 88342 IMHCHEM/IMCYTCHM 1ST ANTB: CPT | Mod: TC,AHULAB | Performed by: INTERNAL MEDICINE

## 2024-03-04 PROCEDURE — 3700000001 HC GENERAL ANESTHESIA TIME - INITIAL BASE CHARGE

## 2024-03-04 PROCEDURE — 83540 ASSAY OF IRON: CPT | Performed by: NURSE PRACTITIONER

## 2024-03-04 PROCEDURE — 74183 MRI ABD W/O CNTR FLWD CNTR: CPT | Performed by: RADIOLOGY

## 2024-03-04 PROCEDURE — 7100000009 HC PHASE TWO TIME - INITIAL BASE CHARGE

## 2024-03-04 RX ORDER — GADOTERATE MEGLUMINE 376.9 MG/ML
10 INJECTION INTRAVENOUS
Status: COMPLETED | OUTPATIENT
Start: 2024-03-04 | End: 2024-03-04

## 2024-03-04 RX ORDER — LIDOCAINE HYDROCHLORIDE 20 MG/ML
INJECTION, SOLUTION EPIDURAL; INFILTRATION; INTRACAUDAL; PERINEURAL AS NEEDED
Status: DISCONTINUED | OUTPATIENT
Start: 2024-03-04 | End: 2024-03-04

## 2024-03-04 RX ORDER — MIDAZOLAM HYDROCHLORIDE 1 MG/ML
INJECTION INTRAMUSCULAR; INTRAVENOUS AS NEEDED
Status: DISCONTINUED | OUTPATIENT
Start: 2024-03-04 | End: 2024-03-04

## 2024-03-04 RX ORDER — PROPOFOL 10 MG/ML
INJECTION, EMULSION INTRAVENOUS CONTINUOUS PRN
Status: DISCONTINUED | OUTPATIENT
Start: 2024-03-04 | End: 2024-03-04

## 2024-03-04 RX ORDER — PROPOFOL 10 MG/ML
INJECTION, EMULSION INTRAVENOUS AS NEEDED
Status: DISCONTINUED | OUTPATIENT
Start: 2024-03-04 | End: 2024-03-04

## 2024-03-04 RX ORDER — SODIUM CHLORIDE, SODIUM LACTATE, POTASSIUM CHLORIDE, CALCIUM CHLORIDE 600; 310; 30; 20 MG/100ML; MG/100ML; MG/100ML; MG/100ML
20 INJECTION, SOLUTION INTRAVENOUS CONTINUOUS
Status: CANCELLED | OUTPATIENT
Start: 2024-03-04

## 2024-03-04 RX ADMIN — PROPOFOL 30 MG: 10 INJECTION, EMULSION INTRAVENOUS at 14:03

## 2024-03-04 RX ADMIN — IRON SUCROSE 200 MG: 20 INJECTION, SOLUTION INTRAVENOUS at 11:24

## 2024-03-04 RX ADMIN — MIDAZOLAM HYDROCHLORIDE 2 MG: 1 INJECTION INTRAMUSCULAR; INTRAVENOUS at 14:03

## 2024-03-04 RX ADMIN — MORPHINE SULFATE 4 MG: 4 INJECTION, SOLUTION INTRAMUSCULAR; INTRAVENOUS at 03:52

## 2024-03-04 RX ADMIN — PROPOFOL 30 MG: 10 INJECTION, EMULSION INTRAVENOUS at 14:09

## 2024-03-04 RX ADMIN — PROPOFOL 20 MG: 10 INJECTION, EMULSION INTRAVENOUS at 14:01

## 2024-03-04 RX ADMIN — GADOTERATE MEGLUMINE 10 ML: 376.9 INJECTION INTRAVENOUS at 19:59

## 2024-03-04 RX ADMIN — METHYLPREDNISOLONE SODIUM SUCCINATE 20 MG: 40 INJECTION, POWDER, LYOPHILIZED, FOR SOLUTION INTRAMUSCULAR; INTRAVENOUS at 06:48

## 2024-03-04 RX ADMIN — LIDOCAINE HYDROCHLORIDE 60 MG: 20 INJECTION, SOLUTION EPIDURAL; INFILTRATION; INTRACAUDAL; PERINEURAL at 14:03

## 2024-03-04 RX ADMIN — ACETAMINOPHEN 650 MG: 325 TABLET ORAL at 22:33

## 2024-03-04 RX ADMIN — SODIUM CHLORIDE, SODIUM LACTATE, POTASSIUM CHLORIDE, AND CALCIUM CHLORIDE: 600; 310; 30; 20 INJECTION, SOLUTION INTRAVENOUS at 13:49

## 2024-03-04 RX ADMIN — METHYLPREDNISOLONE SODIUM SUCCINATE 20 MG: 40 INJECTION, POWDER, LYOPHILIZED, FOR SOLUTION INTRAMUSCULAR; INTRAVENOUS at 14:00

## 2024-03-04 RX ADMIN — ONDANSETRON 4 MG: 2 INJECTION INTRAMUSCULAR; INTRAVENOUS at 03:59

## 2024-03-04 RX ADMIN — METHYLPREDNISOLONE SODIUM SUCCINATE 20 MG: 40 INJECTION, POWDER, LYOPHILIZED, FOR SOLUTION INTRAMUSCULAR; INTRAVENOUS at 21:08

## 2024-03-04 RX ADMIN — ONDANSETRON 4 MG: 2 INJECTION INTRAMUSCULAR; INTRAVENOUS at 17:10

## 2024-03-04 RX ADMIN — PROPOFOL 100 MCG/KG/MIN: 10 INJECTION, EMULSION INTRAVENOUS at 14:03

## 2024-03-04 RX ADMIN — ALPRAZOLAM 0.5 MG: 0.5 TABLET ORAL at 17:10

## 2024-03-04 RX ADMIN — FAMOTIDINE 20 MG: 20 TABLET, FILM COATED ORAL at 21:20

## 2024-03-04 SDOH — HEALTH STABILITY: MENTAL HEALTH: CURRENT SMOKER: 0

## 2024-03-04 ASSESSMENT — PAIN DESCRIPTION - LOCATION: LOCATION: RECTUM

## 2024-03-04 ASSESSMENT — PAIN SCALES - GENERAL
PAINLEVEL_OUTOF10: 5 - MODERATE PAIN
PAIN_LEVEL: 4
PAINLEVEL_OUTOF10: 0 - NO PAIN
PAINLEVEL_OUTOF10: 0 - NO PAIN
PAINLEVEL_OUTOF10: 3
PAINLEVEL_OUTOF10: 0 - NO PAIN
PAINLEVEL_OUTOF10: 10 - WORST POSSIBLE PAIN

## 2024-03-04 ASSESSMENT — COGNITIVE AND FUNCTIONAL STATUS - GENERAL
MOBILITY SCORE: 24
DAILY ACTIVITIY SCORE: 24

## 2024-03-04 ASSESSMENT — PAIN - FUNCTIONAL ASSESSMENT
PAIN_FUNCTIONAL_ASSESSMENT: 0-10

## 2024-03-04 ASSESSMENT — PAIN DESCRIPTION - DESCRIPTORS: DESCRIPTORS: CRAMPING

## 2024-03-04 NOTE — NURSING NOTE
1500 Spoke with Blood bank about time of results of type and screen. Results will be ready in 40 minutes. Blood bank will reach out to floor room 729 nurse upon completion.     1505 Report given to DRU More RN, made aware of pending type and screen results and blood transfusion orders by Dr Wang.

## 2024-03-04 NOTE — NURSING NOTE
1600 Rbc ordered waiting on type and screen, dr. Wang ordered for pt to go down to mre first and transfuse rbc when mre is complete and back on the floor

## 2024-03-04 NOTE — CARE PLAN
The patient's goals for the shift include Pain relief    The clinical goals for the shift include patient will remain safe and ring for assistance this shift

## 2024-03-04 NOTE — SIGNIFICANT EVENT
Flex sig showed severe inflammation from anal verge up through transverse colon.  Biopsies to evaluate for CMV pending and I will work to expedite this with pathology.  I did reach out to  IBD specialist, Dr. Lozano, who was kind enough to review the case and does think that rescue therapy with Remicade would be an option even with primary failure of Humira.  Discussed case with Dr. Hopper who was present for the procedure as well and she did say we could consider diversion if needed.  Upon speaking with the patient, she wishes to avoid ostomy if possible and consider rescue therapy with Remicade.  We will await biopsy results and continue steroids for now.  Appreciate Colorectal Surgery input and input from Dr. Lozano as well.  We directly communicated with Radiology who will get patient down for MRE today as well.  Ok for diet and advised patient regarding importance of keeping up Nutrition so will add Boost.  Trend labs. We will transfuse her blood today given worsening anemia and ongoing bleeding.    GI will follow.

## 2024-03-04 NOTE — PROGRESS NOTES
03/04/24 1801   Discharge Planning   Living Arrangements Parent   Support Systems Parent   Assistance Needed None   Type of Residence Private residence   Home or Post Acute Services None   Patient expects to be discharged to: Home   Does the patient need discharge transport arranged? No     Patient not medically ready for discharge, Patient admitted for Crohn's disease with  complication. [facility], patient not available to discuss discharge planning, anticipate home with no needs.

## 2024-03-04 NOTE — ANESTHESIA POSTPROCEDURE EVALUATION
Patient: Kyra Behnfeldt    Procedure Summary       Date: 03/04/24 Room / Location: [facility]    Anesthesia Start: 1400 Anesthesia Stop: 1432    Procedure: FLEXIBLE SIGMOIDOSCOPY Diagnosis: Crohn's colitis, with rectal bleeding (CMS/HCC)    Scheduled Providers: Mane Wang MD; Neri Rowe MD Responsible Provider: Neri Rowe MD    Anesthesia Type: MAC ASA Status: 2            Anesthesia Type: MAC    Vitals Value Taken Time   /64 03/04/24 1445   Temp 36.5 °C (97.7 °F) 03/04/24 1430   Pulse 99 03/04/24 1455   Resp 16 03/04/24 1445   SpO2 98 % 03/04/24 1455   Vitals shown include unvalidated device data.    Anesthesia Post Evaluation    Patient location during evaluation: PACU  Patient participation: complete - patient participated  Level of consciousness: awake and alert  Pain score: 4  Pain management: adequate  Airway patency: patent  Cardiovascular status: acceptable  Respiratory status: acceptable  Hydration status: acceptable  Postoperative Nausea and Vomiting: none        No notable events documented.

## 2024-03-04 NOTE — ANESTHESIA PREPROCEDURE EVALUATION
Patient: Kyra Behnfeldt    Procedure Information       Date/Time: 03/04/24 1320    Scheduled providers: Mane Wang MD; Neri Rowe MD    Procedure: FLEXIBLE SIGMOIDOSCOPY    Location: [facility]            Relevant Problems   GI   (+) Crohn's colitis, with rectal bleeding (CMS/HCC)   (+) Crohn's disease of colon with complication (CMS/HCC)       Clinical information reviewed:   Tobacco  Allergies  Meds  Problems  Med Hx  Surg Hx  OB Status    Fam Hx  Soc Hx        NPO Detail:  NPO/Void Status  Carbohydrate Drink Given Prior to Surgery? : N  Date of Last Liquid: 03/04/24  Time of Last Liquid: 0000  Date of Last Solid: 03/04/24  Time of Last Solid: 0000  Last Intake Type: Clear fluids  Time of Last Void: 1200         Physical Exam    Airway  Mallampati: I  TM distance: >3 FB  Neck ROM: full     Cardiovascular - normal exam     Dental - normal exam     Pulmonary - normal exam     Abdominal - normal exam             Anesthesia Plan    History of general anesthesia?: yes  History of complications of general anesthesia?: no    ASA 2     MAC     The patient is not a current smoker.  Patient was not previously instructed to abstain from smoking on day of procedure.  Patient did not smoke on day of procedure.    intravenous induction   Postoperative administration of opioids is intended.  Anesthetic plan and risks discussed with patient.  Use of blood products discussed with patient who.    Plan discussed with CRNA.

## 2024-03-04 NOTE — CARE PLAN
The patient's goals for the shift include Pain relief    The clinical goals for the shift include safety maintained    Problem: Pain  Goal: My pain/discomfort is manageable  Outcome: Progressing     Problem: Safety  Goal: Patient will be injury free during hospitalization  Outcome: Progressing  Goal: I will remain free of falls  Outcome: Progressing     Problem: Psychosocial Needs  Goal: Demonstrates ability to cope with hospitalization/illness  Outcome: Progressing  Goal: Collaborate with me, my family, and caregiver to identify my specific goals  Outcome: Progressing

## 2024-03-04 NOTE — CONSULTS
Reason For Consult  Refractory Crohn's colitis    History Of Present Illness  Kyra Behnfeldt is a 23 y.o. female presenting with rectal bleeding, anemia, mild malnutrition.  Pt was diagnosed with Crohn's colitis in 12/23 by colonoscopy (rectum and Transverse colon).  When she is on IV steroids she has control, but basically when she goes to oral steroids she gets worse again.  She has been hospitalized 3 times in the past few weeks.  She has gotten 3 doses of humira, but has worsened again.  She is having 8-10 bloody BM's/day.     Past Medical History  Crohn's colitis    Surgical History  She has a past surgical history that includes Colonoscopy.     Social History  She reports that she has never smoked. She has never been exposed to tobacco smoke. She has never used smokeless tobacco. She reports current alcohol use. She reports current drug use. Drug: Marijuana.    Family History  No family history on file.     Allergies  Patient has no known allergies.    Review of Systems  Weight loss, rectal bleeding, fatigue and diarrhea  Physical Exam    Constitutional: Well developed, awake/alert/oriented x3, no distress, alert and cooperative   Eyes: Sclera anicteric, no conjunctival inflammation, conjugate gaze   ENMT: mucous membranes moist, no apparent injury,   Head/Neck: Neck supple, no apparent injury, No JVD, trachea midline, no bruits   Respiratory/Thorax: Patent airways, CTAB, normal breath sounds with good chest expansion, thorax symmetric   Cardiovascular: Regular, rate and rhythm, no murmurs, normal S1 and S2   Gastrointestinal: Nondistended, soft, non-tender, no rebound tenderness or guarding, no masses palpable, no organomegaly, +BS, no bruits   Extremities: normal extremities, no cyanosis edema, contusions or wounds, 2+ femoral pulses B/L   Neurological: alert and oriented x3, normal strength, Normal gait   Psychological: Appropriate mood and behavior   Skin: Warm and dry, no lesions, no rashes   Anorectal:  "Normal tone, no external hemorrhoids or lesions, smooth mucosa,        Last Recorded Vitals  Blood pressure 125/84, pulse 93, temperature 36.3 °C (97.3 °F), temperature source Temporal, resp. rate 16, height 1.6 m (5' 3\"), weight 49.9 kg (110 lb 0.2 oz), last menstrual period 01/30/2024, SpO2 100 %.    Relevant Results  === 02/01/24 ===    CT ABDOMEN PELVIS W IV CONTRAST    - Impression -  1. Questionable acute enterocolitis.  2. No other acute finding in the abdomen and pelvis.  3. Normal appendix.    Signed by: Gregorio Jones 2/1/2024 6:21 PM  Dictation workstation:   [id number]    Lab Results   Component Value Date    WBC 13.5 (H) 03/04/2024    HGB 7.0 (L) 03/04/2024    HCT 23.3 (L) 03/04/2024    MCV 89 03/04/2024     03/04/2024     Lab Results   Component Value Date    GLUCOSE 105 (H) 03/04/2024    CALCIUM 8.2 (L) 03/04/2024     03/04/2024    K 4.3 03/04/2024    CO2 31 03/04/2024     03/04/2024    BUN 12 03/04/2024    CREATININE 0.63 03/04/2024     Lab Results   Component Value Date    ALT 12 03/01/2024    AST 10 03/01/2024    ALKPHOS 60 03/01/2024    BILITOT 0.2 03/01/2024     Currently on scope has pancolitis from the rectum to proximal transverse colon      Assessment/Plan     Impression - acute steroid refractory colitis  Severe anemia  Mild malnutrition     Plan   I agree with IV steroids  We could try induction remicade or DLI to calm things down and then proceed  Agree with blood and would give IV venofer while she is in house  Will watch closely for toxic colitis which would mean emergent surgery.      We will continue to monitor with you    I spent 45 minutes in the professional and overall care of this patient.      Nadiya Hopper MD    "

## 2024-03-05 LAB
ALBUMIN SERPL BCP-MCNC: 2.8 G/DL (ref 3.4–5)
ALP SERPL-CCNC: 54 U/L (ref 33–110)
ALT SERPL W P-5'-P-CCNC: 10 U/L (ref 7–45)
ANION GAP SERPL CALC-SCNC: 12 MMOL/L (ref 10–20)
AST SERPL W P-5'-P-CCNC: 9 U/L (ref 9–39)
BASOPHILS # BLD AUTO: 0.02 X10*3/UL (ref 0–0.1)
BASOPHILS NFR BLD AUTO: 0.1 %
BILIRUB SERPL-MCNC: 0.3 MG/DL (ref 0–1.2)
BUN SERPL-MCNC: 16 MG/DL (ref 6–23)
CALCIUM SERPL-MCNC: 8.2 MG/DL (ref 8.6–10.3)
CHLORIDE SERPL-SCNC: 100 MMOL/L (ref 98–107)
CO2 SERPL-SCNC: 29 MMOL/L (ref 21–32)
CREAT SERPL-MCNC: 0.65 MG/DL (ref 0.5–1.05)
CRP SERPL-MCNC: 6.53 MG/DL
EGFRCR SERPLBLD CKD-EPI 2021: >90 ML/MIN/1.73M*2
EOSINOPHIL # BLD AUTO: 0.03 X10*3/UL (ref 0–0.7)
EOSINOPHIL NFR BLD AUTO: 0.2 %
ERYTHROCYTE [DISTWIDTH] IN BLOOD BY AUTOMATED COUNT: 13.6 % (ref 11.5–14.5)
GLUCOSE SERPL-MCNC: 101 MG/DL (ref 74–99)
HCT VFR BLD AUTO: 30.2 % (ref 36–46)
HGB BLD-MCNC: 9.8 G/DL (ref 12–16)
IMM GRANULOCYTES # BLD AUTO: 0.2 X10*3/UL (ref 0–0.7)
IMM GRANULOCYTES NFR BLD AUTO: 1.5 % (ref 0–0.9)
LYMPHOCYTES # BLD AUTO: 1.77 X10*3/UL (ref 1.2–4.8)
LYMPHOCYTES NFR BLD AUTO: 13.2 %
MCH RBC QN AUTO: 28.3 PG (ref 26–34)
MCHC RBC AUTO-ENTMCNC: 32.5 G/DL (ref 32–36)
MCV RBC AUTO: 87 FL (ref 80–100)
MONOCYTES # BLD AUTO: 1.74 X10*3/UL (ref 0.1–1)
MONOCYTES NFR BLD AUTO: 13 %
NEUTROPHILS # BLD AUTO: 9.67 X10*3/UL (ref 1.2–7.7)
NEUTROPHILS NFR BLD AUTO: 72 %
NRBC BLD-RTO: 0.5 /100 WBCS (ref 0–0)
PLATELET # BLD AUTO: 347 X10*3/UL (ref 150–450)
POTASSIUM SERPL-SCNC: 4 MMOL/L (ref 3.5–5.3)
PROT SERPL-MCNC: 5.3 G/DL (ref 6.4–8.2)
RBC # BLD AUTO: 3.46 X10*6/UL (ref 4–5.2)
SODIUM SERPL-SCNC: 137 MMOL/L (ref 136–145)
WBC # BLD AUTO: 13.4 X10*3/UL (ref 4.4–11.3)

## 2024-03-05 PROCEDURE — 2500000004 HC RX 250 GENERAL PHARMACY W/ HCPCS (ALT 636 FOR OP/ED): Performed by: INTERNAL MEDICINE

## 2024-03-05 PROCEDURE — 84075 ASSAY ALKALINE PHOSPHATASE: CPT | Performed by: INTERNAL MEDICINE

## 2024-03-05 PROCEDURE — 1100000001 HC PRIVATE ROOM DAILY

## 2024-03-05 PROCEDURE — 86140 C-REACTIVE PROTEIN: CPT | Performed by: INTERNAL MEDICINE

## 2024-03-05 PROCEDURE — 2500000004 HC RX 250 GENERAL PHARMACY W/ HCPCS (ALT 636 FOR OP/ED): Performed by: FAMILY MEDICINE

## 2024-03-05 PROCEDURE — 99233 SBSQ HOSP IP/OBS HIGH 50: CPT | Performed by: INTERNAL MEDICINE

## 2024-03-05 PROCEDURE — 36415 COLL VENOUS BLD VENIPUNCTURE: CPT | Performed by: INTERNAL MEDICINE

## 2024-03-05 PROCEDURE — 2500000001 HC RX 250 WO HCPCS SELF ADMINISTERED DRUGS (ALT 637 FOR MEDICARE OP): Performed by: FAMILY MEDICINE

## 2024-03-05 PROCEDURE — 85025 COMPLETE CBC W/AUTO DIFF WBC: CPT | Performed by: INTERNAL MEDICINE

## 2024-03-05 RX ORDER — ALPRAZOLAM 0.5 MG/1
0.5 TABLET ORAL 3 TIMES DAILY PRN
Qty: 7 TABLET | Refills: 0 | Status: ON HOLD
Start: 2024-03-05 | End: 2024-03-07 | Stop reason: ALTCHOICE

## 2024-03-05 RX ORDER — METHYLPREDNISOLONE SODIUM SUCCINATE 40 MG/ML
20 INJECTION INTRAMUSCULAR; INTRAVENOUS EVERY 8 HOURS
Start: 2024-03-05 | End: 2024-03-18 | Stop reason: HOSPADM

## 2024-03-05 RX ORDER — MORPHINE SULFATE 4 MG/ML
4 INJECTION, SOLUTION INTRAMUSCULAR; INTRAVENOUS EVERY 4 HOURS PRN
Refills: 0
Start: 2024-03-05 | End: 2024-03-18 | Stop reason: HOSPADM

## 2024-03-05 RX ORDER — TALC
3 POWDER (GRAM) TOPICAL DAILY
Start: 2024-03-05 | End: 2024-03-18 | Stop reason: HOSPADM

## 2024-03-05 RX ORDER — ONDANSETRON HYDROCHLORIDE 2 MG/ML
4 INJECTION, SOLUTION INTRAVENOUS EVERY 6 HOURS PRN
Qty: 20 ML
Start: 2024-03-05 | End: 2024-03-18 | Stop reason: HOSPADM

## 2024-03-05 RX ORDER — MORPHINE SULFATE 2 MG/ML
2 INJECTION, SOLUTION INTRAMUSCULAR; INTRAVENOUS EVERY 4 HOURS PRN
Refills: 0
Start: 2024-03-05 | End: 2024-03-18 | Stop reason: HOSPADM

## 2024-03-05 RX ORDER — DICYCLOMINE HYDROCHLORIDE 10 MG/1
10 CAPSULE ORAL 4 TIMES DAILY PRN
Start: 2024-03-05 | End: 2024-03-18 | Stop reason: HOSPADM

## 2024-03-05 RX ORDER — FAMOTIDINE 20 MG/1
20 TABLET, FILM COATED ORAL 2 TIMES DAILY
Start: 2024-03-05 | End: 2024-03-18 | Stop reason: HOSPADM

## 2024-03-05 RX ADMIN — MORPHINE SULFATE 2 MG: 2 INJECTION, SOLUTION INTRAMUSCULAR; INTRAVENOUS at 05:37

## 2024-03-05 RX ADMIN — ALPRAZOLAM 0.25 MG: 0.5 TABLET ORAL at 16:14

## 2024-03-05 RX ADMIN — MORPHINE SULFATE 2 MG: 2 INJECTION, SOLUTION INTRAMUSCULAR; INTRAVENOUS at 13:44

## 2024-03-05 RX ADMIN — METHYLPREDNISOLONE SODIUM SUCCINATE 20 MG: 40 INJECTION, POWDER, LYOPHILIZED, FOR SOLUTION INTRAMUSCULAR; INTRAVENOUS at 13:44

## 2024-03-05 RX ADMIN — METHYLPREDNISOLONE SODIUM SUCCINATE 20 MG: 40 INJECTION, POWDER, LYOPHILIZED, FOR SOLUTION INTRAMUSCULAR; INTRAVENOUS at 05:37

## 2024-03-05 RX ADMIN — METHYLPREDNISOLONE SODIUM SUCCINATE 20 MG: 40 INJECTION, POWDER, LYOPHILIZED, FOR SOLUTION INTRAMUSCULAR; INTRAVENOUS at 21:01

## 2024-03-05 RX ADMIN — ALPRAZOLAM 0.5 MG: 0.5 TABLET ORAL at 02:10

## 2024-03-05 RX ADMIN — MORPHINE SULFATE 2 MG: 2 INJECTION, SOLUTION INTRAMUSCULAR; INTRAVENOUS at 21:01

## 2024-03-05 RX ADMIN — FAMOTIDINE 20 MG: 20 TABLET, FILM COATED ORAL at 21:01

## 2024-03-05 RX ADMIN — FAMOTIDINE 20 MG: 20 TABLET, FILM COATED ORAL at 08:45

## 2024-03-05 ASSESSMENT — PAIN SCALES - GENERAL
PAINLEVEL_OUTOF10: 0 - NO PAIN
PAINLEVEL_OUTOF10: 0 - NO PAIN
PAINLEVEL_OUTOF10: 8
PAINLEVEL_OUTOF10: 3
PAINLEVEL_OUTOF10: 6

## 2024-03-05 ASSESSMENT — PAIN - FUNCTIONAL ASSESSMENT
PAIN_FUNCTIONAL_ASSESSMENT: 0-10
PAIN_FUNCTIONAL_ASSESSMENT: 0-10

## 2024-03-05 ASSESSMENT — COGNITIVE AND FUNCTIONAL STATUS - GENERAL
MOBILITY SCORE: 23
MOBILITY SCORE: 23
DAILY ACTIVITIY SCORE: 24
CLIMB 3 TO 5 STEPS WITH RAILING: A LITTLE
DAILY ACTIVITIY SCORE: 24
CLIMB 3 TO 5 STEPS WITH RAILING: A LITTLE

## 2024-03-05 ASSESSMENT — ACTIVITIES OF DAILY LIVING (ADL): LACK_OF_TRANSPORTATION: NO

## 2024-03-05 ASSESSMENT — PAIN DESCRIPTION - LOCATION: LOCATION: ABDOMEN

## 2024-03-05 NOTE — CONSULTS
Outcome: Preoperative education and amy completed for Ileostomy    Abdomen assessed in sitting, standing, and lying position.   Amy made in All 4 Quadrants and lowers are preferred  using Surgical Pen, Covered with Clear Dressing    Abdominal creases and scars were avoided.     Education provided:  Stoma Appearance  Purpose of pouch  Post-operative diet  Expectations for post-operative care/teaching by [facility] RN    Showed samples of pouches    Materials provided: pre-op educational packet, [employer] Living with Ileostomy Booklet    Patient Response: Verbalized understanding. 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    Time Increment: 45 minutes    Mary Beth Mcmillan, ADEOLAN, RN, CWOCN   With ADEOLA HorowitzN, RN, C, CWOCN    3/5/2024  5:11 PM

## 2024-03-05 NOTE — PROGRESS NOTES
"Kyra Behnfeldt is a 23 y.o. female on day 4 of admission presenting with Crohn's disease of colon with complication (CMS/HCC).    Subjective   Pt overall still having bloody BM's, urgency and cramping, but feeling much better since getting blood       Objective     Physical Exam  Gen  - pale, but well appearing, in NAD  Abd - slightly doughy, but soft and nontender no incisions  Extr - no edema    Last Recorded Vitals  Blood pressure 111/71, pulse 87, temperature 36.8 °C (98.2 °F), resp. rate 18, height 1.6 m (5' 3\"), weight 49.9 kg (110 lb 0.2 oz), last menstrual period 01/30/2024, SpO2 95 %.  Intake/Output last 3 Shifts:  I/O last 3 completed shifts:  In: 1100 (22 mL/kg) [Blood:700; IV Piggyback:400]  Out: 0 (0 mL/kg)   Weight: 49.9 kg     Relevant Results  === 03/01/24 ===    MR ENTEROGRAPHY    - Impression -  Colonic wall thickening involving at least the transverse segment if  not also the distal right and left colon in a single long contiguous  segment. Little if any surrounding inflammatory change, but  nevertheless suspect for active colitis which may be inflammatory in  nature    Restricted diffusion in the terminal ileum, but no associated wall  thickening or hyperenhancement    Remainder of small bowel definitely negative for active inflammatory  small bowel disease    No complication from Crohn's or other inflammatory small bowel  disease including no perforation/abscess, stricture/obstruction or  sinus tract/fistula    MACRO:  None    Signed by: Gian Lozoya 3/5/2024 8:48 AM  Dictation workstation:   [id number]    Lab Results   Component Value Date    WBC 13.4 (H) 03/05/2024    HGB 9.8 (L) 03/05/2024    HCT 30.2 (L) 03/05/2024    MCV 87 03/05/2024     03/05/2024         Assessment/Plan   Principal Problem:    Crohn's disease of colon with complication (CMS/HCC)  Active Problems:    Hypokalemia  Severe anemia   Toxic colitis  Mild malnutrition    Plan:   Discussed DLI as a treatment - TAC would " not be warranted with her rapid onset of disease.  Currently she is open to it, but would like to to attempt rescue remicade which is very reasonable where she is doing ok with IV steroids    Daily Upright KUB looking for toxic megacolon /perforation  Stoma to see for marking and education (ordered)  Nutrition shakes - ensure high protein  Continue IV iron  Will continue to watch with you - she is aware if she develops toxic megacolon then surgery will be emergent.             I spent 30 minutes in the professional and overall care of this patient.      Nadiya Hopper MD

## 2024-03-05 NOTE — CARE PLAN
The patient's goals for the shift include Pain relief    The clinical goals for the shift include saftey maintained

## 2024-03-05 NOTE — PROGRESS NOTES
Kyra Behnfeldt is a 23 y.o. female     Pt seen earlier this am    Bleeding is improving   Did get 2 units on 03/04/24  And iron infusion  Did have flex sig showing inlammation  CRS on board monitoring for megacolon  MRE done results pending  GI considering remicaid    Per pt does feel anxious with how things have been over past month  No prior similar anxiety   Xanax is helping however taking only once a dy    Review of Systems     Constitutional: no fever, no chills, not feeling poorly, not feeling tired   Cardiovascular: no chest pain   Respiratory: no cough, wheezing or shortness of breath a  Gastrointestinal: Diarrhea with bloody stool  Neurological: no headache,   All other systems have been reviewed and are negative for complaint.       Vitals:    03/05/24 0803   BP: 111/71   Pulse: 87   Resp: 18   Temp: 36.8 °C (98.2 °F)   SpO2: 95%        Scheduled medications  famotidine, 20 mg, oral, BID   Or  famotidine, 20 mg, intravenous, BID  melatonin, 3 mg, oral, Daily  methylPREDNISolone sodium succinate (PF), 20 mg, intravenous, q8h      Continuous medications     PRN medications  PRN medications: acetaminophen, ALPRAZolam, cyclobenzaprine, dicyclomine, guaiFENesin, hydrOXYzine HCL, morphine, morphine, ondansetron    Lab Review   Results from last 7 days   Lab Units 03/05/24  0630 03/04/24  0540 03/03/24  0710   WBC AUTO x10*3/uL 13.4* 13.5* 13.0*   HEMOGLOBIN g/dL 9.8* 7.0* 8.1*   HEMATOCRIT % 30.2* 23.3* 27.6*   PLATELETS AUTO x10*3/uL 347 363 400       Results from last 7 days   Lab Units 03/05/24  0630 03/04/24  0540 03/03/24  0710 03/01/24  1527 03/01/24  0038   SODIUM mmol/L 137 138 136   < > 135*   POTASSIUM mmol/L 4.0 4.3 4.0   < > 3.0*   CHLORIDE mmol/L 100 102 100   < > 99   CO2 mmol/L 29 31 27   < > 27   BUN mg/dL 16 12 13   < > 17   CREATININE mg/dL 0.65 0.63 0.66   < > 0.87   CALCIUM mg/dL 8.2* 8.2* 8.4*   < > 8.1*   PROTEIN TOTAL g/dL 5.3*  --   --   --  6.2*   BILIRUBIN TOTAL mg/dL 0.3  --   --   --   0.2   ALK PHOS U/L 54  --   --   --  60   ALT U/L 10  --   --   --  12   AST U/L 9  --   --   --  10   GLUCOSE mg/dL 101* 105* 110*   < > 129*    < > = values in this interval not displayed.              MR enterography   Final Result   Colonic wall thickening involving at least the transverse segment if   not also the distal right and left colon in a single long contiguous   segment. Little if any surrounding inflammatory change, but   nevertheless suspect for active colitis which may be inflammatory in   nature        Restricted diffusion in the terminal ileum, but no associated wall   thickening or hyperenhancement        Remainder of small bowel definitely negative for active inflammatory   small bowel disease        No complication from Crohn's or other inflammatory small bowel   disease including no perforation/abscess, stricture/obstruction or   sinus tract/fistula        MACRO:   None        Signed by: Gian Lozoya 3/5/2024 8:48 AM   Dictation workstation:   [id number]            Physical Exam     Constitutional   General appearance: Alert and in no acute distress.     Pulmonary   Respiratory assessment: No respiratory distress, normal respiratory rhythm and effort.    Auscultation of Lungs: Clear bilateral breath sounds.   Cardiovascular   Auscultation of heart: Apical pulse normal, heart rate and rhythm normal, normal S1 and S2, no murmurs and no pericardial rub.    Exam for edema: No peripheral edema.   Abdomen   Abdominal Exam: No bruits, normal bowel sounds, soft, non-tender, no abdominal mass palpated.    Liver and Spleen exam: No hepato-splenomegaly.   Musculoskeletal   Examination of gait: ROM intact  Skin inspection: Normal skin color and pigmentation, normal skin turgor and no visible rash.   Neurologic   Cranial nerves: Nerves 2-12 were intact, no focal neuro defects.     Assessment/Plan      #Acute Crohn's colitis with flare  Continue Solu-Medrol  Monitor CBC and electrolytes  GI and CRS on  board  Trying to get remicaid    #Anemia of chronic disease    Acute blood loss anemia  S/p transufion better    Noted iron studies from 4 weeks ago and did have low iron and sat     Will follow

## 2024-03-05 NOTE — CARE PLAN
Problem: Pain  Goal: My pain/discomfort is manageable  Outcome: Progressing     Problem: Safety  Goal: Patient will be injury free during hospitalization  Outcome: Progressing  Goal: I will remain free of falls  Outcome: Progressing     Problem: Psychosocial Needs  Goal: Demonstrates ability to cope with hospitalization/illness  Outcome: Progressing  Goal: Collaborate with me, my family, and caregiver to identify my specific goals  Outcome: Progressing   The patient's goals for the shift include Pain relief    The clinical goals for the shift include safety maintained

## 2024-03-05 NOTE — DISCHARGE SUMMARY
Discharge Diagnosis  Crohn's disease of colon with complication (CMS/[facility])    Issues Requiring Follow-Up  Anemia   crohns    Discharge Meds     Your medication list        START taking these medications        Instructions Last Dose Given Next Dose Due   ALPRAZolam 0.5 mg tablet  Commonly known as: Xanax      Take 1 tablet (0.5 mg) by mouth 3 times a day as needed for anxiety.       dicyclomine 10 mg capsule  Commonly known as: Bentyl      Take 1 capsule (10 mg) by mouth 4 times a day as needed (cramping).       famotidine 20 mg tablet  Commonly known as: Pepcid      Take 1 tablet (20 mg) by mouth 2 times a day.       melatonin 3 mg tablet      Take 1 tablet (3 mg) by mouth once daily.       methylPREDNISolone sodium succinate 40 mg injection  Commonly known as: SOLU-Medrol      Infuse 0.5 mL (20 mg) into a venous catheter every 8 hours.       morphine 4 mg/mL injection      Infuse 1 mL (4 mg) into a venous catheter every 4 hours if needed for severe pain (7 - 10).       morphine 2 mg/mL injection      Infuse 1 mL (2 mg) into a venous catheter every 4 hours if needed for moderate pain (4 - 6).       ondansetron 4 mg/2 mL injection  Commonly known as: Zofran      Infuse 2 mL (4 mg) into a venous catheter every 6 hours if needed for nausea.              CONTINUE taking these medications        Instructions Last Dose Given Next Dose Due   acetaminophen 325 mg tablet  Commonly known as: Tylenol      Take 2 tablets (650 mg) by mouth every 4 hours if needed for mild pain (1 - 3).       cyclobenzaprine 10 mg tablet  Commonly known as: Flexeril      Take 1 tablet (10 mg) by mouth 2 times a day as needed for muscle spasms.       hydrOXYzine HCL 50 mg tablet  Commonly known as: Atarax                  STOP taking these medications      adalimumab 40 mg/0.4 mL pen injector kit pen-injector  Commonly known as: Humira Pen        Humira(CF) Pen Crohns-UC-HS 80 mg/0.8 mL pen injector kit pen-injector  Generic drug: adalimumab         predniSONE 10 mg tablet  Commonly known as: Deltasone                  Where to Get Your Medications        Information about where to get these medications is not yet available    Ask your nurse or doctor about these medications  ALPRAZolam 0.5 mg tablet  dicyclomine 10 mg capsule  famotidine 20 mg tablet  melatonin 3 mg tablet  methylPREDNISolone sodium succinate 40 mg injection  morphine 2 mg/mL injection  morphine 4 mg/mL injection  ondansetron 4 mg/2 mL injection         Test Results Pending At Discharge  Pending Labs       Order Current Status    Surgical Pathology Exam In process    TPMT Genotyping In process            Hospital Course   Pt readmitted to hospital for diarrhea , abdo pain and hematochezia.  Known h/o crohn, recently admitted and treated with iv steroids, and was discharged to home with prednisnoe and to [facility] with humira debraoefred since her symptoms were getting wrose she was readmitted   Started back on iv solumedrol  Did get 2 units of prbc and iron infusion 200 on 03/04/24  Flex sig showing extensive inflammation  Pt seen by colorectal and recommended to [facility] for megacolon  GI recommended to start on remicaid however it is not available at [facility] [place]note 2] and pt is going to be [place] [facility] [place]    Pertinent Physical Exam At Time of Discharge      Outpatient Follow-Up  Future Appointments   Date Time Provider Department Center   3/13/2024  3:45 PM Mane Wang MD [id number] [facility]   3/18/2024 10:40 AM Gian High MD [id number] [facility]         Haim Loo MD

## 2024-03-05 NOTE — PROGRESS NOTES
Kyra Behnfeldt is a 23 y.o. female   Late entry   Pt seen earlier this am around 930 AM  She does have bleeding per rectum, was more yesterday then today   Hb 7 dw pt   No dizziness  Has been drinking fluids  Continues to have bloody stool with mild discomfort    Review of Systems     Constitutional: no fever, no chills, not feeling poorly, not feeling tired   Cardiovascular: no chest pain   Respiratory: no cough, wheezing or shortness of breath a  Gastrointestinal: Diarrhea with bloody stool  Neurological: no headache,   All other systems have been reviewed and are negative for complaint.       Vitals:    03/05/24 0344   BP: 114/72   Pulse: 82   Resp: 16   Temp: 36.6 °C (97.9 °F)   SpO2: 97%        Scheduled medications  famotidine, 20 mg, oral, BID   Or  famotidine, 20 mg, intravenous, BID  melatonin, 3 mg, oral, Daily  methylPREDNISolone sodium succinate (PF), 20 mg, intravenous, q8h      Continuous medications     PRN medications  PRN medications: acetaminophen, ALPRAZolam, cyclobenzaprine, dicyclomine, guaiFENesin, hydrOXYzine HCL, morphine, morphine, ondansetron    Lab Review   Results from last 7 days   Lab Units 03/04/24  0540 03/03/24  0710 03/02/24  0646   WBC AUTO x10*3/uL 13.5* 13.0* 12.4*   HEMOGLOBIN g/dL 7.0* 8.1* 8.0*   HEMATOCRIT % 23.3* 27.6* 26.6*   PLATELETS AUTO x10*3/uL 363 400 412       Results from last 7 days   Lab Units 03/04/24  0540 03/03/24  0710 03/02/24  0646 03/01/24  1527 03/01/24  0038   SODIUM mmol/L 138 136 137  --  135*   POTASSIUM mmol/L 4.3 4.0 4.2   < > 3.0*   CHLORIDE mmol/L 102 100 101  --  99   CO2 mmol/L 31 27 27  --  27   BUN mg/dL 12 13 13  --  17   CREATININE mg/dL 0.63 0.66 0.73  --  0.87   CALCIUM mg/dL 8.2* 8.4* 8.5*  --  8.1*   PROTEIN TOTAL g/dL  --   --   --   --  6.2*   BILIRUBIN TOTAL mg/dL  --   --   --   --  0.2   ALK PHOS U/L  --   --   --   --  60   ALT U/L  --   --   --   --  12   AST U/L  --   --   --   --  10   GLUCOSE mg/dL 105* 110* 113*  --  129*    <  > = values in this interval not displayed.              MR enterography    (Results Pending)         Physical Exam     Constitutional   General appearance: Alert and in no acute distress.     Pulmonary   Respiratory assessment: No respiratory distress, normal respiratory rhythm and effort.    Auscultation of Lungs: Clear bilateral breath sounds.   Cardiovascular   Auscultation of heart: Apical pulse normal, heart rate and rhythm normal, normal S1 and S2, no murmurs and no pericardial rub.    Exam for edema: No peripheral edema.   Abdomen   Abdominal Exam: No bruits, normal bowel sounds, soft, non-tender, no abdominal mass palpated.    Liver and Spleen exam: No hepato-splenomegaly.   Musculoskeletal   Examination of gait: ROM intact  Skin inspection: Normal skin color and pigmentation, normal skin turgor and no visible rash.   Neurologic   Cranial nerves: Nerves 2-12 were intact, no focal neuro defects.     Assessment/Plan      #Acute Crohn's colitis with flare  Continue Solu-Medrol  Monitor CBC and electrolytes    #Anemia of chronic disease    Acute blood loss anemia    Noted iron studies from 4 weeks ago and did have low iron and sat    Will give iv iron  Noted input from GI  Transfused one unit  Will follow

## 2024-03-05 NOTE — PROGRESS NOTES
GI Daily Progress Note    Assessment/Plan:    23 y.o. female with Crohn's colitis, presented with worsening rectal bleeding and abdominal pain, with recent admissions for the same x 2, was improved on discharge on solumedrol and discharged on 40 mg prednisone with recurrent symptoms 24 hours after discharge.  She has received  3 doses of humira, C diff, stool PCR negative. Flex sig yesterday showed severe colitis which appears worsened since her last procedure, biopsy for CMV pending. CRP is around 6 today.      - consider Remicade if negative CMV, unable to receive it at [facility] and would need to be transferred to [facility]  - colorectal surgery consulted  - diet as tolerated, continue with oral dietary supplements      LOS: 4 days     Kyra Behnfeldt is a 23 y.o. female who was admitted with Crohn's disease of colon with complication (CMS/HCC). She reports she is feeling better, still had urgency and blood wih stool. H&H improved post transfusion.    Subjective:    Patient expresses she is feeling better  Patient denies nausea or vomiting    Objective:    Vital signs in last 24 hours:  Temp:  [36.3 °C (97.3 °F)-38 °C (100.4 °F)] 36.8 °C (98.3 °F)  Heart Rate:  [] 85  Resp:  [15-18] 18  BP: (110-125)/(63-84) 114/72    Intake/Output last 3 shifts:  I/O last 3 completed shifts:  In: 1100 (22 mL/kg) [Blood:700; IV Piggyback:400]  Out: 0 (0 mL/kg)   Weight: 49.9 kg   Intake/Output this shift:  No intake/output data recorded.    Physical Exam  Vitals reviewed.   Constitutional:       Appearance: Normal appearance.   HENT:      Head: Normocephalic and atraumatic.   Cardiovascular:      Rate and Rhythm: Normal rate and regular rhythm.   Pulmonary:      Effort: Pulmonary effort is normal.      Breath sounds: Normal breath sounds.   Abdominal:      General: Abdomen is flat. Bowel sounds are normal.      Palpations: Abdomen is soft.      Tenderness: There is abdominal tenderness.   Musculoskeletal:      Cervical back: Neck  supple.   Skin:     General: Skin is warm and dry.   Neurological:      General: No focal deficit present.      Mental Status: She is alert and oriented to person, place, and time.   Psychiatric:         Mood and Affect: Mood normal.         Behavior: Behavior normal.       Scheduled medications  famotidine, 20 mg, oral, BID   Or  famotidine, 20 mg, intravenous, BID  melatonin, 3 mg, oral, Daily  methylPREDNISolone sodium succinate (PF), 20 mg, intravenous, q8h      Continuous medications     PRN medications  PRN medications: acetaminophen, ALPRAZolam, cyclobenzaprine, dicyclomine, guaiFENesin, hydrOXYzine HCL, morphine, morphine, ondansetron       Results for orders placed or performed during the hospital encounter of 03/01/24 (from the past 24 hour(s))   Lavender Top   Result Value Ref Range    Extra Tube Hold for add-ons.    Type and Screen   Result Value Ref Range    ABO TYPE B     Rh TYPE POS     ANTIBODY SCREEN NEG    Prepare RBC: 2 Units   Result Value Ref Range    PRODUCT CODE E5782G03     Unit Number [id number]     Unit ABO B     Unit RH POS     XM INTEP COMP     Dispense Status IS     Blood Expiration Date March 26, 2024 23:59 EDT     PRODUCT BLOOD TYPE 7300     UNIT VOLUME 350     PRODUCT CODE C1190N25     Unit Number [id number]     Unit ABO B     Unit RH POS     XM INTEP COMP     Dispense Status IS     Blood Expiration Date March 28, 2024 23:59 EDT     PRODUCT BLOOD TYPE 7300     UNIT VOLUME 350    C-reactive protein   Result Value Ref Range    C-Reactive Protein 6.53 (H) <1.00 mg/dL   CBC and Auto Differential   Result Value Ref Range    WBC 13.4 (H) 4.4 - 11.3 x10*3/uL    nRBC 0.5 (H) 0.0 - 0.0 /100 WBCs    RBC 3.46 (L) 4.00 - 5.20 x10*6/uL    Hemoglobin 9.8 (L) 12.0 - 16.0 g/dL    Hematocrit 30.2 (L) 36.0 - 46.0 %    MCV 87 80 - 100 fL    MCH 28.3 26.0 - 34.0 pg    MCHC 32.5 32.0 - 36.0 g/dL    RDW 13.6 11.5 - 14.5 %    Platelets 347 150 - 450 x10*3/uL    Neutrophils % 72.0 40.0 - 80.0 %     Immature Granulocytes %, Automated 1.5 (H) 0.0 - 0.9 %    Lymphocytes % 13.2 13.0 - 44.0 %    Monocytes % 13.0 2.0 - 10.0 %    Eosinophils % 0.2 0.0 - 6.0 %    Basophils % 0.1 0.0 - 2.0 %    Neutrophils Absolute 9.67 (H) 1.20 - 7.70 x10*3/uL    Immature Granulocytes Absolute, Automated 0.20 0.00 - 0.70 x10*3/uL    Lymphocytes Absolute 1.77 1.20 - 4.80 x10*3/uL    Monocytes Absolute 1.74 (H) 0.10 - 1.00 x10*3/uL    Eosinophils Absolute 0.03 0.00 - 0.70 x10*3/uL    Basophils Absolute 0.02 0.00 - 0.10 x10*3/uL   Comprehensive metabolic panel   Result Value Ref Range    Glucose 101 (H) 74 - 99 mg/dL    Sodium 137 136 - 145 mmol/L    Potassium 4.0 3.5 - 5.3 mmol/L    Chloride 100 98 - 107 mmol/L    Bicarbonate 29 21 - 32 mmol/L    Anion Gap 12 10 - 20 mmol/L    Urea Nitrogen 16 6 - 23 mg/dL    Creatinine 0.65 0.50 - 1.05 mg/dL    eGFR >90 >60 mL/min/1.73m*2    Calcium 8.2 (L) 8.6 - 10.3 mg/dL    Albumin 2.8 (L) 3.4 - 5.0 g/dL    Alkaline Phosphatase 54 33 - 110 U/L    Total Protein 5.3 (L) 6.4 - 8.2 g/dL    AST 9 9 - 39 U/L    Bilirubin, Total 0.3 0.0 - 1.2 mg/dL    ALT 10 7 - 45 U/L     MR enterography    Result Date: 3/5/2024    Colonic wall thickening involving at least the transverse segment if not also the distal right and left colon in a single long contiguous segment. Little if any surrounding inflammatory change, but nevertheless suspect for active colitis which may be inflammatory in nature   Restricted diffusion in the terminal ileum, but no associated wall thickening or hyperenhancement   Remainder of small bowel definitely negative for active inflammatory small bowel disease   No complication from Crohn's or other inflammatory small bowel disease including no perforation/abscess, stricture/obstruction or sinus tract/fistula   MACRO: None   Signed by: Gian Lozoya 3/5/2024 8:48 AM Dictation workstation:   [id number]    Flexible Sigmoidoscopy    Result Date: 3/4/2024  Table formatting from the original result  was not included. Impression Severe abnormal mucosa in the transverse colon, descending colon, sigmoid colon, rectosigmoid and rectum, consistent with Crohn's disease; performed cold forceps biopsy Small hemorrhoids Findings Severe, continuous edematous, erythematous, friable, granular and ulcerated mucosa with erosion and loss of vascular pattern in the descending colon, sigmoid colon, rectosigmoid and rectum, consistent with Crohn's disease; performed cold forceps biopsy. There was diffuse and continuous inflammation from the anal verge all the way up through the transverse colon (which was the limit of this examination) associated with diffuse and deep ulcerations.  There were no areas of normal appearing mucosa throughout the entire examined colon.  The inflammation was significantly worsened from prior colonoscopy.  Decision was made not to try to push more proximally up through the colon given the severity of the inflammation and risk of perforation.  Biopsies were obtained from the transverse (Bottle Number 1), descending and sigmoid colon (Bottle Number 2), and rectum (Bottle Number 3) for the evaluation of inflammatory bowel disease and to rule-out CMV. External small hemorrhoids observed during retroflexion The colon was otherwise normal on direct and retroflexion views.  Recommendation  Await pathology results - Return the patient to the hospital kramer or ICU (if applicable). -The patient has a contact number available for  emergencies.  The signs and symptoms of potential delayed complications were discussed with the patient.  Return to normal activities tomorrow.  Written discharge instructions were provided to the patient. - Resume previous diet. - Continue present medications. - Follow-up with the inpatient GI consult service for further recommendations. Indication Crohn's colitis, with rectal bleeding (CMS/[facility]) Staff Staff Role Mane Wang MD Proceduralist Medications See Anesthesia Record.  Preprocedure A history and physical has been performed, and patient medication allergies have been reviewed. The patient's tolerance of previous anesthesia has been reviewed. The risks and benefits of the procedure and the sedation options and risks were discussed with the patient. All questions were answered and informed consent obtained. Details of the Procedure The patient underwent monitored anesthesia care, which was administered by an anesthesia professional. The patient's blood pressure, heart rate, level of consciousness, oxygen, respirations, ECG and ETCO2 were monitored throughout the procedure. A digital rectal exam was performed. A perianal exam was performed. The scope was introduced through the anus and advanced to the transverse colon. Retroflexion was performed in the rectum. The quality of bowel preparation was evaluated using the [place] Bowel Preparation Scale with scores of: right colon = not assessed, transverse colon = not assessed, left colon = not assessed. Bowel prep was adequate. The patient's estimated blood loss was minimal (<5 mL). The procedure was not difficult. The patient tolerated the procedure well. There were no apparent adverse events. Events Procedure Events Event Event Time ENDO SCOPE IN TIME 3/4/2024  2:04 PM ENDO SCOPE OUT TIME 3/4/2024  2:22 PM Specimens ID Type Source Tests Collected by Time 1 : cold bx Tissue COLON - TRANSVERSE BIOPSY SURGICAL PATHOLOGY EXAM Mane Wang MD 3/4/2024 1415 2 : Left colon, cold bx Tissue COLON - DESCENDING BIOPSY SURGICAL PATHOLOGY EXAM Mane Wang MD 3/4/2024 1418 3 : cold bx Tissue RECTUM BIOPSY SURGICAL PATHOLOGY EXAM Mane Wang MD 3/4/2024 1420 Procedure Location [facility] [address] [facility] [address] [phone] Referring Provider Christine Rendon V, APRN-CNP [address] [facility], [facility] [address] [place] [address] Procedure Provider Mane Wang MD

## 2024-03-05 NOTE — PROGRESS NOTES
03/05/24 1301   Discharge Planning   Living Arrangements Other (Comment)   Support Systems Parent   Assistance Needed none   Type of Residence Private residence   Number of Stairs Within Residence 12   Do you have animals or pets at home? No   Who is requesting discharge planning? Provider   Home or Post Acute Services None   Patient expects to be discharged to: home   Does the patient need discharge transport arranged? No   Financial Resource Strain   How hard is it for you to pay for the very basics like food, housing, medical care, and heating? Not hard   Housing Stability   In the last 12 months, was there a time when you were not able to pay the mortgage or rent on time? N   In the last 12 months, how many places have you lived? 1   In the last 12 months, was there a time when you did not have a steady place to sleep or slept in a shelter (including now)? N   Transportation Needs   In the past 12 months, has lack of transportation kept you from medical appointments or from getting medications? no   In the past 12 months, has lack of transportation kept you from meetings, work, or from getting things needed for daily living? No     I met with patient at bedside and explained tcc role. She lives in a house with a roommate, ambulates independently and drives. Admitted with chron's flare. Colorectal surgery following. No needs identified at this time, tcc to continue to follow for needs if surgery needed.

## 2024-03-06 ENCOUNTER — APPOINTMENT (OUTPATIENT)
Dept: RADIOLOGY | Facility: HOSPITAL | Age: 24
DRG: 386 | End: 2024-03-06
Payer: COMMERCIAL

## 2024-03-06 LAB
ALBUMIN SERPL BCP-MCNC: 2.9 G/DL (ref 3.4–5)
ALP SERPL-CCNC: 58 U/L (ref 33–110)
ALT SERPL W P-5'-P-CCNC: 9 U/L (ref 7–45)
ANION GAP SERPL CALC-SCNC: 11 MMOL/L (ref 10–20)
AST SERPL W P-5'-P-CCNC: 8 U/L (ref 9–39)
BASOPHILS # BLD AUTO: 0.02 X10*3/UL (ref 0–0.1)
BASOPHILS NFR BLD AUTO: 0.1 %
BILIRUB SERPL-MCNC: 0.3 MG/DL (ref 0–1.2)
BLOOD EXPIRATION DATE: NORMAL
BLOOD EXPIRATION DATE: NORMAL
BUN SERPL-MCNC: 16 MG/DL (ref 6–23)
CALCIUM SERPL-MCNC: 7.9 MG/DL (ref 8.6–10.3)
CHLORIDE SERPL-SCNC: 99 MMOL/L (ref 98–107)
CO2 SERPL-SCNC: 30 MMOL/L (ref 21–32)
CREAT SERPL-MCNC: 0.62 MG/DL (ref 0.5–1.05)
CRP SERPL-MCNC: 6.95 MG/DL
DISPENSE STATUS: NORMAL
DISPENSE STATUS: NORMAL
EGFRCR SERPLBLD CKD-EPI 2021: >90 ML/MIN/1.73M*2
EOSINOPHIL # BLD AUTO: 0.05 X10*3/UL (ref 0–0.7)
EOSINOPHIL NFR BLD AUTO: 0.3 %
ERYTHROCYTE [DISTWIDTH] IN BLOOD BY AUTOMATED COUNT: 13.5 % (ref 11.5–14.5)
GLUCOSE SERPL-MCNC: 90 MG/DL (ref 74–99)
HCT VFR BLD AUTO: 31.3 % (ref 36–46)
HGB BLD-MCNC: 10.3 G/DL (ref 12–16)
IMM GRANULOCYTES # BLD AUTO: 0.17 X10*3/UL (ref 0–0.7)
IMM GRANULOCYTES NFR BLD AUTO: 1.2 % (ref 0–0.9)
LYMPHOCYTES # BLD AUTO: 1.72 X10*3/UL (ref 1.2–4.8)
LYMPHOCYTES NFR BLD AUTO: 11.8 %
MCH RBC QN AUTO: 28.9 PG (ref 26–34)
MCHC RBC AUTO-ENTMCNC: 32.9 G/DL (ref 32–36)
MCV RBC AUTO: 88 FL (ref 80–100)
MONOCYTES # BLD AUTO: 1.96 X10*3/UL (ref 0.1–1)
MONOCYTES NFR BLD AUTO: 13.4 %
NEUTROPHILS # BLD AUTO: 10.67 X10*3/UL (ref 1.2–7.7)
NEUTROPHILS NFR BLD AUTO: 73.2 %
NRBC BLD-RTO: 0.1 /100 WBCS (ref 0–0)
PLATELET # BLD AUTO: 392 X10*3/UL (ref 150–450)
POTASSIUM SERPL-SCNC: 3.7 MMOL/L (ref 3.5–5.3)
PRODUCT BLOOD TYPE: 7300
PRODUCT BLOOD TYPE: 7300
PRODUCT CODE: NORMAL
PRODUCT CODE: NORMAL
PROT SERPL-MCNC: 5.7 G/DL (ref 6.4–8.2)
RBC # BLD AUTO: 3.57 X10*6/UL (ref 4–5.2)
SODIUM SERPL-SCNC: 136 MMOL/L (ref 136–145)
UNIT ABO: NORMAL
UNIT ABO: NORMAL
UNIT NUMBER: NORMAL
UNIT NUMBER: NORMAL
UNIT RH: NORMAL
UNIT RH: NORMAL
UNIT VOLUME: 350
UNIT VOLUME: 350
WBC # BLD AUTO: 14.6 X10*3/UL (ref 4.4–11.3)
XM INTEP: NORMAL
XM INTEP: NORMAL

## 2024-03-06 PROCEDURE — 2500000004 HC RX 250 GENERAL PHARMACY W/ HCPCS (ALT 636 FOR OP/ED): Performed by: FAMILY MEDICINE

## 2024-03-06 PROCEDURE — 99232 SBSQ HOSP IP/OBS MODERATE 35: CPT | Performed by: INTERNAL MEDICINE

## 2024-03-06 PROCEDURE — 74018 RADEX ABDOMEN 1 VIEW: CPT | Performed by: RADIOLOGY

## 2024-03-06 PROCEDURE — 74018 RADEX ABDOMEN 1 VIEW: CPT

## 2024-03-06 PROCEDURE — 86140 C-REACTIVE PROTEIN: CPT | Performed by: INTERNAL MEDICINE

## 2024-03-06 PROCEDURE — 36415 COLL VENOUS BLD VENIPUNCTURE: CPT | Performed by: INTERNAL MEDICINE

## 2024-03-06 PROCEDURE — 1100000001 HC PRIVATE ROOM DAILY

## 2024-03-06 PROCEDURE — 2500000001 HC RX 250 WO HCPCS SELF ADMINISTERED DRUGS (ALT 637 FOR MEDICARE OP): Performed by: FAMILY MEDICINE

## 2024-03-06 PROCEDURE — 80053 COMPREHEN METABOLIC PANEL: CPT | Performed by: INTERNAL MEDICINE

## 2024-03-06 PROCEDURE — 85025 COMPLETE CBC W/AUTO DIFF WBC: CPT | Performed by: INTERNAL MEDICINE

## 2024-03-06 PROCEDURE — 2500000004 HC RX 250 GENERAL PHARMACY W/ HCPCS (ALT 636 FOR OP/ED): Performed by: INTERNAL MEDICINE

## 2024-03-06 RX ADMIN — MORPHINE SULFATE 2 MG: 2 INJECTION, SOLUTION INTRAMUSCULAR; INTRAVENOUS at 06:23

## 2024-03-06 RX ADMIN — FAMOTIDINE 20 MG: 10 INJECTION, SOLUTION INTRAVENOUS at 08:25

## 2024-03-06 RX ADMIN — ACETAMINOPHEN 650 MG: 325 TABLET ORAL at 22:26

## 2024-03-06 RX ADMIN — ALPRAZOLAM 0.5 MG: 0.5 TABLET ORAL at 14:05

## 2024-03-06 RX ADMIN — MORPHINE SULFATE 4 MG: 4 INJECTION, SOLUTION INTRAMUSCULAR; INTRAVENOUS at 19:46

## 2024-03-06 RX ADMIN — MORPHINE SULFATE 2 MG: 2 INJECTION, SOLUTION INTRAMUSCULAR; INTRAVENOUS at 02:06

## 2024-03-06 RX ADMIN — ALPRAZOLAM 0.5 MG: 0.5 TABLET ORAL at 22:26

## 2024-03-06 RX ADMIN — METHYLPREDNISOLONE SODIUM SUCCINATE 20 MG: 40 INJECTION, POWDER, LYOPHILIZED, FOR SOLUTION INTRAMUSCULAR; INTRAVENOUS at 06:14

## 2024-03-06 RX ADMIN — MORPHINE SULFATE 4 MG: 4 INJECTION, SOLUTION INTRAMUSCULAR; INTRAVENOUS at 12:27

## 2024-03-06 RX ADMIN — ACETAMINOPHEN 650 MG: 325 TABLET ORAL at 14:05

## 2024-03-06 RX ADMIN — ALPRAZOLAM 0.5 MG: 0.5 TABLET ORAL at 03:29

## 2024-03-06 RX ADMIN — FAMOTIDINE 20 MG: 10 INJECTION, SOLUTION INTRAVENOUS at 21:01

## 2024-03-06 RX ADMIN — METHYLPREDNISOLONE SODIUM SUCCINATE 20 MG: 40 INJECTION, POWDER, LYOPHILIZED, FOR SOLUTION INTRAMUSCULAR; INTRAVENOUS at 22:26

## 2024-03-06 RX ADMIN — METHYLPREDNISOLONE SODIUM SUCCINATE 20 MG: 40 INJECTION, POWDER, LYOPHILIZED, FOR SOLUTION INTRAMUSCULAR; INTRAVENOUS at 14:04

## 2024-03-06 ASSESSMENT — PAIN SCALES - GENERAL
PAINLEVEL_OUTOF10: 4
PAINLEVEL_OUTOF10: 6
PAINLEVEL_OUTOF10: 0 - NO PAIN
PAINLEVEL_OUTOF10: 6
PAINLEVEL_OUTOF10: 7
PAINLEVEL_OUTOF10: 4
PAINLEVEL_OUTOF10: 2

## 2024-03-06 ASSESSMENT — COGNITIVE AND FUNCTIONAL STATUS - GENERAL
MOBILITY SCORE: 24
DAILY ACTIVITIY SCORE: 24

## 2024-03-06 ASSESSMENT — PAIN - FUNCTIONAL ASSESSMENT
PAIN_FUNCTIONAL_ASSESSMENT: 0-10

## 2024-03-06 ASSESSMENT — PAIN DESCRIPTION - DESCRIPTORS
DESCRIPTORS: ACHING
DESCRIPTORS: ACHING

## 2024-03-06 ASSESSMENT — PAIN DESCRIPTION - LOCATION
LOCATION: ABDOMEN

## 2024-03-06 ASSESSMENT — PAIN DESCRIPTION - ORIENTATION
ORIENTATION: MID
ORIENTATION: LOWER
ORIENTATION: LOWER

## 2024-03-06 NOTE — PROGRESS NOTES
GI Daily Progress Note    Assessment/Plan:    23 y.o.F with Crohn's colitis, presented with worsening rectal bleeding and abdominal pain, with recent admissions for the same x 2, was improved on discharge on solumedrol and discharged on 40 mg prednisone with recurrent symptoms 24 hours after discharge.  She has received  3 doses of humira, C diff, stool PCR negative. Flex sig yesterday showed severe colitis which appears worsened since her last procedure, biopsy for CMV pending. CRP is 6.9.      - await [facility] transfer for Remicade infusion  - continue solumedrol for now  - colorectal surgery consulted  - diet as tolerated, continue with oral dietary supplements   - supportive care as needed     LOS: 5 days     Kyra Behnfeldt is a 23 y.o. female who was admitted with Crohn's disease of colon with complication (CMS/HCC). She reports his symptoms are worsening.     Subjective:    Patient expresses her abd pain is not better, worse before each BM and slightly better after, still passing blood with stools.  Patient denies nausea or vomiting.     Objective:    Vital signs in last 24 hours:  Temp:  [36.6 °C (97.8 °F)-37.1 °C (98.8 °F)] 36.6 °C (97.8 °F)  Heart Rate:  [85-98] 98  Resp:  [18] 18  BP: (114-124)/(72-84) 119/78    Intake/Output last 3 shifts:  I/O last 3 completed shifts:  In: 1060 (21.2 mL/kg) [P.O.:360; Blood:700]  Out: - (0 mL/kg)   Weight: 49.9 kg   Intake/Output this shift:  No intake/output data recorded.    Physical Exam  Vitals reviewed.   Constitutional:       Appearance: Normal appearance.   HENT:      Head: Normocephalic and atraumatic.   Cardiovascular:      Rate and Rhythm: Normal rate and regular rhythm.   Pulmonary:      Effort: Pulmonary effort is normal.      Breath sounds: Normal breath sounds.   Abdominal:      General: Abdomen is flat. Bowel sounds are normal.      Palpations: Abdomen is soft.      Tenderness: There is abdominal tenderness.   Musculoskeletal:      Cervical back: Neck supple.    Skin:     General: Skin is warm and dry.   Neurological:      General: No focal deficit present.      Mental Status: She is alert and oriented to person, place, and time.   Psychiatric:         Mood and Affect: Mood normal.         Behavior: Behavior normal.     Scheduled medications  famotidine, 20 mg, oral, BID   Or  famotidine, 20 mg, intravenous, BID  melatonin, 3 mg, oral, Daily  methylPREDNISolone sodium succinate (PF), 20 mg, intravenous, q8h      Continuous medications     PRN medications  PRN medications: acetaminophen, ALPRAZolam, cyclobenzaprine, dicyclomine, guaiFENesin, hydrOXYzine HCL, morphine, morphine, ondansetron       Results for orders placed or performed during the hospital encounter of 03/01/24 (from the past 24 hour(s))   C-reactive protein   Result Value Ref Range    C-Reactive Protein 6.95 (H) <1.00 mg/dL   CBC and Auto Differential   Result Value Ref Range    WBC 14.6 (H) 4.4 - 11.3 x10*3/uL    nRBC 0.1 (H) 0.0 - 0.0 /100 WBCs    RBC 3.57 (L) 4.00 - 5.20 x10*6/uL    Hemoglobin 10.3 (L) 12.0 - 16.0 g/dL    Hematocrit 31.3 (L) 36.0 - 46.0 %    MCV 88 80 - 100 fL    MCH 28.9 26.0 - 34.0 pg    MCHC 32.9 32.0 - 36.0 g/dL    RDW 13.5 11.5 - 14.5 %    Platelets 392 150 - 450 x10*3/uL    Neutrophils % 73.2 40.0 - 80.0 %    Immature Granulocytes %, Automated 1.2 (H) 0.0 - 0.9 %    Lymphocytes % 11.8 13.0 - 44.0 %    Monocytes % 13.4 2.0 - 10.0 %    Eosinophils % 0.3 0.0 - 6.0 %    Basophils % 0.1 0.0 - 2.0 %    Neutrophils Absolute 10.67 (H) 1.20 - 7.70 x10*3/uL    Immature Granulocytes Absolute, Automated 0.17 0.00 - 0.70 x10*3/uL    Lymphocytes Absolute 1.72 1.20 - 4.80 x10*3/uL    Monocytes Absolute 1.96 (H) 0.10 - 1.00 x10*3/uL    Eosinophils Absolute 0.05 0.00 - 0.70 x10*3/uL    Basophils Absolute 0.02 0.00 - 0.10 x10*3/uL   Comprehensive metabolic panel   Result Value Ref Range    Glucose 90 74 - 99 mg/dL    Sodium 136 136 - 145 mmol/L    Potassium 3.7 3.5 - 5.3 mmol/L    Chloride 99 98 -  107 mmol/L    Bicarbonate 30 21 - 32 mmol/L    Anion Gap 11 10 - 20 mmol/L    Urea Nitrogen 16 6 - 23 mg/dL    Creatinine 0.62 0.50 - 1.05 mg/dL    eGFR >90 >60 mL/min/1.73m*2    Calcium 7.9 (L) 8.6 - 10.3 mg/dL    Albumin 2.9 (L) 3.4 - 5.0 g/dL    Alkaline Phosphatase 58 33 - 110 U/L    Total Protein 5.7 (L) 6.4 - 8.2 g/dL    AST 8 (L) 9 - 39 U/L    Bilirubin, Total 0.3 0.0 - 1.2 mg/dL    ALT 9 7 - 45 U/L     XR abdomen 1 view    Result Date: 3/6/2024  Interpreted By:  Elly Salgado, STUDY: XR ABDOMEN 1 VIEW;  3/6/2024 8:38 am   INDICATION: Signs/Symptoms:upright - toxic colitis rule out megacolon or free air.   COMPARISON: None.   ACCESSION NUMBER(S): [id number]   ORDERING CLINICIAN: ÁLVARO MEEKS   FINDINGS: The bowel gas pattern is nonobstructive with stool throughout the colon. No dilated bowel loops are noted. No free air is seen.       Nonobstructing bowel gas pattern.   No definite free air. If concern for free air continues further evaluation with CT may be obtained.     MACRO: None   Signed by: Elly Salgado 3/6/2024 9:33 AM Dictation workstation:   [id number]    MR enterography    Result Date: 3/5/2024    Colonic wall thickening involving at least the transverse segment if not also the distal right and left colon in a single long contiguous segment. Little if any surrounding inflammatory change, but nevertheless suspect for active colitis which may be inflammatory in nature   Restricted diffusion in the terminal ileum, but no associated wall thickening or hyperenhancement   Remainder of small bowel definitely negative for active inflammatory small bowel disease   No complication from Crohn's or other inflammatory small bowel disease including no perforation/abscess, stricture/obstruction or sinus tract/fistula   MACRO: None   Signed by: Gian Lozoya 3/5/2024 8:48 AM Dictation workstation:   [id number]    Flexible Sigmoidoscopy    Result Date: 3/4/2024  Table formatting from the original result was  not included. Impression Severe abnormal mucosa in the transverse colon, descending colon, sigmoid colon, rectosigmoid and rectum, consistent with Crohn's disease; performed cold forceps biopsy Small hemorrhoids Findings Severe, continuous edematous, erythematous, friable, granular and ulcerated mucosa with erosion and loss of vascular pattern in the descending colon, sigmoid colon, rectosigmoid and rectum, consistent with Crohn's disease; performed cold forceps biopsy. There was diffuse and continuous inflammation from the anal verge all the way up through the transverse colon (which was the limit of this examination) associated with diffuse and deep ulcerations.  There were no areas of normal appearing mucosa throughout the entire examined colon.  The inflammation was significantly worsened from prior colonoscopy.  Decision was made not to try to push more proximally up through the colon given the severity of the inflammation and risk of perforation.  Biopsies were obtained from the transverse (Bottle Number 1), descending and sigmoid colon (Bottle Number 2), and rectum (Bottle Number 3) for the evaluation of inflammatory bowel disease and to rule-out CMV. External small hemorrhoids observed during retroflexion The colon was otherwise normal on direct and retroflexion views.  Recommendation  Await pathology results - Return the patient to the hospital kramer or ICU (if applicable). -The patient has a contact number available for  emergencies.  The signs and symptoms of potential delayed complications were discussed with the patient.  Return to normal activities tomorrow.  Written discharge instructions were provided to the patient. - Resume previous diet. - Continue present medications. - Follow-up with the inpatient GI consult service for further recommendations. Indication Crohn's colitis, with rectal bleeding (CMS/[facility]) Staff Staff Role Mane Wang MD Proceduralist Medications See Anesthesia Record.  Preprocedure A history and physical has been performed, and patient medication allergies have been reviewed. The patient's tolerance of previous anesthesia has been reviewed. The risks and benefits of the procedure and the sedation options and risks were discussed with the patient. All questions were answered and informed consent obtained. Details of the Procedure The patient underwent monitored anesthesia care, which was administered by an anesthesia professional. The patient's blood pressure, heart rate, level of consciousness, oxygen, respirations, ECG and ETCO2 were monitored throughout the procedure. A digital rectal exam was performed. A perianal exam was performed. The scope was introduced through the anus and advanced to the transverse colon. Retroflexion was performed in the rectum. The quality of bowel preparation was evaluated using the [place] Bowel Preparation Scale with scores of: right colon = not assessed, transverse colon = not assessed, left colon = not assessed. Bowel prep was adequate. The patient's estimated blood loss was minimal (<5 mL). The procedure was not difficult. The patient tolerated the procedure well. There were no apparent adverse events. Events Procedure Events Event Event Time ENDO SCOPE IN TIME 3/4/2024  2:04 PM ENDO SCOPE OUT TIME 3/4/2024  2:22 PM Specimens ID Type Source Tests Collected by Time 1 : cold bx Tissue COLON - TRANSVERSE BIOPSY SURGICAL PATHOLOGY EXAM Mane Wang MD 3/4/2024 1415 2 : Left colon, cold bx Tissue COLON - DESCENDING BIOPSY SURGICAL PATHOLOGY EXAM Mane Wang MD 3/4/2024 1418 3 : cold bx Tissue RECTUM BIOPSY SURGICAL PATHOLOGY EXAM Mane Wang MD 3/4/2024 1420 Procedure Location [facility] [address] [facility] [address] [phone] Referring Provider Christine Rendon V, APRN-CNP [address] [facility], [facility] [address] [place] [address] Procedure Provider Mane Wang MD

## 2024-03-06 NOTE — PROGRESS NOTES
Kyra Behnfeldt is a 23 y.o. female     Pt seen earlier this am    Bleeding is improving   Did get 2 units on 03/04/24  And iron infusion  Did have flex sig showing inlammation  CRS on board monitoring for megacolon  MRE noted report  GI considering remicaid    Anxiety is better    Review of Systems     Constitutional: no fever, no chills, not feeling poorly, not feeling tired   Cardiovascular: no chest pain   Respiratory: no cough, wheezing or shortness of breath a  Gastrointestinal: Diarrhea with bloody stool  Neurological: no headache,   All other systems have been reviewed and are negative for complaint.       Vitals:    03/06/24 0806   BP: 119/78   Pulse: 98   Resp: 18   Temp: 36.6 °C (97.8 °F)   SpO2: 97%        Scheduled medications  famotidine, 20 mg, oral, BID   Or  famotidine, 20 mg, intravenous, BID  melatonin, 3 mg, oral, Daily  methylPREDNISolone sodium succinate (PF), 20 mg, intravenous, q8h      Continuous medications     PRN medications  PRN medications: acetaminophen, ALPRAZolam, cyclobenzaprine, dicyclomine, guaiFENesin, hydrOXYzine HCL, morphine, morphine, ondansetron    Lab Review   Results from last 7 days   Lab Units 03/06/24  0639 03/05/24  0630 03/04/24  0540   WBC AUTO x10*3/uL 14.6* 13.4* 13.5*   HEMOGLOBIN g/dL 10.3* 9.8* 7.0*   HEMATOCRIT % 31.3* 30.2* 23.3*   PLATELETS AUTO x10*3/uL 392 347 363       Results from last 7 days   Lab Units 03/06/24  0639 03/05/24  0630 03/04/24  0540 03/01/24  1527 03/01/24  0038   SODIUM mmol/L 136 137 138   < > 135*   POTASSIUM mmol/L 3.7 4.0 4.3   < > 3.0*   CHLORIDE mmol/L 99 100 102   < > 99   CO2 mmol/L 30 29 31   < > 27   BUN mg/dL 16 16 12   < > 17   CREATININE mg/dL 0.62 0.65 0.63   < > 0.87   CALCIUM mg/dL 7.9* 8.2* 8.2*   < > 8.1*   PROTEIN TOTAL g/dL 5.7* 5.3*  --   --  6.2*   BILIRUBIN TOTAL mg/dL 0.3 0.3  --   --  0.2   ALK PHOS U/L 58 54  --   --  60   ALT U/L 9 10  --   --  12   AST U/L 8* 9  --   --  10   GLUCOSE mg/dL 90 101* 105*   < >  129*    < > = values in this interval not displayed.              XR abdomen 1 view   Final Result   Nonobstructing bowel gas pattern.        No definite free air. If concern for free air continues further   evaluation with CT may be obtained.             MACRO:   None        Signed by: Elly Salgado 3/6/2024 9:33 AM   Dictation workstation:   [id number]      MR enterography   Final Result   Colonic wall thickening involving at least the transverse segment if   not also the distal right and left colon in a single long contiguous   segment. Little if any surrounding inflammatory change, but   nevertheless suspect for active colitis which may be inflammatory in   nature        Restricted diffusion in the terminal ileum, but no associated wall   thickening or hyperenhancement        Remainder of small bowel definitely negative for active inflammatory   small bowel disease        No complication from Crohn's or other inflammatory small bowel   disease including no perforation/abscess, stricture/obstruction or   sinus tract/fistula        MACRO:   None        Signed by: Gian Lozoya 3/5/2024 8:48 AM   Dictation workstation:   [id number]            Physical Exam     Constitutional   General appearance: Alert and in no acute distress.     Pulmonary   Respiratory assessment: No respiratory distress, normal respiratory rhythm and effort.    Auscultation of Lungs: Clear bilateral breath sounds.   Cardiovascular   Auscultation of heart: Apical pulse normal, heart rate and rhythm normal, normal S1 and S2, no murmurs and no pericardial rub.    Exam for edema: No peripheral edema.   Abdomen   Abdominal Exam: No bruits, normal bowel sounds, soft, non-tender, no abdominal mass palpated.    Liver and Spleen exam: No hepato-splenomegaly.   Musculoskeletal   Examination of gait: ROM intact  Skin inspection: Normal skin color and pigmentation, normal skin turgor and no visible rash.   Neurologic   Cranial nerves: Nerves 2-12 were  intact, no focal neuro defects.     Assessment/Plan      #Acute Crohn's colitis with flare  Continue Solu-Medrol  Monitor CBC and electrolytes  GI and CRS on board  Trying to get remicaid    #Anemia of chronic disease    Acute blood loss anemia  S/p transufion better    Noted iron studies from 4 weeks ago and did have low iron and sat     Will follow    Will ask uh connect to intervene to see if pt can get private room at [facility]

## 2024-03-06 NOTE — PROGRESS NOTES
03/06/24 1316   Discharge Planning   Home or Post Acute Services None     Patient is waiting for transfer to [facility] for remicaid infusion as this in not done at [facility].  Bertha Mcgee RN

## 2024-03-07 ENCOUNTER — HOSPITAL ENCOUNTER (INPATIENT)
Facility: HOSPITAL | Age: 24
LOS: 11 days | Discharge: HOME | DRG: 330 | End: 2024-03-18
Attending: INTERNAL MEDICINE | Admitting: INTERNAL MEDICINE
Payer: COMMERCIAL

## 2024-03-07 ENCOUNTER — APPOINTMENT (OUTPATIENT)
Dept: RADIOLOGY | Facility: HOSPITAL | Age: 24
DRG: 330 | End: 2024-03-07
Payer: COMMERCIAL

## 2024-03-07 VITALS
RESPIRATION RATE: 20 BRPM | BODY MASS INDEX: 19.49 KG/M2 | TEMPERATURE: 97.3 F | DIASTOLIC BLOOD PRESSURE: 88 MMHG | OXYGEN SATURATION: 99 % | SYSTOLIC BLOOD PRESSURE: 130 MMHG | HEIGHT: 63 IN | WEIGHT: 110.01 LBS | HEART RATE: 101 BPM

## 2024-03-07 DIAGNOSIS — K50.111 CROHN'S COLITIS, WITH RECTAL BLEEDING (MULTI): ICD-10-CM

## 2024-03-07 DIAGNOSIS — K50.119 CROHN'S COLITIS, UNSPECIFIED COMPLICATION (MULTI): ICD-10-CM

## 2024-03-07 DIAGNOSIS — Z93.2 S/P ILEOSTOMY (MULTI): Primary | ICD-10-CM

## 2024-03-07 DIAGNOSIS — K50.119 CROHN'S DISEASE OF COLON WITH COMPLICATION (MULTI): ICD-10-CM

## 2024-03-07 LAB
ABO GROUP (TYPE) IN BLOOD: NORMAL
ALBUMIN SERPL BCP-MCNC: 3 G/DL (ref 3.4–5)
ALP SERPL-CCNC: 59 U/L (ref 33–110)
ALT SERPL W P-5'-P-CCNC: 7 U/L (ref 7–45)
ANION GAP SERPL CALC-SCNC: 13 MMOL/L (ref 10–20)
ANTIBODY SCREEN: NORMAL
AST SERPL W P-5'-P-CCNC: 9 U/L (ref 9–39)
BASOPHILS # BLD MANUAL: 0 X10*3/UL (ref 0–0.1)
BASOPHILS NFR BLD MANUAL: 0 %
BILIRUB SERPL-MCNC: 0.3 MG/DL (ref 0–1.2)
BUN SERPL-MCNC: 15 MG/DL (ref 6–23)
BURR CELLS BLD QL SMEAR: ABNORMAL
CALCIUM SERPL-MCNC: 8.8 MG/DL (ref 8.6–10.6)
CHLORIDE SERPL-SCNC: 98 MMOL/L (ref 98–107)
CO2 SERPL-SCNC: 29 MMOL/L (ref 21–32)
CREAT SERPL-MCNC: 0.61 MG/DL (ref 0.5–1.05)
CRP SERPL-MCNC: 7.56 MG/DL
EGFRCR SERPLBLD CKD-EPI 2021: >90 ML/MIN/1.73M*2
EOSINOPHIL # BLD MANUAL: 0 X10*3/UL (ref 0–0.7)
EOSINOPHIL NFR BLD MANUAL: 0 %
ERYTHROCYTE [DISTWIDTH] IN BLOOD BY AUTOMATED COUNT: 13.8 % (ref 11.5–14.5)
GLUCOSE SERPL-MCNC: 104 MG/DL (ref 74–99)
HCT VFR BLD AUTO: 31.1 % (ref 36–46)
HGB BLD-MCNC: 9.7 G/DL (ref 12–16)
IMM GRANULOCYTES # BLD AUTO: 0.11 X10*3/UL (ref 0–0.7)
IMM GRANULOCYTES NFR BLD AUTO: 0.9 % (ref 0–0.9)
LAB AP ASR DISCLAIMER: NORMAL
LABORATORY COMMENT REPORT: NORMAL
LYMPHOCYTES # BLD MANUAL: 0.44 X10*3/UL (ref 1.2–4.8)
LYMPHOCYTES NFR BLD MANUAL: 3.4 %
MAGNESIUM SERPL-MCNC: 2.22 MG/DL (ref 1.6–2.4)
MCH RBC QN AUTO: 27.9 PG (ref 26–34)
MCHC RBC AUTO-ENTMCNC: 31.2 G/DL (ref 32–36)
MCV RBC AUTO: 89 FL (ref 80–100)
MONOCYTES # BLD MANUAL: 0.66 X10*3/UL (ref 0.1–1)
MONOCYTES NFR BLD MANUAL: 5.1 %
NEUTROPHILS # BLD MANUAL: 11.7 X10*3/UL (ref 1.2–7.7)
NEUTS BAND # BLD MANUAL: 2.52 X10*3/UL (ref 0–0.7)
NEUTS BAND NFR BLD MANUAL: 19.5 %
NEUTS SEG # BLD MANUAL: 9.18 X10*3/UL (ref 1.2–7)
NEUTS SEG NFR BLD MANUAL: 71.2 %
NRBC BLD-RTO: 0 /100 WBCS (ref 0–0)
PATH REPORT.FINAL DX SPEC: NORMAL
PATH REPORT.GROSS SPEC: NORMAL
PATH REPORT.TOTAL CANCER: NORMAL
PLATELET # BLD AUTO: 387 X10*3/UL (ref 150–450)
POTASSIUM SERPL-SCNC: 3.9 MMOL/L (ref 3.5–5.3)
PROT SERPL-MCNC: 5.9 G/DL (ref 6.4–8.2)
RBC # BLD AUTO: 3.48 X10*6/UL (ref 4–5.2)
RBC MORPH BLD: ABNORMAL
RH FACTOR (ANTIGEN D): NORMAL
SODIUM SERPL-SCNC: 136 MMOL/L (ref 136–145)
TOTAL CELLS COUNTED BLD: 118
VARIANT LYMPHS # BLD MANUAL: 0.1 X10*3/UL (ref 0–0.5)
VARIANT LYMPHS NFR BLD: 0.8 %
WBC # BLD AUTO: 12.9 X10*3/UL (ref 4.4–11.3)

## 2024-03-07 PROCEDURE — 2500000004 HC RX 250 GENERAL PHARMACY W/ HCPCS (ALT 636 FOR OP/ED)

## 2024-03-07 PROCEDURE — 86140 C-REACTIVE PROTEIN: CPT | Performed by: INTERNAL MEDICINE

## 2024-03-07 PROCEDURE — 2500000004 HC RX 250 GENERAL PHARMACY W/ HCPCS (ALT 636 FOR OP/ED): Mod: JZ

## 2024-03-07 PROCEDURE — 2500000001 HC RX 250 WO HCPCS SELF ADMINISTERED DRUGS (ALT 637 FOR MEDICARE OP)

## 2024-03-07 PROCEDURE — 0D1B0Z4 BYPASS ILEUM TO CUTANEOUS, OPEN APPROACH: ICD-10-PCS | Performed by: SURGERY

## 2024-03-07 PROCEDURE — 2500000004 HC RX 250 GENERAL PHARMACY W/ HCPCS (ALT 636 FOR OP/ED): Performed by: STUDENT IN AN ORGANIZED HEALTH CARE EDUCATION/TRAINING PROGRAM

## 2024-03-07 PROCEDURE — 85027 COMPLETE CBC AUTOMATED: CPT | Performed by: INTERNAL MEDICINE

## 2024-03-07 PROCEDURE — 85007 BL SMEAR W/DIFF WBC COUNT: CPT | Performed by: INTERNAL MEDICINE

## 2024-03-07 PROCEDURE — 86900 BLOOD TYPING SEROLOGIC ABO: CPT

## 2024-03-07 PROCEDURE — 36415 COLL VENOUS BLD VENIPUNCTURE: CPT | Performed by: INTERNAL MEDICINE

## 2024-03-07 PROCEDURE — 83735 ASSAY OF MAGNESIUM: CPT

## 2024-03-07 PROCEDURE — 1100000001 HC PRIVATE ROOM DAILY

## 2024-03-07 PROCEDURE — 86920 COMPATIBILITY TEST SPIN: CPT

## 2024-03-07 PROCEDURE — 2500000005 HC RX 250 GENERAL PHARMACY W/O HCPCS

## 2024-03-07 PROCEDURE — 74018 RADEX ABDOMEN 1 VIEW: CPT

## 2024-03-07 PROCEDURE — 82310 ASSAY OF CALCIUM: CPT | Performed by: INTERNAL MEDICINE

## 2024-03-07 PROCEDURE — 99221 1ST HOSP IP/OBS SF/LOW 40: CPT | Performed by: OTOLARYNGOLOGY

## 2024-03-07 PROCEDURE — 74018 RADEX ABDOMEN 1 VIEW: CPT | Performed by: RADIOLOGY

## 2024-03-07 PROCEDURE — 2500000004 HC RX 250 GENERAL PHARMACY W/ HCPCS (ALT 636 FOR OP/ED): Performed by: FAMILY MEDICINE

## 2024-03-07 PROCEDURE — 99232 SBSQ HOSP IP/OBS MODERATE 35: CPT | Performed by: SURGERY

## 2024-03-07 RX ORDER — SODIUM CHLORIDE, SODIUM LACTATE, POTASSIUM CHLORIDE, CALCIUM CHLORIDE 600; 310; 30; 20 MG/100ML; MG/100ML; MG/100ML; MG/100ML
100 INJECTION, SOLUTION INTRAVENOUS CONTINUOUS
Status: ACTIVE | OUTPATIENT
Start: 2024-03-07 | End: 2024-03-08

## 2024-03-07 RX ORDER — MESALAMINE 1000 MG/1
1000 SUPPOSITORY RECTAL 2 TIMES DAILY
COMMUNITY
End: 2024-03-18 | Stop reason: HOSPADM

## 2024-03-07 RX ORDER — ACETAMINOPHEN 325 MG/1
650 TABLET ORAL EVERY 6 HOURS PRN
Status: DISCONTINUED | OUTPATIENT
Start: 2024-03-07 | End: 2024-03-08

## 2024-03-07 RX ORDER — DIPHENHYDRAMINE HYDROCHLORIDE 50 MG/ML
25 INJECTION INTRAMUSCULAR; INTRAVENOUS EVERY 6 HOURS PRN
Status: DISCONTINUED | OUTPATIENT
Start: 2024-03-07 | End: 2024-03-07

## 2024-03-07 RX ORDER — ENOXAPARIN SODIUM 100 MG/ML
40 INJECTION SUBCUTANEOUS DAILY
Status: DISCONTINUED | OUTPATIENT
Start: 2024-03-07 | End: 2024-03-10

## 2024-03-07 RX ORDER — KETOROLAC TROMETHAMINE 15 MG/ML
15 INJECTION, SOLUTION INTRAMUSCULAR; INTRAVENOUS EVERY 6 HOURS PRN
Status: DISCONTINUED | OUTPATIENT
Start: 2024-03-07 | End: 2024-03-10

## 2024-03-07 RX ORDER — GUAIFENESIN 600 MG/1
600 TABLET, EXTENDED RELEASE ORAL EVERY 12 HOURS PRN
Status: DISCONTINUED | OUTPATIENT
Start: 2024-03-07 | End: 2024-03-16

## 2024-03-07 RX ORDER — DIPHENHYDRAMINE HYDROCHLORIDE 50 MG/ML
50 INJECTION INTRAMUSCULAR; INTRAVENOUS EVERY 6 HOURS PRN
Status: DISCONTINUED | OUTPATIENT
Start: 2024-03-07 | End: 2024-03-10

## 2024-03-07 RX ORDER — DIPHENHYDRAMINE HYDROCHLORIDE 50 MG/ML
50 INJECTION INTRAMUSCULAR; INTRAVENOUS ONCE
Status: COMPLETED | OUTPATIENT
Start: 2024-03-07 | End: 2024-03-07

## 2024-03-07 RX ORDER — ACETAMINOPHEN 325 MG/1
650 TABLET ORAL EVERY 6 HOURS PRN
Status: DISCONTINUED | OUTPATIENT
Start: 2024-03-07 | End: 2024-03-07

## 2024-03-07 RX ORDER — ALPRAZOLAM 0.5 MG/1
0.5 TABLET ORAL 3 TIMES DAILY PRN
Status: DISCONTINUED | OUTPATIENT
Start: 2024-03-07 | End: 2024-03-16

## 2024-03-07 RX ORDER — ACETAMINOPHEN 325 MG/1
650 TABLET ORAL EVERY 4 HOURS PRN
Status: DISCONTINUED | OUTPATIENT
Start: 2024-03-07 | End: 2024-03-07

## 2024-03-07 RX ORDER — ACETAMINOPHEN 325 MG/1
650 TABLET ORAL ONCE
Status: COMPLETED | OUTPATIENT
Start: 2024-03-07 | End: 2024-03-07

## 2024-03-07 RX ORDER — TALC
3 POWDER (GRAM) TOPICAL DAILY
Status: DISCONTINUED | OUTPATIENT
Start: 2024-03-07 | End: 2024-03-18 | Stop reason: HOSPADM

## 2024-03-07 RX ORDER — MORPHINE SULFATE 2 MG/ML
2 INJECTION, SOLUTION INTRAMUSCULAR; INTRAVENOUS ONCE
Status: COMPLETED | OUTPATIENT
Start: 2024-03-07 | End: 2024-03-07

## 2024-03-07 RX ORDER — HYDROXYZINE HYDROCHLORIDE 25 MG/1
50 TABLET, FILM COATED ORAL EVERY 8 HOURS PRN
Status: DISCONTINUED | OUTPATIENT
Start: 2024-03-07 | End: 2024-03-18 | Stop reason: HOSPADM

## 2024-03-07 RX ORDER — ALPRAZOLAM 0.5 MG/1
0.5 TABLET ORAL 3 TIMES DAILY PRN
COMMUNITY
End: 2024-03-18 | Stop reason: HOSPADM

## 2024-03-07 RX ORDER — DICYCLOMINE HYDROCHLORIDE 10 MG/1
10 CAPSULE ORAL 4 TIMES DAILY PRN
Status: DISCONTINUED | OUTPATIENT
Start: 2024-03-07 | End: 2024-03-07

## 2024-03-07 RX ADMIN — METHYLPREDNISOLONE SODIUM SUCCINATE 20 MG: 40 INJECTION, POWDER, FOR SOLUTION INTRAMUSCULAR; INTRAVENOUS at 07:14

## 2024-03-07 RX ADMIN — KETOROLAC TROMETHAMINE 15 MG: 15 INJECTION, SOLUTION INTRAMUSCULAR; INTRAVENOUS at 10:36

## 2024-03-07 RX ADMIN — Medication 10 ML: at 17:20

## 2024-03-07 RX ADMIN — ACETAMINOPHEN 650 MG: 325 TABLET ORAL at 12:40

## 2024-03-07 RX ADMIN — ALPRAZOLAM 0.5 MG: 0.5 TABLET ORAL at 23:09

## 2024-03-07 RX ADMIN — ACETAMINOPHEN 650 MG: 325 TABLET ORAL at 23:09

## 2024-03-07 RX ADMIN — SODIUM CHLORIDE, POTASSIUM CHLORIDE, SODIUM LACTATE AND CALCIUM CHLORIDE 75 ML/HR: 600; 310; 30; 20 INJECTION, SOLUTION INTRAVENOUS at 10:41

## 2024-03-07 RX ADMIN — METHYLPREDNISOLONE SODIUM SUCCINATE 20 MG: 40 INJECTION, POWDER, FOR SOLUTION INTRAMUSCULAR; INTRAVENOUS at 22:29

## 2024-03-07 RX ADMIN — METHYLPREDNISOLONE SODIUM SUCCINATE 20 MG: 40 INJECTION, POWDER, FOR SOLUTION INTRAMUSCULAR; INTRAVENOUS at 16:33

## 2024-03-07 RX ADMIN — ACETAMINOPHEN 650 MG: 325 TABLET ORAL at 04:35

## 2024-03-07 RX ADMIN — ALPRAZOLAM 0.5 MG: 0.5 TABLET ORAL at 12:39

## 2024-03-07 RX ADMIN — MORPHINE SULFATE 4 MG: 4 INJECTION, SOLUTION INTRAMUSCULAR; INTRAVENOUS at 03:06

## 2024-03-07 RX ADMIN — DIPHENHYDRAMINE HYDROCHLORIDE 50 MG: 50 INJECTION INTRAMUSCULAR; INTRAVENOUS at 12:45

## 2024-03-07 RX ADMIN — SODIUM CHLORIDE 500 MG: 9 INJECTION, SOLUTION INTRAVENOUS at 13:20

## 2024-03-07 RX ADMIN — MORPHINE SULFATE 2 MG: 2 INJECTION, SOLUTION INTRAMUSCULAR; INTRAVENOUS at 07:10

## 2024-03-07 RX ADMIN — KETOROLAC TROMETHAMINE 15 MG: 15 INJECTION, SOLUTION INTRAMUSCULAR; INTRAVENOUS at 17:20

## 2024-03-07 RX ADMIN — KETOROLAC TROMETHAMINE 15 MG: 15 INJECTION, SOLUTION INTRAMUSCULAR; INTRAVENOUS at 23:13

## 2024-03-07 RX ADMIN — Medication 10 ML: at 23:10

## 2024-03-07 RX ADMIN — Medication 10 ML: at 07:10

## 2024-03-07 SDOH — SOCIAL STABILITY: SOCIAL INSECURITY: HAS ANYONE EVER THREATENED TO HURT YOUR FAMILY OR YOUR PETS?: NO

## 2024-03-07 SDOH — SOCIAL STABILITY: SOCIAL INSECURITY: DO YOU FEEL ANYONE HAS EXPLOITED OR TAKEN ADVANTAGE OF YOU FINANCIALLY OR OF YOUR PERSONAL PROPERTY?: NO

## 2024-03-07 SDOH — SOCIAL STABILITY: SOCIAL INSECURITY: HAVE YOU HAD THOUGHTS OF HARMING ANYONE ELSE?: NO

## 2024-03-07 SDOH — SOCIAL STABILITY: SOCIAL INSECURITY: ARE THERE ANY APPARENT SIGNS OF INJURIES/BEHAVIORS THAT COULD BE RELATED TO ABUSE/NEGLECT?: NO

## 2024-03-07 SDOH — SOCIAL STABILITY: SOCIAL INSECURITY: ABUSE: ADULT

## 2024-03-07 SDOH — SOCIAL STABILITY: SOCIAL INSECURITY: ARE YOU OR HAVE YOU BEEN THREATENED OR ABUSED PHYSICALLY, EMOTIONALLY, OR SEXUALLY BY ANYONE?: NO

## 2024-03-07 SDOH — SOCIAL STABILITY: SOCIAL INSECURITY: DO YOU FEEL UNSAFE GOING BACK TO THE PLACE WHERE YOU ARE LIVING?: NO

## 2024-03-07 SDOH — SOCIAL STABILITY: SOCIAL INSECURITY: DOES ANYONE TRY TO KEEP YOU FROM HAVING/CONTACTING OTHER FRIENDS OR DOING THINGS OUTSIDE YOUR HOME?: NO

## 2024-03-07 SDOH — SOCIAL STABILITY: SOCIAL INSECURITY: WERE YOU ABLE TO COMPLETE ALL THE BEHAVIORAL HEALTH SCREENINGS?: YES

## 2024-03-07 ASSESSMENT — PAIN SCALES - GENERAL
PAINLEVEL_OUTOF10: 3
PAINLEVEL_OUTOF10: 7
PAINLEVEL_OUTOF10: 0 - NO PAIN
PAINLEVEL_OUTOF10: 6
PAINLEVEL_OUTOF10: 6
PAINLEVEL_OUTOF10: 2
PAINLEVEL_OUTOF10: 6
PAINLEVEL_OUTOF10: 7
PAINLEVEL_OUTOF10: 2
PAINLEVEL_OUTOF10: 3

## 2024-03-07 ASSESSMENT — PAIN DESCRIPTION - DESCRIPTORS
DESCRIPTORS: DISCOMFORT
DESCRIPTORS: DISCOMFORT
DESCRIPTORS: ACHING
DESCRIPTORS: ACHING;DISCOMFORT

## 2024-03-07 ASSESSMENT — PAIN DESCRIPTION - LOCATION
LOCATION: ABDOMEN
LOCATION: ABDOMEN

## 2024-03-07 ASSESSMENT — COGNITIVE AND FUNCTIONAL STATUS - GENERAL
MOBILITY SCORE: 24
DAILY ACTIVITIY SCORE: 24
PATIENT BASELINE BEDBOUND: NO
MOBILITY SCORE: 24
DAILY ACTIVITIY SCORE: 24

## 2024-03-07 ASSESSMENT — PAIN - FUNCTIONAL ASSESSMENT
PAIN_FUNCTIONAL_ASSESSMENT: 0-10

## 2024-03-07 ASSESSMENT — COLUMBIA-SUICIDE SEVERITY RATING SCALE - C-SSRS
2. HAVE YOU ACTUALLY HAD ANY THOUGHTS OF KILLING YOURSELF?: NO
6. HAVE YOU EVER DONE ANYTHING, STARTED TO DO ANYTHING, OR PREPARED TO DO ANYTHING TO END YOUR LIFE?: NO
1. IN THE PAST MONTH, HAVE YOU WISHED YOU WERE DEAD OR WISHED YOU COULD GO TO SLEEP AND NOT WAKE UP?: NO

## 2024-03-07 ASSESSMENT — LIFESTYLE VARIABLES
HOW OFTEN DO YOU HAVE 6 OR MORE DRINKS ON ONE OCCASION: NEVER
HOW OFTEN DO YOU HAVE A DRINK CONTAINING ALCOHOL: NEVER
AUDIT-C TOTAL SCORE: 0
SKIP TO QUESTIONS 9-10: 1
HOW MANY STANDARD DRINKS CONTAINING ALCOHOL DO YOU HAVE ON A TYPICAL DAY: PATIENT DOES NOT DRINK
AUDIT-C TOTAL SCORE: 0

## 2024-03-07 ASSESSMENT — ACTIVITIES OF DAILY LIVING (ADL): LACK_OF_TRANSPORTATION: NO

## 2024-03-07 ASSESSMENT — PAIN DESCRIPTION - ORIENTATION: ORIENTATION: LOWER

## 2024-03-07 NOTE — CONSULTS
ENT DEPARTMENT CONSULTATION NOTE  Name: Kyra Behnfeldt  MRN: [id number]  : 2000  Consulting Attending: Dr. Richard      History of Present Illness  The patient is a 23 y.o. female with past medical history significant for but not limited to Crohn's disease. ENT consulted to evaluate c/f ulcers in mouth. Patient reports long standing history of canker sores. Additionally, she reports episode of developing increasingly painful episode of ulcer on her left tonsil while in [place] a few months ago with associated difficulty swallowing. She also developed stinging sensation in left ear and pain in the left jaw. These symptoms occurred prior to Crohn's diagnosis (she reports diagnosis in December). She reports presenting to [facility] ER with complaint of ulcer in tonsil at which time she was febrile, and was prescribed steroids, tylenol and antibiotics after which symptoms resolved in a few days. She subsequently developed strep throat 2 weeks later. Today, she reports similar feeling of ulcer on left tonsil (however, not as severe as prior episode); she does also note ulcers on both sides of her tongue. She has been using magic mouth wash for the past day and is unsure if it is helping symptoms. Denies muffled voice, trismus, difficulty tolerating secretions, otalgia at present. She does have abdominal pain. Denies fevers, chills, nausea, vomiting.    She initially presented to [facility] ED on 3/1/24 with abdominal pain and was admitted to medicine service. GI was consulted for colitis and jovita red bleeding with recommendation for solumedrol, and MRE. She underwent flexible sigmoidoscopy with finding of severe inflammation. Patient did receive blood product while at [facility]. She was transferred to [facility] for further care as she was unable to receive remicaid at [facility]. Upon admission to [facility], CRS consulted with recommendation for daily KUB. Most recent labs reveal WBC 12.9, Hgb 9.7, CRP 7.56. Vitals, bp 111/79, SpO2 99%, HR  86, temp 97.3 F, RR 16.     Review of Systems  14 point review of systems completed and all negative except as noted in HPI.    Past Medical History  Past Medical History:   Diagnosis Date    Benign neoplasm of cecum     CC (Crohn's colitis) (CMS/HCC)     Colitis     Generalized abdominal pain     Hypokalemia     Internal hemorrhoid     Rectal bleeding        Past Surgical History  Past Surgical History:   Procedure Laterality Date    COLONOSCOPY         Allergies  No Known Allergies    Medications    Current Facility-Administered Medications:     ALPRAZolam (Xanax) tablet 0.5 mg, 0.5 mg, oral, TID PRN, Mickey ALVAREZ Do, MD, 0.5 mg at 03/07/24 1239    diphenhydrAMINE (BENADryl) injection 50 mg, 50 mg, intravenous, q6h PRN, Mickey ALVAREZ Do, MD    enoxaparin (Lovenox) syringe 40 mg, 40 mg, subcutaneous, Daily, Kim O MD Andi    guaiFENesin (Mucinex) 12 hr tablet 600 mg, 600 mg, oral, q12h PRN, Robin Roth MD    hydrOXYzine HCL (Atarax) tablet 50 mg, 50 mg, oral, q8h PRN, Robin Roth MD    inFLIXimab-dyyb (Inflectra) 500 mg in sodium chloride 0.9% 300 mL IV*, 10 mg/kg, intravenous, Once, Mickey ALVAREZ Do, MD, 500 mg at 03/07/24 1320    ketorolac (Toradol) injection 15 mg, 15 mg, intravenous, q6h PRN, Mickey ALVAREZ Do, MD, 15 mg at 03/07/24 1036    lactated Ringer's infusion, 75 mL/hr, intravenous, Continuous, Mickey ALVAREZ Do, MD, Last Rate: 75 mL/hr at 03/07/24 1303, 75 mL/hr at 03/07/24 1303    lidocaine-diphenhydrAMINE-Maalox 1:1:1 Magic Mouthwash, 10 mL, Swish & Spit, q4h PRN, Robin Roth MD, 10 mL at 03/07/24 0710    melatonin tablet 3 mg, 3 mg, oral, Daily, Robin Roth MD    methylPREDNISolone sod succinate (PF) (SOLU-Medrol) 40 mg/mL injection 20 mg, 20 mg, intravenous, q8h, Robin Roth MD, 20 mg at 03/07/24 0714    Family History  No family history on file.    Social History  Social History     Socioeconomic History    Marital status: Single     Spouse name: Not on file    Number of children: Not on file     Years of education: Not on file    Highest education level: Not on file   Occupational History    Not on file   Tobacco Use    Smoking status: Never     Passive exposure: Never    Smokeless tobacco: Never   Substance and Sexual Activity    Alcohol use: Yes     Comment: occasional    Drug use: Yes     Types: Marijuana    Sexual activity: Not on file   Other Topics Concern    Not on file   Social History Narrative    Not on file     Social Determinants of Health     Financial Resource Strain: Low Risk  (3/5/2024)    Overall Financial Resource Strain (CARDIA)     Difficulty of Paying Living Expenses: Not hard at all   Food Insecurity: Not on file   Transportation Needs: No Transportation Needs (3/5/2024)    PRAPARE - Transportation     Lack of Transportation (Medical): No     Lack of Transportation (Non-Medical): No   Physical Activity: Not on file   Stress: Not on file   Social Connections: Not on file   Intimate Partner Violence: Not on file   Housing Stability: Low Risk  (3/5/2024)    Housing Stability Vital Sign     Unable to Pay for Housing in the Last Year: No     Number of Places Lived in the Last Year: 1     Unstable Housing in the Last Year: No   Recent Concern: Housing Stability - High Risk (2/24/2024)    Housing Stability Vital Sign     Unable to Pay for Housing in the Last Year: Yes     Number of Places Lived in the Last Year: 1     Unstable Housing in the Last Year: No       Vital Signs  Vitals:    03/07/24 1320   BP: 127/80   Pulse: 99   Resp: 18   Temp: 36.1 °C (97 °F)   SpO2: 100%         Physical Exam:  CONSTITUTIONAL:  No acute distress, thin figure  VOICE:  vocalizes well  RESPIRATION:  Breathing comfortably, no stridor  CV:  No clubbing/cyanosis/edema in hands  EYES:  EOM intact, sclera normal  NEURO:  Alert and oriented times 3, Cranial nerves II-XII grossly intact and symmetric bilaterally  HEAD AND FACE:  Symmetric facial features  EARS:  Normal external ears, normal hearing to whispered  voice.  NOSE:  External nose midline  ORAL CAVITY/OROPHARYNX/LIPS:  Normal mucous membranes, no soft palate fullness, no uvular deviation, Tonsils with ulceration on right tonsil and multiple ulcerations on left tonsil, dentition normal, small ulcer on left lateral tongue, small ulcer on right lateral tongue.  Ulcers do not appear suspicious and are consistent with aphthous ulcers  PHARYNGEAL WALLS:  No masses or lesions  NECK/LYMPH:  no palpable lymphadenopathy  SKIN:  Neck skin is without scar or injury  PSYCH:  Alert and oriented with appropriate mood and affect        Other Studies/Information:  I personally reviewed the consultant notes or primary team notes as well as the following other studies:     Results for orders placed or performed during the hospital encounter of 03/07/24 (from the past 24 hour(s))   Magnesium   Result Value Ref Range    Magnesium 2.22 1.60 - 2.40 mg/dL   Type and Screen   Result Value Ref Range    ABO TYPE B     Rh TYPE POS     ANTIBODY SCREEN NEG        Assessment and Plan:  The patient is a 23 y.o. female with past medical history significant for but not limited to Crohn's disease. ENT consulted to evaluate ulcers in mouth.  Upon physical examination, ulcers in the mouth appear consistent with aphthous ulcers.  We recommend topical anesthetic agents such as Magic mouthwash or BMX.  We also recommend nonnarcotic pain medications that can help alleviate the pain.      Recommendations:  -BMX or Magic mouthwash  -None opiate pain medications  -No concerns for neoplastic process, consistent with aphthous ulcers.  No plans to biopsy.  -ENT will sign off    Corie Macdonald MD - PGY1  Otolaryngology - Head & Neck Surgery  [facility]    ENT Consult pager: [phone]  ENT Peds pager: [phone]  ENT Head & Neck Surgery Phone: [phone]  ENT subspecialty team: Jenae individual resident who wrote today's note  ENT Outpatient scheduling number: [phone]

## 2024-03-07 NOTE — H&P
History Of Present Illness  Kyra Behnfeldt is a 24 yo F w/ PMHx Crohn's colitis presenting as transfer from [facility] for Crohn's colitis and consideration of Remicade infusion.     Pt was initially diagnosed with Crohn's 12/2023 by colonoscopy. She was started on prednisone then budesonide, was planned to start Humira but there issues with starting. Has been hospitalized several times in past 2 months for worsening symptoms.   She was admitted 2/2-2/6 to  [place], started on IV steroids and Humira initiation on 2/3. She was discharged but given worsening bloody stools, presented again and started on IV steroids again, improved then discharged and at home started having 14 bloody BM per day. Second loading dose of Humira given on 2/17, improved on IV steroids and was discharged on 2/26/24 with PO prednisone 40 mg. Given worsening symptoms including hematochezia, diarrhea, abdominal pain, presented to [facility] on 3/1. Initial labs notable for leukocytosis (12, on steroid), hgb 8.2, K 3. She was started on IV solumedrol, received 2u pRBC and IV iron. Given third dose of Humira 3/2. Cdiff and stool PCR panel were negative on 3/1. Underwent flex sig with GI which showed extensive inflammation from anal verge through transverse colon, appearing worse from prior scope, CMV biopsy still pending. Colorectal surgery saw patient and recommended monitoring for megacolon. GI evaluated patient and recommended starting Remicaid, given this is not available at [facility], transferred to [facility]. Diverting loop ostomy was discussed, patient expressed she wants to avoid ostomy if possible and trial Remicade if possible.     MR enterography 3/6: colonic wall thickening involving at least the transverse segment if not also the distal right and left colon in a single long continguous segment, little of any surrounding inflammatory change but suspect for active colitis which may be inflammatory in nature. Restricted diffusion in the terminal ileum  but no associated wall thickening or hyperenhancement. Remainder of small bowel negative for active inflammatory small bowel disease. No complication from Crohn's or other inflammatory small bowel disease including no perforation/abscess, stricture/obstruction or sinus tract/fistula.    Flex sig 3/4/2024:   Severe, continuous edematous, erythematous, friable, granular and ulcerated mucosa with erosion and loss of vascular pattern in the descending colon, sigmoid colon, rectosigmoid and rectum, consistent with Crohn's disease; performed cold forceps biopsy. There was diffuse and continuous inflammation from the anal verge all the way up through the transverse colon (which was the limit of this examination) associated with diffuse and deep ulcerations.  There were no areas of normal appearing mucosa throughout the entire examined colon.  The inflammation was significantly worsened from prior colonoscopy.  Decision was made not to try to push more proximally up through the colon given the severity of the inflammation and risk of perforation.  Biopsies were obtained from the transverse (Bottle Number 1), descending and sigmoid colon (Bottle Number 2), and rectum (Bottle Number 3) for the evaluation of inflammatory bowel disease and to rule-out CMV.  External small hemorrhoids observed during retroflexion  The colon was otherwise normal on direct and retroflexion views.    Upon arrival, afebrile, , /91 on RA. Endorsing abdominal cramping 3/10 currently. Typically has worsening symptoms, diarrhea, hematochezia at night. Endorsing insomnia. States that morphine and Tylenol have been helping with pain. No new medications. No recent travel. No fevers, chills, nausea, vomiting. Poor PO intake given fear of having more BMs. Feels that she may have rectal prolapse when straining.       GI hx:   -Colonoscopy 12/7/2023: normal ileum, 7 mm polyp in the cecum removed with cold snare, localized moderate inflammation  found in the distal rectum and in the proximal transverse colon secondary to colitis. Biopsied. Endoscopic findings suspicious for Crohn's colitis.   FINAL DIAGNOSIS   A. CECUM POLYP:   -- FRAGMENTS OF SESSILE SERRATED ADENOMA      B. ILEUM BIOPSY:   -- ILEAL MUCOSA WITH NO SIGNIFICANT PATHOLOGICAL FINDINGS      C. CECUM, ASCENDING COLON BIOPSY:   -- COLONIC MUCOSA WITH NO SIGNIFICANT PATHOLOGIC FINDINGS      D. TRANSVERSE COLON BIOPSY:   -- CHRONIC ACTIVE COLITIS  -- NEGATIVE FOR DYSPLASIA       Note: No granuloma is identified.     E. SIGMOID, DESCENDING COLON BIOPSY:   -- COLONIC MUCOSA WITH NO SIGNIFICANT PATHOLOGICAL FINDINGS      F. RECTUM BIOPSY:    -- CHRONIC ACTIVE COLITIS  -- NEGATIVE FOR DYSPLASIA             Past Medical History  Past Medical History:   Diagnosis Date    Benign neoplasm of cecum     CC (Crohn's colitis) (CMS/HCC)     Colitis     Generalized abdominal pain     Hypokalemia     Internal hemorrhoid     Rectal bleeding        Surgical History  Past Surgical History:   Procedure Laterality Date    COLONOSCOPY          Social History  She reports that she has never smoked. She has never been exposed to tobacco smoke. She has never used smokeless tobacco. She reports current alcohol use. She reports current drug use. Drug: Marijuana.    Family History  No family history on file.     Allergies  Patient has no known allergies.         Last Recorded Vitals  Blood pressure (!) 125/91, pulse 102, temperature 36.4 °C (97.5 °F), resp. rate 17, last menstrual period 01/30/2024, SpO2 97 %.    PHYSICAL EXAM:  General: awake, alert, thin, NAD  HENT: NCAT, EOMI, small ulceration noted on right side of tongue  Eyes: no scleral icterus   Skin: no suspect lesions or rashes noted on visible skin  Cardiac: HR ~100, regular rhythm, normal S1, S2, no M/R/G  Pulm: CTAB, normal respiratory effort, on room air  Abdomen: soft, diffuse tenderness to palpation, no involuntary guarding or rebound tenderness  : no  indwelling urinary catheter  EXT: no peripheral edema, no asymmetry noted  Neuro: AOx3, able to follow commands, no gross FND  Psych: coherent thought process, appropriate mood and affect      Assessment/Plan   Principal Problem:    Crohn's colitis, unspecified complication (CMS/HCC)  Kyra Behnfeldt is a 22 yo F w/ PMHx Crohn's colitis presenting as transfer from [facility] for Crohn's colitis and consideration of Remicade infusion. CRS following and offered DLI as treatment, plan to continue steroids and monitor closely for signs of toxic megacolon. Slightly tachycardic but otherwise HDS.     #Severe Crohn's colitis flare  ::MR enterography 3/6: colonic wall thickening involving transverse segment +/- the distal right and left colon in a single long continguous segment, no perforation/abscess, stricture/obstruction or sinus tract/fistula  ::Cdiff, stool pathogen panel negative 3/1  ::Calprotectin 1320 2/2/2024  ::Otis criteria [id number]  -Consider stool cx, stool O&P  -F/u CMV biopsy   -BID electrolytes  -C/w solumedrol 20 mg q8h  -KUB on admission, if any signs of megacolon, notify colorectal surgery   -CRS aware of admission  -Tylenol 650 q4h prn   -BMX for oral ulcer      #Acute blood loss anemia  #DELPHINE  ::S/p 2u pRBC at OSH on 3/4  ::Iron studies on 3/4: iron 20, TIBC 261, %sat 8, ferritin 17  ::S/p IV iron on 3/4  -Monitor CBC BID  -Transfuse hgb <7  -Maintain active T&S    #Insomnia  #Anxiety  -Melatonin, atarax prn     F: bolus PRN  E: replete PRN  N: low fiber  A: PIV    DVT PPx: SCDs    CODE STATUS: FULL CODE (confirmed on admission)  SURROGATE DECISION MAKER: Steven Behnfeldt (father) [phone] or Antoinette (mother) [phone]      Robin Roth MD

## 2024-03-07 NOTE — NURSING NOTE
Dr. Roth at bedside around 5am to see patient and this RN present in room. I spoke with Dr. Roth in front of patient regarding pt request for pain medication (morphine), xanax, and tucks pads. Per Dr. Roth orders will be put in, however, as of time of this note orders have not been placed. Secure chat sent to Dr. Roth at 0613 requesting orders discussed-no response. [facility] and spoke with return calling team member who stated he would pass message to Dr. Roth regarding need for orders. Secure chat response finally received from Dr. Rtoh at 0648 stating that she is signing out (although orders were not put in that were requesting by patient during prior discussion) and that day team will not to put in the requested orders. This RN asked that Dr. Roth pls pass msg along to day team to complete orders. One time order only placed for morphine by Dr. Roth.

## 2024-03-07 NOTE — CARE PLAN
The patient's goals for the shift include pain management.    The clinical goals for the shift include pain management and execute transfer to [facility].    Over the shift, the patient did not make progress toward the following goals. Barriers to progression include n/a. Recommendations to address these barriers include n/a.

## 2024-03-07 NOTE — PROGRESS NOTES
03/07/24 1551   Discharge Planning   Living Arrangements Other (Comment)  (roommate)   Support Systems Parent   Assistance Needed Independent   Type of Residence Private residence   Number of Stairs to Enter Residence 2   Number of Stairs Within Residence 12   Home or Post Acute Services None   Patient expects to be discharged to: Home   Does the patient need discharge transport arranged? No   Financial Resource Strain   How hard is it for you to pay for the very basics like food, housing, medical care, and heating? Not hard   Housing Stability   In the last 12 months, was there a time when you were not able to pay the mortgage or rent on time? N   In the last 12 months, was there a time when you did not have a steady place to sleep or slept in a shelter (including now)? N   Transportation Needs   In the past 12 months, has lack of transportation kept you from medical appointments or from getting medications? no   In the past 12 months, has lack of transportation kept you from meetings, work, or from getting things needed for daily living? No     Transitional Care Coordination Progress Note:  Patient discussed during interdisciplinary rounds.   Team members present: COLTON CORTES  Plan per Medical/Surgical team: Chron's colitis  Payor: Joshua  Discharge disposition: Home  Potential Barriers: none  ADOD: 1-2 days    Previous Home Care: NA  DME: NA  Ph[facility]acy: [facility]  Falls: Denies  PCP:  new pcp appointment scheduled for 3/18    Met with patient and parents at bedside, provided introduction of self and role. Patient states she lives at home with a roommate; safe at home. Patient independent for adls, no assistive devices. Patient states she normally drives self to appointments. No concerns obtaining/affording medications. Patient states no social/financial concerns. Family to provide transport home at time of discharge. Patient with no anticipated needs at discharge. Will continue to monitor for discharge  planning needs.     Antoinette MIRN, RN  Transitional Care Coordinator (TCC)  [phone]

## 2024-03-07 NOTE — CONSULTS
Reason For Consult  Refractory Crohn's colitis     History Of Present Illness  Kyra Behnfeldt is a 23 y.o. female who presented to [facility] 3/1 with rectal bleeding, anemia, mild malnutrition, and transferred to [facility] 3/7 for remicade therapy. Pt was diagnosed with Crohn's colitis in Dec 2023 by colonoscopy (rectum and Transverse colon)  On IV steroids her symptoms are controlled, but symptoms relapse on oral steroids. She started Humira 2/3 (loading doses 2/3 and 2/17) but having 8-10 bloody BM's/day on 40mg prednisone daily. She has been hospitalized 3 times in the past few weeks.     At [facility] she was started on 20mg IV solumedrol TID. C diff and infectious panel negative. Flex sigmoidoscope done which showed pancolitis from rectum to proximal transverse colon. MRE confirmed findings and showed no significant small bowel inflammation. [facility] team planned to start patient on IV remicade and watch closely for toxic colitis. Patient preferred to avoid an ostomy if possible to wanted to wait on DLI. [facility] does not stock remicade rescue, and so patient transferred to [facility] ([facility] service accepted).     Patient received blood transfusions at [facility] and one dose IV venofer. CRP increased from 0.62 3/4 to 6.59 3/5 and 6.95 3/6.    Colorectal surgery following for potential need for surgical treatment if patient develops toxic megacolon or in the case if patient does not respond to remicade (DLI offered as treatment). Patient would like to avoid an ostomy if possible. Patient is aware that if she develops toxic megacolon then surgery will be emergent. Stoma / wound care has seen and marked patient.     At [facility], patient continues to endorse abdominal pain and passing 7-10 bloody BM's a day. She states she has been eating less d/t fear of passing more bloody BM's.     Past Medical History  She has a past medical history of Benign neoplasm of cecum, CC (Crohn's colitis) (CMS/HCC), Colitis, Generalized abdominal pain, Hypokalemia,  "Internal hemorrhoid, and Rectal bleeding.    Surgical History  She has a past surgical history that includes Colonoscopy.     Social History  She reports that she has never smoked. She has never been exposed to tobacco smoke. She has never used smokeless tobacco. She reports current alcohol use. She reports current drug use. Drug: Marijuana.    Family History  No family history on file.     Allergies  Patient has no known allergies.    Review of Systems  12 point ROS negative except from HPI.   Weight loss, rectal bleeding, fatigue and diarrhea        Physical Exam  Visit Vitals  BP (!) 125/91   Pulse 102   Temp 36.4 °C (97.5 °F)   Resp 17      General: pleasant, not in acute distress  Cards: slightly tachycardic  Pulm: unlabored RA  Abdomen: soft, some tenderness to palpation in infraumbilical left region. Stoma sites marked      Last Recorded Vitals  Blood pressure 130/87, pulse 103, temperature 37.3 °C (99.2 °F), temperature source Temporal, resp. rate 18, height 1.6 m (5' 3\"), weight 49.9 kg (110 lb 0.2 oz), last menstrual period 01/30/2024, SpO2 100 %.    Relevant Results  Results for orders placed or performed during the hospital encounter of 03/01/24 (from the past 24 hour(s))   C-reactive protein   Result Value Ref Range    C-Reactive Protein 6.95 (H) <1.00 mg/dL   CBC and Auto Differential   Result Value Ref Range    WBC 14.6 (H) 4.4 - 11.3 x10*3/uL    nRBC 0.1 (H) 0.0 - 0.0 /100 WBCs    RBC 3.57 (L) 4.00 - 5.20 x10*6/uL    Hemoglobin 10.3 (L) 12.0 - 16.0 g/dL    Hematocrit 31.3 (L) 36.0 - 46.0 %    MCV 88 80 - 100 fL    MCH 28.9 26.0 - 34.0 pg    MCHC 32.9 32.0 - 36.0 g/dL    RDW 13.5 11.5 - 14.5 %    Platelets 392 150 - 450 x10*3/uL    Neutrophils % 73.2 40.0 - 80.0 %    Immature Granulocytes %, Automated 1.2 (H) 0.0 - 0.9 %    Lymphocytes % 11.8 13.0 - 44.0 %    Monocytes % 13.4 2.0 - 10.0 %    Eosinophils % 0.3 0.0 - 6.0 %    Basophils % 0.1 0.0 - 2.0 %    Neutrophils Absolute 10.67 (H) 1.20 - 7.70 " x10*3/uL    Immature Granulocytes Absolute, Automated 0.17 0.00 - 0.70 x10*3/uL    Lymphocytes Absolute 1.72 1.20 - 4.80 x10*3/uL    Monocytes Absolute 1.96 (H) 0.10 - 1.00 x10*3/uL    Eosinophils Absolute 0.05 0.00 - 0.70 x10*3/uL    Basophils Absolute 0.02 0.00 - 0.10 x10*3/uL   Comprehensive metabolic panel   Result Value Ref Range    Glucose 90 74 - 99 mg/dL    Sodium 136 136 - 145 mmol/L    Potassium 3.7 3.5 - 5.3 mmol/L    Chloride 99 98 - 107 mmol/L    Bicarbonate 30 21 - 32 mmol/L    Anion Gap 11 10 - 20 mmol/L    Urea Nitrogen 16 6 - 23 mg/dL    Creatinine 0.62 0.50 - 1.05 mg/dL    eGFR >90 >60 mL/min/1.73m*2    Calcium 7.9 (L) 8.6 - 10.3 mg/dL    Albumin 2.9 (L) 3.4 - 5.0 g/dL    Alkaline Phosphatase 58 33 - 110 U/L    Total Protein 5.7 (L) 6.4 - 8.2 g/dL    AST 8 (L) 9 - 39 U/L    Bilirubin, Total 0.3 0.0 - 1.2 mg/dL    ALT 9 7 - 45 U/L    .     Assessment/Plan     23F with Crohn's presenting with rectal bleeding and imaging showing acute pancolitis from rectum to proximal transverse colon, transferred from [facility] for remicade infusions. Colorectal surgery following for potential need for surgical treatment if patient develops toxic megacolon or in the case if patient does not respond to remicade (DLI offered as treatment). Patient would like to avoid an ostomy if possible. Patient is aware that if she develops toxic megacolon then surgery will be emergent. Stoma / wound care has seen and marked patient.     -CRS will follow. Daily upright KUB looking for toxic megacolon/ perforation    Patient discussed with night chief.    Kathy Pal MD

## 2024-03-07 NOTE — H&P (VIEW-ONLY)
Reason For Consult  Refractory Crohn's colitis     History Of Present Illness  Kyra Behnfeldt is a 23 y.o. female who presented to [facility] 3/1 with rectal bleeding, anemia, mild malnutrition, and transferred to [facility] 3/7 for remicade therapy. Pt was diagnosed with Crohn's colitis in Dec 2023 by colonoscopy (rectum and Transverse colon)  On IV steroids her symptoms are controlled, but symptoms relapse on oral steroids. She started Humira 2/3 (loading doses 2/3 and 2/17) but having 8-10 bloody BM's/day on 40mg prednisone daily. She has been hospitalized 3 times in the past few weeks.     At [facility] she was started on 20mg IV solumedrol TID. C diff and infectious panel negative. Flex sigmoidoscope done which showed pancolitis from rectum to proximal transverse colon. MRE confirmed findings and showed no significant small bowel inflammation. [facility] team planned to start patient on IV remicade and watch closely for toxic colitis. Patient preferred to avoid an ostomy if possible to wanted to wait on DLI. [facility] does not stock remicade rescue, and so patient transferred to [facility] ([facility] service accepted).     Patient received blood transfusions at [facility] and one dose IV venofer. CRP increased from 0.62 3/4 to 6.59 3/5 and 6.95 3/6.    Colorectal surgery following for potential need for surgical treatment if patient develops toxic megacolon or in the case if patient does not respond to remicade (DLI offered as treatment). Patient would like to avoid an ostomy if possible. Patient is aware that if she develops toxic megacolon then surgery will be emergent. Stoma / wound care has seen and marked patient.     At [facility], patient continues to endorse abdominal pain and passing 7-10 bloody BM's a day. She states she has been eating less d/t fear of passing more bloody BM's.     Past Medical History  She has a past medical history of Benign neoplasm of cecum, CC (Crohn's colitis) (CMS/HCC), Colitis, Generalized abdominal pain, Hypokalemia,  "Internal hemorrhoid, and Rectal bleeding.    Surgical History  She has a past surgical history that includes Colonoscopy.     Social History  She reports that she has never smoked. She has never been exposed to tobacco smoke. She has never used smokeless tobacco. She reports current alcohol use. She reports current drug use. Drug: Marijuana.    Family History  No family history on file.     Allergies  Patient has no known allergies.    Review of Systems  12 point ROS negative except from HPI.   Weight loss, rectal bleeding, fatigue and diarrhea        Physical Exam  Visit Vitals  BP (!) 125/91   Pulse 102   Temp 36.4 °C (97.5 °F)   Resp 17      General: pleasant, not in acute distress  Cards: slightly tachycardic  Pulm: unlabored RA  Abdomen: soft, some tenderness to palpation in infraumbilical left region. Stoma sites marked      Last Recorded Vitals  Blood pressure 130/87, pulse 103, temperature 37.3 °C (99.2 °F), temperature source Temporal, resp. rate 18, height 1.6 m (5' 3\"), weight 49.9 kg (110 lb 0.2 oz), last menstrual period 01/30/2024, SpO2 100 %.    Relevant Results  Results for orders placed or performed during the hospital encounter of 03/01/24 (from the past 24 hour(s))   C-reactive protein   Result Value Ref Range    C-Reactive Protein 6.95 (H) <1.00 mg/dL   CBC and Auto Differential   Result Value Ref Range    WBC 14.6 (H) 4.4 - 11.3 x10*3/uL    nRBC 0.1 (H) 0.0 - 0.0 /100 WBCs    RBC 3.57 (L) 4.00 - 5.20 x10*6/uL    Hemoglobin 10.3 (L) 12.0 - 16.0 g/dL    Hematocrit 31.3 (L) 36.0 - 46.0 %    MCV 88 80 - 100 fL    MCH 28.9 26.0 - 34.0 pg    MCHC 32.9 32.0 - 36.0 g/dL    RDW 13.5 11.5 - 14.5 %    Platelets 392 150 - 450 x10*3/uL    Neutrophils % 73.2 40.0 - 80.0 %    Immature Granulocytes %, Automated 1.2 (H) 0.0 - 0.9 %    Lymphocytes % 11.8 13.0 - 44.0 %    Monocytes % 13.4 2.0 - 10.0 %    Eosinophils % 0.3 0.0 - 6.0 %    Basophils % 0.1 0.0 - 2.0 %    Neutrophils Absolute 10.67 (H) 1.20 - 7.70 " x10*3/uL    Immature Granulocytes Absolute, Automated 0.17 0.00 - 0.70 x10*3/uL    Lymphocytes Absolute 1.72 1.20 - 4.80 x10*3/uL    Monocytes Absolute 1.96 (H) 0.10 - 1.00 x10*3/uL    Eosinophils Absolute 0.05 0.00 - 0.70 x10*3/uL    Basophils Absolute 0.02 0.00 - 0.10 x10*3/uL   Comprehensive metabolic panel   Result Value Ref Range    Glucose 90 74 - 99 mg/dL    Sodium 136 136 - 145 mmol/L    Potassium 3.7 3.5 - 5.3 mmol/L    Chloride 99 98 - 107 mmol/L    Bicarbonate 30 21 - 32 mmol/L    Anion Gap 11 10 - 20 mmol/L    Urea Nitrogen 16 6 - 23 mg/dL    Creatinine 0.62 0.50 - 1.05 mg/dL    eGFR >90 >60 mL/min/1.73m*2    Calcium 7.9 (L) 8.6 - 10.3 mg/dL    Albumin 2.9 (L) 3.4 - 5.0 g/dL    Alkaline Phosphatase 58 33 - 110 U/L    Total Protein 5.7 (L) 6.4 - 8.2 g/dL    AST 8 (L) 9 - 39 U/L    Bilirubin, Total 0.3 0.0 - 1.2 mg/dL    ALT 9 7 - 45 U/L    .     Assessment/Plan     23F with Crohn's presenting with rectal bleeding and imaging showing acute pancolitis from rectum to proximal transverse colon, transferred from [facility] for remicade infusions. Colorectal surgery following for potential need for surgical treatment if patient develops toxic megacolon or in the case if patient does not respond to remicade (DLI offered as treatment). Patient would like to avoid an ostomy if possible. Patient is aware that if she develops toxic megacolon then surgery will be emergent. Stoma / wound care has seen and marked patient.     -CRS will follow. Daily upright KUB looking for toxic megacolon/ perforation    Patient discussed with night chief.    Kathy Pal MD

## 2024-03-07 NOTE — PROGRESS NOTES
Pharmacy Medication History Review    Kyra Behnfeldt is a 23 y.o. female admitted for Crohn's colitis, unspecified complication (CMS/[facility]). Pharmacy reviewed the patient's [id number] medications and allergies for accuracy.    The list below reflects the updated PTA list. Comments regarding how patient may be taking medications differently can be found in the Admit Orders Activity  Prior to Admission Medications   Prescriptions Last Dose Informant Patient Reported?   ALPRAZolam (Xanax) 0.5 mg tablet  Self Yes   Sig: Take 1 tablet (0.5 mg) by mouth 3 times a day as needed for anxiety.   acetaminophen (Tylenol) 325 mg tablet  Self No   Sig: Take 2 tablets (650 mg) by mouth every 4 hours if needed for mild pain (1 - 3).   adalimumab (HUMIRA,CF, PEN SUBQ) 3/2/2024 Self Yes   Sig: Inject 40 mg under the skin. Every 14 days   cyclobenzaprine (Flexeril) 10 mg tablet  Self No   Sig: Take 1 tablet (10 mg) by mouth 2 times a day as needed for muscle spasms.   dicyclomine (Bentyl) 10 mg capsule  Self No   Sig: Take 1 capsule (10 mg) by mouth 4 times a day as needed (cramping).   famotidine (Pepcid) 20 mg tablet  Self No   Sig: Take 1 tablet (20 mg) by mouth 2 times a day.   hydrOXYzine HCL (Atarax) 50 mg tablet  Self Yes   Sig: Take 1 tablet (50 mg) by mouth every 8 hours if needed (sleep).   melatonin 3 mg tablet  Self No   Sig: Take 1 tablet (3 mg) by mouth once daily.   mesalamine (Canasa) 1,000 mg suppository  Self Yes   Sig: Insert 1 suppository (1,000 mg) into the rectum 2 times a day.   methylPREDNISolone sod succinate, PF, (SOLU-Medrol) 40 mg injection  Self No   Sig: Infuse 0.5 mL (20 mg) into a venous catheter every 8 hours.   morphine 2 mg/mL injection  Self No   Sig: Infuse 1 mL (2 mg) into a venous catheter every 4 hours if needed for moderate pain (4 - 6).   morphine 4 mg/mL injection  Self No   Sig: Infuse 1 mL (4 mg) into a venous catheter every 4 hours if needed for severe pain (7 - 10).   ondansetron  (Zofran) 4 mg/2 mL injection  Self No   Sig: Infuse 2 mL (4 mg) into a venous catheter every 6 hours if needed for nausea.      Facility-Administered Medications: None        The list below reflects the updated allergy list. Please review each documented allergy for additional clarification and justification.  Allergies  Reviewed by Carolyn Cruz [facility] on 3/7/2024   No Known Allergies         Patient declines M2B at discharge. Pharmacy has been updated to [facility] [place].    Sources used to confirm home medication list include: Discharge Summary 3/5, ED Provider Notes 3/1, Patient interview, OARRS, Care Everywhere, medication fill history.    Below are additional concerns with the patient's PTA list.  None to note.    Carolyn Cruz [facility]   Transitions of Care Pharmacist  [facility]  Please reach out via Secure Chat for questions, or if no response call Retention Education or [facility]

## 2024-03-07 NOTE — SIGNIFICANT EVENT
Plan Update:    Saw patient on AM rounds. She reports that her pain is worse compared to admission and her diarrhea is unchanged. She denies N/V, fevers, and chills, but endorses night sweats. On exam, her abdomen is soft, nondistended, tender to palpation across lower quadrants. Will continue to monitor for signs of toxic megacolon. Counseled patient that diversion may become necessary if she does not respond to remicade therapy or clinical status continues to worsen. Will continue to follow along. Please continue daily KUBs.       Discussed with Attending Physician    Michelle Florence MD  General Surgery PGY 5  Colorectal Surgery   [facility] [id number]

## 2024-03-07 NOTE — SIGNIFICANT EVENT
Updated Assessment and Plan:    Principal Problem:    Crohn's colitis, unspecified complication (CMS/HCC)    Kyra Behnfeldt is a 22 yo F w/ PMHx Crohn's colitis presenting as transfer from [facility] for Crohn's colitis and consideration of Remicade infusion. Patient presenting with severe colitis flare but without imaging findings of perforation/abscess, stricture/obstruction, or sinus tract/fistula. CRS following and offered DLI as treatment. Will trial remicade infusion as rescue therapy and monitor patient's clinical response.      Updates 3/7:  -CMV biopsy confirmed negative by pathology  -ordered Remicade infusion at 10mg/kg (500mg); monitor for any signs of infusion reaction  -clear liquid diet, monitor PO intake  -IV fluids 75cc/hr LR  -pain control: tylenol 650mg Q6H, toradol 15mg for 3 days PRN  -ENT consult for ulcers/lesions in mouth, appreciate recs  -daily CRP trend       #Severe Crohn's colitis flare  ::MR enterography 3/6: colonic wall thickening involving transverse segment +/- the distal right and left colon in a single long continguous segment, no perforation/abscess, stricture/obstruction or sinus tract/fistula  ::Cdiff, stool pathogen panel negative 3/1  ::Calprotectin 1320 2/2/2024  ::[place] criteria A2L2B1  -Consider stool cx, stool O&P  -F/u CMV biopsy - negative  -C/w solumedrol 20 mg q8h  -KUB on admission, if any signs of megacolon, notify colorectal surgery   -CRS aware of admission  -Tylenol 650 q4h prn   -BMX for oral ulcer, ENT consulted   -ordered Remicade infusion at 10mg/kg (500mg); monitor for any signs of infusion reaction  -clear liquid diet, monitor PO intake  -IV fluids 75cc/hr LR  -pain control: tylenol 650mg Q6H, toradol 15mg for 3 days PRN      #Acute blood loss anemia  #DELPHINE  ::S/p 2u pRBC at OSH on 3/4  ::Iron studies on 3/4: iron 20, TIBC 261, %sat 8, ferritin 17  ::S/p IV iron on 3/4  -Monitor CBC BID  -Transfuse hgb <7  -Maintain active T&S      #Insomnia  #Anxiety  -Melatonin, atarax prn      F: LR 75cc/hr  E: replete PRN  N: CLD  A: PIV    DVT PPx: SCDs     CODE STATUS: FULL CODE (confirmed on admission)  SURROGATE DECISION MAKER: Steven Behnfeldt (father) [phone] or Antoinette (mother) [phone]

## 2024-03-07 NOTE — CARE PLAN
The patient's goals for the shift include  to have decreased abd pain throughout day    The clinical goals for the shift include pt to have improved pain control throughout shift      Problem: Pain  Goal: My pain/discomfort is manageable  Outcome: Progressing     Problem: Safety  Goal: Patient will be injury free during hospitalization  Outcome: Progressing  Goal: I will remain free of falls  Outcome: Progressing     Problem: Daily Care  Goal: Daily care needs are met  Outcome: Progressing     Problem: Psychosocial Needs  Goal: Demonstrates ability to cope with hospitalization/illness  Outcome: Progressing  Goal: Collaborate with me, my family, and caregiver to identify my specific goals  Outcome: Progressing  Flowsheets (Taken 3/7/2024 1052)  Cultural Requests During Hospitalization: n/a  Spiritual Requests During Hospitalization: n/a     Problem: Discharge Barriers  Goal: My discharge needs are met  Outcome: Progressing

## 2024-03-08 ENCOUNTER — APPOINTMENT (OUTPATIENT)
Dept: RADIOLOGY | Facility: HOSPITAL | Age: 24
DRG: 330 | End: 2024-03-08
Payer: COMMERCIAL

## 2024-03-08 LAB
ALBUMIN SERPL BCP-MCNC: 2.6 G/DL (ref 3.4–5)
ALP SERPL-CCNC: 51 U/L (ref 33–110)
ALT SERPL W P-5'-P-CCNC: 6 U/L (ref 7–45)
ANION GAP SERPL CALC-SCNC: 11 MMOL/L (ref 10–20)
AST SERPL W P-5'-P-CCNC: 8 U/L (ref 9–39)
BASOPHILS # BLD MANUAL: 0 X10*3/UL (ref 0–0.1)
BASOPHILS NFR BLD MANUAL: 0 %
BILIRUB DIRECT SERPL-MCNC: 0.1 MG/DL (ref 0–0.3)
BILIRUB SERPL-MCNC: 0.2 MG/DL (ref 0–1.2)
BUN SERPL-MCNC: 19 MG/DL (ref 6–23)
BURR CELLS BLD QL SMEAR: ABNORMAL
CALCIUM SERPL-MCNC: 8.4 MG/DL (ref 8.6–10.6)
CHLORIDE SERPL-SCNC: 101 MMOL/L (ref 98–107)
CO2 SERPL-SCNC: 30 MMOL/L (ref 21–32)
CREAT SERPL-MCNC: 0.56 MG/DL (ref 0.5–1.05)
CRP SERPL-MCNC: 8.15 MG/DL
EGFRCR SERPLBLD CKD-EPI 2021: >90 ML/MIN/1.73M*2
EOSINOPHIL # BLD MANUAL: 0 X10*3/UL (ref 0–0.7)
EOSINOPHIL NFR BLD MANUAL: 0 %
ERYTHROCYTE [DISTWIDTH] IN BLOOD BY AUTOMATED COUNT: 13.5 % (ref 11.5–14.5)
GLUCOSE SERPL-MCNC: 104 MG/DL (ref 74–99)
HCT VFR BLD AUTO: 27.7 % (ref 36–46)
HGB BLD-MCNC: 8.5 G/DL (ref 12–16)
IMM GRANULOCYTES # BLD AUTO: 0.08 X10*3/UL (ref 0–0.7)
IMM GRANULOCYTES NFR BLD AUTO: 0.6 % (ref 0–0.9)
LYMPHOCYTES # BLD MANUAL: 0.61 X10*3/UL (ref 1.2–4.8)
LYMPHOCYTES NFR BLD MANUAL: 4.3 %
MAGNESIUM SERPL-MCNC: 2.08 MG/DL (ref 1.6–2.4)
MCH RBC QN AUTO: 27.8 PG (ref 26–34)
MCHC RBC AUTO-ENTMCNC: 30.7 G/DL (ref 32–36)
MCV RBC AUTO: 91 FL (ref 80–100)
MONOCYTES # BLD MANUAL: 1.11 X10*3/UL (ref 0.1–1)
MONOCYTES NFR BLD MANUAL: 7.8 %
NEUTROPHILS # BLD MANUAL: 12.35 X10*3/UL (ref 1.2–7.7)
NEUTS BAND # BLD MANUAL: 4.77 X10*3/UL (ref 0–0.7)
NEUTS BAND NFR BLD MANUAL: 33.6 %
NEUTS SEG # BLD MANUAL: 7.58 X10*3/UL (ref 1.2–7)
NEUTS SEG NFR BLD MANUAL: 53.4 %
NRBC BLD-RTO: 0 /100 WBCS (ref 0–0)
PHOSPHATE SERPL-MCNC: 4 MG/DL (ref 2.5–4.9)
PLATELET # BLD AUTO: 353 X10*3/UL (ref 150–450)
POLYCHROMASIA BLD QL SMEAR: ABNORMAL
POTASSIUM SERPL-SCNC: 4.1 MMOL/L (ref 3.5–5.3)
PROT SERPL-MCNC: 5.4 G/DL (ref 6.4–8.2)
RBC # BLD AUTO: 3.06 X10*6/UL (ref 4–5.2)
RBC MORPH BLD: ABNORMAL
SCAN RESULT: NORMAL
SODIUM SERPL-SCNC: 138 MMOL/L (ref 136–145)
TOTAL CELLS COUNTED BLD: 116
VARIANT LYMPHS # BLD MANUAL: 0.13 X10*3/UL (ref 0–0.5)
VARIANT LYMPHS NFR BLD: 0.9 %
WBC # BLD AUTO: 14.2 X10*3/UL (ref 4.4–11.3)

## 2024-03-08 PROCEDURE — 83735 ASSAY OF MAGNESIUM: CPT

## 2024-03-08 PROCEDURE — 85027 COMPLETE CBC AUTOMATED: CPT

## 2024-03-08 PROCEDURE — 85007 BL SMEAR W/DIFF WBC COUNT: CPT

## 2024-03-08 PROCEDURE — 82248 BILIRUBIN DIRECT: CPT

## 2024-03-08 PROCEDURE — 2500000004 HC RX 250 GENERAL PHARMACY W/ HCPCS (ALT 636 FOR OP/ED)

## 2024-03-08 PROCEDURE — 1100000001 HC PRIVATE ROOM DAILY

## 2024-03-08 PROCEDURE — 2500000001 HC RX 250 WO HCPCS SELF ADMINISTERED DRUGS (ALT 637 FOR MEDICARE OP)

## 2024-03-08 PROCEDURE — 80048 BASIC METABOLIC PNL TOTAL CA: CPT

## 2024-03-08 PROCEDURE — 2500000005 HC RX 250 GENERAL PHARMACY W/O HCPCS

## 2024-03-08 PROCEDURE — 99232 SBSQ HOSP IP/OBS MODERATE 35: CPT

## 2024-03-08 PROCEDURE — 84100 ASSAY OF PHOSPHORUS: CPT

## 2024-03-08 PROCEDURE — 86140 C-REACTIVE PROTEIN: CPT

## 2024-03-08 PROCEDURE — 74018 RADEX ABDOMEN 1 VIEW: CPT

## 2024-03-08 PROCEDURE — 36415 COLL VENOUS BLD VENIPUNCTURE: CPT

## 2024-03-08 RX ORDER — SODIUM CHLORIDE, SODIUM LACTATE, POTASSIUM CHLORIDE, CALCIUM CHLORIDE 600; 310; 30; 20 MG/100ML; MG/100ML; MG/100ML; MG/100ML
100 INJECTION, SOLUTION INTRAVENOUS CONTINUOUS
Status: CANCELLED | OUTPATIENT
Start: 2024-03-08 | End: 2024-03-09

## 2024-03-08 RX ORDER — ACETAMINOPHEN 325 MG/1
650 TABLET ORAL EVERY 4 HOURS PRN
Status: DISCONTINUED | OUTPATIENT
Start: 2024-03-08 | End: 2024-03-09

## 2024-03-08 RX ORDER — SODIUM CHLORIDE, SODIUM LACTATE, POTASSIUM CHLORIDE, CALCIUM CHLORIDE 600; 310; 30; 20 MG/100ML; MG/100ML; MG/100ML; MG/100ML
100 INJECTION, SOLUTION INTRAVENOUS CONTINUOUS
Status: ACTIVE | OUTPATIENT
Start: 2024-03-08 | End: 2024-03-09

## 2024-03-08 RX ORDER — OXYCODONE HYDROCHLORIDE 5 MG/1
5 TABLET ORAL EVERY 6 HOURS PRN
Status: DISCONTINUED | OUTPATIENT
Start: 2024-03-08 | End: 2024-03-08

## 2024-03-08 RX ORDER — MORPHINE SULFATE 4 MG/ML
4 INJECTION INTRAVENOUS ONCE AS NEEDED
Status: COMPLETED | OUTPATIENT
Start: 2024-03-08 | End: 2024-03-08

## 2024-03-08 RX ORDER — OXYCODONE HYDROCHLORIDE 5 MG/1
5 TABLET ORAL ONCE
Status: COMPLETED | OUTPATIENT
Start: 2024-03-08 | End: 2024-03-08

## 2024-03-08 RX ORDER — OXYCODONE HYDROCHLORIDE 5 MG/1
5 TABLET ORAL EVERY 4 HOURS PRN
Status: DISCONTINUED | OUTPATIENT
Start: 2024-03-08 | End: 2024-03-08

## 2024-03-08 RX ORDER — MORPHINE SULFATE 4 MG/ML
4 INJECTION INTRAVENOUS ONCE
Status: DISCONTINUED | OUTPATIENT
Start: 2024-03-08 | End: 2024-03-08

## 2024-03-08 RX ADMIN — Medication 10 ML: at 09:22

## 2024-03-08 RX ADMIN — HYDROXYZINE HYDROCHLORIDE 50 MG: 25 TABLET, FILM COATED ORAL at 13:12

## 2024-03-08 RX ADMIN — WITCH HAZEL 1 EACH: 500 SOLUTION RECTAL; TOPICAL at 21:48

## 2024-03-08 RX ADMIN — OXYCODONE HYDROCHLORIDE 5 MG: 5 TABLET ORAL at 15:15

## 2024-03-08 RX ADMIN — Medication 10 ML: at 21:30

## 2024-03-08 RX ADMIN — METHYLPREDNISOLONE SODIUM SUCCINATE 20 MG: 40 INJECTION, POWDER, FOR SOLUTION INTRAMUSCULAR; INTRAVENOUS at 06:27

## 2024-03-08 RX ADMIN — ALPRAZOLAM 0.5 MG: 0.5 TABLET ORAL at 22:56

## 2024-03-08 RX ADMIN — KETOROLAC TROMETHAMINE 15 MG: 15 INJECTION, SOLUTION INTRAMUSCULAR; INTRAVENOUS at 11:31

## 2024-03-08 RX ADMIN — MORPHINE SULFATE 4 MG: 4 INJECTION INTRAVENOUS at 21:30

## 2024-03-08 RX ADMIN — SODIUM CHLORIDE, POTASSIUM CHLORIDE, SODIUM LACTATE AND CALCIUM CHLORIDE 100 ML/HR: 600; 310; 30; 20 INJECTION, SOLUTION INTRAVENOUS at 11:32

## 2024-03-08 RX ADMIN — WITCH HAZEL 1 EACH: 500 SOLUTION RECTAL; TOPICAL at 15:16

## 2024-03-08 RX ADMIN — SODIUM CHLORIDE, POTASSIUM CHLORIDE, SODIUM LACTATE AND CALCIUM CHLORIDE 100 ML/HR: 600; 310; 30; 20 INJECTION, SOLUTION INTRAVENOUS at 13:09

## 2024-03-08 RX ADMIN — ACETAMINOPHEN 650 MG: 325 TABLET ORAL at 04:50

## 2024-03-08 RX ADMIN — KETOROLAC TROMETHAMINE 15 MG: 15 INJECTION, SOLUTION INTRAMUSCULAR; INTRAVENOUS at 17:56

## 2024-03-08 RX ADMIN — METHYLPREDNISOLONE SODIUM SUCCINATE 20 MG: 40 INJECTION, POWDER, FOR SOLUTION INTRAMUSCULAR; INTRAVENOUS at 22:53

## 2024-03-08 RX ADMIN — METHYLPREDNISOLONE SODIUM SUCCINATE 20 MG: 40 INJECTION, POWDER, FOR SOLUTION INTRAMUSCULAR; INTRAVENOUS at 14:33

## 2024-03-08 RX ADMIN — ACETAMINOPHEN 650 MG: 325 TABLET ORAL at 13:09

## 2024-03-08 RX ADMIN — ACETAMINOPHEN 650 MG: 325 TABLET ORAL at 22:56

## 2024-03-08 RX ADMIN — SODIUM CHLORIDE, POTASSIUM CHLORIDE, SODIUM LACTATE AND CALCIUM CHLORIDE 100 ML/HR: 600; 310; 30; 20 INJECTION, SOLUTION INTRAVENOUS at 21:47

## 2024-03-08 RX ADMIN — OXYCODONE HYDROCHLORIDE 5 MG: 5 TABLET ORAL at 06:22

## 2024-03-08 ASSESSMENT — PAIN SCALES - GENERAL
PAINLEVEL_OUTOF10: 6
PAINLEVEL_OUTOF10: 9
PAINLEVEL_OUTOF10: 5 - MODERATE PAIN
PAINLEVEL_OUTOF10: 4
PAINLEVEL_OUTOF10: 7
PAINLEVEL_OUTOF10: 3

## 2024-03-08 ASSESSMENT — PAIN - FUNCTIONAL ASSESSMENT
PAIN_FUNCTIONAL_ASSESSMENT: 0-10

## 2024-03-08 ASSESSMENT — PAIN DESCRIPTION - DESCRIPTORS
DESCRIPTORS: ACHING;DISCOMFORT
DESCRIPTORS: CRAMPING;DULL
DESCRIPTORS: ACHING;DISCOMFORT

## 2024-03-08 ASSESSMENT — PAIN DESCRIPTION - LOCATION
LOCATION: ABDOMEN
LOCATION: ABDOMEN

## 2024-03-08 NOTE — NURSING NOTE
Pt. Having difficult time coping with illness. Pt. Encourage to try cold packs and pain regiment ordered. Pt. Also had 2 bloody liquid stool. Pt. Not coping well with new Diagnosis and Illness. Holland Subramanian RN

## 2024-03-08 NOTE — CARE PLAN
The patient's goals for the shift include      The clinical goals for the shift include pain relief    Over the shift, the patient did not make progress toward the following goals. Barriers to progression include mobility. Recommendations to address these barriers include to move around more.

## 2024-03-08 NOTE — PROGRESS NOTES
Kyra Behnfeldt is a 23 y.o. female on day 1 of admission presenting with Crohn's colitis, unspecified complication (CMS/HCC).    Subjective   Overnight patient received 1 dose of oxycodone 5mg. Patient still reporting multiple bloody bowel movements. Has been lying still in a fetal position due to pain. Only tolerating clear liquids but has aphthous ulcurs in mouth.      Denied fever, chills, chest pain, shortness of breath, nausea, vomiting, constipation.       Objective   Vitals:    03/08/24 0845   BP: 115/75   Pulse: 96   Resp: 16   Temp: 36.5 °C (97.7 °F)   SpO2: 96%         Physical Exam  General: awake, alert, thin, NAD  HENT: NCAT, EOMI, small ulceration noted on right side of tongue  Eyes: no scleral icterus   Skin: no suspect lesions or rashes noted on visible skin  Cardiac: HR ~100, regular rhythm, normal S1, S2, no M/R/G  Pulm: CTAB, normal respiratory effort, on room air  Abdomen: soft, diffuse tenderness to palpation, no involuntary guarding or rebound tenderness  : no indwelling urinary catheter  EXT: no peripheral edema, no asymmetry noted  Neuro: AOx3, able to follow commands, no gross FND  Psych: coherent thought process, appropriate mood and affect       Labs:  Results for orders placed or performed during the hospital encounter of 03/07/24 (from the past 24 hour(s))   CBC and Auto Differential   Result Value Ref Range    WBC 14.2 (H) 4.4 - 11.3 x10*3/uL    nRBC 0.0 0.0 - 0.0 /100 WBCs    RBC 3.06 (L) 4.00 - 5.20 x10*6/uL    Hemoglobin 8.5 (L) 12.0 - 16.0 g/dL    Hematocrit 27.7 (L) 36.0 - 46.0 %    MCV 91 80 - 100 fL    MCH 27.8 26.0 - 34.0 pg    MCHC 30.7 (L) 32.0 - 36.0 g/dL    RDW 13.5 11.5 - 14.5 %    Platelets 353 150 - 450 x10*3/uL    Neutrophils %      Immature Granulocytes %, Automated      Lymphocytes %      Monocytes %      Eosinophils %      Basophils %      Neutrophils Absolute      Lymphocytes Absolute      Monocytes Absolute      Eosinophils Absolute      Basophils Absolute                            Assessment/Plan   Principal Problem:    Crohn's colitis, unspecified complication (CMS/HCC)      Kyra Behnfeldt is a 24 yo F w/ PMHx Crohn's colitis presenting as transfer from [facility] for Crohn's colitis and consideration of Remicade infusion. Patient presenting with severe colitis flare but without imaging findings of perforation/abscess, stricture/obstruction, or sinus tract/fistula. CRS following and offered DLI as treatment. Will trial remicade infusion as rescue therapy and monitor patient's clinical response.      Updates 3/8:  -continue to monitor clinical response to remicade - s/p day 1; no significant improvement in symptoms  -clear liquid diet, monitor PO intake  -IV fluids 100cc/hr LR  -pain control: tylenol 650mg Q6H, toradol 15mg for 3 days PRN; oxycodone 5mg for breakthrough pain  -continue IV solumedrol 20mg Q8H  -daily CRP trend       #Severe Crohn's colitis flare  ::MR enterography 3/6: colonic wall thickening involving transverse segment +/- the distal right and left colon in a single long continguous segment, no perforation/abscess, stricture/obstruction or sinus tract/fistula  ::Cdiff, stool pathogen panel negative 3/1  ::Calprotectin 1320 2/2/2024  ::[place] criteria A2L2B1  -F/u CMV biopsy - negative  -C/w solumedrol 20 mg q8h  -KUB on admission, if any signs of megacolon, notify colorectal surgery   -CRS aware of admission  -Tylenol 650 q4h prn   -BMX for oral ulcer, ENT consulted   -ordered Remicade infusion at 10mg/kg (500mg); monitor for any signs of infusion reaction  -clear liquid diet, monitor PO intake  -IV fluids 100cc/hr LR  -pain control: tylenol 650mg Q6H, toradol 15mg for 3 days PRN      #Acute blood loss anemia  #DELPHINE  ::S/p 2u pRBC at OSH on 3/4  ::Iron studies on 3/4: iron 20, TIBC 261, %sat 8, ferritin 17  ::S/p IV iron on 3/4  -Monitor CBC BID  -Transfuse hgb <7  -Maintain active T&S     #Insomnia  #Anxiety  -Melatonin, atarax prn      F: LR  100cc/hr  E: replete PRN  N: CLD  A: PIV    DVT PPx: SCDs     CODE STATUS: FULL CODE (confirmed on admission)  SURROGATE DECISION MAKER: Steven Behnfeldt (father) [phone] or Antoinette (mother) [phone]           Mickey ALVAREZ Do, MD

## 2024-03-09 ENCOUNTER — APPOINTMENT (OUTPATIENT)
Dept: RADIOLOGY | Facility: HOSPITAL | Age: 24
DRG: 330 | End: 2024-03-09
Payer: COMMERCIAL

## 2024-03-09 LAB
25(OH)D3 SERPL-MCNC: 13 NG/ML (ref 30–100)
ALBUMIN SERPL BCP-MCNC: 2.5 G/DL (ref 3.4–5)
ALP SERPL-CCNC: 53 U/L (ref 33–110)
ALT SERPL W P-5'-P-CCNC: 6 U/L (ref 7–45)
ANION GAP SERPL CALC-SCNC: 12 MMOL/L (ref 10–20)
APTT PPP: 23 SECONDS (ref 27–38)
AST SERPL W P-5'-P-CCNC: 9 U/L (ref 9–39)
BASOPHILS # BLD MANUAL: 0 X10*3/UL (ref 0–0.1)
BASOPHILS NFR BLD MANUAL: 0 %
BILIRUB DIRECT SERPL-MCNC: 0.1 MG/DL (ref 0–0.3)
BILIRUB SERPL-MCNC: 0.2 MG/DL (ref 0–1.2)
BUN SERPL-MCNC: 19 MG/DL (ref 6–23)
CALCIUM SERPL-MCNC: 8.3 MG/DL (ref 8.6–10.6)
CHLORIDE SERPL-SCNC: 101 MMOL/L (ref 98–107)
CO2 SERPL-SCNC: 28 MMOL/L (ref 21–32)
CREAT SERPL-MCNC: 0.61 MG/DL (ref 0.5–1.05)
CRP SERPL-MCNC: 7.25 MG/DL
EGFRCR SERPLBLD CKD-EPI 2021: >90 ML/MIN/1.73M*2
EOSINOPHIL # BLD MANUAL: 0 X10*3/UL (ref 0–0.7)
EOSINOPHIL NFR BLD MANUAL: 0 %
ERYTHROCYTE [DISTWIDTH] IN BLOOD BY AUTOMATED COUNT: 13.2 % (ref 11.5–14.5)
FOLATE SERPL-MCNC: 22.9 NG/ML
GLUCOSE SERPL-MCNC: 89 MG/DL (ref 74–99)
HCT VFR BLD AUTO: 25 % (ref 36–46)
HGB BLD-MCNC: 7.7 G/DL (ref 12–16)
IMM GRANULOCYTES # BLD AUTO: 0.07 X10*3/UL (ref 0–0.7)
IMM GRANULOCYTES NFR BLD AUTO: 0.5 % (ref 0–0.9)
INR PPP: 1.1 (ref 0.9–1.1)
LYMPHOCYTES # BLD MANUAL: 0.34 X10*3/UL (ref 1.2–4.8)
LYMPHOCYTES NFR BLD MANUAL: 2.6 %
MAGNESIUM SERPL-MCNC: 2.11 MG/DL (ref 1.6–2.4)
MCH RBC QN AUTO: 27.2 PG (ref 26–34)
MCHC RBC AUTO-ENTMCNC: 30.8 G/DL (ref 32–36)
MCV RBC AUTO: 88 FL (ref 80–100)
MONOCYTES # BLD MANUAL: 0.69 X10*3/UL (ref 0.1–1)
MONOCYTES NFR BLD MANUAL: 5.2 %
NEUTROPHILS # BLD MANUAL: 12.17 X10*3/UL (ref 1.2–7.7)
NEUTS BAND # BLD MANUAL: 1.58 X10*3/UL (ref 0–0.7)
NEUTS BAND NFR BLD MANUAL: 12 %
NEUTS SEG # BLD MANUAL: 10.59 X10*3/UL (ref 1.2–7)
NEUTS SEG NFR BLD MANUAL: 80.2 %
NRBC BLD-RTO: 0 /100 WBCS (ref 0–0)
OVALOCYTES BLD QL SMEAR: ABNORMAL
PHOSPHATE SERPL-MCNC: 3.7 MG/DL (ref 2.5–4.9)
PLATELET # BLD AUTO: 324 X10*3/UL (ref 150–450)
POLYCHROMASIA BLD QL SMEAR: ABNORMAL
POTASSIUM SERPL-SCNC: 4 MMOL/L (ref 3.5–5.3)
PREALB SERPL-MCNC: 15.9 MG/DL (ref 18–40)
PROT SERPL-MCNC: 5.2 G/DL (ref 6.4–8.2)
PROTHROMBIN TIME: 12.5 SECONDS (ref 9.8–12.8)
RBC # BLD AUTO: 2.83 X10*6/UL (ref 4–5.2)
RBC MORPH BLD: ABNORMAL
SODIUM SERPL-SCNC: 137 MMOL/L (ref 136–145)
STOMATOCYTES BLD QL SMEAR: ABNORMAL
TOTAL CELLS COUNTED BLD: 116
VIT B12 SERPL-MCNC: >2000 PG/ML (ref 211–911)
WBC # BLD AUTO: 13.2 X10*3/UL (ref 4.4–11.3)

## 2024-03-09 PROCEDURE — 85027 COMPLETE CBC AUTOMATED: CPT

## 2024-03-09 PROCEDURE — 84100 ASSAY OF PHOSPHORUS: CPT

## 2024-03-09 PROCEDURE — 74018 RADEX ABDOMEN 1 VIEW: CPT | Performed by: RADIOLOGY

## 2024-03-09 PROCEDURE — 99232 SBSQ HOSP IP/OBS MODERATE 35: CPT | Performed by: SURGERY

## 2024-03-09 PROCEDURE — 1170000001 HC PRIVATE ONCOLOGY ROOM DAILY

## 2024-03-09 PROCEDURE — 2500000005 HC RX 250 GENERAL PHARMACY W/O HCPCS

## 2024-03-09 PROCEDURE — 82607 VITAMIN B-12: CPT

## 2024-03-09 PROCEDURE — 82306 VITAMIN D 25 HYDROXY: CPT

## 2024-03-09 PROCEDURE — 84134 ASSAY OF PREALBUMIN: CPT

## 2024-03-09 PROCEDURE — 82746 ASSAY OF FOLIC ACID SERUM: CPT

## 2024-03-09 PROCEDURE — 85730 THROMBOPLASTIN TIME PARTIAL: CPT

## 2024-03-09 PROCEDURE — 80076 HEPATIC FUNCTION PANEL: CPT

## 2024-03-09 PROCEDURE — 2500000004 HC RX 250 GENERAL PHARMACY W/ HCPCS (ALT 636 FOR OP/ED)

## 2024-03-09 PROCEDURE — 86140 C-REACTIVE PROTEIN: CPT

## 2024-03-09 PROCEDURE — 2500000001 HC RX 250 WO HCPCS SELF ADMINISTERED DRUGS (ALT 637 FOR MEDICARE OP)

## 2024-03-09 PROCEDURE — 99232 SBSQ HOSP IP/OBS MODERATE 35: CPT

## 2024-03-09 PROCEDURE — 85007 BL SMEAR W/DIFF WBC COUNT: CPT

## 2024-03-09 PROCEDURE — 82374 ASSAY BLOOD CARBON DIOXIDE: CPT

## 2024-03-09 PROCEDURE — 36415 COLL VENOUS BLD VENIPUNCTURE: CPT

## 2024-03-09 PROCEDURE — 83735 ASSAY OF MAGNESIUM: CPT

## 2024-03-09 PROCEDURE — 74018 RADEX ABDOMEN 1 VIEW: CPT

## 2024-03-09 RX ORDER — MORPHINE SULFATE 4 MG/ML
4 INJECTION INTRAVENOUS EVERY 4 HOURS PRN
Status: DISCONTINUED | OUTPATIENT
Start: 2024-03-09 | End: 2024-03-10

## 2024-03-09 RX ORDER — TALC
3 POWDER (GRAM) TOPICAL DAILY
Status: CANCELLED | OUTPATIENT
Start: 2024-03-09

## 2024-03-09 RX ORDER — DEXTROSE MONOHYDRATE, SODIUM CHLORIDE, AND POTASSIUM CHLORIDE 50; 1.49; 4.5 G/1000ML; G/1000ML; G/1000ML
90 INJECTION, SOLUTION INTRAVENOUS CONTINUOUS
Status: DISCONTINUED | OUTPATIENT
Start: 2024-03-09 | End: 2024-03-10

## 2024-03-09 RX ORDER — ACETAMINOPHEN 325 MG/1
975 TABLET ORAL EVERY 6 HOURS PRN
Status: DISCONTINUED | OUTPATIENT
Start: 2024-03-09 | End: 2024-03-10

## 2024-03-09 RX ORDER — MORPHINE SULFATE 4 MG/ML
4 INJECTION INTRAVENOUS EVERY 4 HOURS PRN
Status: DISCONTINUED | OUTPATIENT
Start: 2024-03-09 | End: 2024-03-09

## 2024-03-09 RX ORDER — ACETAMINOPHEN 325 MG/1
975 TABLET ORAL EVERY 6 HOURS PRN
Status: DISCONTINUED | OUTPATIENT
Start: 2024-03-09 | End: 2024-03-09

## 2024-03-09 RX ORDER — SODIUM CHLORIDE, SODIUM LACTATE, POTASSIUM CHLORIDE, CALCIUM CHLORIDE 600; 310; 30; 20 MG/100ML; MG/100ML; MG/100ML; MG/100ML
100 INJECTION, SOLUTION INTRAVENOUS CONTINUOUS
Status: DISCONTINUED | OUTPATIENT
Start: 2024-03-09 | End: 2024-03-09

## 2024-03-09 RX ADMIN — SODIUM CHLORIDE, POTASSIUM CHLORIDE, SODIUM LACTATE AND CALCIUM CHLORIDE 100 ML/HR: 600; 310; 30; 20 INJECTION, SOLUTION INTRAVENOUS at 17:28

## 2024-03-09 RX ADMIN — ALPRAZOLAM 0.5 MG: 0.5 TABLET ORAL at 22:34

## 2024-03-09 RX ADMIN — WITCH HAZEL 1 EACH: 500 SOLUTION RECTAL; TOPICAL at 08:57

## 2024-03-09 RX ADMIN — MORPHINE SULFATE 4 MG: 4 INJECTION INTRAVENOUS at 08:53

## 2024-03-09 RX ADMIN — ACETAMINOPHEN 975 MG: 325 TABLET ORAL at 18:12

## 2024-03-09 RX ADMIN — METHYLPREDNISOLONE SODIUM SUCCINATE 20 MG: 40 INJECTION, POWDER, FOR SOLUTION INTRAMUSCULAR; INTRAVENOUS at 22:34

## 2024-03-09 RX ADMIN — Medication 10 ML: at 22:34

## 2024-03-09 RX ADMIN — SODIUM CHLORIDE, POTASSIUM CHLORIDE, SODIUM LACTATE AND CALCIUM CHLORIDE 100 ML/HR: 600; 310; 30; 20 INJECTION, SOLUTION INTRAVENOUS at 08:53

## 2024-03-09 RX ADMIN — KETOROLAC TROMETHAMINE 15 MG: 15 INJECTION, SOLUTION INTRAMUSCULAR; INTRAVENOUS at 13:40

## 2024-03-09 RX ADMIN — METHYLPREDNISOLONE SODIUM SUCCINATE 20 MG: 40 INJECTION, POWDER, FOR SOLUTION INTRAMUSCULAR; INTRAVENOUS at 08:19

## 2024-03-09 RX ADMIN — MORPHINE SULFATE 4 MG: 4 INJECTION INTRAVENOUS at 21:04

## 2024-03-09 RX ADMIN — KETOROLAC TROMETHAMINE 15 MG: 15 INJECTION, SOLUTION INTRAMUSCULAR; INTRAVENOUS at 04:27

## 2024-03-09 RX ADMIN — METHYLPREDNISOLONE SODIUM SUCCINATE 20 MG: 40 INJECTION, POWDER, FOR SOLUTION INTRAMUSCULAR; INTRAVENOUS at 15:52

## 2024-03-09 RX ADMIN — Medication 10 ML: at 13:40

## 2024-03-09 ASSESSMENT — PAIN - FUNCTIONAL ASSESSMENT
PAIN_FUNCTIONAL_ASSESSMENT: 0-10

## 2024-03-09 ASSESSMENT — COGNITIVE AND FUNCTIONAL STATUS - GENERAL
DAILY ACTIVITIY SCORE: 24
MOBILITY SCORE: 24

## 2024-03-09 ASSESSMENT — PAIN SCALES - GENERAL
PAINLEVEL_OUTOF10: 4
PAINLEVEL_OUTOF10: 7
PAINLEVEL_OUTOF10: 9
PAINLEVEL_OUTOF10: 4
PAINLEVEL_OUTOF10: 7
PAINLEVEL_OUTOF10: 6
PAINLEVEL_OUTOF10: 3
PAINLEVEL_OUTOF10: 3

## 2024-03-09 ASSESSMENT — PAIN DESCRIPTION - LOCATION
LOCATION: ABDOMEN

## 2024-03-09 NOTE — CARE PLAN
The patient's goals for the shift include pt. will have decreased pain to abdominal region    The clinical goals for the shift include pt. will tolerate meal    Over the shift, the patient did not make progress toward the following goals. Barriers to progression include pt.still having loose, bloody stool. Recommendations to address these barriers include pt. Still a discomfort and unable to regulate level pt. Where pt. can perform ADL's independently.    Problem: Pain  Goal: My pain/discomfort is manageable  Outcome: Not Progressing     Problem: Safety  Goal: Patient will be injury free during hospitalization  Outcome: Not Progressing  Goal: I will remain free of falls  Outcome: Not Progressing     Problem: Daily Care  Goal: Daily care needs are met  Outcome: Not Progressing     Problem: Psychosocial Needs  Goal: Demonstrates ability to cope with hospitalization/illness  Outcome: Not Progressing  Goal: Collaborate with me, my family, and caregiver to identify my specific goals  Outcome: Not Progressing     Problem: Discharge Barriers  Goal: My discharge needs are met  Outcome: Not Progressing

## 2024-03-09 NOTE — PROGRESS NOTES
Colorectal Surgery Consult Progress Note    Reason for consult: Crohn's disease.    HPI:     Subjective  Patient able to sleep overnight after taking morphine but states that she did have 7 bowel movements mixed with blood yesterday. Additionally states that pain this morning is severe.  Pain poorly controlled  Nausea well controlled, has not vomited  Having bowel movements  Voiding freely without issue  Ambulating independently    Objective  Physical Exam:  Gen: in no apparent distress and uncomfortable appearing. Mom present at the bedside  Resp: has a normal respiratory effort  Abd: Abdomen is soft and tender to palpation in the left lower quadrant without rebounding, guarding or signs of peritonitis .   Skin: Warm and dry  Extremities: Warm and dry, no edema.  Anorectal: Deferred.    I/O last 3 completed shifts:  In: 853.3 (17.1 mL/kg) [I.V.:853.3 (17.1 mL/kg)]  Out: - (0 mL/kg)   Weight: 49.9 kg   I/O this shift:  In: 800 [I.V.:800]  Out: -     Data Review:  CBC:   Lab Results   Component Value Date    WBC 13.2 (H) 03/09/2024    RBC 2.83 (L) 03/09/2024     BMP:   Lab Results   Component Value Date    GLUCOSE 89 03/09/2024    CO2 28 03/09/2024    BUN 19 03/09/2024    CREATININE 0.61 03/09/2024    CALCIUM 8.3 (L) 03/09/2024     Coagulation:   Lab Results   Component Value Date    INR 1.1 03/09/2024    APTT 23 (L) 03/09/2024       Imaging:  XR abdomen 1 view    Result Date: 3/8/2024  Interpreted By:  Esteban Alvarez, STUDY: XR ABDOMEN 1 VIEW;  3/8/2024 6:01 am   INDICATION: Signs/Symptoms:assess for megacolon/bowel obstruction.   COMPARISON: Abdomen 03/07/2024   ACCESSION NUMBER(S): [id number]   ORDERING CLINICIAN: BASSEM ZAFAR   FINDINGS: Nonobstructive bowel gas pattern. Limited evaluation of pneumoperitoneum on supine imaging, however no gross evidence of free air is noted.   Visualized lungs are clear.   Osseous structures demonstrate no acute bony changes.       1.  Normal intestinal gas pattern.   MACRO:  None   Signed by: Esteban Alvarez 3/8/2024 12:35 PM Dictation workstation:   [id number]       Assessment: 23F with Crohn's presenting with rectal bleeding and imaging showing acute pancolitis from rectum to proximal transverse colon, transferred from [facility] for remicade infusions. Colorectal surgery following for potential need for surgical treatment if patient develops toxic megacolon or in the case if patient does not respond to remicade (DLI offered as treatment). Patient with persistent abdominal pain and bloody bowel movements. Now agreeable to diverting loop ileostomy for colonic rest.     Plan:  Neuro: Continue current pain regimen with tylenol, toradol, and morphie. Xanax as needed for anxiety.  Resp: ICS  Card: qshift vitals  FEN: HLIV, replete lytes as needed  GI: FLD today, NPO at midnight, laparoscopic possible open DLI in AM with Dr. Castro. Methylpred 20mg q8h for Crohn's   : no cocnerns at this time  Heme/ID: no concerns at this time  Ppy: Lovenox 40mg daily  Dispo: Transfer care to colorectal surgery service    Plans discussed with staff, Dr. Castro.    Mackenzie A Simerlink, MD  Colorectal Surgery  [place] [facility] Pager [phone]

## 2024-03-09 NOTE — CARE PLAN
The patient's goals for the shift include pt. will have decreased number of bowel movement.    The clinical goals for the shift include pt will have decreased pain to abdominal region    Over the shift, the patient did not make progress toward the following goals. Barriers to progression include improving ,yet still having abdominal pain, and bloody stool. Recommendations to address these barriers include pt. Pending transfer to [facility] for further management.    Problem: Pain  Goal: My pain/discomfort is manageable  Outcome: Not Progressing     Problem: Safety  Goal: Patient will be injury free during hospitalization  Outcome: Not Progressing  Goal: I will remain free of falls  Outcome: Not Progressing     Problem: Daily Care  Goal: Daily care needs are met  Outcome: Not Progressing     Problem: Psychosocial Needs  Goal: Demonstrates ability to cope with hospitalization/illness  Outcome: Not Progressing  Goal: Collaborate with me, my family, and caregiver to identify my specific goals  Outcome: Not Progressing     Problem: Discharge Barriers  Goal: My discharge needs are met  Outcome: Not Progressing

## 2024-03-09 NOTE — PROGRESS NOTES
Kyra Behnfeldt is a 23 y.o. female on day 2 of admission presenting with Crohn's colitis, unspecified complication (CMS/HCC).    Subjective   Pt still have same abdominal pain, reports slightly better this morning. Had 7 bloody bowel movements overnight. Received IV Morphine 4 mg once for pain last night.        Objective   Vitals:    03/09/24 0800   BP: (!) 125/92   Pulse: 109   Resp: 16   Temp: 37.1 °C (98.8 °F)   SpO2: 100%         Physical Exam  General: awake, alert, thin, NAD  HENT: NCAT, EOMI, small ulceration noted on right side of tongue  Eyes: no scleral icterus   Skin: no suspect lesions or rashes noted on visible skin  Cardiac: regular rate/regular rhythm, normal S1, S2, no M/R/G  Pulm: CTAB, normal respiratory effort, on room air  Abdomen: soft, diffuse tenderness to palpation, no involuntary guarding or rebound tenderness  EXT: no peripheral edema, no asymmetry noted  Neuro: AOx3, able to follow commands, no gross FND  Psych: coherent thought process, appropriate mood and affect       Labs:  Results for orders placed or performed during the hospital encounter of 03/07/24 (from the past 24 hour(s))   CBC and Auto Differential   Result Value Ref Range    WBC 13.2 (H) 4.4 - 11.3 x10*3/uL    nRBC 0.0 0.0 - 0.0 /100 WBCs    RBC 2.83 (L) 4.00 - 5.20 x10*6/uL    Hemoglobin 7.7 (L) 12.0 - 16.0 g/dL    Hematocrit 25.0 (L) 36.0 - 46.0 %    MCV 88 80 - 100 fL    MCH 27.2 26.0 - 34.0 pg    MCHC 30.8 (L) 32.0 - 36.0 g/dL    RDW 13.2 11.5 - 14.5 %    Platelets 324 150 - 450 x10*3/uL    Neutrophils %      Immature Granulocytes %, Automated      Lymphocytes %      Monocytes %      Eosinophils %      Basophils %      Neutrophils Absolute      Lymphocytes Absolute      Monocytes Absolute      Eosinophils Absolute      Basophils Absolute     Hepatic Function Panel   Result Value Ref Range    Albumin 2.5 (L) 3.4 - 5.0 g/dL    Bilirubin, Total 0.2 0.0 - 1.2 mg/dL    Bilirubin, Direct 0.1 0.0 - 0.3 mg/dL    Alkaline  Phosphatase 53 33 - 110 U/L    ALT 6 (L) 7 - 45 U/L    AST 9 9 - 39 U/L    Total Protein 5.2 (L) 6.4 - 8.2 g/dL   Magnesium   Result Value Ref Range    Magnesium 2.11 1.60 - 2.40 mg/dL   Vitamin D 25-Hydroxy,Total (for eval of Vitamin D levels)   Result Value Ref Range    Vitamin D, 25-Hydroxy, Total 13 (L) 30 - 100 ng/mL   Vitamin B12   Result Value Ref Range    Vitamin B12 >2,000 (H) 211 - 911 pg/mL   Folate   Result Value Ref Range    Folate, Serum 22.9 >5.0 ng/mL   Prealbumin   Result Value Ref Range    Prealbumin 15.9 (L) 18.0 - 40.0 mg/dL   C-reactive protein   Result Value Ref Range    C-Reactive Protein 7.25 (H) <1.00 mg/dL   Phosphorus   Result Value Ref Range    Phosphorus 3.7 2.5 - 4.9 mg/dL   Basic Metabolic Panel   Result Value Ref Range    Glucose 89 74 - 99 mg/dL    Sodium 137 136 - 145 mmol/L    Potassium 4.0 3.5 - 5.3 mmol/L    Chloride 101 98 - 107 mmol/L    Bicarbonate 28 21 - 32 mmol/L    Anion Gap 12 10 - 20 mmol/L    Urea Nitrogen 19 6 - 23 mg/dL    Creatinine 0.61 0.50 - 1.05 mg/dL    eGFR >90 >60 mL/min/1.73m*2    Calcium 8.3 (L) 8.6 - 10.6 mg/dL               Scheduled medications  enoxaparin, 40 mg, subcutaneous, Daily  melatonin, 3 mg, oral, Daily  methylPREDNISolone sodium succinate (PF), 20 mg, intravenous, q8h      Continuous medications  lactated Ringer's, 100 mL/hr, Last Rate: 100 mL/hr (03/08/24 1076)      PRN medications  PRN medications: acetaminophen, ALPRAZolam, diphenhydrAMINE, guaiFENesin, hydrOXYzine HCL, ketorolac, lidocaine-diphenhydraMINE-Maalox 1:1:1, morphine, witch hazel     Assessment/Plan   Principal Problem:    Crohn's colitis, unspecified complication (CMS/HCC)  Active Problems:    Crohn's disease of colon with complication (CMS/HCC)      Kyra Behnfeldt is a 22 yo F w/ PMHx Crohn's colitis presenting as transfer from [facility] for Crohn's colitis and consideration of Remicade infusion. Patient presenting with severe colitis flare but without imaging findings of  perforation/abscess, stricture/obstruction, or sinus tract/fistula. CRS following and offered DLI as treatment. S/p remicade infusion on 3/7 and still having bloody bowel movements. CRS tentatively planning for surgery tomorrow.       Updates 3/9:  -Received IV Morphine 4 mg once last night    -pain control: tylenol 975mg Q6H PRN, toradol 15mg PRN and IV morphine 4 mg Q4H PRN   -continue IV solumedrol 20mg Q8H  -daily CRP trend, 7.25 today   -Pending surgery with colorectal surgery tomorrow, NPO at midnight   -KUB stable     #Severe Crohn's colitis flare  ::MR enterography 3/6: colonic wall thickening involving transverse segment +/- the distal right and left colon in a single long continguous segment, no perforation/abscess, stricture/obstruction or sinus tract/fistula  ::Cdiff, stool pathogen panel negative 3/1  ::Calprotectin 1320 2/2/2024  ::[place] criteria A2L2B1  ::CMV biopsy - negative  -C/w solumedrol 20 mg q8h  -KUB on admission, if any signs of megacolon, notify colorectal surgery   -BMX for oral ulcer, ENT signed off   -s/p Remicade infusion at 10mg/kg (500mg) on 3/7   -clear liquid diet, monitor PO intake  -pain control: tylenol 975mg Q6H, toradol 15mg PRN, Morphine  4mg Q4H PRN       #Acute blood loss anemia  #DELPHINE  ::S/p 2u pRBC at OSH on 3/4  ::Iron studies on 3/4: iron 20, TIBC 261, %sat 8, ferritin 17  ::S/p IV iron on 3/4  -Monitor CBC BID  -Transfuse hgb <7  -Maintain active T&S     #Insomnia  #Anxiety  -Melatonin, atarax prn      F:  ml/hr    E: replete PRN  N: NPO at midnight   A: PIV    DVT PPx: SCDs     CODE STATUS: FULL CODE (confirmed on admission)  SURROGATE DECISION MAKER: Sawyer Behnfeldt (father) [phone] or Antoinette (mother) [phone]           Rebecca Saez MD

## 2024-03-09 NOTE — NURSING NOTE
Pt. Transferred to [place] [address], report called to RN .Pt. Left floor accompanied with family, all her personal belongs and Pt. Transport. Holland Subramanian RN

## 2024-03-09 NOTE — NURSING NOTE
Pt transferred from [facility]. Report taken  from JOY Thompson. PT oriented to room, unit, call light.

## 2024-03-10 ENCOUNTER — ANESTHESIA (OUTPATIENT)
Dept: OPERATING ROOM | Facility: HOSPITAL | Age: 24
DRG: 330 | End: 2024-03-10
Payer: COMMERCIAL

## 2024-03-10 ENCOUNTER — APPOINTMENT (OUTPATIENT)
Dept: RADIOLOGY | Facility: HOSPITAL | Age: 24
DRG: 330 | End: 2024-03-10
Payer: COMMERCIAL

## 2024-03-10 ENCOUNTER — ANESTHESIA EVENT (OUTPATIENT)
Dept: OPERATING ROOM | Facility: HOSPITAL | Age: 24
DRG: 330 | End: 2024-03-10
Payer: COMMERCIAL

## 2024-03-10 PROBLEM — D64.9 ANEMIA: Status: ACTIVE | Noted: 2024-03-10

## 2024-03-10 LAB
ABO GROUP (TYPE) IN BLOOD: NORMAL
ABO GROUP (TYPE) IN BLOOD: NORMAL
ALBUMIN SERPL BCP-MCNC: 2.4 G/DL (ref 3.4–5)
ALP SERPL-CCNC: 54 U/L (ref 33–110)
ALT SERPL W P-5'-P-CCNC: 17 U/L (ref 7–45)
ANION GAP SERPL CALC-SCNC: 12 MMOL/L (ref 10–20)
ANTIBODY SCREEN: NORMAL
AST SERPL W P-5'-P-CCNC: 16 U/L (ref 9–39)
BASOPHILS # BLD MANUAL: 0 X10*3/UL (ref 0–0.1)
BASOPHILS NFR BLD MANUAL: 0 %
BILIRUB SERPL-MCNC: 0.2 MG/DL (ref 0–1.2)
BLOOD EXPIRATION DATE: NORMAL
BUN SERPL-MCNC: 17 MG/DL (ref 6–23)
CALCIUM SERPL-MCNC: 8.1 MG/DL (ref 8.6–10.6)
CHLORIDE SERPL-SCNC: 98 MMOL/L (ref 98–107)
CO2 SERPL-SCNC: 29 MMOL/L (ref 21–32)
CREAT SERPL-MCNC: 0.51 MG/DL (ref 0.5–1.05)
CRP SERPL-MCNC: 10.27 MG/DL
DISPENSE STATUS: NORMAL
EGFRCR SERPLBLD CKD-EPI 2021: >90 ML/MIN/1.73M*2
EOSINOPHIL # BLD MANUAL: 0 X10*3/UL (ref 0–0.7)
EOSINOPHIL NFR BLD MANUAL: 0 %
ERYTHROCYTE [DISTWIDTH] IN BLOOD BY AUTOMATED COUNT: 12.9 % (ref 11.5–14.5)
GLUCOSE SERPL-MCNC: 121 MG/DL (ref 74–99)
HCT VFR BLD AUTO: 20.9 % (ref 36–46)
HGB BLD-MCNC: 7.1 G/DL (ref 12–16)
IMM GRANULOCYTES # BLD AUTO: 0.12 X10*3/UL (ref 0–0.7)
IMM GRANULOCYTES NFR BLD AUTO: 0.9 % (ref 0–0.9)
LYMPHOCYTES # BLD MANUAL: 0.68 X10*3/UL (ref 1.2–4.8)
LYMPHOCYTES NFR BLD MANUAL: 5.3 %
MAGNESIUM SERPL-MCNC: 2.04 MG/DL (ref 1.6–2.4)
MCH RBC QN AUTO: 27.8 PG (ref 26–34)
MCHC RBC AUTO-ENTMCNC: 34 G/DL (ref 32–36)
MCV RBC AUTO: 82 FL (ref 80–100)
MONOCYTES # BLD MANUAL: 0.33 X10*3/UL (ref 0.1–1)
MONOCYTES NFR BLD MANUAL: 2.6 %
NEUTROPHILS # BLD MANUAL: 11.67 X10*3/UL (ref 1.2–7.7)
NEUTS BAND # BLD MANUAL: 1.01 X10*3/UL (ref 0–0.7)
NEUTS BAND NFR BLD MANUAL: 7.9 %
NEUTS SEG # BLD MANUAL: 10.66 X10*3/UL (ref 1.2–7)
NEUTS SEG NFR BLD MANUAL: 83.3 %
NRBC BLD-RTO: 0.2 /100 WBCS (ref 0–0)
PLATELET # BLD AUTO: 348 X10*3/UL (ref 150–450)
POTASSIUM SERPL-SCNC: 4.2 MMOL/L (ref 3.5–5.3)
PRODUCT BLOOD TYPE: 7300
PRODUCT CODE: NORMAL
PROT SERPL-MCNC: 4.9 G/DL (ref 6.4–8.2)
RBC # BLD AUTO: 2.55 X10*6/UL (ref 4–5.2)
RBC MORPH BLD: ABNORMAL
RH FACTOR (ANTIGEN D): NORMAL
RH FACTOR (ANTIGEN D): NORMAL
SODIUM SERPL-SCNC: 135 MMOL/L (ref 136–145)
TOTAL CELLS COUNTED BLD: 114
UNIT ABO: NORMAL
UNIT NUMBER: NORMAL
UNIT RH: NORMAL
UNIT VOLUME: 350
VARIANT LYMPHS # BLD MANUAL: 0.12 X10*3/UL (ref 0–0.5)
VARIANT LYMPHS NFR BLD: 0.9 %
WBC # BLD AUTO: 12.8 X10*3/UL (ref 4.4–11.3)
XM INTEP: NORMAL

## 2024-03-10 PROCEDURE — 74018 RADEX ABDOMEN 1 VIEW: CPT | Performed by: RADIOLOGY

## 2024-03-10 PROCEDURE — 3700000002 HC GENERAL ANESTHESIA TIME - EACH INCREMENTAL 1 MINUTE: Performed by: SURGERY

## 2024-03-10 PROCEDURE — 99232 SBSQ HOSP IP/OBS MODERATE 35: CPT | Performed by: INTERNAL MEDICINE

## 2024-03-10 PROCEDURE — 2500000001 HC RX 250 WO HCPCS SELF ADMINISTERED DRUGS (ALT 637 FOR MEDICARE OP)

## 2024-03-10 PROCEDURE — 7100000002 HC RECOVERY ROOM TIME - EACH INCREMENTAL 1 MINUTE: Performed by: SURGERY

## 2024-03-10 PROCEDURE — 99232 SBSQ HOSP IP/OBS MODERATE 35: CPT | Performed by: SURGERY

## 2024-03-10 PROCEDURE — 2500000004 HC RX 250 GENERAL PHARMACY W/ HCPCS (ALT 636 FOR OP/ED): Performed by: STUDENT IN AN ORGANIZED HEALTH CARE EDUCATION/TRAINING PROGRAM

## 2024-03-10 PROCEDURE — A4217 STERILE WATER/SALINE, 500 ML: HCPCS | Performed by: SURGERY

## 2024-03-10 PROCEDURE — A44310 PR ILEOSTOMY/JEJUNOSTOMY,NONTUBE: Performed by: STUDENT IN AN ORGANIZED HEALTH CARE EDUCATION/TRAINING PROGRAM

## 2024-03-10 PROCEDURE — 2500000004 HC RX 250 GENERAL PHARMACY W/ HCPCS (ALT 636 FOR OP/ED): Performed by: SURGERY

## 2024-03-10 PROCEDURE — 36415 COLL VENOUS BLD VENIPUNCTURE: CPT

## 2024-03-10 PROCEDURE — 3700000001 HC GENERAL ANESTHESIA TIME - INITIAL BASE CHARGE: Performed by: SURGERY

## 2024-03-10 PROCEDURE — 2500000005 HC RX 250 GENERAL PHARMACY W/O HCPCS: Performed by: ANESTHESIOLOGIST ASSISTANT

## 2024-03-10 PROCEDURE — 80069 RENAL FUNCTION PANEL: CPT | Mod: CCI

## 2024-03-10 PROCEDURE — 2500000005 HC RX 250 GENERAL PHARMACY W/O HCPCS

## 2024-03-10 PROCEDURE — 3600000004 HC OR TIME - INITIAL BASE CHARGE - PROCEDURE LEVEL FOUR: Performed by: SURGERY

## 2024-03-10 PROCEDURE — 36430 TRANSFUSION BLD/BLD COMPNT: CPT

## 2024-03-10 PROCEDURE — 85027 COMPLETE CBC AUTOMATED: CPT

## 2024-03-10 PROCEDURE — 74018 RADEX ABDOMEN 1 VIEW: CPT

## 2024-03-10 PROCEDURE — 2500000005 HC RX 250 GENERAL PHARMACY W/O HCPCS: Performed by: SURGERY

## 2024-03-10 PROCEDURE — P9016 RBC LEUKOCYTES REDUCED: HCPCS

## 2024-03-10 PROCEDURE — 2500000004 HC RX 250 GENERAL PHARMACY W/ HCPCS (ALT 636 FOR OP/ED): Performed by: ANESTHESIOLOGIST ASSISTANT

## 2024-03-10 PROCEDURE — 3600000009 HC OR TIME - EACH INCREMENTAL 1 MINUTE - PROCEDURE LEVEL FOUR: Performed by: SURGERY

## 2024-03-10 PROCEDURE — 2500000004 HC RX 250 GENERAL PHARMACY W/ HCPCS (ALT 636 FOR OP/ED)

## 2024-03-10 PROCEDURE — 80053 COMPREHEN METABOLIC PANEL: CPT

## 2024-03-10 PROCEDURE — 1170000001 HC PRIVATE ONCOLOGY ROOM DAILY

## 2024-03-10 PROCEDURE — 85007 BL SMEAR W/DIFF WBC COUNT: CPT

## 2024-03-10 PROCEDURE — 2500000001 HC RX 250 WO HCPCS SELF ADMINISTERED DRUGS (ALT 637 FOR MEDICARE OP): Performed by: STUDENT IN AN ORGANIZED HEALTH CARE EDUCATION/TRAINING PROGRAM

## 2024-03-10 PROCEDURE — 83735 ASSAY OF MAGNESIUM: CPT

## 2024-03-10 PROCEDURE — 86140 C-REACTIVE PROTEIN: CPT

## 2024-03-10 PROCEDURE — A44310 PR ILEOSTOMY/JEJUNOSTOMY,NONTUBE: Performed by: ANESTHESIOLOGIST ASSISTANT

## 2024-03-10 PROCEDURE — 2720000007 HC OR 272 NO HCPCS: Performed by: SURGERY

## 2024-03-10 PROCEDURE — 44187 LAP ILEO/JEJUNO-STOMY: CPT | Performed by: SURGERY

## 2024-03-10 PROCEDURE — 86900 BLOOD TYPING SEROLOGIC ABO: CPT

## 2024-03-10 PROCEDURE — 7100000001 HC RECOVERY ROOM TIME - INITIAL BASE CHARGE: Performed by: SURGERY

## 2024-03-10 RX ORDER — ONDANSETRON HYDROCHLORIDE 2 MG/ML
4 INJECTION, SOLUTION INTRAVENOUS EVERY 8 HOURS PRN
Status: DISCONTINUED | OUTPATIENT
Start: 2024-03-10 | End: 2024-03-17

## 2024-03-10 RX ORDER — METRONIDAZOLE 500 MG/100ML
INJECTION, SOLUTION INTRAVENOUS AS NEEDED
Status: DISCONTINUED | OUTPATIENT
Start: 2024-03-10 | End: 2024-03-10

## 2024-03-10 RX ORDER — MORPHINE SULFATE 2 MG/ML
1 INJECTION, SOLUTION INTRAMUSCULAR; INTRAVENOUS
Status: DISCONTINUED | OUTPATIENT
Start: 2024-03-10 | End: 2024-03-11

## 2024-03-10 RX ORDER — MORPHINE SULFATE 4 MG/ML
4 INJECTION INTRAVENOUS EVERY 4 HOURS PRN
Status: DISCONTINUED | OUTPATIENT
Start: 2024-03-10 | End: 2024-03-10 | Stop reason: HOSPADM

## 2024-03-10 RX ORDER — HYDROMORPHONE HYDROCHLORIDE 1 MG/ML
0.2 INJECTION, SOLUTION INTRAMUSCULAR; INTRAVENOUS; SUBCUTANEOUS
Status: DISCONTINUED | OUTPATIENT
Start: 2024-03-10 | End: 2024-03-10

## 2024-03-10 RX ORDER — OXYCODONE HYDROCHLORIDE 5 MG/1
10 TABLET ORAL EVERY 4 HOURS PRN
Status: DISCONTINUED | OUTPATIENT
Start: 2024-03-10 | End: 2024-03-11

## 2024-03-10 RX ORDER — SODIUM CHLORIDE 0.9 G/100ML
IRRIGANT IRRIGATION AS NEEDED
Status: DISCONTINUED | OUTPATIENT
Start: 2024-03-10 | End: 2024-03-10 | Stop reason: HOSPADM

## 2024-03-10 RX ORDER — NALOXONE HYDROCHLORIDE 0.4 MG/ML
0.2 INJECTION, SOLUTION INTRAMUSCULAR; INTRAVENOUS; SUBCUTANEOUS EVERY 5 MIN PRN
Status: DISCONTINUED | OUTPATIENT
Start: 2024-03-10 | End: 2024-03-18 | Stop reason: HOSPADM

## 2024-03-10 RX ORDER — CEFAZOLIN 1 G/1
INJECTION, POWDER, FOR SOLUTION INTRAVENOUS AS NEEDED
Status: DISCONTINUED | OUTPATIENT
Start: 2024-03-10 | End: 2024-03-10

## 2024-03-10 RX ORDER — LIDOCAINE HYDROCHLORIDE 10 MG/ML
0.1 INJECTION INFILTRATION; PERINEURAL ONCE
Status: DISCONTINUED | OUTPATIENT
Start: 2024-03-10 | End: 2024-03-10 | Stop reason: HOSPADM

## 2024-03-10 RX ORDER — METHOCARBAMOL 100 MG/ML
750 INJECTION, SOLUTION INTRAMUSCULAR; INTRAVENOUS ONCE
Status: DISCONTINUED | OUTPATIENT
Start: 2024-03-10 | End: 2024-03-10 | Stop reason: HOSPADM

## 2024-03-10 RX ORDER — GLYCOPYRROLATE 0.2 MG/ML
INJECTION INTRAMUSCULAR; INTRAVENOUS AS NEEDED
Status: DISCONTINUED | OUTPATIENT
Start: 2024-03-10 | End: 2024-03-10

## 2024-03-10 RX ORDER — MORPHINE SULFATE 2 MG/ML
2 INJECTION, SOLUTION INTRAMUSCULAR; INTRAVENOUS EVERY 5 MIN PRN
Status: DISCONTINUED | OUTPATIENT
Start: 2024-03-10 | End: 2024-03-10 | Stop reason: HOSPADM

## 2024-03-10 RX ORDER — LIDOCAINE HYDROCHLORIDE 20 MG/ML
INJECTION, SOLUTION INFILTRATION; PERINEURAL AS NEEDED
Status: DISCONTINUED | OUTPATIENT
Start: 2024-03-10 | End: 2024-03-10

## 2024-03-10 RX ORDER — OXYCODONE HYDROCHLORIDE 5 MG/1
10 TABLET ORAL EVERY 4 HOURS PRN
Status: DISCONTINUED | OUTPATIENT
Start: 2024-03-10 | End: 2024-03-18 | Stop reason: HOSPADM

## 2024-03-10 RX ORDER — SODIUM CHLORIDE, SODIUM LACTATE, POTASSIUM CHLORIDE, CALCIUM CHLORIDE 600; 310; 30; 20 MG/100ML; MG/100ML; MG/100ML; MG/100ML
20 INJECTION, SOLUTION INTRAVENOUS CONTINUOUS
Status: DISCONTINUED | OUTPATIENT
Start: 2024-03-10 | End: 2024-03-17

## 2024-03-10 RX ORDER — DEXAMETHASONE SODIUM PHOSPHATE 4 MG/ML
INJECTION, SOLUTION INTRA-ARTICULAR; INTRALESIONAL; INTRAMUSCULAR; INTRAVENOUS; SOFT TISSUE AS NEEDED
Status: DISCONTINUED | OUTPATIENT
Start: 2024-03-10 | End: 2024-03-10

## 2024-03-10 RX ORDER — ACETAMINOPHEN 10 MG/ML
1000 INJECTION, SOLUTION INTRAVENOUS EVERY 6 HOURS SCHEDULED
Status: DISPENSED | OUTPATIENT
Start: 2024-03-10 | End: 2024-03-11

## 2024-03-10 RX ORDER — SODIUM CHLORIDE, SODIUM LACTATE, POTASSIUM CHLORIDE, CALCIUM CHLORIDE 600; 310; 30; 20 MG/100ML; MG/100ML; MG/100ML; MG/100ML
INJECTION, SOLUTION INTRAVENOUS CONTINUOUS PRN
Status: DISCONTINUED | OUTPATIENT
Start: 2024-03-10 | End: 2024-03-10

## 2024-03-10 RX ORDER — MIDAZOLAM HYDROCHLORIDE 1 MG/ML
INJECTION INTRAMUSCULAR; INTRAVENOUS AS NEEDED
Status: DISCONTINUED | OUTPATIENT
Start: 2024-03-10 | End: 2024-03-10

## 2024-03-10 RX ORDER — ONDANSETRON 4 MG/1
4 TABLET, ORALLY DISINTEGRATING ORAL EVERY 8 HOURS PRN
Status: DISCONTINUED | OUTPATIENT
Start: 2024-03-10 | End: 2024-03-17

## 2024-03-10 RX ORDER — ACETAMINOPHEN 10 MG/ML
15 INJECTION, SOLUTION INTRAVENOUS ONCE
Status: DISCONTINUED | OUTPATIENT
Start: 2024-03-10 | End: 2024-03-10 | Stop reason: HOSPADM

## 2024-03-10 RX ORDER — BUPIVACAINE HYDROCHLORIDE 2.5 MG/ML
INJECTION, SOLUTION INFILTRATION; PERINEURAL AS NEEDED
Status: DISCONTINUED | OUTPATIENT
Start: 2024-03-10 | End: 2024-03-10 | Stop reason: HOSPADM

## 2024-03-10 RX ORDER — MORPHINE SULFATE 2 MG/ML
2 INJECTION, SOLUTION INTRAMUSCULAR; INTRAVENOUS EVERY 4 HOURS PRN
Status: DISCONTINUED | OUTPATIENT
Start: 2024-03-10 | End: 2024-03-10 | Stop reason: HOSPADM

## 2024-03-10 RX ORDER — MORPHINE SULFATE 4 MG/ML
4 INJECTION INTRAVENOUS EVERY 4 HOURS PRN
Status: DISCONTINUED | OUTPATIENT
Start: 2024-03-10 | End: 2024-03-10

## 2024-03-10 RX ORDER — SODIUM CHLORIDE, SODIUM LACTATE, POTASSIUM CHLORIDE, CALCIUM CHLORIDE 600; 310; 30; 20 MG/100ML; MG/100ML; MG/100ML; MG/100ML
100 INJECTION, SOLUTION INTRAVENOUS CONTINUOUS
Status: DISCONTINUED | OUTPATIENT
Start: 2024-03-10 | End: 2024-03-10 | Stop reason: HOSPADM

## 2024-03-10 RX ORDER — OXYCODONE HYDROCHLORIDE 5 MG/1
5 TABLET ORAL EVERY 4 HOURS PRN
Status: DISCONTINUED | OUTPATIENT
Start: 2024-03-10 | End: 2024-03-11

## 2024-03-10 RX ORDER — ONDANSETRON HYDROCHLORIDE 2 MG/ML
4 INJECTION, SOLUTION INTRAVENOUS ONCE AS NEEDED
Status: DISCONTINUED | OUTPATIENT
Start: 2024-03-10 | End: 2024-03-10 | Stop reason: HOSPADM

## 2024-03-10 RX ORDER — ONDANSETRON HYDROCHLORIDE 2 MG/ML
INJECTION, SOLUTION INTRAVENOUS AS NEEDED
Status: DISCONTINUED | OUTPATIENT
Start: 2024-03-10 | End: 2024-03-10

## 2024-03-10 RX ORDER — PROPOFOL 10 MG/ML
INJECTION, EMULSION INTRAVENOUS AS NEEDED
Status: DISCONTINUED | OUTPATIENT
Start: 2024-03-10 | End: 2024-03-10

## 2024-03-10 RX ORDER — ACETAMINOPHEN 325 MG/1
650 TABLET ORAL EVERY 6 HOURS
Status: DISCONTINUED | OUTPATIENT
Start: 2024-03-10 | End: 2024-03-13

## 2024-03-10 RX ORDER — HYDROMORPHONE HYDROCHLORIDE 1 MG/ML
INJECTION, SOLUTION INTRAMUSCULAR; INTRAVENOUS; SUBCUTANEOUS AS NEEDED
Status: DISCONTINUED | OUTPATIENT
Start: 2024-03-10 | End: 2024-03-10

## 2024-03-10 RX ORDER — FENTANYL CITRATE 50 UG/ML
INJECTION, SOLUTION INTRAMUSCULAR; INTRAVENOUS AS NEEDED
Status: DISCONTINUED | OUTPATIENT
Start: 2024-03-10 | End: 2024-03-10

## 2024-03-10 RX ORDER — ENOXAPARIN SODIUM 100 MG/ML
40 INJECTION SUBCUTANEOUS EVERY 24 HOURS
Status: DISCONTINUED | OUTPATIENT
Start: 2024-03-10 | End: 2024-03-18 | Stop reason: HOSPADM

## 2024-03-10 RX ORDER — MORPHINE SULFATE 4 MG/ML
4 INJECTION INTRAVENOUS EVERY 5 MIN PRN
Status: DISCONTINUED | OUTPATIENT
Start: 2024-03-10 | End: 2024-03-10 | Stop reason: HOSPADM

## 2024-03-10 RX ORDER — DIPHENHYDRAMINE HYDROCHLORIDE 50 MG/ML
12.5 INJECTION INTRAMUSCULAR; INTRAVENOUS ONCE AS NEEDED
Status: DISCONTINUED | OUTPATIENT
Start: 2024-03-10 | End: 2024-03-10 | Stop reason: HOSPADM

## 2024-03-10 RX ORDER — ROCURONIUM BROMIDE 10 MG/ML
INJECTION, SOLUTION INTRAVENOUS AS NEEDED
Status: DISCONTINUED | OUTPATIENT
Start: 2024-03-10 | End: 2024-03-10

## 2024-03-10 RX ORDER — OXYCODONE HYDROCHLORIDE 5 MG/1
5 TABLET ORAL EVERY 6 HOURS PRN
Status: DISCONTINUED | OUTPATIENT
Start: 2024-03-10 | End: 2024-03-16

## 2024-03-10 RX ADMIN — METHYLPREDNISOLONE SODIUM SUCCINATE 20 MG: 40 INJECTION, POWDER, FOR SOLUTION INTRAMUSCULAR; INTRAVENOUS at 18:07

## 2024-03-10 RX ADMIN — KETOROLAC TROMETHAMINE 15 MG: 15 INJECTION, SOLUTION INTRAMUSCULAR; INTRAVENOUS at 05:54

## 2024-03-10 RX ADMIN — GLYCOPYRROLATE 0.2 MG: 0.2 INJECTION INTRAMUSCULAR; INTRAVENOUS at 15:15

## 2024-03-10 RX ADMIN — HYDROMORPHONE HYDROCHLORIDE 1 MG: 1 INJECTION, SOLUTION INTRAMUSCULAR; INTRAVENOUS; SUBCUTANEOUS at 16:18

## 2024-03-10 RX ADMIN — SUGAMMADEX 200 MG: 100 INJECTION, SOLUTION INTRAVENOUS at 16:10

## 2024-03-10 RX ADMIN — SODIUM CHLORIDE, POTASSIUM CHLORIDE, SODIUM LACTATE AND CALCIUM CHLORIDE 40 ML/HR: 600; 310; 30; 20 INJECTION, SOLUTION INTRAVENOUS at 19:23

## 2024-03-10 RX ADMIN — PROPOFOL 150 MG: 10 INJECTION, EMULSION INTRAVENOUS at 15:18

## 2024-03-10 RX ADMIN — METRONIDAZOLE 500 MG: 500 INJECTION, SOLUTION INTRAVENOUS at 15:25

## 2024-03-10 RX ADMIN — METHYLPREDNISOLONE SODIUM SUCCINATE 20 MG: 40 INJECTION, POWDER, FOR SOLUTION INTRAMUSCULAR; INTRAVENOUS at 07:06

## 2024-03-10 RX ADMIN — POTASSIUM CHLORIDE, DEXTROSE MONOHYDRATE AND SODIUM CHLORIDE 90 ML/HR: 150; 5; 450 INJECTION, SOLUTION INTRAVENOUS at 00:50

## 2024-03-10 RX ADMIN — MORPHINE SULFATE 4 MG: 4 INJECTION, SOLUTION INTRAMUSCULAR; INTRAVENOUS at 14:55

## 2024-03-10 RX ADMIN — FENTANYL CITRATE 100 MCG: 50 INJECTION, SOLUTION INTRAMUSCULAR; INTRAVENOUS at 15:15

## 2024-03-10 RX ADMIN — MIDAZOLAM HYDROCHLORIDE 2 MG: 1 INJECTION, SOLUTION INTRAMUSCULAR; INTRAVENOUS at 15:15

## 2024-03-10 RX ADMIN — ACETAMINOPHEN 1000 MG: 10 INJECTION, SOLUTION INTRAVENOUS at 19:31

## 2024-03-10 RX ADMIN — Medication 10 ML: at 11:16

## 2024-03-10 RX ADMIN — OXYCODONE HYDROCHLORIDE 10 MG: 5 TABLET ORAL at 23:40

## 2024-03-10 RX ADMIN — SODIUM CHLORIDE, POTASSIUM CHLORIDE, SODIUM LACTATE AND CALCIUM CHLORIDE: 600; 310; 30; 20 INJECTION, SOLUTION INTRAVENOUS at 15:25

## 2024-03-10 RX ADMIN — LIDOCAINE HYDROCHLORIDE 50 MG: 20 INJECTION, SOLUTION INFILTRATION; PERINEURAL at 15:18

## 2024-03-10 RX ADMIN — OXYCODONE HYDROCHLORIDE 5 MG: 5 TABLET ORAL at 18:06

## 2024-03-10 RX ADMIN — MORPHINE SULFATE 4 MG: 4 INJECTION INTRAVENOUS at 10:46

## 2024-03-10 RX ADMIN — ONDANSETRON 4 MG: 2 INJECTION, SOLUTION INTRAMUSCULAR; INTRAVENOUS at 16:04

## 2024-03-10 RX ADMIN — DEXAMETHASONE SODIUM PHOSPHATE 6 MG: 4 INJECTION, SOLUTION INTRAMUSCULAR; INTRAVENOUS at 15:49

## 2024-03-10 RX ADMIN — ROCURONIUM BROMIDE 50 MG: 10 INJECTION, SOLUTION INTRAVENOUS at 15:20

## 2024-03-10 RX ADMIN — CEFAZOLIN 2 G: 330 INJECTION, POWDER, FOR SOLUTION INTRAMUSCULAR; INTRAVENOUS at 15:28

## 2024-03-10 RX ADMIN — ROCURONIUM BROMIDE 10 MG: 10 INJECTION, SOLUTION INTRAVENOUS at 15:56

## 2024-03-10 RX ADMIN — MIDAZOLAM HYDROCHLORIDE 2 MG: 1 INJECTION, SOLUTION INTRAMUSCULAR; INTRAVENOUS at 16:27

## 2024-03-10 RX ADMIN — PROPOFOL 50 MG: 10 INJECTION, EMULSION INTRAVENOUS at 15:37

## 2024-03-10 SDOH — HEALTH STABILITY: MENTAL HEALTH: CURRENT SMOKER: 0

## 2024-03-10 ASSESSMENT — PAIN DESCRIPTION - LOCATION
LOCATION: ABDOMEN

## 2024-03-10 ASSESSMENT — PAIN SCALES - GENERAL
PAINLEVEL_OUTOF10: 2
PAINLEVEL_OUTOF10: 0 - NO PAIN
PAINLEVEL_OUTOF10: 7
PAINLEVEL_OUTOF10: 7
PAINLEVEL_OUTOF10: 0 - NO PAIN
PAINLEVEL_OUTOF10: 2
PAINLEVEL_OUTOF10: 4
PAINLEVEL_OUTOF10: 6
PAINLEVEL_OUTOF10: 2
PAINLEVEL_OUTOF10: 4
PAINLEVEL_OUTOF10: 0 - NO PAIN
PAINLEVEL_OUTOF10: 6
PAINLEVEL_OUTOF10: 6

## 2024-03-10 ASSESSMENT — PAIN - FUNCTIONAL ASSESSMENT
PAIN_FUNCTIONAL_ASSESSMENT: 0-10

## 2024-03-10 ASSESSMENT — PAIN SCALES - PAIN ASSESSMENT IN ADVANCED DEMENTIA (PAINAD)
TOTALSCORE: 0
BODYLANGUAGE: RELAXED
FACIALEXPRESSION: SMILING OR INEXPRESSIVE
CONSOLABILITY: NO NEED TO CONSOLE
BREATHING: NORMAL

## 2024-03-10 ASSESSMENT — PAIN DESCRIPTION - ORIENTATION: ORIENTATION: MID

## 2024-03-10 NOTE — ANESTHESIA POSTPROCEDURE EVALUATION
Patient: Kyra Behnfeldt    Procedure Summary       Date: 03/10/24 Room / Location: [facility] OR 11 / [facility]    Anesthesia Start: 1459 Anesthesia Stop: 1632    Procedure: Creation Ileostomy Diagnosis:       Crohn's colitis, unspecified complication (CMS/HCC)      (Crohn's colitis, unspecified complication (CMS/HCC) [K50.119])    Surgeons: Lorena Castro MD Responsible Provider: Husam Hannah DO    Anesthesia Type: general ASA Status: 3            Anesthesia Type: general    Vitals Value Taken Time   /88 03/10/24 1626   Temp 36.7 °C (98.1 °F) 03/10/24 1626   Pulse 86 03/10/24 1626   Resp 18 03/10/24 1626   SpO2 98 03/10/24 1640       Anesthesia Post Evaluation    Patient location during evaluation: PACU  Patient participation: complete - patient participated  Level of consciousness: awake  Pain management: adequate  Multimodal analgesia pain management approach  Airway patency: patent  Two or more strategies used to mitigate risk of obstructive sleep apnea  Cardiovascular status: acceptable  Respiratory status: acceptable  Hydration status: acceptable  Postoperative Nausea and Vomiting: none        No notable events documented.

## 2024-03-10 NOTE — BRIEF OP NOTE
Date: 3/10/2024  OR Location: [facility] OR    Name: Kyra Behnfeldt, : 2000, Age: 23 y.o., MRN: [id number], Sex: female    Diagnosis  Pre-op Diagnosis     * Crohn's colitis, unspecified complication (CMS/HCC) [K50.119] Post-op Diagnosis     * Crohn's colitis, unspecified complication (CMS/HCC) [K50.119]     Procedures  Creation Ileostomy  21742 - OR ILEOSTOMY/JEJUNOSTOMY NON-TUBE      Surgeons      * Lorena Castro - Primary     * Ernesto Castro    Resident/Fellow/Other Assistant:  Surgeon(s) and Role:     * Ernesto Castro MD    Procedure Summary  Anesthesia: General  ASA: III  Anesthesia Staff: Anesthesiologist: Husam Hannah DO  C-AA: MERCEDES Hadley  Estimated Blood Loss: 10mL  Intra-op Medications:   Administrations occurring from 1335 to 1725 on 03/10/24:   Medication Name Total Dose   sodium chloride 0.9 % irrigation solution 1,000 mL   BUPivacaine HCl (Marcaine) 0.25 % (2.5 mg/mL) injection 10 mL   methylPREDNISolone sod succinate (PF) (SOLU-Medrol) 40 mg/mL injection 20 mg Cannot be calculated   morphine injection 4 mg 4 mg              Anesthesia Record               Intraprocedure I/O Totals          Intake    Transfuse .30 mL    Total Intake 123.3 mL          Specimen: No specimens collected     Staff:   Circulator: Sb Rogers RN  Scrub Person: Cammie Khan RN          Findings: Distal Ileum located and brought up through RUQ for loop ileostomy.     Complications:  None; patient tolerated the procedure well.     Disposition: PACU - hemodynamically stable.  Condition: stable  Specimens Collected: No specimens collected    Ernesto Castro MD  General Surgery Resident  Colorectal    Attending Attestation: I was present and scrubbed for the entire procedure.    Lorena Castro  Phone Number: [phone]

## 2024-03-10 NOTE — ANESTHESIA PROCEDURE NOTES
Peripheral IV  Date/Time: 3/10/2024 3:20 PM  Inserted by: Husam Hannah DO    Placement  Needle size: 16 G  Laterality: left  Location: wrist  Local anesthetic: none  Technique: anatomical landmarks  Attempts: 1

## 2024-03-10 NOTE — OP NOTE
Creation Laparoscopic Ileostomy Operative Note     Date: 3/10/2024  OR Location: [facility] OR    Name: Kyra Behnfeldt, : 2000, Age: 23 y.o., MRN: [id number], Sex: female    Diagnosis  Pre-op Diagnosis     * Crohn's colitis, unspecified complication (CMS/HCC) [K50.119] Post-op Diagnosis     * Crohn's colitis, unspecified complication (CMS/HCC) [K50.119]     Procedures  Creation Ileostomy  31752 - [place] ILEOSTOMY/JEJUNOSTOMY NON-TUBE      Surgeons      * Lorena Haas-Ocuin - Primary     * Ernesto Castro    Resident/Fellow/Other Assistant:  Surgeon(s) and Role:     * Ernesto Castro MD    Procedure Summary  Anesthesia: General  ASA: III  Anesthesia Staff: Anesthesiologist: Husam Hannah DO  C-AA: MERCEDES Hadley  Estimated Blood Loss: 1 mL  Intra-op Medications:   Administrations occurring from 1335 to 1725 on 03/10/24:   Medication Name Total Dose   sodium chloride 0.9 % irrigation solution 1,000 mL   BUPivacaine HCl (Marcaine) 0.25 % (2.5 mg/mL) injection 10 mL   methylPREDNISolone sod succinate (PF) (SOLU-Medrol) 40 mg/mL injection 20 mg Cannot be calculated   morphine injection 4 mg 4 mg              Anesthesia Record               Intraprocedure I/O Totals          Intake    Transfuse .30 mL    lactated Ringer's 400.00 mL    Total Intake 523.3 mL       Output    Est. Blood Loss 5 mL    Total Output 5 mL       Net    Net Volume 518.3 mL          Specimen: No specimens collected     Staff:   Circulator: Sb Rogers RN  Scrub Person: Cammie Khan RN         Drains and/or Catheters:   Ileostomy RLQ (Active)   Stomal Appliance Clean;Dry;Intact 03/10/24 1630   Site/Stoma Assessment Red 03/10/24 1602   Peristomal Assessment Clean;Intact 03/10/24 1630   Output Description Liquid 03/10/24 1602         Findings: normal small bowel, inflamed colon without obvious ischemia     Indications: Kyra Behnfeldt is an 23 y.o. female who is having surgery for Crohn's colitis, unspecified complication  (CMS/[facility]) [K50.119].     The patient was seen in the preoperative area. The risks, benefits, complications, treatment options, non-operative alternatives, expected recovery and outcomes were discussed with the patient. The possibilities of reaction to medication, pulmonary aspiration, injury to surrounding structures, bleeding, recurrent infection, the need for additional procedures, failure to diagnose a condition, and creating a complication requiring transfusion or operation were discussed with the patient. The patient concurred with the proposed plan, giving informed consent.  The site of surgery was properly noted/marked if necessary per policy. The patient has been actively warmed in preoperative area. Preoperative antibiotics have been ordered and given within 1 hours of incision. Venous thrombosis prophylaxis have been ordered including bilateral sequential compression devices    Procedure Details:     Informed consent was obtained including discussion of risks, benefits, and alternatives. The patient was brought to the operating room and placed supine. Sequential compression devices were placed. Perioperative antibiotics were given. Subcutaneous heparin was given.  Huddle was completed in accordance with hospital procedures. Anesthesia was induced and endotracheal tube was placed. The patient was placed in lithotomy with all pressure points padded.     The abdomen was prepped and draped in the usual fashion.     A supraumbilical incision was made and carried down the fascia. The umbilical stalk was everted and the fascia grasped and opened under direct vision. Laparoscopic trocar was placed and the abdomen was insufflated. There was no evidence of injury from trocar placement. The abdomen was surveyed. The colon was inflamed. The small bowel appeared normal.     A disc of skin was excised and the soft tissues divided. The fascia was opened and the rectus split. The posterior sheath was opened and a 5 mm  balloon trocar inserted.  The abdomen was insufflated. The laparoscope was inserted and the abdomen examined. There was no evidence of injury, An additional 5 mm trocar was placed in the left abdomen.      A loop of bowel about 20cm from the ileocecal valve reached the premarked stoma site.  The bowel was grapsed and elevated. The posterior sheath was opened. A [place] clamp was placed through and the abdominal wall and grasped the bowel which was then exteriorized. The loop of bowel easily reached and came through without any tension or twisting. The bowel fit through the aperture easily. The orientation was examined from the inside. There was no abdominal wall bleeding. A stoma terri was placed.     The abdomen was desufflated. The skin was closed with 4-0 monocryl. Sterile glue was applied. The stoma was matured in standard fashion with 3-0 chromic. A stoma appliance was placed.     Anesthesia was weaned and the endotracheal tube was removed. The patient was transferred to PACU awake and in stable conditions. Counts were reported correct at the end of the procedure. I was present and scrubbed throughout.    Complications:  None; patient tolerated the procedure well.    Disposition: PACU - hemodynamically stable.  Condition: stable         Additional Details: blood transfusion began preoperatively was completed intraop    Attending Attestation: I was present and scrubbed for the entire procedure.    Lorena Castro  Phone Number: [phone]

## 2024-03-10 NOTE — INTERVAL H&P NOTE
H&P reviewed. The patient was examined and there are no changes to the H&P. Patient is appropriate for OR this AM.       Discussed with Attending Physician    Michelle Florence MD  General Surgery PGY 5  Colorectal Surgery   [facility] [id number]

## 2024-03-10 NOTE — PROGRESS NOTES
Colorectal Surgery Daily Progress Note      Subjective  IRINA ON. Reports that pain was initially better over the last couple days, but is starting to get worse now. Pain control is better if she isn't moving around or eating. Continues to have significantly bloody stools    Current Meds  Scheduled medications  acetaminophen, 1,000 mg, intravenous, q6h MATTIE  enoxaparin, 40 mg, subcutaneous, Daily  melatonin, 3 mg, oral, Daily  methylPREDNISolone sodium succinate (PF), 20 mg, intravenous, q8h      Continuous medications  potassium [id number]-G7-8.45%NaCl, 90 mL/hr, Last Rate: 90 mL/hr (03/10/24 0309)      PRN medications  PRN medications: ALPRAZolam, diphenhydrAMINE, guaiFENesin, hydrOXYzine HCL, lidocaine-diphenhydraMINE-Maalox 1:1:1, morphine, oxyCODONE, oxyCODONE, witch hazel    Objective    Vitals:   Temp:  [36.1 °C (97 °F)-37.6 °C (99.7 °F)] 36.1 °C (97 °F)  Heart Rate:  [] 101  Resp:  [13-16] 13  BP: (111-130)/(74-83) 111/74      I/O  I/O last 3 completed shifts:  In: 2628.2 (52.7 mL/kg) [P.O.:300; I.V.:1861.7 (37.3 mL/kg); IV Piggyback:466.5]  Out: 1 (0 mL/kg) [Stool:1]  Weight: 49.9 kg       Physical Exam  General: laying in bed, NAD  Head: normocephalic, atraumatic  ENT: mucosa are moist   Respiratory: nonlabored breathing on room air  CV: RRR  GI: abdomen is soft, mildly tender across lower abdomen without rebound or guarding, nondistended  MSK: TRENT  Extremities: no swelling or deformity of b/l LEs   Neuro: CN II-XII grossly intact. Sensation to light touch intact    Labs:  Lab Results   Component Value Date    WBC 12.8 (H) 03/10/2024    HGB 7.1 (L) 03/10/2024    HCT 20.9 (L) 03/10/2024    MCV 82 03/10/2024     03/10/2024     Lab Results   Component Value Date    GLUCOSE 121 (H) 03/10/2024    CALCIUM 8.1 (L) 03/10/2024     (L) 03/10/2024    K 4.2 03/10/2024    CO2 29 03/10/2024    CL 98 03/10/2024    BUN 17 03/10/2024    CREATININE 0.51 03/10/2024     Lab Results   Component Value Date    ALT  17 03/10/2024    AST 16 03/10/2024    ALKPHOS 54 03/10/2024    BILITOT 0.2 03/10/2024     Results from last 7 days   Lab Units 03/09/24  0550   APTT seconds 23*   INR  1.1             Imaging  XR abdomen 1 view    Result Date: 3/10/2024  Interpreted By:  Jadon Villa, STUDY: XR ABDOMEN 1 VIEW; 3/10/2024 11:00 am   INDICATION: Signs/Symptoms:toxic megacolon.   COMPARISON: 01/10/2017   ACCESSION NUMBER(S): [id number]   ORDERING CLINICIAN: DENISE EDGAR   FINDINGS: Air-filled loops of small and large bowel. No bowel distention. Distal rectal gas.. Limited evaluation of pneumoperitoneum on supine imaging, however no gross evidence of free air is noted.   Visualized lungs are clear.   Osseous structures demonstrate no acute bony changes.       1.  Nonobstructive bowel gas pattern.   Signed by: Jadon Villa 3/10/2024 12:05 PM Dictation workstation:   [id number]    XR abdomen 1 view    Result Date: 3/9/2024  Interpreted By:  Jadon Villa, STUDY: XR ABDOMEN 1 VIEW; 3/9/2024 5:40 am   INDICATION: Signs/Symptoms:interval assessment of chron's flare; r/o obstruction/perforation/abscess.   COMPARISON: 03/08/2024.   ACCESSION NUMBER(S): [id number]   ORDERING CLINICIAN: BASSEM ZAFAR   FINDINGS: Nonobstructive bowel gas pattern. Limited evaluation of pneumoperitoneum on supine imaging, however no gross evidence of free air is noted.   Visualized lungs are clear.   Osseous structures demonstrate no acute bony changes.       1. Nonobstructive bowel gas pattern.   Signed by: Jadon Villa 3/9/2024 3:44 PM Dictation workstation:   [id number]      Assessment:  Ms. Behnfeldt, 23yoF presenting as transfer from [facility] on 3/7 for Crohn's colitis with marked abdominal pain and bloody stools. She is currently undergoing remicade rescue therapy with modest improvement in symptoms. No evidence of toxic megacolon on KUB today. Given that her symptoms persist and CRP is increased this AM, will pursue DLI today.     Plan:    Neuro:   - stop toradol   - tylenol, morphine, and oxy for pain control   - continue xanax and atarax  - continue melatonin  - benadryl PRN for itching     CV:  - HDS     Heme:   - PRBC x1 for anemia     Resp:  - IS   - mucinex PRN for runny nose     GI: appreciate GI recs   - NPO   - OR today   - daily CRP   - daily KUB   - next dose of remicade likely tomorrow   - continue steroids   - magic mouthwash for aphthous ulcers   - tucks pads PRN for hemorrhoids   - dietary supplements post op     :  - strict I/O   - will replace lytes PRN   - mIVF     MSK:  - OOB ad amber    Ppx:  - SCD   - Lvx     Dispo: RNF       Discussed with Attending Physician    Michelle Florence MD  General Surgery PGY 5  Colorectal Surgery   [facility] [id number]

## 2024-03-10 NOTE — ANESTHESIA PROCEDURE NOTES
Airway  Date/Time: 3/10/2024 3:22 PM  Urgency: elective    Airway not difficult    Staffing  Performed: MERCEDES   Authorized by: Husam Hannah DO    Performed by: MERCEDES Hadley  Patient location during procedure: OR    Indications and Patient Condition  Indications for airway management: anesthesia  Spontaneous Ventilation: absent  Sedation level: deep  Preoxygenated: yes  Patient position: sniffing  Mask difficulty assessment: 1 - vent by mask    Final Airway Details  Final airway type: endotracheal airway      Successful airway: ETT  Cuffed: yes   Successful intubation technique: direct laryngoscopy  Endotracheal tube insertion site: oral  Blade: Violeta  Blade size: #4  ETT size (mm): 7.0  Cormack-Lehane Classification: grade I - full view of glottis  Placement verified by: chest auscultation   Measured from: lips  ETT to lips (cm): 22  Number of attempts at approach: 1  Ventilation between attempts: BVM    Additional Comments  Easy by CAA, no stylet used

## 2024-03-10 NOTE — ANESTHESIA PREPROCEDURE EVALUATION
Patient: Kyra Behnfeldt    Procedure Information       Anesthesia Start Date/Time: 03/10/24 1439    Procedure: Creation Ileostomy    Location: [facility] OR 11 / [facility] OR    Surgeons: Lorena Castro MD            Relevant Problems   Anesthesia (within normal limits)   (-) Malignant hyperthermia      Cardiovascular (within normal limits)      Endocrine (within normal limits)      GI   (+) Crohn's colitis, unspecified complication (CMS/HCC)   (+) Crohn's colitis, with rectal bleeding (CMS/HCC)   (+) Crohn's disease of colon with complication (CMS/HCC)      /Renal (within normal limits)      Neuro/Psych (within normal limits)      Pulmonary (within normal limits)      GI/Hepatic (within normal limits)      Hematology   (+) Anemia      Musculoskeletal (within normal limits)      Eyes, Ears, Nose, and Throat (within normal limits)      Infectious Disease (within normal limits)       Clinical information reviewed:   Tobacco  Allergies  Meds   Med Hx  Surg Hx  OB Status  Fam Hx  Soc   Hx        NPO Detail:  NPO/Void Status  Date of Last Liquid: 03/09/24  Time of Last Liquid: 2300  Date of Last Solid: 03/09/24  Time of Last Solid: 2300         Physical Exam    Airway  Mallampati: II  TM distance: >3 FB  Neck ROM: full     Cardiovascular    Dental - normal exam     Pulmonary    Abdominal            Anesthesia Plan    History of general anesthesia?: yes  History of complications of general anesthesia?: no    ASA 3     general     The patient is not a current smoker.    intravenous induction   Postoperative administration of opioids is intended.  Trial extubation is planned.  Anesthetic plan and risks discussed with patient.  Use of blood products discussed with patient who consented to blood products.    Plan discussed with CAA.

## 2024-03-11 ENCOUNTER — APPOINTMENT (OUTPATIENT)
Dept: RADIOLOGY | Facility: HOSPITAL | Age: 24
DRG: 330 | End: 2024-03-11
Payer: COMMERCIAL

## 2024-03-11 LAB
ALBUMIN SERPL BCP-MCNC: 2.4 G/DL (ref 3.4–5)
ALBUMIN SERPL BCP-MCNC: 2.4 G/DL (ref 3.4–5)
ANION GAP SERPL CALC-SCNC: 13 MMOL/L (ref 10–20)
ANION GAP SERPL CALC-SCNC: 14 MMOL/L (ref 10–20)
BASOPHILS # BLD AUTO: 0.01 X10*3/UL (ref 0–0.1)
BASOPHILS NFR BLD AUTO: 0.1 %
BUN SERPL-MCNC: 14 MG/DL (ref 6–23)
BUN SERPL-MCNC: 16 MG/DL (ref 6–23)
CALCIUM SERPL-MCNC: 7.7 MG/DL (ref 8.6–10.6)
CALCIUM SERPL-MCNC: 8.3 MG/DL (ref 8.6–10.6)
CHLORIDE SERPL-SCNC: 100 MMOL/L (ref 98–107)
CHLORIDE SERPL-SCNC: 99 MMOL/L (ref 98–107)
CO2 SERPL-SCNC: 27 MMOL/L (ref 21–32)
CO2 SERPL-SCNC: 29 MMOL/L (ref 21–32)
CREAT SERPL-MCNC: 0.48 MG/DL (ref 0.5–1.05)
CREAT SERPL-MCNC: 0.48 MG/DL (ref 0.5–1.05)
CRP SERPL-MCNC: 7.52 MG/DL
EGFRCR SERPLBLD CKD-EPI 2021: >90 ML/MIN/1.73M*2
EGFRCR SERPLBLD CKD-EPI 2021: >90 ML/MIN/1.73M*2
EOSINOPHIL # BLD AUTO: 0 X10*3/UL (ref 0–0.7)
EOSINOPHIL NFR BLD AUTO: 0 %
ERYTHROCYTE [DISTWIDTH] IN BLOOD BY AUTOMATED COUNT: 13.1 % (ref 11.5–14.5)
GLUCOSE SERPL-MCNC: 108 MG/DL (ref 74–99)
GLUCOSE SERPL-MCNC: 123 MG/DL (ref 74–99)
HCT VFR BLD AUTO: 27 % (ref 36–46)
HGB BLD-MCNC: 8.7 G/DL (ref 12–16)
IMM GRANULOCYTES # BLD AUTO: 0.1 X10*3/UL (ref 0–0.7)
IMM GRANULOCYTES NFR BLD AUTO: 0.9 % (ref 0–0.9)
LYMPHOCYTES # BLD AUTO: 0.81 X10*3/UL (ref 1.2–4.8)
LYMPHOCYTES NFR BLD AUTO: 7.1 %
MAGNESIUM SERPL-MCNC: 2.36 MG/DL (ref 1.6–2.4)
MCH RBC QN AUTO: 29 PG (ref 26–34)
MCHC RBC AUTO-ENTMCNC: 32.2 G/DL (ref 32–36)
MCV RBC AUTO: 90 FL (ref 80–100)
MONOCYTES # BLD AUTO: 1.12 X10*3/UL (ref 0.1–1)
MONOCYTES NFR BLD AUTO: 9.8 %
NEUTROPHILS # BLD AUTO: 9.39 X10*3/UL (ref 1.2–7.7)
NEUTROPHILS NFR BLD AUTO: 82.1 %
NRBC BLD-RTO: 0.2 /100 WBCS (ref 0–0)
PHOSPHATE SERPL-MCNC: 3.5 MG/DL (ref 2.5–4.9)
PHOSPHATE SERPL-MCNC: 3.6 MG/DL (ref 2.5–4.9)
PLATELET # BLD AUTO: 324 X10*3/UL (ref 150–450)
POTASSIUM SERPL-SCNC: 4.3 MMOL/L (ref 3.5–5.3)
POTASSIUM SERPL-SCNC: 4.3 MMOL/L (ref 3.5–5.3)
RBC # BLD AUTO: 3 X10*6/UL (ref 4–5.2)
RBC MORPH BLD: NORMAL
SODIUM SERPL-SCNC: 135 MMOL/L (ref 136–145)
SODIUM SERPL-SCNC: 139 MMOL/L (ref 136–145)
WBC # BLD AUTO: 11.4 X10*3/UL (ref 4.4–11.3)

## 2024-03-11 PROCEDURE — 2500000001 HC RX 250 WO HCPCS SELF ADMINISTERED DRUGS (ALT 637 FOR MEDICARE OP)

## 2024-03-11 PROCEDURE — 2500000004 HC RX 250 GENERAL PHARMACY W/ HCPCS (ALT 636 FOR OP/ED)

## 2024-03-11 PROCEDURE — 99233 SBSQ HOSP IP/OBS HIGH 50: CPT

## 2024-03-11 PROCEDURE — 36415 COLL VENOUS BLD VENIPUNCTURE: CPT

## 2024-03-11 PROCEDURE — 1170000001 HC PRIVATE ONCOLOGY ROOM DAILY

## 2024-03-11 PROCEDURE — 74018 RADEX ABDOMEN 1 VIEW: CPT | Performed by: RADIOLOGY

## 2024-03-11 PROCEDURE — 2500000004 HC RX 250 GENERAL PHARMACY W/ HCPCS (ALT 636 FOR OP/ED): Performed by: STUDENT IN AN ORGANIZED HEALTH CARE EDUCATION/TRAINING PROGRAM

## 2024-03-11 PROCEDURE — 80069 RENAL FUNCTION PANEL: CPT

## 2024-03-11 PROCEDURE — 74018 RADEX ABDOMEN 1 VIEW: CPT

## 2024-03-11 PROCEDURE — 2500000001 HC RX 250 WO HCPCS SELF ADMINISTERED DRUGS (ALT 637 FOR MEDICARE OP): Performed by: STUDENT IN AN ORGANIZED HEALTH CARE EDUCATION/TRAINING PROGRAM

## 2024-03-11 PROCEDURE — 99232 SBSQ HOSP IP/OBS MODERATE 35: CPT | Performed by: INTERNAL MEDICINE

## 2024-03-11 PROCEDURE — 2500000004 HC RX 250 GENERAL PHARMACY W/ HCPCS (ALT 636 FOR OP/ED): Performed by: NURSE PRACTITIONER

## 2024-03-11 PROCEDURE — 83735 ASSAY OF MAGNESIUM: CPT

## 2024-03-11 PROCEDURE — 2500000004 HC RX 250 GENERAL PHARMACY W/ HCPCS (ALT 636 FOR OP/ED): Mod: JZ | Performed by: INTERNAL MEDICINE

## 2024-03-11 PROCEDURE — 85025 COMPLETE CBC W/AUTO DIFF WBC: CPT

## 2024-03-11 RX ORDER — MORPHINE SULFATE 4 MG/ML
4 INJECTION INTRAVENOUS
Status: DISCONTINUED | OUTPATIENT
Start: 2024-03-11 | End: 2024-03-14

## 2024-03-11 RX ORDER — MORPHINE SULFATE 2 MG/ML
2 INJECTION, SOLUTION INTRAMUSCULAR; INTRAVENOUS ONCE
Status: COMPLETED | OUTPATIENT
Start: 2024-03-11 | End: 2024-03-11

## 2024-03-11 RX ORDER — ACETAMINOPHEN 325 MG/1
650 TABLET ORAL ONCE
Status: COMPLETED | OUTPATIENT
Start: 2024-03-11 | End: 2024-03-11

## 2024-03-11 RX ORDER — METHYLPREDNISOLONE 4 MG/1
20 TABLET ORAL 3 TIMES DAILY
Status: DISCONTINUED | OUTPATIENT
Start: 2024-03-11 | End: 2024-03-12

## 2024-03-11 RX ORDER — DIPHENHYDRAMINE HYDROCHLORIDE 50 MG/ML
50 INJECTION INTRAMUSCULAR; INTRAVENOUS ONCE
Status: COMPLETED | OUTPATIENT
Start: 2024-03-11 | End: 2024-03-11

## 2024-03-11 RX ADMIN — METHYLPREDNISOLONE 20 MG: 4 TABLET ORAL at 20:07

## 2024-03-11 RX ADMIN — SODIUM CHLORIDE 500 MG: 9 INJECTION, SOLUTION INTRAVENOUS at 16:18

## 2024-03-11 RX ADMIN — MORPHINE SULFATE 4 MG: 4 INJECTION, SOLUTION INTRAMUSCULAR; INTRAVENOUS at 20:08

## 2024-03-11 RX ADMIN — METHYLPREDNISOLONE SODIUM SUCCINATE 20 MG: 40 INJECTION, POWDER, FOR SOLUTION INTRAMUSCULAR; INTRAVENOUS at 09:45

## 2024-03-11 RX ADMIN — OXYCODONE HYDROCHLORIDE 10 MG: 5 TABLET ORAL at 09:46

## 2024-03-11 RX ADMIN — ACETAMINOPHEN 650 MG: 325 TABLET ORAL at 21:57

## 2024-03-11 RX ADMIN — OXYCODONE HYDROCHLORIDE 10 MG: 5 TABLET ORAL at 21:58

## 2024-03-11 RX ADMIN — DIPHENHYDRAMINE HYDROCHLORIDE 50 MG: 50 INJECTION INTRAMUSCULAR; INTRAVENOUS at 15:29

## 2024-03-11 RX ADMIN — ACETAMINOPHEN 1000 MG: 10 INJECTION, SOLUTION INTRAVENOUS at 01:44

## 2024-03-11 RX ADMIN — ALPRAZOLAM 0.5 MG: 0.5 TABLET ORAL at 21:57

## 2024-03-11 RX ADMIN — ACETAMINOPHEN 650 MG: 325 TABLET ORAL at 15:29

## 2024-03-11 RX ADMIN — ALPRAZOLAM 0.5 MG: 0.5 TABLET ORAL at 01:48

## 2024-03-11 RX ADMIN — OXYCODONE HYDROCHLORIDE 10 MG: 5 TABLET ORAL at 15:39

## 2024-03-11 RX ADMIN — MORPHINE SULFATE 1 MG: 2 INJECTION, SOLUTION INTRAMUSCULAR; INTRAVENOUS at 10:52

## 2024-03-11 RX ADMIN — MORPHINE SULFATE 2 MG: 2 INJECTION, SOLUTION INTRAMUSCULAR; INTRAVENOUS at 11:48

## 2024-03-11 RX ADMIN — METHYLPREDNISOLONE SODIUM SUCCINATE 20 MG: 40 INJECTION, POWDER, FOR SOLUTION INTRAMUSCULAR; INTRAVENOUS at 01:43

## 2024-03-11 RX ADMIN — ACETAMINOPHEN 1000 MG: 10 INJECTION, SOLUTION INTRAVENOUS at 07:39

## 2024-03-11 RX ADMIN — MORPHINE SULFATE 2 MG: 2 INJECTION, SOLUTION INTRAMUSCULAR; INTRAVENOUS at 11:22

## 2024-03-11 ASSESSMENT — PAIN SCALES - GENERAL
PAINLEVEL_OUTOF10: 7
PAINLEVEL_OUTOF10: 7
PAINLEVEL_OUTOF10: 5 - MODERATE PAIN
PAINLEVEL_OUTOF10: 7
PAINLEVEL_OUTOF10: 7
PAINLEVEL_OUTOF10: 6
PAINLEVEL_OUTOF10: 7
PAINLEVEL_OUTOF10: 7
PAINLEVEL_OUTOF10: 6
PAINLEVEL_OUTOF10: 7
PAINLEVEL_OUTOF10: 8

## 2024-03-11 ASSESSMENT — PAIN - FUNCTIONAL ASSESSMENT
PAIN_FUNCTIONAL_ASSESSMENT: 0-10

## 2024-03-11 ASSESSMENT — PAIN DESCRIPTION - LOCATION
LOCATION: ABDOMEN
LOCATION: RECTUM
LOCATION: RECTUM
LOCATION: ABDOMEN
LOCATION: ABDOMEN

## 2024-03-11 ASSESSMENT — COGNITIVE AND FUNCTIONAL STATUS - GENERAL
MOVING TO AND FROM BED TO CHAIR: A LITTLE
MOBILITY SCORE: 23
DAILY ACTIVITIY SCORE: 22
DRESSING REGULAR LOWER BODY CLOTHING: A LITTLE
DRESSING REGULAR UPPER BODY CLOTHING: A LITTLE

## 2024-03-11 NOTE — CARE PLAN
The patient's goals for the shift include pt. will have decreased number of bowel movement.    The clinical goals for the shift include pt will start to be hands on with ostomy care 3/11/24 1600    Over the shift, the patient did not make progress toward the following goals. Barriers to progression include ***. Recommendations to address these barriers include ***.

## 2024-03-11 NOTE — PROGRESS NOTES
03/07/24 1551   Discharge Planning   Living Arrangements Other (Comment)  (roommate)   Support Systems Parent   Assistance Needed Independent   Type of Residence Private residence   Number of Stairs to Enter Residence 2   Number of Stairs Within Residence 12   Home or Post Acute Services None   Patient expects to be discharged to: Home   Does the patient need discharge transport arranged? No   Financial Resource Strain   How hard is it for you to pay for the very basics like food, housing, medical care, and heating? Not hard   Housing Stability   In the last 12 months, was there a time when you were not able to pay the mortgage or rent on time? N   In the last 12 months, was there a time when you did not have a steady place to sleep or slept in a shelter (including now)? N   Transportation Needs   In the past 12 months, has lack of transportation kept you from medical appointments or from getting medications? no   In the past 12 months, has lack of transportation kept you from meetings, work, or from getting things needed for daily living? No     Transitional Care Coordination Progress Note:  Patient discussed during interdisciplinary rounds.   Team members present: COLTON CORTES  Plan per Medical/Surgical team: Chron's colitis  Payor: Joshua  Discharge disposition: Home  Potential Barriers: none  ADOD: 1-2 days    Previous Home Care: NA  DME: NA  [facility]  Falls: Denies  PCP:  new pcp appointment scheduled for 3/18    Met with patient and parents at bedside, provided introduction of self and role. Patient states she lives at home with a roommate; safe at home. Patient independent for adls, no assistive devices. Patient states she normally drives self to appointments. No concerns obtaining/affording medications. Patient states no social/financial concerns. Family to provide transport home at time of discharge. Patient with no anticipated needs at discharge. Will continue to monitor for discharge  planning needs.     Antoinette LIM, RN  Transitional Care Coordinator (TCC)  [phone]    3/11/24 11am- TCC met with patient at bedside to discuss anticipated discharge needs. Patient states she lives at home with a roommate; safe at home. Patient independent for adls, no assistive devices. Patient states she normally drives self to appointments. No concerns obtaining/affording medications. Patient states no social/financial concerns. Family to provide transport home at time of discharge. Patient with no anticipated needs at discharge. Will continue to monitor for discharge planning needs.   Clarissa De Dios RN TCC

## 2024-03-11 NOTE — CONSULTS
Wound Care Consult     Visit Date: 3/11/2024      Patient Name: Kyra Behnfeldt         MRN: [id number]           YOB: 2000     Reason for Consult: New diverting loop ileostomy        Wound History: Hx Chron's disease      Ostomy Assessment:    Ostomy type: diverting loop ileostomy       size: SHAN     color: beefy red and moist       protruding: budded   Ad: white ad. Very loose   Functioning: green effluent   Mucocutaneous junction: intact   Peristomal skin: SHAN  Pouchin piece [employer] Active life  Ostomy Education: Patient is POD #1. Will begin education on POD#2  Plan: assess stoma/pouching    Wound Team Plan: See the patient on 3/12 for ostomy education     Charly Ryder RN-BC, CWON  3/11/2024  5:52 PM

## 2024-03-11 NOTE — CONSULTS
Gastroenterology Consult Service  Department of Gastroenterology & Hepatology  [facility]    [facility]  Date of Service  March 10, 2024   Patient: Kyra Behnfeldt    Medical Record: [id number]  Reason for Consult: Crohn's colitis  Requesting Service: Colorectal service    History of Present Illness:  Kyra Behnfeldt is a 23 y.o. female with a past medical history of Crohn's colitis presenting as transfer from [facility] for Crohn's colitis and consideration of Remicade infusion.     Patient initially presented at [facility] 3/1 with rectal bleeding, anemia, mild malnutrition, and transferred to [facility] 3/7 for remicade therapy. Pt was diagnosed with Crohn's colitis in Dec 2023 by colonoscopy (rectum and Transverse colon)  On IV steroids her symptoms are controlled, but symptoms relapse on oral steroids. She started Humira 2/3 (loading doses 2/3 and 2/17) but had 8-10 bloody BM's/day on 40mg prednisone daily. She has been hospitalized 3 times in the past few weeks.      At [facility] she was started on 20mg IV solumedrol TID. C diff and infectious panel negative. Flex sigmoidoscope done which showed pancolitis from rectum to proximal transverse colon. MRE confirmed findings and showed no significant small bowel inflammation. [facility] team planned to start patient on IV remicade and watch closely for toxic colitis. Patient preferred to avoid an ostomy if possible to wanted to wait on DLI. [facility] does not stock remicade rescue, and so patient transferred to [facility]      Patient received blood transfusions at [facility] and one dose IV venofer. CRP increased from 0.62 3/4 to 6.59 3/5 and 6.95 3/6.     Colorectal surgery was consulted for potential need for surgical treatment if patient develops toxic megacolon or in the case if patient does not respond to remicade (DLI offered as treatment). Patient would like to have a diversion ostomy which is planned for today. Patient will get second dose of Humira  tomorrow.         Prev endoscopic eval:    Flex sigmoidoscopy 3/4/24:     Impression  · Severe abnormal mucosa in the transverse colon, descending colon, sigmoid colon, rectosigmoid and rectum, consistent with Crohn's disease; performed cold forceps biopsy  · Small hemorrhoids        Past Medical History:    Past Medical History:   Diagnosis Date    Benign neoplasm of cecum     CC (Crohn's colitis) (CMS/HCC)     Colitis     Generalized abdominal pain     Hypokalemia     Internal hemorrhoid     Rectal bleeding        Past Surgical History:  Past Surgical History:   Procedure Laterality Date    COLONOSCOPY         Family History:  No family history on file.    Social History:  Social History     Tobacco Use    Smoking status: Never     Passive exposure: Never    Smokeless tobacco: Never   Substance Use Topics    Alcohol use: Yes     Comment: occasional       Allergies:  No Known Allergies    Medications:  Prior to Admission Medications:  Current Outpatient Medications   Medication Instructions    acetaminophen (TYLENOL) 650 mg, oral, Every 4 hours PRN    adalimumab (HUMIRA,CF, PEN SUBQ) 40 mg, subcutaneous, Every 14 days    ALPRAZolam (XANAX) 0.5 mg, oral, 3 times daily PRN    cyclobenzaprine (FLEXERIL) 10 mg, oral, 2 times daily PRN    dicyclomine (BENTYL) 10 mg, oral, 4 times daily PRN    famotidine (PEPCID) 20 mg, oral, 2 times daily    hydrOXYzine HCL (ATARAX) 50 mg, oral, Every 8 hours PRN    melatonin 3 mg, oral, Daily    mesalamine (CANASA) 1,000 mg, rectal, 2 times daily    methylPREDNISolone sodium succinate (SOLU-MEDROL) 20 mg, intravenous, Every 8 hours    morphine 4 mg, intravenous, Every 4 hours PRN    morphine 2 mg, intravenous, Every 4 hours PRN    ondansetron (ZOFRAN) 4 mg, intravenous, Every 6 hours PRN         Current Facility-Administered Medications:     acetaminophen (Ofirmev) injection 1,000 mg, 1,000 mg, intravenous, q6h [facility], Kathy Pal MD, 1,000 mg at 03/11/24 0144    acetaminophen (Tylenol)  tablet 650 mg, 650 mg, oral, q6h, Ernesto Castro MD    ALPRAZolam (Xanax) tablet 0.5 mg, 0.5 mg, oral, TID PRN, Kathy Pal MD, 0.5 mg at 03/11/24 0148    enoxaparin (Lovenox) syringe 40 mg, 40 mg, subcutaneous, q24h, Ernesto Castro MD    guaiFENesin (Mucinex) 12 hr tablet 600 mg, 600 mg, oral, q12h PRN, Kathy Pal MD    hydrOXYzine HCL (Atarax) tablet 50 mg, 50 mg, oral, q8h PRN, Kathy Pal MD, 50 mg at 03/08/24 1312    lactated Ringer's infusion, 40 mL/hr, intravenous, Continuous, Ernesto Castro MD, Last Rate: 40 mL/hr at 03/10/24 2228, 40 mL/hr at 03/10/24 2228    lidocaine-diphenhydrAMINE-Maalox 1:1:1 Magic Mouthwash, 10 mL, Swish & Spit, q4h PRN, Kathy Pal MD, 10 mL at 03/10/24 1116    melatonin tablet 3 mg, 3 mg, oral, Daily, Kathy Pal MD    methylPREDNISolone sod succinate (PF) (SOLU-Medrol) 40 mg/mL injection 20 mg, 20 mg, intravenous, q8h, Ernesto Castro MD, 20 mg at 03/11/24 0143    morphine injection 1 mg, 1 mg, intravenous, q3h PRN, Kathy Pal MD    naloxone (Narcan) injection 0.2 mg, 0.2 mg, intravenous, q5 min PRN, Ernesto Castro MD    ondansetron ODT (Zofran-ODT) disintegrating tablet 4 mg, 4 mg, oral, q8h PRN **OR** ondansetron (Zofran) injection 4 mg, 4 mg, intravenous, q8h PRN, Ernesto Castro MD    oxyCODONE (Roxicodone) immediate release tablet 10 mg, 10 mg, oral, q4h PRN, Kathy Pal MD, 10 mg at 03/10/24 2340    oxyCODONE (Roxicodone) immediate release tablet 10 mg, 10 mg, oral, q4h PRN, Ernesto Castro MD    oxyCODONE (Roxicodone) immediate release tablet 5 mg, 5 mg, oral, q4h PRN, aKthy Pal MD    oxyCODONE (Roxicodone) immediate release tablet 5 mg, 5 mg, oral, q6h PRN, Ernesto Castro MD, 5 mg at 03/10/24 1806    witch hazel (Tucks) pads 1 each, 1 each, Topical, 4x daily PRN, Kathy Pal MD, 1 each at 03/09/24 0857    Pertinent Review of Systems:      Her 12 point ROS is negative except as above.     Physical Exam:    Vital Signs:    Vitals:    03/10/24 1749 03/10/24  2136 03/11/24 0156 03/11/24 0600   BP: 116/74 117/74 113/77 119/78   BP Location:  Left arm Left arm Left arm   Patient Position:  Lying Lying Lying   Pulse: 88 89 67 82   Resp: 16 16     Temp: 36.3 °C (97.3 °F) 36.5 °C (97.7 °F) 36.7 °C (98.1 °F) 36.3 °C (97.3 °F)   TempSrc: Temporal Temporal Temporal Temporal   SpO2: 96% 98% 99% 99%   Weight:       Height:           General appearance: well appearing, no acute distress  Skin: no jaundice  Head: normal  Eyes: anicteric sclera  Lungs: lungs clear to auscultation, no wheezing or rhonchi  Heart: RRR without murmur, gallop, or rubs.  No ectopy  Abdomen: Normal abdominal exam, Abdomen soft, diffuse tender. Bowel sounds normal. No masses, organomegaly  Extremities: Extremities normal. No lower extremity edema.  Neuro: AOx3.    Diagnostic Testing:    Labs:  Lab Results   Component Value Date    WBC 12.8 (H) 03/10/2024    HGB 7.1 (L) 03/10/2024    HCT 20.9 (L) 03/10/2024    MCV 82 03/10/2024     03/10/2024     Lab Results   Component Value Date    GLUCOSE 121 (H) 03/10/2024    CALCIUM 8.1 (L) 03/10/2024     (L) 03/10/2024    K 4.2 03/10/2024    CO2 29 03/10/2024    CL 98 03/10/2024    BUN 17 03/10/2024    CREATININE 0.51 03/10/2024       Reviewed:  Recent pertinent imaging/procedures    Assessment:     Kyra Behnfeldt is a 22 yo F w/ PMHx Crohn's colitis presenting as transfer from [facility] for Crohn's colitis and consideration of Remicade infusion. Patient presenting with severe colitis flare but without imaging findings of perforation/abscess, stricture/obstruction, or sinus tract/fistula. CRS following and offered DLI today. S/p remicade infusion on 3/7 and still having bloody bowel movements.       Plan:        - Will monitor for symptoms after the diversion ileostomy.   - Patient will require second dose of remicaide tomorrow.   - Continue IV solumedrol 20mg Q8H  - Daily CRP trend  - LEANNA per primary team.       Patient seen and staffed with   Vane.    Thank you for the consultation. Gastroenterology will continue to the follow the patient.   Please do not hesitate to contact me or page [phone] if there are any further questions between the weekday hours of 7 AM - 5 PM.   If there is an urgent concern during the weekend, after-hours, or holidays; then please page the [place] [phone]. Thank you.    SIGNATURE: Esther Guerrero MD PATIENT NAME: Kyra Behnfeldt   DATE: March 10, 2024 MRN: [id number]

## 2024-03-11 NOTE — CARE PLAN
Problem: Pain  Goal: My pain/discomfort is manageable  Outcome: Progressing     Problem: Safety  Goal: Patient will be injury free during hospitalization  Outcome: Progressing  Goal: I will remain free of falls  Outcome: Progressing     Problem: Daily Care  Goal: Daily care needs are met  Outcome: Progressing     The clinical goals for the shift include Patient's pain will be controlled and rate pain 3 out of 10 or less throughtout shift

## 2024-03-11 NOTE — PROGRESS NOTES
[facility]  [facility] [place]  CONSULT FOLLOW-UP         SUBJECTIVE     Interval Events/Subjective: Patient underwent diverting ileostomy yesterday, complaining of pain at the surgical site, otherwise rectal pain has improved.  Scheduled to get second dose of infliximab today.      Medications:  acetaminophen, 1,000 mg, intravenous, q6h MATTIE  acetaminophen, 650 mg, oral, q6h  acetaminophen, 650 mg, oral, Once  diphenhydrAMINE, 50 mg, intravenous, Once  enoxaparin, 40 mg, subcutaneous, q24h  inFLIXimab, 10 mg/kg, intravenous, Once  melatonin, 3 mg, oral, Daily  methylPREDNISolone sodium succinate (PF), 20 mg, intravenous, q8h      PRN medications: ALPRAZolam, guaiFENesin, hydrOXYzine HCL, lidocaine-diphenhydraMINE-Maalox 1:1:1, morphine, naloxone, ondansetron ODT **OR** ondansetron, oxyCODONE, oxyCODONE, witch hazel      EXAM     Physical Exam     General: NAD, AA&O x 3  Eyes: EOMI, PERRLA  ENT: MMM  Heart: RRR  Lungs: No respiratory distress  Abdomen: Soft, non tender, non distended, diverging ileostomy  Skin: No jaundice   Neuro: Appropriately responds to questions/commands         DATA                                                                            Labs     Lab Results   Component Value Date    WBC 11.4 (H) 03/11/2024    HGB 8.7 (L) 03/11/2024    HCT 27.0 (L) 03/11/2024    MCV 90 03/11/2024     03/11/2024        Lab Results   Component Value Date    GLUCOSE 121 (H) 03/10/2024    GLUCOSE 123 (H) 03/10/2024    CALCIUM 8.1 (L) 03/10/2024    CALCIUM 7.7 (L) 03/10/2024     (L) 03/10/2024     (L) 03/10/2024    K 4.2 03/10/2024    K 4.3 03/10/2024    CO2 29 03/10/2024    CO2 27 03/10/2024    CL 98 03/10/2024    CL 99 03/10/2024    BUN 17 03/10/2024    BUN 16 03/10/2024    CREATININE 0.51 03/10/2024    CREATININE 0.48 (L) 03/10/2024                                                                               Imaging   MR enterography 3/6: colonic  wall thickening involving transverse segment +/- the distal right and left colon in a single long continguous segment, no perforation/abscess, stricture/obstruction or sinus tract/fistula.                                                                         GI Procedures     Flex sigmoidoscopy 3/4/24:      Impression  ·             Severe abnormal mucosa in the transverse colon, descending colon, sigmoid colon, rectosigmoid and rectum, consistent with Crohn's disease; performed cold forceps biopsy  ·             Small hemorrhoids      ASSESSMENT / PLAN     Assessment and Recommendations:   Kyra Behnfeldt is a 24 yo F w/ PMHx Crohn's colitis presenting as transfer from [facility] for Crohn's colitis and consideration of Remicade infusion. Patient presenting with severe colitis flare but without imaging findings of perforation/abscess, stricture/obstruction, or sinus tract/fistula. CRS following and offered DLI as treatment. Patient was started on remicade infusion on 3/7 and still had bloody bowel movements, underwent diversion ileostomy on 3/10.     Plan:   - Will administer second dose of infliximab today.   - Switch the patient to oral prednisone 40 mg once daily with 5 mg weekly taper.   - Continue to monitor her symptoms.   - GI will continue to follow.         LEANNA per primary team     ------------------------------------------------------------------------  Esther Guerrero MD  Gastroenterology Fellow  After 5PM and on Weekends, please page on-call fellow.    Discussed and seen with service attending Dr. Cifuentes  Final recommendations pending attending attestation.

## 2024-03-11 NOTE — SIGNIFICANT EVENT
S: Patient lying in bed surrounded by family. Endorses good pain control. Denies n/v, chest pain, SOB.  Tolerating clears    O:   Visit Vitals  /74   Pulse 88   Temp 36.3 °C (97.3 °F) (Temporal)   Resp 16   General: awake, alert  Cards: normal rate and rhythm   Pulm: unlabored RA  Abdomen: ostomy pink, bag without output. Abdomen, soft, appropriately tender to palpation     A/P: recovering appropriately.   Neuro: tylenol, oxy PRN, morphine breakthrough (patient reports itching with dilaudid so preferred morphine), zofran PRN  Cards: 4h vitals  Pulm: IS  GI: clears  : IVF 40  Ppx: LVX

## 2024-03-11 NOTE — PROGRESS NOTES
Colorectal Surgery Progress Note      HPI: Kyra Behnfeldt is a 23 y.o. female with a past medical history of hemorrhoids, anxiety, anemia, and Crohn's colitis (diagnosed 12/2023) who was a transfer from [facility] for Remicade infusion for worsening Crohn's flare.  Since diagnosis, she has had worsening bloody, liquid stools (up to 14 times per day), abdominal pain, and multiple hospitalizations for trouble controlling symptoms. Past treatments include PO prednisone and Humira.  She reports her symptoms improve on IV steroids and worsen on PO steroids.  She was started on Remicade infusions and was being monitored for toxic megacolon with daily KUBs and CRPs.  Patient did not respond to Remicade and is now hospital day 4, POD 1 s/p laparoscopic diverting loop ileostomy on 3/10/24 with Dr. Castro.          Subjective  No acute events overnight  Pain improving   Denies nausea/vomiting  Ostomy with bowel sweat, no gas in pouch  Ambulating independently  Ostomy output 50ml last 24 hours    Objective  Physical Exam:  Gen: Pleasant, conversational female in no physical distress and alert  HENT: Head normocephalic, atraumatic.  Mucous membranes moist.  Reddened ulcerations on bilateral tonsils and tongue  Neuro: Alert and oriented x3.    CV: Normal rate, normotensive  Resp: No acute respiratory distress.  Normal effort on room air. Chest expansion symmetrical.   GI: Abdomen is soft, nondistended, and appropriately tender to palpation. Umbilicus laparoscopic site well approximated, clean and dry with intact glue.  Stoma is pink and healthy with bowel sweat in pouch.  Ad in place.  Skin: Warm and dry, no rashes or lesions  Musculoskeletal: Moves all extremities x4    I/O last 3 completed shifts:  In: 2279.5 (48.3 mL/kg) [I.V.:1489.7 (31.6 mL/kg); Blood:123.3; IV Piggyback:666.5]  Out: 56 (1.2 mL/kg) [Stool:51; Blood:5]  Weight: 47.2 kg   I/O this shift:  In: 520 [P.O.:200; I.V.:320]  Out: 0     Data Review:  CBC:   Lab  Results   Component Value Date    WBC 11.4 (H) 03/11/2024    RBC 3.00 (L) 03/11/2024     BMP:   Lab Results   Component Value Date    GLUCOSE 108 (H) 03/11/2024    CO2 29 03/11/2024    BUN 14 03/11/2024    CREATININE 0.48 (L) 03/11/2024    CALCIUM 8.3 (L) 03/11/2024     Coagulation:   Lab Results   Component Value Date    INR 1.1 03/09/2024    APTT 23 (L) 03/09/2024     Labs reviewed and personally interpreted as: BMP without electrolyte derangement, Creatinine normal.  CBC with slight increase in WBCs and mild anemia.    Imaging:  KUB 3/10/24 reviewed, showing no bowel distention or obstructive gas pattern    KUB 3/11/24 reviewed, showing no free air or obstructive gas pattern    Assessment: s/p laparoscopic diverting loop ileostomy on 3/10/24 with Dr. Castro.    Plan:  -Second dose of Remicade today per GI  -Advance diet to regular  -follow up KUB/CRP  -Ostomy nurse consult    Neuro: Post-op pain, history of anxiety  -Continue current pain regimen with tylenol, morphine, oxycodone  -Sleep hygiene, continue home melatonin  -continue home PRN xanax    CV: hemodynamically stable, no significant history  -Q4 vital signs    Resp: no acute issues, no significant pulmonary history  -ICS 10x every hour  -OOB/ambulation    GI: s/p laparoscopic diverting loop ileostomy, history of hemorrhoids and Crohn's  -Remicade infusion today per GI  -Advance diet to regular, no raw fruits/vegetables  -Zofran prn for nausea  -dehydration, diet, and recording stoma output education, encourage recording on sheets  -stoma nursing for supplies and support  -On IV solumedrol, awaiting GI recs to transition to PO and taper    : voiding, making sufficient urine  - Strict intake and output  - LR @ 50ml/hr  - Daily RFP/replace electrolytes as needed    Heme: H&H stable with no evidence of bleeding, history of anemia  -CBC prn    ID: afebrile, no leukocytosis  - Q4 temp  - monitor for s/s of infection    Endo: no acute issues, no  significant endocrine history  -no indication for SSI    DVT Prophy: SCDs & Lovenox    Dispo:   -Continue care on RNF  -tentative plan: home with no needs tomorrow/Wednesday; declining home care    Plan of care discussed with Colorectal surgical team and patient.    Vivian ESCOBAR-CNP  Colorectal Surgery NP

## 2024-03-12 ENCOUNTER — APPOINTMENT (OUTPATIENT)
Dept: RADIOLOGY | Facility: HOSPITAL | Age: 24
DRG: 330 | End: 2024-03-12
Payer: COMMERCIAL

## 2024-03-12 LAB
ALBUMIN SERPL BCP-MCNC: 2.7 G/DL (ref 3.4–5)
ANION GAP SERPL CALC-SCNC: 12 MMOL/L (ref 10–20)
BASOPHILS # BLD AUTO: 0.03 X10*3/UL (ref 0–0.1)
BASOPHILS NFR BLD AUTO: 0.2 %
BUN SERPL-MCNC: 13 MG/DL (ref 6–23)
CALCIUM SERPL-MCNC: 8.3 MG/DL (ref 8.6–10.6)
CHLORIDE SERPL-SCNC: 99 MMOL/L (ref 98–107)
CO2 SERPL-SCNC: 31 MMOL/L (ref 21–32)
CREAT SERPL-MCNC: 0.52 MG/DL (ref 0.5–1.05)
CRP SERPL-MCNC: 6.42 MG/DL
EGFRCR SERPLBLD CKD-EPI 2021: >90 ML/MIN/1.73M*2
EOSINOPHIL # BLD AUTO: 0.02 X10*3/UL (ref 0–0.7)
EOSINOPHIL NFR BLD AUTO: 0.2 %
ERYTHROCYTE [DISTWIDTH] IN BLOOD BY AUTOMATED COUNT: 13.2 % (ref 11.5–14.5)
GLUCOSE SERPL-MCNC: 92 MG/DL (ref 74–99)
HCT VFR BLD AUTO: 33.1 % (ref 36–46)
HGB BLD-MCNC: 10 G/DL (ref 12–16)
IMM GRANULOCYTES # BLD AUTO: 0.14 X10*3/UL (ref 0–0.7)
IMM GRANULOCYTES NFR BLD AUTO: 1.1 % (ref 0–0.9)
LYMPHOCYTES # BLD AUTO: 1.59 X10*3/UL (ref 1.2–4.8)
LYMPHOCYTES NFR BLD AUTO: 12.5 %
MAGNESIUM SERPL-MCNC: 2.15 MG/DL (ref 1.6–2.4)
MCH RBC QN AUTO: 27.9 PG (ref 26–34)
MCHC RBC AUTO-ENTMCNC: 30.2 G/DL (ref 32–36)
MCV RBC AUTO: 93 FL (ref 80–100)
MONOCYTES # BLD AUTO: 1.52 X10*3/UL (ref 0.1–1)
MONOCYTES NFR BLD AUTO: 12 %
NEUTROPHILS # BLD AUTO: 9.41 X10*3/UL (ref 1.2–7.7)
NEUTROPHILS NFR BLD AUTO: 74 %
NRBC BLD-RTO: 0 /100 WBCS (ref 0–0)
PHOSPHATE SERPL-MCNC: 2.9 MG/DL (ref 2.5–4.9)
PLATELET # BLD AUTO: 440 X10*3/UL (ref 150–450)
POTASSIUM SERPL-SCNC: 4.3 MMOL/L (ref 3.5–5.3)
RBC # BLD AUTO: 3.58 X10*6/UL (ref 4–5.2)
SODIUM SERPL-SCNC: 138 MMOL/L (ref 136–145)
WBC # BLD AUTO: 12.7 X10*3/UL (ref 4.4–11.3)

## 2024-03-12 PROCEDURE — 36415 COLL VENOUS BLD VENIPUNCTURE: CPT | Performed by: STUDENT IN AN ORGANIZED HEALTH CARE EDUCATION/TRAINING PROGRAM

## 2024-03-12 PROCEDURE — 2500000004 HC RX 250 GENERAL PHARMACY W/ HCPCS (ALT 636 FOR OP/ED)

## 2024-03-12 PROCEDURE — 99233 SBSQ HOSP IP/OBS HIGH 50: CPT

## 2024-03-12 PROCEDURE — 2500000004 HC RX 250 GENERAL PHARMACY W/ HCPCS (ALT 636 FOR OP/ED): Performed by: NURSE PRACTITIONER

## 2024-03-12 PROCEDURE — 1170000001 HC PRIVATE ONCOLOGY ROOM DAILY

## 2024-03-12 PROCEDURE — 86140 C-REACTIVE PROTEIN: CPT | Performed by: STUDENT IN AN ORGANIZED HEALTH CARE EDUCATION/TRAINING PROGRAM

## 2024-03-12 PROCEDURE — 74018 RADEX ABDOMEN 1 VIEW: CPT | Performed by: RADIOLOGY

## 2024-03-12 PROCEDURE — 2500000004 HC RX 250 GENERAL PHARMACY W/ HCPCS (ALT 636 FOR OP/ED): Performed by: STUDENT IN AN ORGANIZED HEALTH CARE EDUCATION/TRAINING PROGRAM

## 2024-03-12 PROCEDURE — 83735 ASSAY OF MAGNESIUM: CPT

## 2024-03-12 PROCEDURE — 85025 COMPLETE CBC W/AUTO DIFF WBC: CPT

## 2024-03-12 PROCEDURE — 2500000001 HC RX 250 WO HCPCS SELF ADMINISTERED DRUGS (ALT 637 FOR MEDICARE OP)

## 2024-03-12 PROCEDURE — 2500000001 HC RX 250 WO HCPCS SELF ADMINISTERED DRUGS (ALT 637 FOR MEDICARE OP): Performed by: STUDENT IN AN ORGANIZED HEALTH CARE EDUCATION/TRAINING PROGRAM

## 2024-03-12 PROCEDURE — 84100 ASSAY OF PHOSPHORUS: CPT

## 2024-03-12 PROCEDURE — C9113 INJ PANTOPRAZOLE SODIUM, VIA: HCPCS | Performed by: NURSE PRACTITIONER

## 2024-03-12 PROCEDURE — 99232 SBSQ HOSP IP/OBS MODERATE 35: CPT | Performed by: INTERNAL MEDICINE

## 2024-03-12 PROCEDURE — 74018 RADEX ABDOMEN 1 VIEW: CPT

## 2024-03-12 RX ORDER — PREDNISONE 20 MG/1
40 TABLET ORAL DAILY
Status: DISCONTINUED | OUTPATIENT
Start: 2024-03-12 | End: 2024-03-12

## 2024-03-12 RX ORDER — PANTOPRAZOLE SODIUM 40 MG/10ML
40 INJECTION, POWDER, LYOPHILIZED, FOR SOLUTION INTRAVENOUS DAILY
Status: DISCONTINUED | OUTPATIENT
Start: 2024-03-12 | End: 2024-03-18 | Stop reason: HOSPADM

## 2024-03-12 RX ADMIN — METHYLPREDNISOLONE SODIUM SUCCINATE 20 MG: 40 INJECTION, POWDER, FOR SOLUTION INTRAMUSCULAR; INTRAVENOUS at 22:22

## 2024-03-12 RX ADMIN — OXYCODONE HYDROCHLORIDE 5 MG: 5 TABLET ORAL at 17:44

## 2024-03-12 RX ADMIN — ACETAMINOPHEN 650 MG: 325 TABLET ORAL at 21:00

## 2024-03-12 RX ADMIN — ACETAMINOPHEN 650 MG: 325 TABLET ORAL at 15:23

## 2024-03-12 RX ADMIN — PANTOPRAZOLE SODIUM 40 MG: 40 INJECTION, POWDER, FOR SOLUTION INTRAVENOUS at 09:26

## 2024-03-12 RX ADMIN — SODIUM CHLORIDE, POTASSIUM CHLORIDE, SODIUM LACTATE AND CALCIUM CHLORIDE 20 ML/HR: 600; 310; 30; 20 INJECTION, SOLUTION INTRAVENOUS at 16:23

## 2024-03-12 RX ADMIN — MORPHINE SULFATE 4 MG: 4 INJECTION, SOLUTION INTRAMUSCULAR; INTRAVENOUS at 10:19

## 2024-03-12 RX ADMIN — MORPHINE SULFATE 4 MG: 4 INJECTION, SOLUTION INTRAMUSCULAR; INTRAVENOUS at 16:19

## 2024-03-12 RX ADMIN — ACETAMINOPHEN 650 MG: 325 TABLET ORAL at 09:22

## 2024-03-12 RX ADMIN — ACETAMINOPHEN 650 MG: 325 TABLET ORAL at 03:59

## 2024-03-12 RX ADMIN — METHYLPREDNISOLONE 20 MG: 4 TABLET ORAL at 09:23

## 2024-03-12 RX ADMIN — METHYLPREDNISOLONE SODIUM SUCCINATE 20 MG: 40 INJECTION, POWDER, FOR SOLUTION INTRAMUSCULAR; INTRAVENOUS at 16:30

## 2024-03-12 RX ADMIN — OXYCODONE HYDROCHLORIDE 5 MG: 5 TABLET ORAL at 12:25

## 2024-03-12 RX ADMIN — MORPHINE SULFATE 4 MG: 4 INJECTION, SOLUTION INTRAMUSCULAR; INTRAVENOUS at 21:01

## 2024-03-12 RX ADMIN — MELATONIN 3 MG: 3 TAB ORAL at 19:00

## 2024-03-12 RX ADMIN — OXYCODONE HYDROCHLORIDE 5 MG: 5 TABLET ORAL at 06:34

## 2024-03-12 RX ADMIN — OXYCODONE HYDROCHLORIDE 5 MG: 5 TABLET ORAL at 22:21

## 2024-03-12 RX ADMIN — ALPRAZOLAM 0.5 MG: 0.5 TABLET ORAL at 22:21

## 2024-03-12 ASSESSMENT — PAIN SCALES - WONG BAKER
WONGBAKER_NUMERICALRESPONSE: NO HURT
WONGBAKER_NUMERICALRESPONSE: HURTS LITTLE BIT
WONGBAKER_NUMERICALRESPONSE: HURTS LITTLE MORE
WONGBAKER_NUMERICALRESPONSE: HURTS LITTLE BIT

## 2024-03-12 ASSESSMENT — PAIN DESCRIPTION - ORIENTATION: ORIENTATION: LOWER

## 2024-03-12 ASSESSMENT — COGNITIVE AND FUNCTIONAL STATUS - GENERAL
DRESSING REGULAR UPPER BODY CLOTHING: A LITTLE
TOILETING: A LITTLE
STANDING UP FROM CHAIR USING ARMS: A LITTLE
MOVING TO AND FROM BED TO CHAIR: A LITTLE
DRESSING REGULAR LOWER BODY CLOTHING: A LITTLE
TURNING FROM BACK TO SIDE WHILE IN FLAT BAD: A LITTLE
HELP NEEDED FOR BATHING: A LITTLE
WALKING IN HOSPITAL ROOM: A LITTLE
PERSONAL GROOMING: A LITTLE

## 2024-03-12 ASSESSMENT — PAIN SCALES - GENERAL
PAINLEVEL_OUTOF10: 3
PAINLEVEL_OUTOF10: 5 - MODERATE PAIN
PAINLEVEL_OUTOF10: 5 - MODERATE PAIN
PAINLEVEL_OUTOF10: 3
PAINLEVEL_OUTOF10: 3
PAINLEVEL_OUTOF10: 6
PAINLEVEL_OUTOF10: 6
PAINLEVEL_OUTOF10: 3
PAINLEVEL_OUTOF10: 8
PAINLEVEL_OUTOF10: 3
PAINLEVEL_OUTOF10: 4
PAINLEVEL_OUTOF10: 7
PAINLEVEL_OUTOF10: 7

## 2024-03-12 ASSESSMENT — PAIN - FUNCTIONAL ASSESSMENT
PAIN_FUNCTIONAL_ASSESSMENT: 0-10

## 2024-03-12 ASSESSMENT — PAIN DESCRIPTION - LOCATION
LOCATION: ABDOMEN

## 2024-03-12 NOTE — PROGRESS NOTES
Colorectal Surgery Progress Note      HPI: Kyra Behnfeldt is a 23 y.o. female with a past medical history of hemorrhoids, anxiety, anemia, and Crohn's colitis (diagnosed 12/2023) who was a transfer from [facility] for Remicade infusion for worsening Crohn's flare.  Since diagnosis, she has had worsening bloody, liquid stools (up to 14 times per day), abdominal pain, and multiple hospitalizations for trouble controlling symptoms. Patient did not respond to Remicade here at [facility] and is now hospital day 4, POD 1 s/p laparoscopic diverting loop ileostomy on 3/10/24 with Dr. Castro.  She received a total of 2 Remicade infusions since admission.          Subjective  No acute events overnight  Pain improving   Denies nausea/vomiting  Edematous, pink stoma with small amount of brown, liquid stool in pouch  Ambulating independently  Ostomy output 125ml last 24 hours    Objective  Physical Exam:  Gen: Pleasant, conversational female in no physical distress and alert  HENT: Head normocephalic, atraumatic.  Mucous membranes moist.  Reddened ulcerations on bilateral tonsils and tongue  Neuro: Alert and oriented x3.    CV: Normal rate, normotensive  Resp: No acute respiratory distress.  Normal effort on room air. Chest expansion symmetrical.   GI: Abdomen is soft, nondistended, and appropriately tender to palpation. Umbilicus laparoscopic site well approximated, clean and dry with intact glue.  Stoma is edematous but pink and healthy with brown, liquid stool in pouch.  Ad in place.  Skin: Warm and dry, no rashes or lesions  Musculoskeletal: Moves all extremities x4    I/O last 3 completed shifts:  In: 2252.7 (47.7 mL/kg) [P.O.:540; I.V.:1212.7 (25.7 mL/kg); IV Piggyback:500]  Out: 175 (3.7 mL/kg) [Stool:175]  Weight: 47.2 kg   No intake/output data recorded.    Data Review:  CBC:   Lab Results   Component Value Date    WBC 12.7 (H) 03/12/2024    RBC 3.58 (L) 03/12/2024     BMP:   Lab Results   Component Value Date    GLUCOSE  92 03/12/2024    CO2 31 03/12/2024    BUN 13 03/12/2024    CREATININE 0.52 03/12/2024    CALCIUM 8.3 (L) 03/12/2024       Labs reviewed and personally interpreted as: BMP without electrolyte derangement, Creatinine normal.  CBC with slight increase in WBCs and mild but improving anemia.    Imaging:  KUB 3/10/24 reviewed, showing no bowel distention or obstructive gas pattern    KUB 3/11/24 reviewed, showing no free air or obstructive gas pattern    Assessment: s/p laparoscopic diverting loop ileostomy on 3/10/24 with Dr. Castro.    Plan:  -Switch PO methylprednisolone to prednisone with weekly taper per GI recs  -Continue regular diet  -DC daily KUBs      Neuro: Post-op pain, history of anxiety  -Continue current pain regimen with tylenol, morphine, oxycodone  -Sleep hygiene, continue home melatonin  -continue home PRN xanax for anxeity    CV: hemodynamically stable, no significant history  -Q4 vital signs    Resp: no acute issues, no significant pulmonary history  -ICS 10x every hour  -OOB/ambulation    GI: Crohn's flare s/p laparoscopic diverting loop ileostomy, history of hemorrhoids and Crohn's  -downtrending CRP  -Continue regular diet, no raw fruits/vegetables  -Zofran prn for nausea  -dehydration, diet, and recording stoma output education, encourage recording on sheets  -stoma nursing for supplies and support  -PO prednisone 40 mg daily with 5 mg weekly taper     : voiding, making sufficient urine  - Strict intake and output  - LR @ 40ml/hr  - Daily RFP/replace electrolytes as needed    Heme: H&H stable with no evidence of bleeding, history of anemia  -CBC prn    ID: afebrile, no leukocytosis  - Q4 temp  - monitor for s/s of infection    Endo: no acute issues, no significant endocrine history  -no indication for SSI    DVT Prophy: SCDs & Lovenox    Dispo:   -Continue care on RNF  -tentative plan: home with no needs Wednesday; declining home care    Plan of care discussed with Colorectal surgical team  and patient.    Vivian ESCOBAR-CNP  Colorectal Surgery NP

## 2024-03-12 NOTE — PROGRESS NOTES
[facility] [place]  CONSULT FOLLOW-UP         SUBJECTIVE     Interval Events/Subjective: Patient reported some rectal pain and discomfort today.       Medications:  acetaminophen, 650 mg, oral, q6h  enoxaparin, 40 mg, subcutaneous, q24h  melatonin, 3 mg, oral, Daily  methylPREDNISolone, 20 mg, oral, TID  pantoprazole, 40 mg, intravenous, Daily      PRN medications: ALPRAZolam, guaiFENesin, hydrOXYzine HCL, lidocaine-diphenhydraMINE-Maalox 1:1:1, morphine, naloxone, ondansetron ODT **OR** ondansetron, oxyCODONE, oxyCODONE, witch hazel      EXAM     Physical Exam     General: NAD, AA&O x 3  Eyes: EOMI, PERRLA  ENT: MMM  Heart: RRR  Lungs: No respiratory distress  Abdomen: Soft, non tender, non distended ,ileostomy in place  Skin: No jaundice   Neuro: Appropriately responds to questions/commands         DATA                                                                            Labs     Lab Results   Component Value Date    WBC 12.7 (H) 03/12/2024    HGB 10.0 (L) 03/12/2024    HCT 33.1 (L) 03/12/2024    MCV 93 03/12/2024     03/12/2024        Lab Results   Component Value Date    GLUCOSE 92 03/12/2024    CALCIUM 8.3 (L) 03/12/2024     03/12/2024    K 4.3 03/12/2024    CO2 31 03/12/2024    CL 99 03/12/2024    BUN 13 03/12/2024    CREATININE 0.52 03/12/2024                                                                               Imaging   MR enterography 3/6: colonic wall thickening involving transverse segment +/- the distal right and left colon in a single long continguous segment, no perforation/abscess, stricture/obstruction or sinus tract/fistula.                                                                          GI Procedures     Flex sigmoidoscopy 3/4/24:      Impression  ·             Severe abnormal mucosa in the transverse colon, descending colon, sigmoid colon, rectosigmoid and rectum, consistent with Crohn's disease;  performed cold forceps biopsy  ·             Small hemorrhoids      ASSESSMENT / PLAN     Assessment and Recommendations:   Kyra Behnfeldt is a 24 yo F w/ PMHx Crohn's colitis presenting as transfer from [facility] for Crohn's colitis and consideration of Remicade infusion. Patient presenting with severe colitis flare but without imaging findings of perforation/abscess, stricture/obstruction, or sinus tract/fistula. CRS following and offered DLI as treatment. Patient was started on remicade infusion on 3/7 and still had bloody bowel movements, underwent diversion ileostomy on 3/10, patient underwent second dose of infliximab on 3/11.      Plan:   - Obtain daily CRP and daily Xray KUB.   - Continue with oral prednisone 40 mg once daily with 5 mg weekly taper.   - Continue to monitor her symptoms.   - GI will continue to follow.        LEANNA per primary team     ------------------------------------------------------------------------  Esther Guerrero MD  Gastroenterology Fellow  After 5PM and on Weekends, please page on-call fellow.    Discussed and seen with service attending Dr. Cifuentes  Final recommendations pending attending attestation.      I saw and evaluated the patient. I personally obtained the key and critical portions of the history and physical exam or was physically present for key and critical portions performed by the resident/fellow. I reviewed the resident/fellow's documentation and discussed the patient with the resident/fellow. I agree with the resident/fellow's medical decision making as documented in the note.    Mera Cifuentes MD

## 2024-03-12 NOTE — PROGRESS NOTES
Wound Care Progress Note     Visit Date: 3/12/2024      Patient Name: Kyra Behnfeldt         MRN: [id number]                Reason for Visit: loop ileostomy      Wound History: POD #2 from loop ileostomy creation     Pertinent Labs:   Albumin   Date Value Ref Range Status   03/12/2024 2.7 (L) 3.4 - 5.0 g/dL Final     Ileostomy RLQ (Active)   Placement Date/Time: 03/10/24 1601   Location: RLQ   Number of days: 2     Ileostomy RLQ (Active)   Stomal Appliance Changed;1 piece 03/12/24 1430   Site/Stoma Assessment Intact 03/12/24 1430   Stoma Size (cm) 3.5 cm 03/12/24 1430   Peristomal Assessment Clean;Intact 03/12/24 1430   Treatment Pouch change 03/12/24 1430   Output (mL) 0 mL 03/12/24 1645   Output Description Liquid 03/10/24 1602     WO nursing visit outcome: Patient watched first pouch change.  She asked appropriate questions.  Encouraged patient to have continuing education assitance with ostomy team and homecare.  Discussed s/s of dehydration and the need to measure.  Discussed diet.  Patient agreeable to hands on lesson with next planned pouch change.    WO next scheduled visit/plan: Wound/ostomy team to follow up with patient tomorrow.    Stoma Type: Loop Ileostomy  Ad:  Yes-removed  Diameter: 1 3/8 in  Location: RLQ  Protrusion: Budded  Mucosal Condition and Color: Moist, Red, Edematous  Mucocutaneous Junction: Intact  Peristomal Skin: Clear, intact  Peristomal Contour: Flat  Supportive Tissue: Firm  Character of Output:  bowel sweat  Removed/Current Pouching System: 1 piece Convatec Active life  Current Wearing Time: 3-4 Days    Recommendations:   Skin Care: cavilon no sting skin barrier film  Pouching System: Adapt barrier ring, 1 piece [place] flat drainable pouch  Wear Time: 3-4 Days    Time Increment: 30 minutes    Ashley Mena, MSN, RN, CWCN, COCN   3/12/2024  6:19 PM

## 2024-03-13 ENCOUNTER — APPOINTMENT (OUTPATIENT)
Dept: GASTROENTEROLOGY | Facility: CLINIC | Age: 24
End: 2024-03-13
Payer: COMMERCIAL

## 2024-03-13 PROBLEM — K50.119 CROHN'S DISEASE OF COLON WITH COMPLICATION (MULTI): Status: RESOLVED | Noted: 2024-03-01 | Resolved: 2024-03-13

## 2024-03-13 PROBLEM — D64.9 ANEMIA: Status: RESOLVED | Noted: 2024-03-10 | Resolved: 2024-03-13

## 2024-03-13 LAB
ALBUMIN SERPL BCP-MCNC: 2.4 G/DL (ref 3.4–5)
ANION GAP SERPL CALC-SCNC: 12 MMOL/L (ref 10–20)
BASOPHILS # BLD AUTO: 0.01 X10*3/UL (ref 0–0.1)
BASOPHILS NFR BLD AUTO: 0.1 %
BUN SERPL-MCNC: 14 MG/DL (ref 6–23)
CALCIUM SERPL-MCNC: 8.1 MG/DL (ref 8.6–10.6)
CHLORIDE SERPL-SCNC: 99 MMOL/L (ref 98–107)
CO2 SERPL-SCNC: 30 MMOL/L (ref 21–32)
CREAT SERPL-MCNC: 0.42 MG/DL (ref 0.5–1.05)
CRP SERPL-MCNC: 4.68 MG/DL
EGFRCR SERPLBLD CKD-EPI 2021: >90 ML/MIN/1.73M*2
EOSINOPHIL # BLD AUTO: 0.01 X10*3/UL (ref 0–0.7)
EOSINOPHIL NFR BLD AUTO: 0.1 %
ERYTHROCYTE [DISTWIDTH] IN BLOOD BY AUTOMATED COUNT: 13 % (ref 11.5–14.5)
GLUCOSE SERPL-MCNC: 86 MG/DL (ref 74–99)
HCT VFR BLD AUTO: 25.7 % (ref 36–46)
HGB BLD-MCNC: 7.9 G/DL (ref 12–16)
IMM GRANULOCYTES # BLD AUTO: 0.1 X10*3/UL (ref 0–0.7)
IMM GRANULOCYTES NFR BLD AUTO: 1 % (ref 0–0.9)
LYMPHOCYTES # BLD AUTO: 1.15 X10*3/UL (ref 1.2–4.8)
LYMPHOCYTES NFR BLD AUTO: 11.1 %
MAGNESIUM SERPL-MCNC: 2.06 MG/DL (ref 1.6–2.4)
MCH RBC QN AUTO: 27.6 PG (ref 26–34)
MCHC RBC AUTO-ENTMCNC: 30.7 G/DL (ref 32–36)
MCV RBC AUTO: 90 FL (ref 80–100)
MONOCYTES # BLD AUTO: 1.12 X10*3/UL (ref 0.1–1)
MONOCYTES NFR BLD AUTO: 10.8 %
NEUTROPHILS # BLD AUTO: 7.94 X10*3/UL (ref 1.2–7.7)
NEUTROPHILS NFR BLD AUTO: 76.9 %
NRBC BLD-RTO: 0 /100 WBCS (ref 0–0)
PHOSPHATE SERPL-MCNC: 3.6 MG/DL (ref 2.5–4.9)
PLATELET # BLD AUTO: 387 X10*3/UL (ref 150–450)
POTASSIUM SERPL-SCNC: 4.2 MMOL/L (ref 3.5–5.3)
RBC # BLD AUTO: 2.86 X10*6/UL (ref 4–5.2)
SODIUM SERPL-SCNC: 137 MMOL/L (ref 136–145)
WBC # BLD AUTO: 10.3 X10*3/UL (ref 4.4–11.3)

## 2024-03-13 PROCEDURE — 2500000004 HC RX 250 GENERAL PHARMACY W/ HCPCS (ALT 636 FOR OP/ED)

## 2024-03-13 PROCEDURE — 86140 C-REACTIVE PROTEIN: CPT | Performed by: STUDENT IN AN ORGANIZED HEALTH CARE EDUCATION/TRAINING PROGRAM

## 2024-03-13 PROCEDURE — 2500000001 HC RX 250 WO HCPCS SELF ADMINISTERED DRUGS (ALT 637 FOR MEDICARE OP)

## 2024-03-13 PROCEDURE — 36415 COLL VENOUS BLD VENIPUNCTURE: CPT | Performed by: STUDENT IN AN ORGANIZED HEALTH CARE EDUCATION/TRAINING PROGRAM

## 2024-03-13 PROCEDURE — 99233 SBSQ HOSP IP/OBS HIGH 50: CPT

## 2024-03-13 PROCEDURE — 2500000004 HC RX 250 GENERAL PHARMACY W/ HCPCS (ALT 636 FOR OP/ED): Performed by: NURSE PRACTITIONER

## 2024-03-13 PROCEDURE — C9113 INJ PANTOPRAZOLE SODIUM, VIA: HCPCS | Performed by: NURSE PRACTITIONER

## 2024-03-13 PROCEDURE — 85025 COMPLETE CBC W/AUTO DIFF WBC: CPT

## 2024-03-13 PROCEDURE — 2500000001 HC RX 250 WO HCPCS SELF ADMINISTERED DRUGS (ALT 637 FOR MEDICARE OP): Performed by: STUDENT IN AN ORGANIZED HEALTH CARE EDUCATION/TRAINING PROGRAM

## 2024-03-13 PROCEDURE — 1170000001 HC PRIVATE ONCOLOGY ROOM DAILY

## 2024-03-13 PROCEDURE — 83735 ASSAY OF MAGNESIUM: CPT

## 2024-03-13 PROCEDURE — 99231 SBSQ HOSP IP/OBS SF/LOW 25: CPT | Performed by: INTERNAL MEDICINE

## 2024-03-13 PROCEDURE — 80069 RENAL FUNCTION PANEL: CPT

## 2024-03-13 RX ORDER — PREDNISONE 20 MG/1
40 TABLET ORAL DAILY
Status: DISCONTINUED | OUTPATIENT
Start: 2024-03-13 | End: 2024-03-14

## 2024-03-13 RX ORDER — ACETAMINOPHEN 10 MG/ML
1000 INJECTION, SOLUTION INTRAVENOUS ONCE
Status: COMPLETED | OUTPATIENT
Start: 2024-03-13 | End: 2024-03-13

## 2024-03-13 RX ADMIN — OXYCODONE HYDROCHLORIDE 5 MG: 5 TABLET ORAL at 16:39

## 2024-03-13 RX ADMIN — MORPHINE SULFATE 4 MG: 4 INJECTION, SOLUTION INTRAMUSCULAR; INTRAVENOUS at 15:08

## 2024-03-13 RX ADMIN — ACETAMINOPHEN 650 MG: 325 TABLET ORAL at 05:14

## 2024-03-13 RX ADMIN — ACETAMINOPHEN 1000 MG: 10 INJECTION INTRAVENOUS at 22:16

## 2024-03-13 RX ADMIN — METHYLPREDNISOLONE SODIUM SUCCINATE 20 MG: 40 INJECTION, POWDER, FOR SOLUTION INTRAMUSCULAR; INTRAVENOUS at 15:08

## 2024-03-13 RX ADMIN — MELATONIN 3 MG: 3 TAB ORAL at 19:00

## 2024-03-13 RX ADMIN — ACETAMINOPHEN 650 MG: 325 TABLET ORAL at 11:08

## 2024-03-13 RX ADMIN — MORPHINE SULFATE 4 MG: 4 INJECTION, SOLUTION INTRAMUSCULAR; INTRAVENOUS at 10:11

## 2024-03-13 RX ADMIN — OXYCODONE HYDROCHLORIDE 5 MG: 5 TABLET ORAL at 11:08

## 2024-03-13 RX ADMIN — ALPRAZOLAM 0.5 MG: 0.5 TABLET ORAL at 23:19

## 2024-03-13 RX ADMIN — MORPHINE SULFATE 4 MG: 4 INJECTION, SOLUTION INTRAMUSCULAR; INTRAVENOUS at 05:14

## 2024-03-13 RX ADMIN — METHYLPREDNISOLONE SODIUM SUCCINATE 20 MG: 40 INJECTION, POWDER, FOR SOLUTION INTRAMUSCULAR; INTRAVENOUS at 06:47

## 2024-03-13 RX ADMIN — OXYCODONE HYDROCHLORIDE 5 MG: 5 TABLET ORAL at 06:46

## 2024-03-13 RX ADMIN — MORPHINE SULFATE 4 MG: 4 INJECTION, SOLUTION INTRAMUSCULAR; INTRAVENOUS at 20:15

## 2024-03-13 RX ADMIN — PANTOPRAZOLE SODIUM 40 MG: 40 INJECTION, POWDER, FOR SOLUTION INTRAVENOUS at 11:08

## 2024-03-13 ASSESSMENT — PAIN SCALES - GENERAL
PAINLEVEL_OUTOF10: 4
PAINLEVEL_OUTOF10: 6
PAINLEVEL_OUTOF10: 3
PAINLEVEL_OUTOF10: 6
PAINLEVEL_OUTOF10: 7
PAINLEVEL_OUTOF10: 5 - MODERATE PAIN
PAINLEVEL_OUTOF10: 7
PAINLEVEL_OUTOF10: 7
PAINLEVEL_OUTOF10: 4
PAINLEVEL_OUTOF10: 10 - WORST POSSIBLE PAIN

## 2024-03-13 ASSESSMENT — ACTIVITIES OF DAILY LIVING (ADL)
TOILETING: INDEPENDENT
PATIENT'S MEMORY ADEQUATE TO SAFELY COMPLETE DAILY ACTIVITIES?: YES
JUDGMENT_ADEQUATE_SAFELY_COMPLETE_DAILY_ACTIVITIES: YES
LACK_OF_TRANSPORTATION: NO
ADEQUATE_TO_COMPLETE_ADL: YES
WALKS IN HOME: INDEPENDENT
HEARING - RIGHT EAR: FUNCTIONAL
FEEDING YOURSELF: INDEPENDENT
BATHING: INDEPENDENT
GROOMING: INDEPENDENT
HEARING - LEFT EAR: FUNCTIONAL
DRESSING YOURSELF: INDEPENDENT

## 2024-03-13 ASSESSMENT — PAIN DESCRIPTION - ORIENTATION: ORIENTATION: LOWER

## 2024-03-13 ASSESSMENT — PAIN - FUNCTIONAL ASSESSMENT
PAIN_FUNCTIONAL_ASSESSMENT: 0-10

## 2024-03-13 ASSESSMENT — COGNITIVE AND FUNCTIONAL STATUS - GENERAL
WALKING IN HOSPITAL ROOM: A LITTLE
PERSONAL GROOMING: A LITTLE
HELP NEEDED FOR BATHING: A LITTLE
TURNING FROM BACK TO SIDE WHILE IN FLAT BAD: A LITTLE
DRESSING REGULAR UPPER BODY CLOTHING: A LITTLE
DRESSING REGULAR LOWER BODY CLOTHING: A LITTLE
TOILETING: A LITTLE
MOVING TO AND FROM BED TO CHAIR: A LITTLE
STANDING UP FROM CHAIR USING ARMS: A LITTLE

## 2024-03-13 ASSESSMENT — PAIN DESCRIPTION - LOCATION
LOCATION: ABDOMEN
LOCATION: ABDOMEN

## 2024-03-13 ASSESSMENT — PAIN SCALES - WONG BAKER
WONGBAKER_NUMERICALRESPONSE: HURTS LITTLE BIT
WONGBAKER_NUMERICALRESPONSE: NO HURT

## 2024-03-13 NOTE — PROGRESS NOTES
[facility]  [facility] [place]  CONSULT FOLLOW-UP         SUBJECTIVE     Interval Events/Subjective: Patient reported improvement in her symptoms, was switched to IV steroids yesterday.  CRP trending down.      Medications:  acetaminophen, 650 mg, oral, q6h  enoxaparin, 40 mg, subcutaneous, q24h  melatonin, 3 mg, oral, Daily  methylPREDNISolone sodium succinate (PF), 20 mg, intravenous, q8h  pantoprazole, 40 mg, intravenous, Daily      PRN medications: ALPRAZolam, guaiFENesin, hydrOXYzine HCL, lidocaine-diphenhydraMINE-Maalox 1:1:1, morphine, naloxone, ondansetron ODT **OR** ondansetron, oxyCODONE, oxyCODONE, witch hazel      EXAM     Physical Exam     General: NAD, AA&O x 3  Eyes: EOMI, PERRLA  ENT: MMM  Heart: RRR  Lungs: No respiratory distress  Abdomen: Soft, non tender, non distended, ileostomy in place  Skin: No jaundice   Neuro: Appropriately responds to questions/commands         DATA                                                                            Labs     Lab Results   Component Value Date    WBC 10.3 03/13/2024    HGB 7.9 (L) 03/13/2024    HCT 25.7 (L) 03/13/2024    MCV 90 03/13/2024     03/13/2024        Lab Results   Component Value Date    GLUCOSE 86 03/13/2024    CALCIUM 8.1 (L) 03/13/2024     03/13/2024    K 4.2 03/13/2024    CO2 30 03/13/2024    CL 99 03/13/2024    BUN 14 03/13/2024    CREATININE 0.42 (L) 03/13/2024        MR enterography 3/6: colonic wall thickening involving transverse segment +/- the distal right and left colon in a single long continguous segment, no perforation/abscess, stricture/obstruction or sinus tract/fistula.                                                                           GI Procedures      Flex sigmoidoscopy 3/4/24:      Impression  ·             Severe abnormal mucosa in the transverse colon, descending colon, sigmoid colon, rectosigmoid and rectum, consistent with Crohn's disease; performed  cold forceps biopsy  ·             Small hemorrhoids        ASSESSMENT / PLAN      Assessment and Recommendations:   Kyra Behnfeldt is a 22 yo F w/ PMHx Crohn's colitis presenting as transfer from [facility] for Crohn's colitis and consideration of Remicade infusion. Patient presenting with severe colitis flare but without imaging findings of perforation/abscess, stricture/obstruction, or sinus tract/fistula. CRS following and offered DLI as treatment. Patient was started on remicade infusion on 3/7 and still had bloody bowel movements, underwent diversion ileostomy on 3/10, patient underwent second dose of infliximab on 3/11,     Updates 3:13  Patient reported improvement in her symptoms, was switched to IV steroids yesterday.  CRP trending down.     Plan:   - Continue with oral prednisone 40 mg once daily with 5 mg weekly taper.   - Continue to monitor her symptoms.   - Obtain daily CRP   - GI will continue to follow.     ------------------------------------------------------------------------  Esther Guerrero MD  Gastroenterology Fellow  After 5PM and on Weekends, please page on-call fellow.    Discussed and seen with service attending Dr. Cifuentes  Final recommendations pending attending attestation.

## 2024-03-13 NOTE — NURSING NOTE
Paged CR Team regarding pain management. Patient complained that she had a painful bowel movement and needed a dose of morphine instead of Oxycodone. CR team notified, given approval to give breakthrough Morphine IV at this time. Continuing with Plan of Care.

## 2024-03-13 NOTE — HOSPITAL COURSE
Kyra Behnfeldt is a 23 year old female with a past medical history of hemorrhoids, anxiety, anemia, and Crohn's colitis (diagnosed 12/2023) who has had worsening bloody, liquid stools (up to 14 times per day), abdominal pain, and multiple hospitalizations for trouble controlling symptoms since diagnosis.  She presented to [facility] as a transfer from [facility] for Remicade infusion in the setting of worsening Crohn's flare. GI was consulted and recommended IV steroids and Remicade infusion.  She did not have improvement of her symptoms and underwent a diverting loop ileostomy on 3/10/24 with Dr. Castro. She tolerated the procedure well and was admitted to a regular nursing floor.  Her diet was slowly advanced as tolerated.  Throughout her hospital stay, she was monitored for toxic megacolon with daily abdominal xrays and CRP lab draws.  She was noted to have return of bowel function with flatus and liquid, brown stool in ostomy pouch.  She received a second dose of Remicade.  Dehydration teaching, ileostomy diet teaching, and output recording were all completed with verbalized understanding.  At the time of discharge, she was tolerating a regular diet, pain was controlled, and she had improvement of symptoms.  She will follow up with colorectal surgery next week as well as with Dr Yamil Lozano from GI.  She will discharge home with home care as support for new ileostomy.

## 2024-03-13 NOTE — PROGRESS NOTES
Wound Care Progress Note     Visit Date: 3/13/2024      Patient Name: Kyra Behnfeldt         MRN: [id number]                Reason for Visit: ileostomy education     Wound History: POD #3 from loop ileostomy creation     Pertinent Labs:   Albumin   Date Value Ref Range Status   03/13/2024 2.4 (L) 3.4 - 5.0 g/dL Final     Ileostomy RLQ (Active)   Placement Date/Time: 03/10/24 1601   Location: RLQ   Number of days: 3     Ileostomy RLQ (Active)   Stomal Appliance 1 piece 03/13/24 1757   Site/Stoma Assessment [place] 03/13/24 1757   Stoma Size (cm) 3.5 cm 03/12/24 1430   Peristomal Assessment SHAN 03/13/24 1757   Treatment Placement checked 03/13/24 1757   Output (mL) 0 mL 03/13/24 1646   Output Description Brown;Loose 03/13/24 1757     Pouching checked and intact.  Patient declined a hands on lesson today.  Discussed with patient and her mother lifestyle (accomodating for sports like volleyball, showering, clothing for work, style of pouching) topics.  Showed patient sample of stealth belt and provided written education to patient.  Patient agreeable to lesson with next planned pouch change.    Wound Team Plan: Wound/ostomy team to meet with patient tomorrow.     Ashley Mena, MSN, RN, CWCN, COCN   3/13/2024  5:57 PM

## 2024-03-13 NOTE — PROGRESS NOTES
Colorectal Surgery Progress Note      HPI: Kyra Behnfeldt is a 23 y.o. female with a past medical history of hemorrhoids, anxiety, anemia, and Crohn's colitis (diagnosed 12/2023) who was a transfer from [facility] for Remicade infusion for worsening Crohn's flare.  Since diagnosis, she has had worsening bloody, liquid stools (up to 14 times per day), abdominal pain, and multiple hospitalizations for trouble controlling symptoms. Patient did not respond to Remicade here at [facility] [place], [place] and is now hospital day 4, POD 1 s/p laparoscopic diverting loop ileostomy on 3/10/24 with Dr. Castro.  She received a total of 2 Remicade infusions since admission.          Subjective  No acute events overnight  Pain controlled  Denies nausea/vomiting  Continued blood tinged mucous per rectum  Edematous, pink stoma with small amount of brown, liquid stool in pouch  Ambulating independently  Ostomy output 0ml recorded last 24 hours    Objective  Physical Exam:  Gen: Lejunior, conversational female in no physical distress and alert  HENT: Head normocephalic, atraumatic.  Mucous membranes moist.    Neuro: Alert and oriented x3.    CV: Normal rate, normotensive  Resp: No acute respiratory distress.  Normal effort on room air. Chest expansion symmetrical.   GI: Abdomen is soft, nondistended, and appropriately tender to palpation. Umbilicus laparoscopic site well approximated, clean and dry with intact glue.  Stoma is edematous but pink and healthy with brown, liquid stool in pouch.  Skin: Warm and dry, no rashes or lesions  Musculoskeletal: Moves all extremities x4    I/O last 3 completed shifts:  In: 698 (14.8 mL/kg) [P.O.:390; I.V.:308 (6.5 mL/kg)]  Out: 125 (2.6 mL/kg) [Stool:125]  Weight: 47.2 kg   No intake/output data recorded.    Data Review:  CBC:   Lab Results   Component Value Date    WBC 10.3 03/13/2024    RBC 2.86 (L) 03/13/2024     BMP:   Lab Results   Component Value Date    GLUCOSE 86 03/13/2024    CO2 30 03/13/2024    BUN 14  "03/13/2024    CREATININE 0.42 (L) 03/13/2024    CALCIUM 8.1 (L) 03/13/2024   No results found for: \"INR\", \"APTT\"    Labs reviewed and personally interpreted as: BMP without electrolyte derangement, Creatinine normal.  CBC without leukocytosis and acute blood loss anemia (10 ---> 7.9)    Imaging:  KUB 3/10/24 reviewed, showing no bowel distention or obstructive gas pattern    KUB 3/11/24 reviewed, showing no free air or obstructive gas pattern    Assessment: s/p laparoscopic diverting loop ileostomy on 3/10/24 with Dr. Castro.    Plan:  -PO steroid was switched back to IV last evening at recommendation of GI; switching back to PO steroid this afternoon.  Plan for 5mg weekly taper.  -Continue regular diet, encourage PO intake  -Encourage self emptying and pouch changing  -lleostomy diet, dehydration, and recording teaching        Neuro: Post-op pain, history of anxiety  -Continue current pain regimen with tylenol, morphine, oxycodone  -Sleep hygiene, continue home melatonin  -continue home PRN xanax for anxeity    CV: hemodynamically stable, no significant history  -Q4 vital signs    Resp: no acute issues, no significant pulmonary history  -ICS 10x every hour  -OOB/ambulation    GI: Crohn's flare s/p laparoscopic diverting loop ileostomy, history of hemorrhoids and Crohn's  -downtrending CRP  -Continue regular diet, no raw fruits/vegetables  -Zofran prn for nausea  -dehydration, diet, and recording stoma output education, encourage recording on sheets  -stoma nursing for supplies and support  -PO prednisone 40 mg daily with 5 mg weekly taper     : voiding, making sufficient urine  - Strict intake and output  - KVO fluids  - Daily RFP/replace electrolytes as needed    Heme: Hemodynamically stable with Hgb drop today (10 ---> 7.9) without s/s of bleeding, history of anemia  -CBC prn    ID: afebrile, no leukocytosis  - Q4 temp  - monitor for s/s of infection    Endo: no acute issues, no significant endocrine " history  -no indication for SSI    DVT Prophy: SCDs & Lovenox    Dispo:   -Continue care on RNF  -tentative dispo: home with home care Thursday     Plan of care discussed with Colorectal surgical team and patient.    Vivian ESCOBAR-CNP  Colorectal Surgery NP

## 2024-03-13 NOTE — PROGRESS NOTES
03/07/24 1551   Discharge Planning   Living Arrangements Other (Comment)  (roommate)   Support Systems Parent   Assistance Needed Independent   Type of Residence Private residence   Number of Stairs to Enter Residence 2   Number of Stairs Within Residence 12   Home or Post Acute Services None   Patient expects to be discharged to: Home   Does the patient need discharge transport arranged? No   Financial Resource Strain   How hard is it for you to pay for the very basics like food, housing, medical care, and heating? Not hard   Housing Stability   In the last 12 months, was there a time when you were not able to pay the mortgage or rent on time? N   In the last 12 months, was there a time when you did not have a steady place to sleep or slept in a shelter (including now)? N   Transportation Needs   In the past 12 months, has lack of transportation kept you from medical appointments or from getting medications? no   In the past 12 months, has lack of transportation kept you from meetings, work, or from getting things needed for daily living? No     Transitional Care Coordination Progress Note:  Patient discussed during interdisciplinary rounds.   Team members present: COLTON CORTES  Plan per Medical/Surgical team: Chron's colitis  Payor: Joshua  Discharge disposition: Home  Potential Barriers: none  ADOD: 1-2 days    Previous Home Care: NA  DME: NA  [facility]  Falls: Denies  PCP:  new pcp appointment scheduled for 3/18    Met with patient and parents at bedside, provided introduction of self and role. Patient states she lives at home with a roommate; safe at home. Patient independent for adls, no assistive devices. Patient states she normally drives self to appointments. No concerns obtaining/affording medications. Patient states no social/financial concerns. Family to provide transport home at time of discharge. Patient with no anticipated needs at discharge. Will continue to monitor for discharge  planning needs.     Antoinette LIM, RN  Transitional Care Coordinator (TCC)  [phone]    3/11/24 11am- TCC met with patient at bedside to discuss anticipated discharge needs. Patient states she lives at home with a roommate; safe at home. Patient independent for adls, no assistive devices. Patient states she normally drives self to appointments. No concerns obtaining/affording medications. Patient states no social/financial concerns. Family to provide transport home at time of discharge. Patient with no anticipated needs at discharge. Will continue to monitor for discharge planning needs.   Clarissa De Dios RN [facility]    3/13/24 825am- TCC met with patient at bedside to discuss anticipated discharge needs. Patient is agreeable to home health care. Patient states she would like a list of PCP in her area as she is in need of a new one. TCC printed patient a list of PCP and educated patient on  website. TCC will continue to follow patient for discharge needs.  Clarissa De Dios RN [facility]

## 2024-03-13 NOTE — CARE PLAN
The patient's goals for the shift include pt. will have decreased number of bowel movement.    The clinical goals for the shift include pt will increase daily activity        Problem: Daily Care  Goal: Daily care needs are met  Outcome: Progressing     Problem: Psychosocial Needs  Goal: Demonstrates ability to cope with hospitalization/illness  Outcome: Progressing     Problem: Discharge Barriers  Goal: My discharge needs are met  Outcome: Progressing

## 2024-03-14 LAB
ALBUMIN SERPL BCP-MCNC: 2.9 G/DL (ref 3.4–5)
ANION GAP SERPL CALC-SCNC: 13 MMOL/L (ref 10–20)
BASOPHILS # BLD AUTO: 0.05 X10*3/UL (ref 0–0.1)
BASOPHILS NFR BLD AUTO: 0.3 %
BUN SERPL-MCNC: 15 MG/DL (ref 6–23)
CALCIUM SERPL-MCNC: 8.6 MG/DL (ref 8.6–10.6)
CHLORIDE SERPL-SCNC: 98 MMOL/L (ref 98–107)
CO2 SERPL-SCNC: 29 MMOL/L (ref 21–32)
CREAT SERPL-MCNC: 0.6 MG/DL (ref 0.5–1.05)
CRP SERPL-MCNC: 3.39 MG/DL
EGFRCR SERPLBLD CKD-EPI 2021: >90 ML/MIN/1.73M*2
EOSINOPHIL # BLD AUTO: 0.14 X10*3/UL (ref 0–0.7)
EOSINOPHIL NFR BLD AUTO: 0.9 %
ERYTHROCYTE [DISTWIDTH] IN BLOOD BY AUTOMATED COUNT: 13.1 % (ref 11.5–14.5)
GLUCOSE SERPL-MCNC: 80 MG/DL (ref 74–99)
HCT VFR BLD AUTO: 32.3 % (ref 36–46)
HGB BLD-MCNC: 9.8 G/DL (ref 12–16)
IMM GRANULOCYTES # BLD AUTO: 0.31 X10*3/UL (ref 0–0.7)
IMM GRANULOCYTES NFR BLD AUTO: 1.9 % (ref 0–0.9)
LYMPHOCYTES # BLD AUTO: 3.35 X10*3/UL (ref 1.2–4.8)
LYMPHOCYTES NFR BLD AUTO: 20.9 %
MAGNESIUM SERPL-MCNC: 2.09 MG/DL (ref 1.6–2.4)
MCH RBC QN AUTO: 27.2 PG (ref 26–34)
MCHC RBC AUTO-ENTMCNC: 30.3 G/DL (ref 32–36)
MCV RBC AUTO: 90 FL (ref 80–100)
MONOCYTES # BLD AUTO: 1.61 X10*3/UL (ref 0.1–1)
MONOCYTES NFR BLD AUTO: 10 %
NEUTROPHILS # BLD AUTO: 10.59 X10*3/UL (ref 1.2–7.7)
NEUTROPHILS NFR BLD AUTO: 66 %
NRBC BLD-RTO: 0.2 /100 WBCS (ref 0–0)
PHOSPHATE SERPL-MCNC: 2.8 MG/DL (ref 2.5–4.9)
PLATELET # BLD AUTO: 656 X10*3/UL (ref 150–450)
POTASSIUM SERPL-SCNC: 3.5 MMOL/L (ref 3.5–5.3)
RBC # BLD AUTO: 3.6 X10*6/UL (ref 4–5.2)
SODIUM SERPL-SCNC: 136 MMOL/L (ref 136–145)
WBC # BLD AUTO: 16.1 X10*3/UL (ref 4.4–11.3)

## 2024-03-14 PROCEDURE — 86140 C-REACTIVE PROTEIN: CPT

## 2024-03-14 PROCEDURE — 80069 RENAL FUNCTION PANEL: CPT

## 2024-03-14 PROCEDURE — 1170000001 HC PRIVATE ONCOLOGY ROOM DAILY

## 2024-03-14 PROCEDURE — 99231 SBSQ HOSP IP/OBS SF/LOW 25: CPT | Performed by: INTERNAL MEDICINE

## 2024-03-14 PROCEDURE — 2500000004 HC RX 250 GENERAL PHARMACY W/ HCPCS (ALT 636 FOR OP/ED): Performed by: STUDENT IN AN ORGANIZED HEALTH CARE EDUCATION/TRAINING PROGRAM

## 2024-03-14 PROCEDURE — 2500000001 HC RX 250 WO HCPCS SELF ADMINISTERED DRUGS (ALT 637 FOR MEDICARE OP)

## 2024-03-14 PROCEDURE — 36415 COLL VENOUS BLD VENIPUNCTURE: CPT

## 2024-03-14 PROCEDURE — C9113 INJ PANTOPRAZOLE SODIUM, VIA: HCPCS | Performed by: NURSE PRACTITIONER

## 2024-03-14 PROCEDURE — 2500000004 HC RX 250 GENERAL PHARMACY W/ HCPCS (ALT 636 FOR OP/ED)

## 2024-03-14 PROCEDURE — 2500000004 HC RX 250 GENERAL PHARMACY W/ HCPCS (ALT 636 FOR OP/ED): Performed by: NURSE PRACTITIONER

## 2024-03-14 PROCEDURE — 2500000001 HC RX 250 WO HCPCS SELF ADMINISTERED DRUGS (ALT 637 FOR MEDICARE OP): Performed by: STUDENT IN AN ORGANIZED HEALTH CARE EDUCATION/TRAINING PROGRAM

## 2024-03-14 PROCEDURE — 85025 COMPLETE CBC W/AUTO DIFF WBC: CPT

## 2024-03-14 PROCEDURE — 83735 ASSAY OF MAGNESIUM: CPT

## 2024-03-14 RX ORDER — ACETAMINOPHEN 10 MG/ML
15 INJECTION, SOLUTION INTRAVENOUS EVERY 6 HOURS PRN
Status: DISCONTINUED | OUTPATIENT
Start: 2024-03-14 | End: 2024-03-15

## 2024-03-14 RX ORDER — ACETAMINOPHEN 10 MG/ML
15 INJECTION, SOLUTION INTRAVENOUS EVERY 8 HOURS PRN
Status: DISCONTINUED | OUTPATIENT
Start: 2024-03-14 | End: 2024-03-14

## 2024-03-14 RX ORDER — CYCLOBENZAPRINE HCL 10 MG
5 TABLET ORAL 3 TIMES DAILY PRN
Status: DISCONTINUED | OUTPATIENT
Start: 2024-03-14 | End: 2024-03-14

## 2024-03-14 RX ORDER — POTASSIUM CHLORIDE 14.9 MG/ML
20 INJECTION INTRAVENOUS
Status: COMPLETED | OUTPATIENT
Start: 2024-03-14 | End: 2024-03-14

## 2024-03-14 RX ORDER — SIMETHICONE 80 MG
40 TABLET,CHEWABLE ORAL 4 TIMES DAILY PRN
Status: DISCONTINUED | OUTPATIENT
Start: 2024-03-14 | End: 2024-03-18 | Stop reason: HOSPADM

## 2024-03-14 RX ORDER — HYDROMORPHONE HYDROCHLORIDE 1 MG/ML
0.2 INJECTION, SOLUTION INTRAMUSCULAR; INTRAVENOUS; SUBCUTANEOUS
Status: DISCONTINUED | OUTPATIENT
Start: 2024-03-14 | End: 2024-03-18 | Stop reason: HOSPADM

## 2024-03-14 RX ORDER — CYCLOBENZAPRINE HCL 10 MG
5 TABLET ORAL 3 TIMES DAILY
Status: DISCONTINUED | OUTPATIENT
Start: 2024-03-14 | End: 2024-03-14

## 2024-03-14 RX ORDER — POTASSIUM CHLORIDE 20 MEQ/1
20 TABLET, EXTENDED RELEASE ORAL ONCE
Status: COMPLETED | OUTPATIENT
Start: 2024-03-14 | End: 2024-03-16

## 2024-03-14 RX ORDER — ACETAMINOPHEN 10 MG/ML
1000 INJECTION, SOLUTION INTRAVENOUS ONCE
Status: COMPLETED | OUTPATIENT
Start: 2024-03-14 | End: 2024-03-14

## 2024-03-14 RX ORDER — METHOCARBAMOL 500 MG/1
500 TABLET, FILM COATED ORAL EVERY 6 HOURS SCHEDULED
Status: DISCONTINUED | OUTPATIENT
Start: 2024-03-14 | End: 2024-03-18 | Stop reason: HOSPADM

## 2024-03-14 RX ADMIN — PANTOPRAZOLE SODIUM 40 MG: 40 INJECTION, POWDER, FOR SOLUTION INTRAVENOUS at 09:32

## 2024-03-14 RX ADMIN — METHYLPREDNISOLONE SODIUM SUCCINATE 20 MG: 40 INJECTION, POWDER, FOR SOLUTION INTRAMUSCULAR; INTRAVENOUS at 09:32

## 2024-03-14 RX ADMIN — SIMETHICONE 40 MG: 80 TABLET, CHEWABLE ORAL at 16:25

## 2024-03-14 RX ADMIN — METHYLPREDNISOLONE SODIUM SUCCINATE 20 MG: 40 INJECTION, POWDER, FOR SOLUTION INTRAMUSCULAR; INTRAVENOUS at 23:52

## 2024-03-14 RX ADMIN — POTASSIUM CHLORIDE 20 MEQ: 14.9 INJECTION, SOLUTION INTRAVENOUS at 11:07

## 2024-03-14 RX ADMIN — POTASSIUM CHLORIDE 20 MEQ: 14.9 INJECTION, SOLUTION INTRAVENOUS at 13:28

## 2024-03-14 RX ADMIN — ACETAMINOPHEN 1000 MG: 10 INJECTION INTRAVENOUS at 11:24

## 2024-03-14 RX ADMIN — OXYCODONE HYDROCHLORIDE 10 MG: 5 TABLET ORAL at 18:31

## 2024-03-14 RX ADMIN — SIMETHICONE 40 MG: 80 TABLET, CHEWABLE ORAL at 23:52

## 2024-03-14 RX ADMIN — OXYCODONE HYDROCHLORIDE 5 MG: 5 TABLET ORAL at 13:27

## 2024-03-14 RX ADMIN — ACETAMINOPHEN 1000 MG: 10 INJECTION INTRAVENOUS at 23:01

## 2024-03-14 RX ADMIN — MORPHINE SULFATE 4 MG: 4 INJECTION, SOLUTION INTRAMUSCULAR; INTRAVENOUS at 14:49

## 2024-03-14 RX ADMIN — MORPHINE SULFATE 4 MG: 4 INJECTION, SOLUTION INTRAMUSCULAR; INTRAVENOUS at 06:17

## 2024-03-14 RX ADMIN — MORPHINE SULFATE 4 MG: 4 INJECTION, SOLUTION INTRAMUSCULAR; INTRAVENOUS at 02:35

## 2024-03-14 RX ADMIN — METHOCARBAMOL 500 MG: 500 TABLET ORAL at 15:45

## 2024-03-14 RX ADMIN — OXYCODONE HYDROCHLORIDE 5 MG: 5 TABLET ORAL at 07:12

## 2024-03-14 RX ADMIN — METHOCARBAMOL 500 MG: 500 TABLET ORAL at 23:52

## 2024-03-14 RX ADMIN — OXYCODONE HYDROCHLORIDE 10 MG: 5 TABLET ORAL at 22:37

## 2024-03-14 RX ADMIN — ONDANSETRON 4 MG: 2 INJECTION INTRAMUSCULAR; INTRAVENOUS at 04:42

## 2024-03-14 RX ADMIN — SIMETHICONE 40 MG: 80 TABLET, CHEWABLE ORAL at 20:00

## 2024-03-14 RX ADMIN — METHOCARBAMOL 500 MG: 500 TABLET ORAL at 09:31

## 2024-03-14 RX ADMIN — METHYLPREDNISOLONE SODIUM SUCCINATE 20 MG: 40 INJECTION, POWDER, FOR SOLUTION INTRAMUSCULAR; INTRAVENOUS at 15:45

## 2024-03-14 RX ADMIN — ACETAMINOPHEN 1000 MG: 10 INJECTION INTRAVENOUS at 04:36

## 2024-03-14 ASSESSMENT — COGNITIVE AND FUNCTIONAL STATUS - GENERAL
WALKING IN HOSPITAL ROOM: A LITTLE
DRESSING REGULAR LOWER BODY CLOTHING: A LITTLE
TOILETING: A LITTLE
MOBILITY SCORE: 24
HELP NEEDED FOR BATHING: A LITTLE
STANDING UP FROM CHAIR USING ARMS: A LITTLE
TURNING FROM BACK TO SIDE WHILE IN FLAT BAD: A LITTLE
MOVING FROM LYING ON BACK TO SITTING ON SIDE OF FLAT BED WITH BEDRAILS: A LITTLE
PERSONAL GROOMING: A LITTLE
MOVING TO AND FROM BED TO CHAIR: A LITTLE
MOVING FROM LYING ON BACK TO SITTING ON SIDE OF FLAT BED WITH BEDRAILS: A LITTLE
WALKING IN HOSPITAL ROOM: A LITTLE
DRESSING REGULAR UPPER BODY CLOTHING: A LITTLE
STANDING UP FROM CHAIR USING ARMS: A LITTLE
CLIMB 3 TO 5 STEPS WITH RAILING: A LITTLE
TOILETING: A LITTLE
TURNING FROM BACK TO SIDE WHILE IN FLAT BAD: A LITTLE
DAILY ACTIVITIY SCORE: 19
DAILY ACTIVITIY SCORE: 24
DRESSING REGULAR LOWER BODY CLOTHING: A LITTLE
HELP NEEDED FOR BATHING: A LITTLE
DRESSING REGULAR UPPER BODY CLOTHING: A LITTLE
PERSONAL GROOMING: A LITTLE
CLIMB 3 TO 5 STEPS WITH RAILING: A LITTLE
MOBILITY SCORE: 18
MOVING TO AND FROM BED TO CHAIR: A LITTLE

## 2024-03-14 ASSESSMENT — PAIN - FUNCTIONAL ASSESSMENT
PAIN_FUNCTIONAL_ASSESSMENT: 0-10

## 2024-03-14 ASSESSMENT — PAIN SCALES - GENERAL
PAINLEVEL_OUTOF10: 6
PAINLEVEL_OUTOF10: 5 - MODERATE PAIN
PAINLEVEL_OUTOF10: 7
PAINLEVEL_OUTOF10: 7
PAINLEVEL_OUTOF10: 4
PAINLEVEL_OUTOF10: 4
PAINLEVEL_OUTOF10: 7
PAINLEVEL_OUTOF10: 8
PAINLEVEL_OUTOF10: 5 - MODERATE PAIN

## 2024-03-14 NOTE — PROGRESS NOTES
"    Colorectal Surgery Progress Note      HPI: Kyra Behnfeldt is a 23 y.o. female with a past medical history of hemorrhoids, anxiety, anemia, and Crohn's colitis (diagnosed 12/2023) who was a transfer from [facility] for Remicade infusion for worsening Crohn's flare. Patient did not respond to Remicade here at [facility] [place] and is now s/p laparoscopic diverting loop ileostomy on 3/10/24 with Dr. Castro.  She has received a total of 2 Remicade infusions since admission.      Subjective  Worse pain/cramping overnight, patient hesitant to take morphine out of concern for constipating effects  Several bloody bowel movements  Denies nausea/vomiting  Edematous, pink stoma with small amount of brown, liquid stool in pouch  Ambulating independently  Ostomy output 200 ml recorded last 24 hours    Objective  Physical Exam:  Gen: No acute distress, in moderate discomfort resting in bed  HENT: Head normocephalic, atraumatic.  Mucous membranes moist.    Neuro: Alert and oriented x3.    CV: Normal rate, normotensive  Resp: No acute respiratory distress.  Normal effort on room air. Chest expansion symmetrical.   GI: Abdomen is soft, nondistended, and appropriately tender to palpation. Umbilicus laparoscopic site well approximated, clean and dry with intact glue.  Stoma is edematous but pink and healthy with brown, liquid stool in pouch.  Skin: Warm and dry, no rashes or lesions  Musculoskeletal: Moves all extremities x4    I/O last 3 completed shifts:  In: - (0 mL/kg)   Out: 200 (4.2 mL/kg) [Stool:200]  Weight: 47.2 kg   No intake/output data recorded.    Data Review:  CBC:   Lab Results   Component Value Date    WBC 16.1 (H) 03/14/2024    RBC 3.60 (L) 03/14/2024     BMP:   Lab Results   Component Value Date    GLUCOSE 80 03/14/2024    CO2 29 03/14/2024    BUN 15 03/14/2024    CREATININE 0.60 03/14/2024    CALCIUM 8.6 03/14/2024   No results found for: \"INR\", \"APTT\"    Labs reviewed and personally interpreted as: BMP without electrolyte " derangement, Creatinine normal.  CBC without leukocytosis and acute blood loss anemia (10 ---> 7.9)    Imaging:  None new    Assessment: s/p laparoscopic diverting loop ileostomy on 3/10/24 with Dr. Castro.    Plan:  Neuro: Post-op pain, history of anxiety  -Continue current pain regimen with IV tylenol, morphine, oxycodone, robaxin  -Sleep hygiene, continue home melatonin  -continue home PRN xanax and atarax for anxiety    CV: hemodynamically stable, no significant history  -Q4 vital signs    Resp: no acute issues, no significant pulmonary history  -ICS 10x every hour  -OOB/ambulation    GI: Crohn's flare s/p laparoscopic diverting loop ileostomy, history of hemorrhoids and Crohn's, downtrending CRP  -IV Solumedrol given worse symptoms overnight  -simethicone for gas pain  -monitor CRP for tracking treatment response per GI  -Continue regular diet, no raw fruits/vegetables, encourage liquid intake  -IV PPI  -Zofran prn for nausea  -dehydration, diet, and recording stoma output education, encourage recording on sheets  -stoma nursing for supplies and support    : voiding, making sufficient urine  - Strict intake and output  - KVO fluids  - Daily RFP/replace electrolytes as needed    Heme:   -CBC prn    ID: afebrile  - Q4 temp  - monitor for s/s of infection  - trend leukocytosis    Endo: no acute issues, no significant endocrine history  -no indication for SSI    DVT Prophy: SCDs & Lovenox    Dispo:   -Continue care on RNF  -tentative dispo: home with home care    Seen and discussed with attending Dr. Gloria Benito MD  Colorectal Surgery Rotator  [facility] Pager: [phone]

## 2024-03-14 NOTE — PROGRESS NOTES
[facility]  [facility] [place]  CONSULT FOLLOW-UP         SUBJECTIVE     Interval Events/Subjective: Patient reported multiple bloody bowel movements and rectal pain throughout the night.      Medications:  enoxaparin, 40 mg, subcutaneous, q24h  melatonin, 3 mg, oral, Daily  methocarbamol, 500 mg, oral, q6h MATTIE  methylPREDNISolone sodium succinate (PF), 20 mg, intravenous, q8h  pantoprazole, 40 mg, intravenous, Daily  potassium chloride CR, 20 mEq, oral, Once      PRN medications: acetaminophen, ALPRAZolam, guaiFENesin, hydrOXYzine HCL, lidocaine-diphenhydraMINE-Maalox 1:1:1, morphine, naloxone, ondansetron ODT **OR** ondansetron, oxyCODONE, oxyCODONE, simethicone, witch hazel      EXAM     Physical Exam     General: NAD, AA&O x 3  Eyes: EOMI, PERRLA  ENT: MMM  Heart: RRR  Lungs: No respiratory distress  Abdomen: Soft, non tender, non distended, ileostomy in place  Skin: No jaundice   Neuro: Appropriately responds to questions/commands         DATA                                                                            Labs     Lab Results   Component Value Date    WBC 16.1 (H) 03/14/2024    HGB 9.8 (L) 03/14/2024    HCT 32.3 (L) 03/14/2024    MCV 90 03/14/2024     (H) 03/14/2024        Lab Results   Component Value Date    GLUCOSE 80 03/14/2024    CALCIUM 8.6 03/14/2024     03/14/2024    K 3.5 03/14/2024    CO2 29 03/14/2024    CL 98 03/14/2024    BUN 15 03/14/2024    CREATININE 0.60 03/14/2024                                                                               Imaging   MR enterography 3/6: colonic wall thickening involving transverse segment +/- the distal right and left colon in a single long continguous segment, no perforation/abscess, stricture/obstruction or sinus tract/fistula.                                                                           GI Procedures      Flex sigmoidoscopy 3/4/24:      Impression  ·             Severe  abnormal mucosa in the transverse colon, descending colon, sigmoid colon, rectosigmoid and rectum, consistent with Crohn's disease; performed cold forceps biopsy  ·             Small hemorrhoids     ASSESSMENT / PLAN     Assessment and Recommendations:   Kyra Behnfeldt is a 24 yo F w/ PMHx Crohn's colitis presenting as transfer from [facility] for Crohn's colitis and consideration of Remicade infusion. Patient presenting with severe colitis flare but without imaging findings of perforation/abscess, stricture/obstruction, or sinus tract/fistula. CRS following and offered DLI as treatment. Patient was started on remicade infusion on 3/7 and still had bloody bowel movements, underwent diversion ileostomy on 3/10, patient underwent second dose of infliximab on 3/11, Patient continued to have intermittent worsening of symptoms and has been unable to wean off on IV steroids. CRP has been trending down though.        Plan:   - Obtain CTAP as discussed.   - Continue IV steroids.   - Obtain daily CRP   - Continue to monitor her symptoms.   - GI will continue to follow.        LEANNA per primary team     ------------------------------------------------------------------------  Esther Guerrero MD  Gastroenterology Fellow  After 5PM and on Weekends, please page on-call fellow.    Discussed and seen with service attending Dr. Cifuentes  Final recommendations pending attending attestation.

## 2024-03-15 ENCOUNTER — APPOINTMENT (OUTPATIENT)
Dept: RADIOLOGY | Facility: HOSPITAL | Age: 24
DRG: 330 | End: 2024-03-15
Payer: COMMERCIAL

## 2024-03-15 LAB
ALBUMIN SERPL BCP-MCNC: 2.9 G/DL (ref 3.4–5)
ANION GAP SERPL CALC-SCNC: 12 MMOL/L (ref 10–20)
BASOPHILS # BLD AUTO: 0.03 X10*3/UL (ref 0–0.1)
BASOPHILS NFR BLD AUTO: 0.2 %
BUN SERPL-MCNC: 14 MG/DL (ref 6–23)
C DIF TOX TCDA+TCDB STL QL NAA+PROBE: NOT DETECTED
CALCIUM SERPL-MCNC: 8.8 MG/DL (ref 8.6–10.6)
CHLORIDE SERPL-SCNC: 96 MMOL/L (ref 98–107)
CO2 SERPL-SCNC: 32 MMOL/L (ref 21–32)
CREAT SERPL-MCNC: 0.51 MG/DL (ref 0.5–1.05)
CRP SERPL-MCNC: 9.34 MG/DL
EGFRCR SERPLBLD CKD-EPI 2021: >90 ML/MIN/1.73M*2
EOSINOPHIL # BLD AUTO: 0.02 X10*3/UL (ref 0–0.7)
EOSINOPHIL NFR BLD AUTO: 0.1 %
ERYTHROCYTE [DISTWIDTH] IN BLOOD BY AUTOMATED COUNT: 13.2 % (ref 11.5–14.5)
GLUCOSE SERPL-MCNC: 102 MG/DL (ref 74–99)
HCT VFR BLD AUTO: 28.2 % (ref 36–46)
HGB BLD-MCNC: 8.9 G/DL (ref 12–16)
IMM GRANULOCYTES # BLD AUTO: 0.34 X10*3/UL (ref 0–0.7)
IMM GRANULOCYTES NFR BLD AUTO: 2.3 % (ref 0–0.9)
LYMPHOCYTES # BLD AUTO: 1.36 X10*3/UL (ref 1.2–4.8)
LYMPHOCYTES NFR BLD AUTO: 9 %
MAGNESIUM SERPL-MCNC: 2.17 MG/DL (ref 1.6–2.4)
MCH RBC QN AUTO: 27.6 PG (ref 26–34)
MCHC RBC AUTO-ENTMCNC: 31.6 G/DL (ref 32–36)
MCV RBC AUTO: 88 FL (ref 80–100)
MONOCYTES # BLD AUTO: 0.98 X10*3/UL (ref 0.1–1)
MONOCYTES NFR BLD AUTO: 6.5 %
NEUTROPHILS # BLD AUTO: 12.35 X10*3/UL (ref 1.2–7.7)
NEUTROPHILS NFR BLD AUTO: 81.9 %
NRBC BLD-RTO: 0.1 /100 WBCS (ref 0–0)
PHOSPHATE SERPL-MCNC: 3.8 MG/DL (ref 2.5–4.9)
PLATELET # BLD AUTO: 560 X10*3/UL (ref 150–450)
POTASSIUM SERPL-SCNC: 4.5 MMOL/L (ref 3.5–5.3)
RBC # BLD AUTO: 3.22 X10*6/UL (ref 4–5.2)
SODIUM SERPL-SCNC: 135 MMOL/L (ref 136–145)
WBC # BLD AUTO: 15.1 X10*3/UL (ref 4.4–11.3)

## 2024-03-15 PROCEDURE — 74177 CT ABD & PELVIS W/CONTRAST: CPT | Performed by: RADIOLOGY

## 2024-03-15 PROCEDURE — C9113 INJ PANTOPRAZOLE SODIUM, VIA: HCPCS | Performed by: NURSE PRACTITIONER

## 2024-03-15 PROCEDURE — 83735 ASSAY OF MAGNESIUM: CPT

## 2024-03-15 PROCEDURE — 2500000004 HC RX 250 GENERAL PHARMACY W/ HCPCS (ALT 636 FOR OP/ED)

## 2024-03-15 PROCEDURE — 1170000001 HC PRIVATE ONCOLOGY ROOM DAILY

## 2024-03-15 PROCEDURE — 87493 C DIFF AMPLIFIED PROBE: CPT

## 2024-03-15 PROCEDURE — 85025 COMPLETE CBC W/AUTO DIFF WBC: CPT

## 2024-03-15 PROCEDURE — 2500000001 HC RX 250 WO HCPCS SELF ADMINISTERED DRUGS (ALT 637 FOR MEDICARE OP): Performed by: STUDENT IN AN ORGANIZED HEALTH CARE EDUCATION/TRAINING PROGRAM

## 2024-03-15 PROCEDURE — 86140 C-REACTIVE PROTEIN: CPT

## 2024-03-15 PROCEDURE — 2500000001 HC RX 250 WO HCPCS SELF ADMINISTERED DRUGS (ALT 637 FOR MEDICARE OP)

## 2024-03-15 PROCEDURE — 99233 SBSQ HOSP IP/OBS HIGH 50: CPT

## 2024-03-15 PROCEDURE — 36415 COLL VENOUS BLD VENIPUNCTURE: CPT

## 2024-03-15 PROCEDURE — 2500000004 HC RX 250 GENERAL PHARMACY W/ HCPCS (ALT 636 FOR OP/ED): Performed by: NURSE PRACTITIONER

## 2024-03-15 PROCEDURE — 80069 RENAL FUNCTION PANEL: CPT

## 2024-03-15 PROCEDURE — 74177 CT ABD & PELVIS W/CONTRAST: CPT

## 2024-03-15 PROCEDURE — 2550000001 HC RX 255 CONTRASTS

## 2024-03-15 RX ORDER — ACETAMINOPHEN 325 MG/1
650 TABLET ORAL EVERY 6 HOURS
Status: DISCONTINUED | OUTPATIENT
Start: 2024-03-15 | End: 2024-03-18 | Stop reason: HOSPADM

## 2024-03-15 RX ORDER — PREDNISONE 20 MG/1
40 TABLET ORAL DAILY
Status: DISCONTINUED | OUTPATIENT
Start: 2024-03-15 | End: 2024-03-15

## 2024-03-15 RX ADMIN — PREDNISONE 40 MG: 20 TABLET ORAL at 13:18

## 2024-03-15 RX ADMIN — SIMETHICONE 40 MG: 80 TABLET, CHEWABLE ORAL at 10:10

## 2024-03-15 RX ADMIN — ALPRAZOLAM 0.5 MG: 0.5 TABLET ORAL at 00:31

## 2024-03-15 RX ADMIN — PANTOPRAZOLE SODIUM 40 MG: 40 INJECTION, POWDER, FOR SOLUTION INTRAVENOUS at 08:11

## 2024-03-15 RX ADMIN — METHOCARBAMOL 500 MG: 500 TABLET ORAL at 13:18

## 2024-03-15 RX ADMIN — OXYCODONE HYDROCHLORIDE 10 MG: 5 TABLET ORAL at 06:02

## 2024-03-15 RX ADMIN — SIMETHICONE 40 MG: 80 TABLET, CHEWABLE ORAL at 22:39

## 2024-03-15 RX ADMIN — OXYCODONE HYDROCHLORIDE 10 MG: 5 TABLET ORAL at 22:39

## 2024-03-15 RX ADMIN — OXYCODONE HYDROCHLORIDE 10 MG: 5 TABLET ORAL at 18:24

## 2024-03-15 RX ADMIN — METHYLPREDNISOLONE SODIUM SUCCINATE 20 MG: 40 INJECTION, POWDER, FOR SOLUTION INTRAMUSCULAR; INTRAVENOUS at 08:10

## 2024-03-15 RX ADMIN — SIMETHICONE 40 MG: 80 TABLET, CHEWABLE ORAL at 14:13

## 2024-03-15 RX ADMIN — METHOCARBAMOL 500 MG: 500 TABLET ORAL at 06:02

## 2024-03-15 RX ADMIN — IOHEXOL 75 ML: 350 INJECTION, SOLUTION INTRAVENOUS at 11:58

## 2024-03-15 RX ADMIN — ALPRAZOLAM 0.5 MG: 0.5 TABLET ORAL at 20:22

## 2024-03-15 RX ADMIN — SIMETHICONE 40 MG: 80 TABLET, CHEWABLE ORAL at 18:25

## 2024-03-15 RX ADMIN — OXYCODONE HYDROCHLORIDE 10 MG: 5 TABLET ORAL at 14:14

## 2024-03-15 RX ADMIN — METHYLPREDNISOLONE SODIUM SUCCINATE 20 MG: 40 INJECTION, POWDER, FOR SOLUTION INTRAMUSCULAR; INTRAVENOUS at 20:20

## 2024-03-15 RX ADMIN — OXYCODONE HYDROCHLORIDE 10 MG: 5 TABLET ORAL at 10:09

## 2024-03-15 ASSESSMENT — PAIN SCALES - GENERAL
PAINLEVEL_OUTOF10: 0 - NO PAIN
PAINLEVEL_OUTOF10: 3
PAINLEVEL_OUTOF10: 0 - NO PAIN
PAINLEVEL_OUTOF10: 7
PAINLEVEL_OUTOF10: 4
PAINLEVEL_OUTOF10: 7

## 2024-03-15 ASSESSMENT — COGNITIVE AND FUNCTIONAL STATUS - GENERAL
DAILY ACTIVITIY SCORE: 24
MOBILITY SCORE: 24

## 2024-03-15 ASSESSMENT — PAIN - FUNCTIONAL ASSESSMENT
PAIN_FUNCTIONAL_ASSESSMENT: 0-10

## 2024-03-15 ASSESSMENT — PAIN DESCRIPTION - LOCATION: LOCATION: ABDOMEN

## 2024-03-15 ASSESSMENT — PAIN DESCRIPTION - ORIENTATION: ORIENTATION: UPPER

## 2024-03-15 NOTE — PROGRESS NOTES
Wound Care Progress Note     Visit Date: 3/15/2024      Patient Name: Kyra Behnfeldt         MRN: [id number]                Reason for Visit: loop ileostomy care     Wound History: POD #5 from loop ileostomy creation     Pertinent Labs:   Albumin   Date Value Ref Range Status   03/15/2024 2.9 (L) 3.4 - 5.0 g/dL Final     Ileostomy RLQ (Active)   Placement Date/Time: 03/10/24 1601   Location: RLQ   Number of days: 5     Ileostomy RLQ (Active)   Stomal Appliance Changed;1 piece;Clean;Dry;Intact 03/15/24 1731   Site/Stoma Assessment Red;Edema 03/15/24 1731   Stoma Size (cm) 3.3 cm 03/15/24 1731   Peristomal Assessment Clean;Intact 03/15/24 1731   Treatment Pouch change 03/15/24 1731   Output (mL) 75 mL 03/15/24 1731   Output Description Brown;Liquid 03/15/24 1731     WOC nursing visit outcome: Patient performed entire pouch change with author assistance.  Patient stated feeling more comfortable with care and was able to anticipate most steps.  Patient drinking fluids and emptying pouch     WOC next scheduled visit/plan: Wound/ostomy team to follow up with patient tomorrow.     Stoma Type: Loop Ileostomy  Ad:  Yes-removed  Diameter: 1 3/8 in  Location: RLQ  Protrusion: Budded  Mucosal Condition and Color: Moist, Red, Edematous  Mucocutaneous Junction: Intact  Peristomal Skin: Clear, intact  Peristomal Contour: Flat  Supportive Tissue: Firm  Character of Output:  bowel sweat  Removed/Current Pouching System: barrier ring, 1 piece [place] flat drainable pouch  Current Wearing Time: 3-4 Days     Recommendations:   Skin Care: cavilon no sting skin barrier film  Pouching System: 1 piece [place] flat drainable pouch  Wear Time: 3-4 Days     Time Increment: 30 minutes     Ashley Mena, MSN, RN, CWCN, COCN     3/15/2024  5:49 PM

## 2024-03-15 NOTE — PROGRESS NOTES
[facility]  [facility] [place]  CONSULT FOLLOW-UP         SUBJECTIVE     Interval Events/Subjective: Patient reported improvement in the abdominal pain, had less frequent bloody bowel movements.      Medications:  acetaminophen, 650 mg, oral, q6h  enoxaparin, 40 mg, subcutaneous, q24h  melatonin, 3 mg, oral, Daily  methocarbamol, 500 mg, oral, q6h MATTIE  methylPREDNISolone sodium succinate (PF), 20 mg, intravenous, q8h  pantoprazole, 40 mg, intravenous, Daily  potassium chloride CR, 20 mEq, oral, Once      PRN medications: ALPRAZolam, guaiFENesin, HYDROmorphone, hydrOXYzine HCL, lidocaine-diphenhydraMINE-Maalox 1:1:1, naloxone, ondansetron ODT **OR** ondansetron, oxyCODONE, oxyCODONE, simethicone, witch hazel      EXAM     Physical Exam     General: NAD, AA&O x 3  Eyes: EOMI, PERRLA  ENT: MMM  Heart: RRR  Lungs: No respiratory distress  Abdomen: Soft, non tender, non distended, ostomy in place  Skin: No jaundice   Neuro: Appropriately responds to questions/commands         DATA                                                                            Labs     Lab Results   Component Value Date    WBC 15.1 (H) 03/15/2024    HGB 8.9 (L) 03/15/2024    HCT 28.2 (L) 03/15/2024    MCV 88 03/15/2024     (H) 03/15/2024        Lab Results   Component Value Date    GLUCOSE 102 (H) 03/15/2024    CALCIUM 8.8 03/15/2024     (L) 03/15/2024    K 4.5 03/15/2024    CO2 32 03/15/2024    CL 96 (L) 03/15/2024    BUN 14 03/15/2024    CREATININE 0.51 03/15/2024                                                                               Imaging     MR enterography 3/6: colonic wall thickening involving transverse segment +/- the distal right and left colon in a single long continguous segment, no perforation/abscess, stricture/obstruction or sinus tract/fistula.        CTAP 3/15:   IMPRESSION:  1.  Expected postsurgical changes of interval ileostomy creation with  mildly  dilated/thickened, fluid-filled proximal small bowel. Mild  wall thickening/enhancement of the ostomy.  2. Mild interval increase in large bowel wall thickening/enhancement.  3. Moderate amount of simple fluid density ascites layering within  the pelvis.                                                                       GI Procedures      Flex sigmoidoscopy 3/4/24:      Impression  ·             Severe abnormal mucosa in the transverse colon, descending colon, sigmoid colon, rectosigmoid and rectum, consistent with Crohn's disease; performed cold forceps biopsy  ·             Small hemorrhoids      ASSESSMENT / PLAN     Assessment and Recommendations:   Kyra Behnfeldt is a 24 yo F w/ PMHx Crohn's colitis presenting as transfer from [facility] for Crohn's colitis and consideration of Remicade infusion. Patient presenting with severe colitis flare but without imaging findings of perforation/abscess, stricture/obstruction, or sinus tract/fistula. CRS following and offered DLI as treatment. Patient was started on remicade infusion on 3/7 and still had bloody bowel movements, underwent diversion ileostomy on 3/10, patient underwent second dose of infliximab on 3/11, Patient continued to have intermittent worsening of symptoms and has been unable to wean off on IV steroids. CRP was trending down but started to go up. CTAP on 3/15 showed mild interval increase in large bowel wall thickening/enhancement.        Plan:   - Obtain Cdiff screen.   - Continue IV steroids.   - Obtain daily CRP   - Continue to monitor her symptoms.   - GI will continue to follow.     LEANNA per primary team     ------------------------------------------------------------------------  Esther Guerrero MD  Gastroenterology Fellow  After 5PM and on Weekends, please page on-call fellow.    Discussed and seen with service attending Dr. Cifuentes  Final recommendations pending attending attestation.

## 2024-03-15 NOTE — PROGRESS NOTES
"    Colorectal Surgery Progress Note      HPI: Kyra Behnfeldt is a 23 y.o. female with a past medical history of hemorrhoids, anxiety, anemia, and Crohn's colitis (diagnosed 12/2023) who was a transfer from [facility] for Remicade infusion for worsening Crohn's flare.  Since diagnosis, she has had worsening bloody, liquid stools (up to 14 times per day), abdominal pain, and multiple hospitalizations for trouble controlling symptoms. Patient [place] [facility] [place] hospital [place] 8, POD 5 s/p laparoscopic diverting loop ileostomy on 3/10/24 with Dr. Castro.  She has received a total of 2 Remicade infusions since admission.          Subjective  No acute events overnight  Pain improving but feeling abdominal cramps/pressure with eating and \"incisional soreness\"  Denies nausea/vomiting  No bloody mucous per rectum overnight  Improving mildly edematous, red stoma with small amount of brown, liquid stool in pouch  Ambulating independently  Ostomy output 275ml recorded last 24 hours    Objective  Physical Exam:  Gen: [place], conversational female in no physical distress and alert  HENT: Head normocephalic, atraumatic.  Mucous membranes moist.    Neuro: Alert and oriented x3.    CV: Normal rate, normotensive  Resp: No acute respiratory distress.  Normal effort on room air. Chest expansion symmetrical.   GI: Abdomen is soft, nondistended, and appropriately tender to palpation. Umbilicus laparoscopic site well approximated, clean and dry with intact glue.  Stoma slightly edematous but improving, red and healthy with brown, liquid stool in pouch.  Skin: Warm and dry, no rashes or lesions  Musculoskeletal: Moves all extremities x4    I/O last 3 completed shifts:  In: 300 (6.4 mL/kg) [IV Piggyback:300]  Out: 2250 (47.7 mL/kg) [Urine:1900 (1.1 mL/kg/hr); Stool:350]  Weight: 47.2 kg   I/O this shift:  In: -   Out: 400 [Urine:400]    Data Review:  CBC:   Lab Results   Component Value Date    WBC " "15.1 (H) 03/15/2024    RBC 3.22 (L) 03/15/2024     BMP:   Lab Results   Component Value Date    GLUCOSE 102 (H) 03/15/2024    CO2 32 03/15/2024    BUN 14 03/15/2024    CREATININE 0.51 03/15/2024    CALCIUM 8.8 03/15/2024   No results found for: \"INR\", \"APTT\"    Labs reviewed and personally interpreted as: BMP without electrolyte derangement, Creatinine normal.  CBC with down trending leukocytosis and stable acute blood loss anemia (9.8 ---> 8.9)    Imaging:  KUB 3/10/24 reviewed, showing no bowel distention or obstructive gas pattern    KUB 3/11/24 reviewed, showing no free air or obstructive gas pattern    KUB 3/12/24 reviewed, showing no free air    CT AP w/ IV 3/15/24 reviewed, showing mild interval increase in large bowel wall thickening and moderate simple fluid density ascites layering within pelvis    Assessment: s/p laparoscopic diverting loop ileostomy on 3/10/24 with Dr. Castro.    Plan:  -CT AP w/ IV as recs of GI for worsening symptoms yesterday and jump in CRP today  -Continue IV steroids and resend cdiff at recs of GI  -Continue regular diet, encourage PO intake as tolerated  -Encourage self emptying and pouch changing  -lleostomy diet, dehydration, and recording teaching        Neuro: Post-op pain, history of anxiety  -Continue current pain regimen with tylenol, robaxin, prn oxycodone, prn dilaudid  -Sleep hygiene, continue home melatonin  -continue home PRN xanax for anxiety  -pet therapy for boredom/anxiety    CV: hemodynamically stable, NSR to ST, no significant history  -Q4 vital signs    Resp: no acute issues, no significant pulmonary history  -ICS 10x every hour  -OOB/ambulation    GI: Crohn's flare s/p laparoscopic diverting loop ileostomy, crampy abdominal bloating, history of hemorrhoids and Crohn's  -trend CRP  -IV methylprednisone 20 mg Q8   -simethicone for bloating and abdominal cramps  -Continue regular diet, no raw fruits/vegetables  -Ensure protein supplements  -Zofran prn for " nausea  -dehydration, diet, and recording stoma output education, encourage recording on sheets  -stoma nursing for supplies and support  -protonix daily  -resend cdiff      : voiding, making sufficient urine  - Strict intake and output  - KVO fluids  - Daily RFP/replace electrolytes as needed    Heme: Acute blood loss anemia but hemodynamically stable, history of anemia  -CBC daily    ID: afebrile, no leukocytosis  - Q4 temp  - monitor for s/s of infection    Endo: no acute issues, no significant endocrine history  -no indication for SSI    DVT Prophy: SCDs & Lovenox    Dispo:   -Continue care on RNF  -tentative dispo: home with home care over weekend  -Will need GI follow up as well with Dr. Lozano    Plan of care discussed with Colorectal surgical team and patient.    Vivian ESCOBAR-CNP  Colorectal Surgery NP

## 2024-03-15 NOTE — SIGNIFICANT EVENT
PM Check    S - Pt reports she has been walking. Pain improved. No need for breakthrough IV dilaudid. No bloody Bms this PM. Overall feels better.    O -  Vitals:    03/14/24 1621   BP: 126/89   Pulse: 97   Resp: 17   Temp: 37.1 °C (98.8 °F)   SpO2: 96%     Gen: No acute distress, resting comfortably in bed  Neuro: Alert and oriented x3.    CV: Normal rate, normotensive  Resp: No acute respiratory distress.  Normal effort on room air. Chest expansion symmetrical.   GI: Abdomen is soft, nondistended, and only mildl tender to palpation. Umbilicus laparoscopic site well approximated, clean and dry with intact glue.  Stoma is pink and healthy with brown, liquid stool in pouch.  Skin: Warm and dry, no rashes or lesions  Musculoskeletal: Moves all extremities x4    A/P  Overall pt is progressing well. Advised to notify nurse should pain worsen or should she have blood Bms. Will not pursue scan at this time.    Lion Dugan MD  CRS

## 2024-03-16 LAB
ALBUMIN SERPL BCP-MCNC: 2.7 G/DL (ref 3.4–5)
ANION GAP SERPL CALC-SCNC: 11 MMOL/L (ref 10–20)
BASOPHILS # BLD AUTO: 0.02 X10*3/UL (ref 0–0.1)
BASOPHILS NFR BLD AUTO: 0.2 %
BUN SERPL-MCNC: 13 MG/DL (ref 6–23)
CALCIUM SERPL-MCNC: 8.4 MG/DL (ref 8.6–10.6)
CHLORIDE SERPL-SCNC: 97 MMOL/L (ref 98–107)
CO2 SERPL-SCNC: 31 MMOL/L (ref 21–32)
CREAT SERPL-MCNC: 0.47 MG/DL (ref 0.5–1.05)
EGFRCR SERPLBLD CKD-EPI 2021: >90 ML/MIN/1.73M*2
EOSINOPHIL # BLD AUTO: 0.02 X10*3/UL (ref 0–0.7)
EOSINOPHIL NFR BLD AUTO: 0.2 %
ERYTHROCYTE [DISTWIDTH] IN BLOOD BY AUTOMATED COUNT: 13 % (ref 11.5–14.5)
GLUCOSE SERPL-MCNC: 77 MG/DL (ref 74–99)
HCT VFR BLD AUTO: 20.8 % (ref 36–46)
HGB BLD-MCNC: 7.1 G/DL (ref 12–16)
IMM GRANULOCYTES # BLD AUTO: 0.19 X10*3/UL (ref 0–0.7)
IMM GRANULOCYTES NFR BLD AUTO: 1.5 % (ref 0–0.9)
LYMPHOCYTES # BLD AUTO: 1.64 X10*3/UL (ref 1.2–4.8)
LYMPHOCYTES NFR BLD AUTO: 12.9 %
MAGNESIUM SERPL-MCNC: 2.1 MG/DL (ref 1.6–2.4)
MCH RBC QN AUTO: 27.7 PG (ref 26–34)
MCHC RBC AUTO-ENTMCNC: 34.1 G/DL (ref 32–36)
MCV RBC AUTO: 81 FL (ref 80–100)
MONOCYTES # BLD AUTO: 1.19 X10*3/UL (ref 0.1–1)
MONOCYTES NFR BLD AUTO: 9.4 %
NEUTROPHILS # BLD AUTO: 9.65 X10*3/UL (ref 1.2–7.7)
NEUTROPHILS NFR BLD AUTO: 75.8 %
NRBC BLD-RTO: 0.2 /100 WBCS (ref 0–0)
PHOSPHATE SERPL-MCNC: 3 MG/DL (ref 2.5–4.9)
PLATELET # BLD AUTO: 455 X10*3/UL (ref 150–450)
POTASSIUM SERPL-SCNC: 3.8 MMOL/L (ref 3.5–5.3)
RBC # BLD AUTO: 2.56 X10*6/UL (ref 4–5.2)
SODIUM SERPL-SCNC: 135 MMOL/L (ref 136–145)
WBC # BLD AUTO: 12.7 X10*3/UL (ref 4.4–11.3)

## 2024-03-16 PROCEDURE — 85025 COMPLETE CBC W/AUTO DIFF WBC: CPT

## 2024-03-16 PROCEDURE — 2500000002 HC RX 250 W HCPCS SELF ADMINISTERED DRUGS (ALT 637 FOR MEDICARE OP, ALT 636 FOR OP/ED)

## 2024-03-16 PROCEDURE — 2500000004 HC RX 250 GENERAL PHARMACY W/ HCPCS (ALT 636 FOR OP/ED): Performed by: NURSE PRACTITIONER

## 2024-03-16 PROCEDURE — 2500000004 HC RX 250 GENERAL PHARMACY W/ HCPCS (ALT 636 FOR OP/ED)

## 2024-03-16 PROCEDURE — 99232 SBSQ HOSP IP/OBS MODERATE 35: CPT | Performed by: SURGERY

## 2024-03-16 PROCEDURE — 2500000001 HC RX 250 WO HCPCS SELF ADMINISTERED DRUGS (ALT 637 FOR MEDICARE OP)

## 2024-03-16 PROCEDURE — 83735 ASSAY OF MAGNESIUM: CPT

## 2024-03-16 PROCEDURE — 36415 COLL VENOUS BLD VENIPUNCTURE: CPT

## 2024-03-16 PROCEDURE — C9113 INJ PANTOPRAZOLE SODIUM, VIA: HCPCS | Performed by: NURSE PRACTITIONER

## 2024-03-16 PROCEDURE — 1170000001 HC PRIVATE ONCOLOGY ROOM DAILY

## 2024-03-16 PROCEDURE — 2500000001 HC RX 250 WO HCPCS SELF ADMINISTERED DRUGS (ALT 637 FOR MEDICARE OP): Performed by: STUDENT IN AN ORGANIZED HEALTH CARE EDUCATION/TRAINING PROGRAM

## 2024-03-16 PROCEDURE — 84100 ASSAY OF PHOSPHORUS: CPT

## 2024-03-16 RX ORDER — POTASSIUM CHLORIDE 14.9 MG/ML
20 INJECTION INTRAVENOUS ONCE
Status: DISCONTINUED | OUTPATIENT
Start: 2024-03-16 | End: 2024-03-18 | Stop reason: HOSPADM

## 2024-03-16 RX ORDER — OXYCODONE HYDROCHLORIDE 5 MG/1
5 TABLET ORAL EVERY 4 HOURS PRN
Status: DISCONTINUED | OUTPATIENT
Start: 2024-03-16 | End: 2024-03-18 | Stop reason: HOSPADM

## 2024-03-16 RX ORDER — ALPRAZOLAM 0.5 MG/1
0.25 TABLET ORAL 3 TIMES DAILY PRN
Status: DISCONTINUED | OUTPATIENT
Start: 2024-03-16 | End: 2024-03-16

## 2024-03-16 RX ORDER — ALPRAZOLAM 0.5 MG/1
0.5 TABLET ORAL 3 TIMES DAILY PRN
Status: DISCONTINUED | OUTPATIENT
Start: 2024-03-16 | End: 2024-03-17

## 2024-03-16 RX ADMIN — METHOCARBAMOL 500 MG: 500 TABLET ORAL at 17:20

## 2024-03-16 RX ADMIN — METHYLPREDNISOLONE SODIUM SUCCINATE 20 MG: 40 INJECTION, POWDER, FOR SOLUTION INTRAMUSCULAR; INTRAVENOUS at 12:22

## 2024-03-16 RX ADMIN — POTASSIUM CHLORIDE 20 MEQ: 1500 TABLET, EXTENDED RELEASE ORAL at 10:18

## 2024-03-16 RX ADMIN — ACETAMINOPHEN 650 MG: 325 TABLET ORAL at 17:20

## 2024-03-16 RX ADMIN — ALPRAZOLAM 0.5 MG: 0.5 TABLET ORAL at 21:24

## 2024-03-16 RX ADMIN — PREDNISONE 25 MG: 5 TABLET ORAL at 21:24

## 2024-03-16 RX ADMIN — METHYLPREDNISOLONE SODIUM SUCCINATE 20 MG: 40 INJECTION, POWDER, FOR SOLUTION INTRAMUSCULAR; INTRAVENOUS at 05:59

## 2024-03-16 RX ADMIN — ACETAMINOPHEN 650 MG: 325 TABLET ORAL at 12:22

## 2024-03-16 RX ADMIN — OXYCODONE HYDROCHLORIDE 10 MG: 5 TABLET ORAL at 02:58

## 2024-03-16 RX ADMIN — OXYCODONE HYDROCHLORIDE 10 MG: 5 TABLET ORAL at 21:23

## 2024-03-16 RX ADMIN — SIMETHICONE 40 MG: 80 TABLET, CHEWABLE ORAL at 07:44

## 2024-03-16 RX ADMIN — PANTOPRAZOLE SODIUM 40 MG: 40 INJECTION, POWDER, FOR SOLUTION INTRAVENOUS at 10:18

## 2024-03-16 RX ADMIN — OXYCODONE HYDROCHLORIDE 5 MG: 5 TABLET ORAL at 17:23

## 2024-03-16 RX ADMIN — OXYCODONE HYDROCHLORIDE 10 MG: 5 TABLET ORAL at 07:44

## 2024-03-16 RX ADMIN — OXYCODONE HYDROCHLORIDE 5 MG: 5 TABLET ORAL at 12:22

## 2024-03-16 ASSESSMENT — PAIN - FUNCTIONAL ASSESSMENT
PAIN_FUNCTIONAL_ASSESSMENT: 0-10

## 2024-03-16 ASSESSMENT — PAIN DESCRIPTION - LOCATION
LOCATION: ABDOMEN
LOCATION: ABDOMEN

## 2024-03-16 ASSESSMENT — PAIN SCALES - GENERAL
PAINLEVEL_OUTOF10: 7
PAINLEVEL_OUTOF10: 7
PAINLEVEL_OUTOF10: 5 - MODERATE PAIN
PAINLEVEL_OUTOF10: 5 - MODERATE PAIN
PAINLEVEL_OUTOF10: 3
PAINLEVEL_OUTOF10: 5 - MODERATE PAIN
PAINLEVEL_OUTOF10: 3
PAINLEVEL_OUTOF10: 5 - MODERATE PAIN
PAINLEVEL_OUTOF10: 3
PAINLEVEL_OUTOF10: 7
PAINLEVEL_OUTOF10: 3
PAINLEVEL_OUTOF10: 7

## 2024-03-16 ASSESSMENT — PAIN DESCRIPTION - ORIENTATION
ORIENTATION: UPPER
ORIENTATION: UPPER;LOWER

## 2024-03-16 NOTE — PROGRESS NOTES
Colorectal Surgery Daily Progress Note      Subjective  IRINA ON. Reports that she is still having rectal pain and dyschezia, but overall feels better today. She reports that abdominal cramping has improved. Continues to take minimal PO.     Current Meds  Scheduled medications  acetaminophen, 650 mg, oral, q6h  enoxaparin, 40 mg, subcutaneous, q24h  melatonin, 3 mg, oral, Daily  methocarbamol, 500 mg, oral, q6h MATTIE  methylPREDNISolone sodium succinate (PF), 20 mg, intravenous, q8h  pantoprazole, 40 mg, intravenous, Daily  potassium chloride, 20 mEq, intravenous, Once      Continuous medications  lactated Ringer's, 10 mL/hr, Last Rate: 10 mL/hr (03/13/24 0731)      PRN medications  PRN medications: ALPRAZolam, guaiFENesin, HYDROmorphone, hydrOXYzine HCL, lidocaine-diphenhydraMINE-Maalox 1:1:1, naloxone, ondansetron ODT **OR** ondansetron, oxyCODONE, oxyCODONE, simethicone, witch hazel    Objective    Vitals:   Temp:  [36.2 °C (97.2 °F)-36.8 °C (98.2 °F)] 36.3 °C (97.3 °F)  Heart Rate:  [78-93] 89  Resp:  [14-16] 14  BP: (119-141)/(73-99) 122/73      I/O  I/O last 3 completed shifts:  In: - (0 mL/kg)   Out: 3395 (71.9 mL/kg) [Urine:2750 (1.6 mL/kg/hr); Stool:645]  Weight: 47.2 kg       Physical Exam  General: laying in bed, NAD  Head: normocephalic, atraumatic  ENT: mucosa are moist   Respiratory: nonlabored breathing on room air  CV: RRR  GI: abdomen is soft, mildly tender in lower abdomen, nondistended. Stoma is pink and well perfused with gas and stool in appliance.   MSK: TRENT  Extremities: no swelling or deformity of b/l LEs   Neuro: CN II-XII grossly intact. Sensation to light touch intact    Labs:  Lab Results   Component Value Date    WBC 12.7 (H) 03/16/2024    HGB 7.1 (L) 03/16/2024    HCT 20.8 (L) 03/16/2024    MCV 81 03/16/2024     (H) 03/16/2024     Lab Results   Component Value Date    GLUCOSE 77 03/16/2024    CALCIUM 8.4 (L) 03/16/2024     (L) 03/16/2024    K 3.8 03/16/2024    CO2 31  03/16/2024    CL 97 (L) 03/16/2024    BUN 13 03/16/2024    CREATININE 0.47 (L) 03/16/2024     Lab Results   Component Value Date    ALT 17 03/10/2024    AST 16 03/10/2024    ALKPHOS 54 03/10/2024    BILITOT 0.2 03/10/2024                 Imaging  CT abdomen pelvis w IV contrast    Result Date: 3/15/2024  Interpreted By:  Jose Naik, STUDY: CT ABDOMEN PELVIS W IV CONTRAST;  3/15/2024 11:57 am   INDICATION: Signs/Symptoms:worsening abdominal pain/cramping, increased CRP in setting of Crohn's flare.   COMPARISON: CT abdomen/pelvis 02/01/2024.   ACCESSION NUMBER(S): [id number]   ORDERING CLINICIAN: SUZIE RDZ   TECHNIQUE: CT of the abdomen and pelvis was performed.  Standard contiguous axial images were obtained at 3 mm slice thickness through the abdomen and pelvis in 2 mm slice thickness through the lower chest. Coronal and sagittal reconstructions at 3 mm slice thickness were performed.   75 ml of contrast Omnipaque 350 were administered intravenously without immediate complication.   FINDINGS: LOWER CHEST: The visualized lung base is unremarkable. The heart is normal in size without pericardial effusion. No pleural effusion is present. Visualized distal esophagus appears normal.   ABDOMEN:   LIVER: The liver is normal in size without evidence of focal liver lesions.   BILE DUCTS: There is no dilation of the intrahepatic bile ducts. There is mild dilation of the CBD to 0.7 cm without evidence of inflammatory changes or obstructing stone.   GALLBLADDER: The gallbladder is nondistended and without evidence of radiopaque stones.   PANCREAS: The pancreas appears unremarkable.   SPLEEN: The spleen is normal in size.   ADRENAL GLANDS: Adrenal glands appear normal.   KIDNEYS AND URETERS: The kidneys are normal in size and enhance symmetrically.  No hydroureteronephrosis or nephroureterolithiasis is identified.   PELVIS:   BLADDER: The urinary bladder appears normal without abnormal wall thickening.   REPRODUCTIVE  ORGANS: The uterus is present.   BOWEL: The stomach is unremarkable. Interval ileostomy creation with mild wall thickening/enhancement of the ostomy. There are multiple mildly dilated/thickened, fluid-filled, loops of small bowel measuring up to maximum caliber of 3.2 cm without discrete transition point through to the ileostomy. The loops of small bowel distal to the ileostomy are completely collapsed. Mild enhancement/bowel wall thickening of the rectum/sigmoid, ascending and descending colon, increased from prior.   VESSELS: There is no aneurysmal dilatation of the abdominal aorta. The IVC appears normal.   PERITONEUM/RETROPERITONEUM/LYMPH NODES: Moderate amount simple fluid density ascites layering within the pelvis. Small amount pneumoperitoneum (series 2, image 49). No loculated fluid collection. No abdominopelvic lymphadenopathy is present.   BONES AND ABDOMINAL WALL: No suspicious osseous lesions are identified. Bilateral L5 pars defects with grade 1 anterolisthesis of L5-S1 mild scattered subcutaneous emphysema along the anterior abdominal wall. Right upper quadrant ileostomy.       1.  Expected postsurgical changes of interval ileostomy creation with mildly dilated/thickened, fluid-filled proximal small bowel. Mild wall thickening/enhancement of the ostomy. 2. Mild interval increase in large bowel wall thickening/enhancement. 3. Moderate amount of simple fluid density ascites layering within the pelvis.   I personally reviewed the images/study and I agree with the findings as stated by Dr. Jose Naik. This study was interpreted at [facility], [place], [place].   MACRO: None     Dictation workstation:   [id number]      Assessment:  Ms. Behnfeldt, 23yoF transferred [facility] for Crohn[facility]s flare with colitis not responding to steroids, now here for remicade rescue. She had minimal response to remicade and underwent DLI on 3/10. She has some days with more pain, but overall  symptoms have improved since her DLI. Will monitor symptoms on oral steroids and continue to assess.     Plan:   Neuro:   - tylenol, robaxain, dilaudid, and oxy for pain control   - continue melatonin   - continue xanax PRN    CV:  - HDS     Resp:  - IS     GI: appreciate GI recs   - soft diet   - zofran PRN   - WOCN   - PPI   - continue simethicone     :  - strict I/O   - will replace lytes PRN   - mIVF while taking in limited fluids    Endo:  - switch form IV to oral steroids     MSK:  - OOB ad amber     Ppx:  - SCD   - Lvx     Dispo: RNF       Discussed with Attending Physician    Michelle Florence MD  General Surgery PGY 5  Colorectal Surgery   [facility] [id number]

## 2024-03-16 NOTE — CARE PLAN
The patient's goals for the shift include pt. will have decreased number of bowel movement.    The clinical goals for the shift include Pt will report adequate pain management throughout shift.    Over the shift, the patient did not make progress toward the following goals. Barriers to progression include . Recommendations to address these barriers include .      Problem: Pain  Goal: My pain/discomfort is manageable  Outcome: Progressing     Problem: Safety  Goal: Patient will be injury free during hospitalization  Outcome: Progressing  Goal: I will remain free of falls  Outcome: Progressing     Problem: Daily Care  Goal: Daily care needs are met  Outcome: Progressing     Problem: Psychosocial Needs  Goal: Demonstrates ability to cope with hospitalization/illness  Outcome: Progressing  Goal: Collaborate with me, my family, and caregiver to identify my specific goals  Outcome: Progressing     Problem: Discharge Barriers  Goal: My discharge needs are met  Outcome: Progressing

## 2024-03-17 LAB
ABO GROUP (TYPE) IN BLOOD: NORMAL
ALBUMIN SERPL BCP-MCNC: 2.9 G/DL (ref 3.4–5)
ANION GAP SERPL CALC-SCNC: 12 MMOL/L (ref 10–20)
ANTIBODY SCREEN: NORMAL
BASOPHILS # BLD AUTO: 0.01 X10*3/UL (ref 0–0.1)
BASOPHILS NFR BLD AUTO: 0.1 %
BLOOD EXPIRATION DATE: NORMAL
BUN SERPL-MCNC: 12 MG/DL (ref 6–23)
CALCIUM SERPL-MCNC: 8.9 MG/DL (ref 8.6–10.6)
CHLORIDE SERPL-SCNC: 96 MMOL/L (ref 98–107)
CO2 SERPL-SCNC: 30 MMOL/L (ref 21–32)
CREAT SERPL-MCNC: 0.4 MG/DL (ref 0.5–1.05)
DISPENSE STATUS: NORMAL
EGFRCR SERPLBLD CKD-EPI 2021: >90 ML/MIN/1.73M*2
EOSINOPHIL # BLD AUTO: 0 X10*3/UL (ref 0–0.7)
EOSINOPHIL NFR BLD AUTO: 0 %
ERYTHROCYTE [DISTWIDTH] IN BLOOD BY AUTOMATED COUNT: 13 % (ref 11.5–14.5)
GLUCOSE SERPL-MCNC: 154 MG/DL (ref 74–99)
HCT VFR BLD AUTO: 20.1 % (ref 36–46)
HGB BLD-MCNC: 6.7 G/DL (ref 12–16)
IMM GRANULOCYTES # BLD AUTO: 0.22 X10*3/UL (ref 0–0.7)
IMM GRANULOCYTES NFR BLD AUTO: 2.1 % (ref 0–0.9)
LYMPHOCYTES # BLD AUTO: 0.77 X10*3/UL (ref 1.2–4.8)
LYMPHOCYTES NFR BLD AUTO: 7.5 %
MAGNESIUM SERPL-MCNC: 1.98 MG/DL (ref 1.6–2.4)
MCH RBC QN AUTO: 27.3 PG (ref 26–34)
MCHC RBC AUTO-ENTMCNC: 33.3 G/DL (ref 32–36)
MCV RBC AUTO: 82 FL (ref 80–100)
MONOCYTES # BLD AUTO: 0.71 X10*3/UL (ref 0.1–1)
MONOCYTES NFR BLD AUTO: 6.9 %
NEUTROPHILS # BLD AUTO: 8.56 X10*3/UL (ref 1.2–7.7)
NEUTROPHILS NFR BLD AUTO: 83.4 %
NRBC BLD-RTO: 0.2 /100 WBCS (ref 0–0)
PHOSPHATE SERPL-MCNC: 3.1 MG/DL (ref 2.5–4.9)
PLATELET # BLD AUTO: 464 X10*3/UL (ref 150–450)
POTASSIUM SERPL-SCNC: 3.7 MMOL/L (ref 3.5–5.3)
PRODUCT BLOOD TYPE: 7300
PRODUCT CODE: NORMAL
RBC # BLD AUTO: 2.45 X10*6/UL (ref 4–5.2)
RH FACTOR (ANTIGEN D): NORMAL
SODIUM SERPL-SCNC: 134 MMOL/L (ref 136–145)
UNIT ABO: NORMAL
UNIT NUMBER: NORMAL
UNIT RH: NORMAL
UNIT VOLUME: 350
WBC # BLD AUTO: 10.3 X10*3/UL (ref 4.4–11.3)
XM INTEP: NORMAL

## 2024-03-17 PROCEDURE — 36415 COLL VENOUS BLD VENIPUNCTURE: CPT

## 2024-03-17 PROCEDURE — 86901 BLOOD TYPING SEROLOGIC RH(D): CPT

## 2024-03-17 PROCEDURE — 99232 SBSQ HOSP IP/OBS MODERATE 35: CPT | Performed by: SURGERY

## 2024-03-17 PROCEDURE — 83735 ASSAY OF MAGNESIUM: CPT

## 2024-03-17 PROCEDURE — P9016 RBC LEUKOCYTES REDUCED: HCPCS

## 2024-03-17 PROCEDURE — 2500000004 HC RX 250 GENERAL PHARMACY W/ HCPCS (ALT 636 FOR OP/ED): Performed by: NURSE PRACTITIONER

## 2024-03-17 PROCEDURE — 86920 COMPATIBILITY TEST SPIN: CPT

## 2024-03-17 PROCEDURE — 36430 TRANSFUSION BLD/BLD COMPNT: CPT

## 2024-03-17 PROCEDURE — 2500000005 HC RX 250 GENERAL PHARMACY W/O HCPCS

## 2024-03-17 PROCEDURE — 80069 RENAL FUNCTION PANEL: CPT

## 2024-03-17 PROCEDURE — 2500000001 HC RX 250 WO HCPCS SELF ADMINISTERED DRUGS (ALT 637 FOR MEDICARE OP)

## 2024-03-17 PROCEDURE — C9113 INJ PANTOPRAZOLE SODIUM, VIA: HCPCS | Performed by: NURSE PRACTITIONER

## 2024-03-17 PROCEDURE — 85025 COMPLETE CBC W/AUTO DIFF WBC: CPT

## 2024-03-17 PROCEDURE — 2500000001 HC RX 250 WO HCPCS SELF ADMINISTERED DRUGS (ALT 637 FOR MEDICARE OP): Performed by: STUDENT IN AN ORGANIZED HEALTH CARE EDUCATION/TRAINING PROGRAM

## 2024-03-17 PROCEDURE — 1170000001 HC PRIVATE ONCOLOGY ROOM DAILY

## 2024-03-17 PROCEDURE — 2500000002 HC RX 250 W HCPCS SELF ADMINISTERED DRUGS (ALT 637 FOR MEDICARE OP, ALT 636 FOR OP/ED): Performed by: STUDENT IN AN ORGANIZED HEALTH CARE EDUCATION/TRAINING PROGRAM

## 2024-03-17 PROCEDURE — 2500000004 HC RX 250 GENERAL PHARMACY W/ HCPCS (ALT 636 FOR OP/ED)

## 2024-03-17 PROCEDURE — 2500000002 HC RX 250 W HCPCS SELF ADMINISTERED DRUGS (ALT 637 FOR MEDICARE OP, ALT 636 FOR OP/ED)

## 2024-03-17 RX ORDER — ONDANSETRON HYDROCHLORIDE 2 MG/ML
4 INJECTION, SOLUTION INTRAVENOUS EVERY 6 HOURS PRN
Status: DISCONTINUED | OUTPATIENT
Start: 2024-03-17 | End: 2024-03-18 | Stop reason: HOSPADM

## 2024-03-17 RX ORDER — ALPRAZOLAM 0.5 MG/1
0.25 TABLET ORAL 3 TIMES DAILY PRN
Status: DISCONTINUED | OUTPATIENT
Start: 2024-03-17 | End: 2024-03-18 | Stop reason: HOSPADM

## 2024-03-17 RX ORDER — ZOLPIDEM TARTRATE 5 MG/1
5 TABLET ORAL NIGHTLY
Status: DISCONTINUED | OUTPATIENT
Start: 2024-03-17 | End: 2024-03-18 | Stop reason: HOSPADM

## 2024-03-17 RX ORDER — POTASSIUM CHLORIDE 20 MEQ/1
20 TABLET, EXTENDED RELEASE ORAL ONCE
Status: COMPLETED | OUTPATIENT
Start: 2024-03-17 | End: 2024-03-17

## 2024-03-17 RX ADMIN — OXYCODONE HYDROCHLORIDE 5 MG: 5 TABLET ORAL at 18:36

## 2024-03-17 RX ADMIN — ACETAMINOPHEN 650 MG: 325 TABLET ORAL at 04:59

## 2024-03-17 RX ADMIN — METHOCARBAMOL 500 MG: 500 TABLET ORAL at 04:59

## 2024-03-17 RX ADMIN — OXYCODONE HYDROCHLORIDE 5 MG: 5 TABLET ORAL at 22:52

## 2024-03-17 RX ADMIN — PREDNISONE 25 MG: 5 TABLET ORAL at 05:00

## 2024-03-17 RX ADMIN — OXYCODONE HYDROCHLORIDE 10 MG: 5 TABLET ORAL at 04:59

## 2024-03-17 RX ADMIN — ACETAMINOPHEN 650 MG: 325 TABLET ORAL at 17:55

## 2024-03-17 RX ADMIN — OXYCODONE HYDROCHLORIDE 5 MG: 5 TABLET ORAL at 09:46

## 2024-03-17 RX ADMIN — PREDNISONE 25 MG: 5 TABLET ORAL at 13:18

## 2024-03-17 RX ADMIN — METHOCARBAMOL 500 MG: 500 TABLET ORAL at 17:55

## 2024-03-17 RX ADMIN — PREDNISONE 25 MG: 5 TABLET ORAL at 21:32

## 2024-03-17 RX ADMIN — PANTOPRAZOLE SODIUM 40 MG: 40 INJECTION, POWDER, FOR SOLUTION INTRAVENOUS at 09:46

## 2024-03-17 RX ADMIN — ZOLPIDEM TARTRATE 5 MG: 5 TABLET ORAL at 21:32

## 2024-03-17 RX ADMIN — POTASSIUM CHLORIDE 20 MEQ: 1500 TABLET, EXTENDED RELEASE ORAL at 11:43

## 2024-03-17 RX ADMIN — METHOCARBAMOL 500 MG: 500 TABLET ORAL at 13:18

## 2024-03-17 RX ADMIN — Medication 10 ML: at 17:56

## 2024-03-17 RX ADMIN — OXYCODONE HYDROCHLORIDE 5 MG: 5 TABLET ORAL at 14:33

## 2024-03-17 RX ADMIN — ACETAMINOPHEN 650 MG: 325 TABLET ORAL at 11:43

## 2024-03-17 ASSESSMENT — PAIN SCALES - GENERAL
PAINLEVEL_OUTOF10: 7
PAINLEVEL_OUTOF10: 6
PAINLEVEL_OUTOF10: 5 - MODERATE PAIN
PAINLEVEL_OUTOF10: 4

## 2024-03-17 ASSESSMENT — PAIN - FUNCTIONAL ASSESSMENT
PAIN_FUNCTIONAL_ASSESSMENT: 0-10

## 2024-03-17 ASSESSMENT — PAIN DESCRIPTION - LOCATION
LOCATION: ABDOMEN

## 2024-03-17 ASSESSMENT — PAIN DESCRIPTION - ORIENTATION: ORIENTATION: LOWER

## 2024-03-17 NOTE — PROGRESS NOTES
Colorectal Surgery Daily Progress Note      Subjective  IRINA ON. Reports having improved rectal pain and dyschezia today. Denies N/V. Had better PO intake yesterday.     Current Meds  Scheduled medications  acetaminophen, 650 mg, oral, q6h  enoxaparin, 40 mg, subcutaneous, q24h  melatonin, 3 mg, oral, Daily  methocarbamol, 500 mg, oral, q6h MATTIE  pantoprazole, 40 mg, intravenous, Daily  potassium chloride, 20 mEq, intravenous, Once  predniSONE, 25 mg, oral, q8h  zolpidem, 5 mg, oral, Nightly      Continuous medications  lactated Ringer's, 20 mL/hr, Last Rate: 20 mL/hr (03/17/24 1045)      PRN medications  PRN medications: ALPRAZolam, HYDROmorphone, hydrOXYzine HCL, lidocaine-diphenhydraMINE-Maalox 1:1:1, naloxone, ondansetron ODT **OR** ondansetron, oxyCODONE, oxyCODONE, simethicone, witch hazel    Objective    Vitals:   Temp:  [36.3 °C (97.3 °F)-36.7 °C (98.1 °F)] 36.3 °C (97.3 °F)  Heart Rate:  [] 105  Resp:  [14-16] 16  BP: (121-131)/(78-87) 121/78      I/O  I/O last 3 completed shifts:  In: 787.3 (16.7 mL/kg) [I.V.:787.3 (16.7 mL/kg)]  Out: 745 (15.8 mL/kg) [Stool:745]  Weight: 47.2 kg       Physical Exam  General: laying in bed, NAD  Head: normocephalic, atraumatic  ENT: mucosa are moist   Respiratory: nonlabored breathing on room air  CV: RRR  GI: abdomen is soft, mildly tender in lower abdomen, nondistended. Stoma is pink and well perfused with gas and stool in appliance.   MSK: TRENT  Extremities: no swelling or deformity of b/l LEs   Neuro: CN II-XII grossly intact. Sensation to light touch intact    Labs:  Lab Results   Component Value Date    WBC 10.3 03/17/2024    HGB 6.7 (L) 03/17/2024    HCT 20.1 (L) 03/17/2024    MCV 82 03/17/2024     (H) 03/17/2024     Lab Results   Component Value Date    GLUCOSE 154 (H) 03/17/2024    CALCIUM 8.9 03/17/2024     (L) 03/17/2024    K 3.7 03/17/2024    CO2 30 03/17/2024    CL 96 (L) 03/17/2024    BUN 12 03/17/2024    CREATININE 0.40 (L) 03/17/2024      Lab Results   Component Value Date    ALT 17 03/10/2024    AST 16 03/10/2024    ALKPHOS 54 03/10/2024    BILITOT 0.2 03/10/2024                 Imaging  CT abdomen pelvis w IV contrast    Result Date: 3/16/2024  Interpreted By:  Kane Sanford and Ohs Zachary STUDY: CT ABDOMEN PELVIS W IV CONTRAST;  3/15/2024 11:57 am   INDICATION: Signs/Symptoms:worsening abdominal pain/cramping, increased CRP in setting of Crohn's flare.   COMPARISON: CT abdomen/pelvis 02/01/2024.   ACCESSION NUMBER(S): [id number]   ORDERING CLINICIAN: SUZIE RDZ   TECHNIQUE: CT of the abdomen and pelvis was performed.  Standard contiguous axial images were obtained at 3 mm slice thickness through the abdomen and pelvis in 2 mm slice thickness through the lower chest. Coronal and sagittal reconstructions at 3 mm slice thickness were performed.   75 ml of contrast Omnipaque 350 were administered intravenously without immediate complication.   FINDINGS: LOWER CHEST: The visualized lung base is unremarkable. The heart is normal in size without pericardial effusion. No pleural effusion is present. Visualized distal esophagus appears normal.   ABDOMEN:   LIVER: The liver is normal in size without evidence of focal liver lesions.   BILE DUCTS: There is no dilation of the intrahepatic bile ducts. There is mild dilation of the CBD to 0.7 cm without evidence of inflammatory changes or obstructing stone.   GALLBLADDER: The gallbladder is nondistended and without evidence of radiopaque stones.   PANCREAS: The pancreas appears unremarkable.   SPLEEN: The spleen is normal in size.   ADRENAL GLANDS: Adrenal glands appear normal.   KIDNEYS AND URETERS: The kidneys are normal in size and enhance symmetrically.  No hydroureteronephrosis or nephroureterolithiasis is identified.   PELVIS:   BLADDER: The urinary bladder appears normal without abnormal wall thickening.   REPRODUCTIVE ORGANS: The uterus is present.   BOWEL: The stomach is unremarkable.  Interval ileostomy creation with mild wall thickening/enhancement of the ostomy. There are multiple mildly dilated/thickened, fluid-filled, loops of small bowel measuring up to maximum caliber of 3.2 cm without discrete transition point through to the ileostomy. The loops of small bowel distal to the ileostomy are completely collapsed. Mild enhancement/bowel wall thickening of the rectum/sigmoid, ascending and descending colon, increased from prior.   VESSELS: There is no aneurysmal dilatation of the abdominal aorta. The IVC appears normal.   PERITONEUM/RETROPERITONEUM/LYMPH NODES: Moderate amount simple fluid density ascites layering within the pelvis. Small amount pneumoperitoneum (series 2, image 49). No loculated fluid collection. No abdominopelvic lymphadenopathy is present.   BONES AND ABDOMINAL WALL: No suspicious osseous lesions are identified. Bilateral L5 pars defects with grade 1 anterolisthesis of L5-S1 mild scattered subcutaneous emphysema along the anterior abdominal wall. Right upper quadrant ileostomy.       1. Dilated small bowel loops are present to the ostomy with mild wall thickening and mucosal enhancement at the ostomy, suggestive of inflammation. No discrete fluid collection or fistula is evident. 2. Mild interval increase in segments of large bowel wall thickening/enhancement. 3. Moderate amount of simple fluid density ascites layering within the pelvis.   I personally reviewed the images/study and I agree with the findings as stated by Dr. Jose Naik. This study was interpreted at [facility], [place], [place].   MACRO: None   Signed by: Kane Sanford 3/16/2024 12:40 PM Dictation workstation:   [id number]      Assessment:  Ms. Behnfeldt, 23yoF transferred from [facility] for Crohn’s flare with colitis not responding to steroids, now here for remicade rescue. She had minimal response to remicade and underwent DLI on 3/10. Her symptoms have continued to  improve. Patient feels that a lot of her symptoms are triggered by stress. She hopes that by managing her stress better and doing more of her normal activities, her pain will continue to improve. As part of this, will start ambien for sleep. Had extensive conversation with patient and mom about expectations, managing symptoms, and other options for therapy.       Plan:   Neuro:   - tylenol, robaxain, dilaudid, and oxy for pain control   - continue melatonin   - 0.25mg xanax and atarax PRN   - start ambien at bedtime      CV:  - HDS      Resp:  - IS      GI: appreciate GI recs   - soft diet   - zofran PRN   - WOCN   - PPI   - continue simethicone      :  - strict I/O   - will replace lytes PRN   - stop mIVF      Endo:  - continue oral steroids   - will discuss when to start weaning steroids      MSK:  - OOB ad amber   - hot pack for suspected pelvic floor spasms      Ppx:  - SCD   - Lvx      Dispo: RNF        Discussed with Attending Physician    Michelle Florence MD  General Surgery PGY 5  Colorectal Surgery   [facility] [id number]

## 2024-03-17 NOTE — PROGRESS NOTES
[facility]  [facility] [place]  CONSULT FOLLOW-UP         SUBJECTIVE     Interval Events/Subjective:   Patient feeling better today. She is having appropriate ostomy output and pain is improved today. She is tolerating PO. Had a long discussion with patient and mom with Dr. Roth at bedside.      Medications:  acetaminophen, 650 mg, oral, q6h  enoxaparin, 40 mg, subcutaneous, q24h  melatonin, 3 mg, oral, Daily  methocarbamol, 500 mg, oral, q6h MATTIE  pantoprazole, 40 mg, intravenous, Daily  potassium chloride, 20 mEq, intravenous, Once  predniSONE, 25 mg, oral, q8h      PRN medications: ALPRAZolam, HYDROmorphone, hydrOXYzine HCL, lidocaine-diphenhydraMINE-Maalox 1:1:1, naloxone, ondansetron ODT **OR** ondansetron, oxyCODONE, oxyCODONE, simethicone, witch hazel      EXAM     Physical Exam     General: NAD, AA&O x 3  Eyes: EOMI  ENT: MMM  Heart: RRR  Lungs: No respiratory distress  Abdomen: Soft, non tender, non distended, ileostomy in place  Skin: No jaundice   Neuro: Appropriately responds to questions/commands       DATA                                                                            Labs     Lab Results   Component Value Date    WBC 12.7 (H) 03/16/2024    HGB 7.1 (L) 03/16/2024    HCT 20.8 (L) 03/16/2024    MCV 81 03/16/2024     (H) 03/16/2024        Lab Results   Component Value Date    GLUCOSE 77 03/16/2024    CALCIUM 8.4 (L) 03/16/2024     (L) 03/16/2024    K 3.8 03/16/2024    CO2 31 03/16/2024    CL 97 (L) 03/16/2024    BUN 13 03/16/2024    CREATININE 0.47 (L) 03/16/2024        CT abdomen and pelvis post op:   IMPRESSION:  1. Dilated small bowel loops are present to the ostomy with mild wall thickening and mucosal enhancement at the ostomy, suggestive of inflammation. No discrete fluid collection or fistula is evident.  2. Mild interval increase in segments of large bowel wall thickening/enhancement.  3. Moderate amount of simple fluid  density ascites layering within the pelvis.    MR enterography 3/6: colonic wall thickening involving transverse segment +/- the distal right and left colon in a single long continguous segment, no perforation/abscess, stricture/obstruction or sinus tract/fistula.                                                                           GI Procedures      Flex sigmoidoscopy 3/4/24:      Impression  ·             Severe abnormal mucosa in the transverse colon, descending colon, sigmoid colon, rectosigmoid and rectum, consistent with Crohn's disease; performed cold forceps biopsy  ·             Small hemorrhoids        ASSESSMENT / PLAN      Assessment and Recommendations:   Kyra Behnfeldt is a 24 yo F w/ PMHx Crohn's colitis presenting [facility] for [facility]hn's colitis and consideration of Remicade infusion. Patient presenting with severe colitis flare but without imaging findings of perforation/abscess, stricture/obstruction, or sinus tract/fistula. CRS following and offered DLI as treatment. Patient was started on remicade infusion on 3/7 and still had bloody bowel movements, underwent diversion ileostomy on 3/10, patient underwent second dose of infliximab on 3/11. C. Diff negative. CRP markedly elevated post op.    3/17 AM- discussion at the bedside with colorectal surgery. All questions answered to the best of my ability.    Plan:   - Ok to switch to PO steroids in preparation for discharge. She will need a prolonged and slow taper as an outpatient.  - Encourage ambulation and PO intake as tolerated  - Will coordinate outpatient follow up with Dr. Lozano.  - Continue to monitor her symptoms.   - GI will continue to follow.     ------------------------------------------------------------------------  Rubia Salazar MD  Gastroenterology Fellow  After 5PM and on Weekends, please page on-call fellow.    Discussed with Dr. Whaley  Final recommendations pending attending attestation.

## 2024-03-17 NOTE — CARE PLAN
The patient's goals for the shift include increased rest and comfort and work towards safe discharge home.     The clinical goals for the shift include Pt will report adequate pain management and increased comfort throughout shift.    Over the shift, the patient did not make progress toward the following goals. Barriers to progression include . Recommendations to address these barriers include .      Problem: Pain  Goal: My pain/discomfort is manageable  Outcome: Progressing     Problem: Safety  Goal: Patient will be injury free during hospitalization  Outcome: Progressing  Goal: I will remain free of falls  Outcome: Progressing     Problem: Daily Care  Goal: Daily care needs are met  Outcome: Progressing     Problem: Psychosocial Needs  Goal: Demonstrates ability to cope with hospitalization/illness  Outcome: Progressing  Goal: Collaborate with me, my family, and caregiver to identify my specific goals  Outcome: Progressing     Problem: Discharge Barriers  Goal: My discharge needs are met  Outcome: Progressing

## 2024-03-18 ENCOUNTER — APPOINTMENT (OUTPATIENT)
Dept: GASTROENTEROLOGY | Facility: CLINIC | Age: 24
End: 2024-03-18
Payer: COMMERCIAL

## 2024-03-18 ENCOUNTER — HOME HEALTH ADMISSION (OUTPATIENT)
Dept: HOME HEALTH SERVICES | Facility: HOME HEALTH | Age: 24
End: 2024-03-18
Payer: COMMERCIAL

## 2024-03-18 ENCOUNTER — DOCUMENTATION (OUTPATIENT)
Dept: HOME HEALTH SERVICES | Facility: HOME HEALTH | Age: 24
End: 2024-03-18

## 2024-03-18 ENCOUNTER — APPOINTMENT (OUTPATIENT)
Dept: PRIMARY CARE | Facility: HOSPITAL | Age: 24
End: 2024-03-18
Payer: COMMERCIAL

## 2024-03-18 LAB
ALBUMIN SERPL BCP-MCNC: 2.8 G/DL (ref 3.4–5)
ANION GAP SERPL CALC-SCNC: 12 MMOL/L (ref 10–20)
BASOPHILS # BLD AUTO: 0.02 X10*3/UL (ref 0–0.1)
BASOPHILS NFR BLD AUTO: 0.1 %
BUN SERPL-MCNC: 11 MG/DL (ref 6–23)
CALCIUM SERPL-MCNC: 8.5 MG/DL (ref 8.6–10.6)
CHLORIDE SERPL-SCNC: 97 MMOL/L (ref 98–107)
CO2 SERPL-SCNC: 28 MMOL/L (ref 21–32)
CREAT SERPL-MCNC: 0.37 MG/DL (ref 0.5–1.05)
EGFRCR SERPLBLD CKD-EPI 2021: >90 ML/MIN/1.73M*2
EOSINOPHIL # BLD AUTO: 0 X10*3/UL (ref 0–0.7)
EOSINOPHIL NFR BLD AUTO: 0 %
ERYTHROCYTE [DISTWIDTH] IN BLOOD BY AUTOMATED COUNT: 13.2 % (ref 11.5–14.5)
GLUCOSE SERPL-MCNC: 146 MG/DL (ref 74–99)
HCT VFR BLD AUTO: 24.7 % (ref 36–46)
HGB BLD-MCNC: 7.9 G/DL (ref 12–16)
IMM GRANULOCYTES # BLD AUTO: 0.29 X10*3/UL (ref 0–0.7)
IMM GRANULOCYTES NFR BLD AUTO: 2.1 % (ref 0–0.9)
LYMPHOCYTES # BLD AUTO: 1.16 X10*3/UL (ref 1.2–4.8)
LYMPHOCYTES NFR BLD AUTO: 8.5 %
MAGNESIUM SERPL-MCNC: 1.81 MG/DL (ref 1.6–2.4)
MCH RBC QN AUTO: 27.4 PG (ref 26–34)
MCHC RBC AUTO-ENTMCNC: 32 G/DL (ref 32–36)
MCV RBC AUTO: 86 FL (ref 80–100)
MONOCYTES # BLD AUTO: 0.99 X10*3/UL (ref 0.1–1)
MONOCYTES NFR BLD AUTO: 7.3 %
NEUTROPHILS # BLD AUTO: 11.16 X10*3/UL (ref 1.2–7.7)
NEUTROPHILS NFR BLD AUTO: 82 %
NRBC BLD-RTO: 0.3 /100 WBCS (ref 0–0)
PHOSPHATE SERPL-MCNC: 3 MG/DL (ref 2.5–4.9)
PLATELET # BLD AUTO: 470 X10*3/UL (ref 150–450)
POTASSIUM SERPL-SCNC: 3.7 MMOL/L (ref 3.5–5.3)
RBC # BLD AUTO: 2.88 X10*6/UL (ref 4–5.2)
SODIUM SERPL-SCNC: 133 MMOL/L (ref 136–145)
WBC # BLD AUTO: 13.6 X10*3/UL (ref 4.4–11.3)

## 2024-03-18 PROCEDURE — 36415 COLL VENOUS BLD VENIPUNCTURE: CPT

## 2024-03-18 PROCEDURE — 85025 COMPLETE CBC W/AUTO DIFF WBC: CPT

## 2024-03-18 PROCEDURE — 2500000001 HC RX 250 WO HCPCS SELF ADMINISTERED DRUGS (ALT 637 FOR MEDICARE OP)

## 2024-03-18 PROCEDURE — 83735 ASSAY OF MAGNESIUM: CPT

## 2024-03-18 PROCEDURE — 2500000004 HC RX 250 GENERAL PHARMACY W/ HCPCS (ALT 636 FOR OP/ED)

## 2024-03-18 PROCEDURE — 80069 RENAL FUNCTION PANEL: CPT

## 2024-03-18 PROCEDURE — 2500000002 HC RX 250 W HCPCS SELF ADMINISTERED DRUGS (ALT 637 FOR MEDICARE OP, ALT 636 FOR OP/ED)

## 2024-03-18 PROCEDURE — 99231 SBSQ HOSP IP/OBS SF/LOW 25: CPT | Performed by: NURSE PRACTITIONER

## 2024-03-18 PROCEDURE — 2500000004 HC RX 250 GENERAL PHARMACY W/ HCPCS (ALT 636 FOR OP/ED): Performed by: NURSE PRACTITIONER

## 2024-03-18 PROCEDURE — C9113 INJ PANTOPRAZOLE SODIUM, VIA: HCPCS | Performed by: NURSE PRACTITIONER

## 2024-03-18 RX ORDER — POTASSIUM CHLORIDE 20 MEQ/1
20 TABLET, EXTENDED RELEASE ORAL ONCE
Status: COMPLETED | OUTPATIENT
Start: 2024-03-18 | End: 2024-03-18

## 2024-03-18 RX ORDER — PREDNISONE 5 MG/1
TABLET ORAL
Qty: 30 TABLET | Refills: 0 | OUTPATIENT
Start: 2024-03-18 | End: 2024-03-28

## 2024-03-18 RX ORDER — ZOLPIDEM TARTRATE 5 MG/1
5 TABLET ORAL NIGHTLY PRN
Qty: 30 TABLET | Refills: 0 | Status: SHIPPED | OUTPATIENT
Start: 2024-03-18 | End: 2024-04-17

## 2024-03-18 RX ORDER — OXYCODONE HYDROCHLORIDE 5 MG/1
5 TABLET ORAL EVERY 6 HOURS PRN
Qty: 26 TABLET | Refills: 0 | OUTPATIENT
Start: 2024-03-18 | End: 2024-03-25

## 2024-03-18 RX ORDER — SIMETHICONE 80 MG
40 TABLET,CHEWABLE ORAL 4 TIMES DAILY PRN
Qty: 30 TABLET | Refills: 0 | Status: SHIPPED | OUTPATIENT
Start: 2024-03-18 | End: 2024-04-18 | Stop reason: WASHOUT

## 2024-03-18 RX ORDER — METHOCARBAMOL 500 MG/1
500 TABLET, FILM COATED ORAL EVERY 6 HOURS SCHEDULED
Qty: 32 TABLET | Refills: 0 | OUTPATIENT
Start: 2024-03-18 | End: 2024-03-26

## 2024-03-18 RX ORDER — OXYCODONE HYDROCHLORIDE 5 MG/1
5 TABLET ORAL EVERY 6 HOURS PRN
Qty: 26 TABLET | Refills: 0 | Status: SHIPPED | OUTPATIENT
Start: 2024-03-18 | End: 2024-03-25 | Stop reason: SDUPTHER

## 2024-03-18 RX ORDER — PREDNISONE 5 MG/1
TABLET ORAL
Qty: 252 TABLET | Refills: 0 | Status: SHIPPED | OUTPATIENT
Start: 2024-03-18 | End: 2024-05-13

## 2024-03-18 RX ORDER — PREDNISONE 20 MG/1
20 TABLET ORAL ONCE
Status: COMPLETED | OUTPATIENT
Start: 2024-03-18 | End: 2024-03-18

## 2024-03-18 RX ORDER — METHOCARBAMOL 500 MG/1
500 TABLET, FILM COATED ORAL EVERY 6 HOURS SCHEDULED
Qty: 32 TABLET | Refills: 0 | Status: SHIPPED | OUTPATIENT
Start: 2024-03-18 | End: 2024-03-26

## 2024-03-18 RX ORDER — PANTOPRAZOLE SODIUM 40 MG/1
40 TABLET, DELAYED RELEASE ORAL DAILY
Qty: 30 TABLET | Refills: 1 | Status: SHIPPED | OUTPATIENT
Start: 2024-03-18

## 2024-03-18 RX ADMIN — OXYCODONE HYDROCHLORIDE 5 MG: 5 TABLET ORAL at 07:40

## 2024-03-18 RX ADMIN — METHOCARBAMOL 500 MG: 500 TABLET ORAL at 05:25

## 2024-03-18 RX ADMIN — OXYCODONE HYDROCHLORIDE 5 MG: 5 TABLET ORAL at 12:13

## 2024-03-18 RX ADMIN — POTASSIUM CHLORIDE 20 MEQ: 1500 TABLET, EXTENDED RELEASE ORAL at 09:48

## 2024-03-18 RX ADMIN — OXYCODONE HYDROCHLORIDE 5 MG: 5 TABLET ORAL at 03:17

## 2024-03-18 RX ADMIN — ACETAMINOPHEN 650 MG: 325 TABLET ORAL at 12:14

## 2024-03-18 RX ADMIN — METHOCARBAMOL 500 MG: 500 TABLET ORAL at 12:14

## 2024-03-18 RX ADMIN — ACETAMINOPHEN 650 MG: 325 TABLET ORAL at 05:25

## 2024-03-18 RX ADMIN — PREDNISONE 25 MG: 5 TABLET ORAL at 05:25

## 2024-03-18 RX ADMIN — PREDNISONE 20 MG: 20 TABLET ORAL at 09:48

## 2024-03-18 RX ADMIN — PANTOPRAZOLE SODIUM 40 MG: 40 INJECTION, POWDER, FOR SOLUTION INTRAVENOUS at 10:12

## 2024-03-18 ASSESSMENT — COGNITIVE AND FUNCTIONAL STATUS - GENERAL
TOILETING: A LITTLE
PERSONAL GROOMING: A LITTLE
DRESSING REGULAR LOWER BODY CLOTHING: A LITTLE
HELP NEEDED FOR BATHING: A LITTLE
WALKING IN HOSPITAL ROOM: A LITTLE
TURNING FROM BACK TO SIDE WHILE IN FLAT BAD: A LITTLE
STANDING UP FROM CHAIR USING ARMS: A LITTLE
MOVING TO AND FROM BED TO CHAIR: A LITTLE
DRESSING REGULAR UPPER BODY CLOTHING: A LITTLE

## 2024-03-18 ASSESSMENT — PAIN SCALES - GENERAL
PAINLEVEL_OUTOF10: 2
PAINLEVEL_OUTOF10: 4
PAINLEVEL_OUTOF10: 4

## 2024-03-18 ASSESSMENT — PAIN - FUNCTIONAL ASSESSMENT
PAIN_FUNCTIONAL_ASSESSMENT: 0-10

## 2024-03-18 NOTE — DISCHARGE SUMMARY
Discharge Diagnosis  Crohn's colitis, unspecified complication (CMS/[facility])    Issues Requiring Follow-Up      Test Results Pending At Discharge  Pending Labs       No current pending labs.            Hospital Course  Kyra Behnfeldt is a 23 year old female with a past medical history of hemorrhoids, anxiety, anemia, and Crohn's colitis (diagnosed 12/2023) who has had worsening bloody, liquid stools (up to 14 times per day), abdominal pain, and multiple hospitalizations for trouble controlling symptoms since diagnosis.  She presented to [facility] as a transfer from [facility] for Remicade infusion in the setting of worsening Crohn's flare. GI was consulted and recommended IV steroids and Remicade infusion.  She did not have improvement of her symptoms and underwent a diverting loop ileostomy on 3/10/24 with Dr. Castro. She tolerated the procedure well and was admitted to a regular nursing floor.  Her diet was slowly advanced as tolerated.  Throughout her hospital stay, she was monitored for toxic megacolon with daily abdominal xrays and CRP lab draws.  She was noted to have return of bowel function with flatus and liquid, brown stool in ostomy pouch.  She received a second dose of Remicade.  Dehydration teaching, ileostomy diet teaching, and output recording were all completed with verbalized understanding.  At the time of discharge, she was tolerating a regular diet, pain was controlled, and she had improvement of symptoms.  She will follow up with colorectal surgery next week as well as with Dr Yamil Lozano from GI.  She will discharge home with home care as support for new ileostomy.    Pertinent Physical Exam At Time of Discharge  Physical Exam  Constitutional:       Appearance: Normal appearance.   HENT:      Head: Normocephalic and atraumatic.      Nose: Nose normal.      Mouth/Throat:      Mouth: Mucous membranes are dry.   Eyes:      Extraocular Movements: Extraocular movements intact.   Cardiovascular:      Rate  and Rhythm: Normal rate.   Pulmonary:      Effort: Pulmonary effort is normal.      Breath sounds: Normal breath sounds.   Abdominal:      Palpations: Abdomen is soft.      Comments: Abdomen soft appropriately tender, small lap site intact, ostomy well pouched, stoma pink and well perfused, brown soft stool in pouch with gas.   Musculoskeletal:         General: Normal range of motion.      Cervical back: Normal range of motion.   Skin:     General: Skin is warm and dry.   Neurological:      General: No focal deficit present.      Mental Status: She is alert and oriented to person, place, and time.   Psychiatric:         Mood and Affect: Mood normal.         Behavior: Behavior normal.     Home Medications     Medication List      START taking these medications    • methocarbamol 500 mg tablet; Commonly known as: Robaxin; Take 1 tablet   (500 mg) by mouth every 6 hours for 8 days.  • oxyCODONE 5 mg immediate release tablet; Commonly known as: Roxicodone;   Take 1 tablet (5 mg) by mouth every 6 hours if needed for moderate pain (4   - 6).  • pantoprazole 40 mg EC tablet; Commonly known as: ProtoNix; Take 1 tablet   (40 mg) by mouth once daily. Do not crush, chew, or split.  • predniSONE 5 mg tablet; Commonly known as: Deltasone; Take 8 tablets (40   mg) by mouth once daily for 7 days, THEN 7 tablets (35 mg) once daily for   7 days, THEN 6 tablets (30 mg) once daily for 7 days, THEN 5 tablets (25   mg) once daily for 7 days, THEN 4 tablets (20 mg) once daily for 7 days,   THEN 3 tablets (15 mg) once daily for 7 days, THEN 2 tablets (10 mg) once   daily for 7 days, THEN 1 tablet (5 mg) once daily for 7 days. Take 5 tabs   (25 mg) by mouth daily for 2 days, then 4 tabs (20 mg) daily for 2 days,   then 3 tabs (15 mg) daily for 2 days, then 2 tabs (10 mg) daily for 2   days, then 1 tab (5 mg) daily for 2 days..; Start taking on: March 18, 2024  • simethicone 80 mg chewable tablet; Commonly known as: Mylicon; Chew 0.5    tablets (40 mg) 4 times a day as needed for flatulence (gas pain).  • zolpidem 5 mg tablet; Commonly known as: Ambien; Take 1 tablet (5 mg) by   mouth as needed at bedtime for sleep.     CONTINUE taking these medications    • acetaminophen 325 mg tablet; Commonly known as: Tylenol; Take 2 tablets   (650 mg) by mouth every 4 hours if needed for mild pain (1 - 3).     STOP taking these medications    • ALPRAZolam 0.5 mg tablet; Commonly known as: Xanax  • cyclobenzaprine 10 mg tablet; Commonly known as: Flexeril  • dicyclomine 10 mg capsule; Commonly known as: Bentyl  • famotidine 20 mg tablet; Commonly known as: Pepcid  • HUMIRA(CF) PEN SUBQ  • hydrOXYzine HCL 50 mg tablet; Commonly known as: Atarax  • melatonin 3 mg tablet  • mesalamine 1,000 mg suppository; Commonly known as: Canasa  • methylPREDNISolone sodium succinate 40 mg injection; Commonly known as:   SOLU-Medrol  • morphine 2 mg/mL injection  • morphine 4 mg/mL injection  • ondansetron 4 mg/2 mL injection; Commonly known as: Zofran       Outpatient Follow-Up  Future Appointments   Date Time Provider Department Center   4/22/2024  1:00 PM oLrena Castro MD [id number] [facility]   5/22/2024  2:45 PM Mane Wang MD [id number] [facility]       Lula Sams, APRN-CNP

## 2024-03-18 NOTE — CARE PLAN
Problem: Pain  Goal: My pain/discomfort is manageable  Outcome: Progressing     Problem: Safety  Goal: Patient will be injury free during hospitalization  Outcome: Progressing  Goal: I will remain free of falls  Outcome: Progressing     Problem: Daily Care  Goal: Daily care needs are met  Outcome: Progressing     Problem: Psychosocial Needs  Goal: Demonstrates ability to cope with hospitalization/illness  Outcome: Progressing  Goal: Collaborate with me, my family, and caregiver to identify my specific goals  Outcome: Progressing     Problem: Discharge Barriers  Goal: My discharge needs are met  Outcome: Progressing   The patient's goals for the shift include pt. will have decreased number of bowel movement.    The clinical goals for the shift include Patient will rest comfortably throughout the night

## 2024-03-18 NOTE — HH CARE COORDINATION
Home Care received a Referral for Nursing. We have processed the referral for a Start of Care on 3.19 or 3.20.24.     If you have any questions or concerns, please feel free to contact us at [phone]. Follow the prompts, enter your five digit zip code, and you will be directed to your care team on EAST 3.

## 2024-03-18 NOTE — PROGRESS NOTES
03/07/24 1551   Discharge Planning   Living Arrangements Other (Comment)  (roommate)   Support Systems Parent   Assistance Needed Independent   Type of Residence Private residence   Number of Stairs to Enter Residence 2   Number of Stairs Within Residence 12   Home or Post Acute Services None   Patient expects to be discharged to: Home   Does the patient need discharge transport arranged? No   Financial Resource Strain   How hard is it for you to pay for the very basics like food, housing, medical care, and heating? Not hard   Housing Stability   In the last 12 months, was there a time when you were not able to pay the mortgage or rent on time? N   In the last 12 months, was there a time when you did not have a steady place to sleep or slept in a shelter (including now)? N   Transportation Needs   In the past 12 months, has lack of transportation kept you from medical appointments or from getting medications? no   In the past 12 months, has lack of transportation kept you from meetings, work, or from getting things needed for daily living? No     Transitional Care Coordination Progress Note:  Patient discussed during interdisciplinary rounds.   Team members present: COLTON CORTES  Plan per Medical/Surgical team: Chron's colitis  Payor: Joshua  Discharge disposition: Home  Potential Barriers: none  ADOD: 1-2 days    Previous Home Care: NA  DME: NA  [facility]  Falls: Denies  PCP:  new pcp appointment scheduled for 3/18    Met with patient and parents at bedside, provided introduction of self and role. Patient states she lives at home with a roommate; safe at home. Patient independent for adls, no assistive devices. Patient states she normally drives self to appointments. No concerns obtaining/affording medications. Patient states no social/financial concerns. Family to provide transport home at time of discharge. Patient with no anticipated needs at discharge. Will continue to monitor for discharge  planning needs.     Antoinette LIM, RN  Transitional Care Coordinator (TCC)  [phone]    3/11/24 11am- TCC met with patient at bedside to discuss anticipated discharge needs. Patient states she lives at home with a roommate; safe at home. Patient independent for adls, no assistive devices. Patient states she normally drives self to appointments. No concerns obtaining/affording medications. Patient states no social/financial concerns. Family to provide transport home at time of discharge. Patient with no anticipated needs at discharge. Will continue to monitor for discharge planning needs.   Clarissa De Dios RN [facility]    3/13/24 825am- TCC met with patient at bedside to discuss anticipated discharge needs. Patient is agreeable to home health care. Patient states she would like a list of PCP in her area as she is in need of a new one. TCC printed patient a list of PCP and educated patient on  website. TCC will continue to follow patient for discharge needs.  Clarissa De Dios RN [facility]    3/18/24- Patient will discharge with [facility] RN. TCC notified that patient may stay with family in [place]. Patient states she will go to the [place] address then eventually leave to [place]. Patient states she would like the home health agency to come to the [place] address first. TCC notified [facility] agency of patients discharge today for SOC date. TCC will continue to follow patient for discharge needs.  Clarissa De Dios RN [facility]

## 2024-03-18 NOTE — CARE PLAN
Problem: Psychosocial Needs  Goal: Demonstrates ability to cope with hospitalization/illness  Outcome: Progressing     Problem: Safety  Goal: I will remain free of falls  Outcome: Progressing     Problem: Psychosocial Needs  Goal: Demonstrates ability to cope with hospitalization/illness  Outcome: Progressing     Problem: Psychosocial Needs  Goal: Demonstrates ability to cope with hospitalization/illness  Outcome: Progressing   The patient's goals for the shift include pt. will have decreased number of bowel movement.    The clinical goals for the shift include pt will continue to improve coping    \

## 2024-03-19 ENCOUNTER — HOME CARE VISIT (OUTPATIENT)
Dept: HOME HEALTH SERVICES | Facility: HOME HEALTH | Age: 24
End: 2024-03-19
Payer: COMMERCIAL

## 2024-03-19 ENCOUNTER — PATIENT OUTREACH (OUTPATIENT)
Dept: CARE COORDINATION | Facility: CLINIC | Age: 24
End: 2024-03-19
Payer: COMMERCIAL

## 2024-03-19 VITALS
OXYGEN SATURATION: 98 % | WEIGHT: 95 LBS | TEMPERATURE: 98.3 F | RESPIRATION RATE: 20 BRPM | SYSTOLIC BLOOD PRESSURE: 110 MMHG | BODY MASS INDEX: 16.83 KG/M2 | HEART RATE: 106 BPM | HEIGHT: 63 IN | DIASTOLIC BLOOD PRESSURE: 62 MMHG

## 2024-03-19 PROCEDURE — 0023 HH SOC

## 2024-03-19 PROCEDURE — G0299 HHS/HOSPICE OF RN EA 15 MIN: HCPCS

## 2024-03-19 ASSESSMENT — ENCOUNTER SYMPTOMS
LOWEST PAIN SEVERITY IN PAST 24 HOURS: 0/10
STOOL FREQUENCY: MORE THAN TWICE DAILY
APPETITE LEVEL: POOR
CRAMPS: 1
NAUSEA: 1
PAIN: 1
BOWEL PATTERN NORMAL: 1
CHANGE IN APPETITE: DECREASED
PAIN SEVERITY GOAL: 0/10
PAIN LOCATION - PAIN SEVERITY: 5/10
HIGHEST PAIN SEVERITY IN PAST 24 HOURS: 5/10
SUBJECTIVE PAIN PROGRESSION: UNCHANGED
FLATUS: 1
LAST BOWEL MOVEMENT: 66918
PAIN LOCATION: ABDOMEN
PAIN LOCATION - PAIN FREQUENCY: INTERMITTENT
ABDOMINAL PAIN: 1
PERSON REPORTING PAIN: PATIENT

## 2024-03-19 ASSESSMENT — ACTIVITIES OF DAILY LIVING (ADL)
OASIS_M1830: 02
ENTERING_EXITING_HOME: MINIMUM ASSIST

## 2024-03-19 NOTE — PROGRESS NOTES
[facility]  Admitted 3/7/2024  Discharged 3/18/2024  Dx: Crohn's flare  S/P: Loop Ileostomy  Discharged with  Home Care    Outreach call to patient to support a smooth transition of care from recent admission.  Spoke with patient, she is doing okay. Patient states, she has spoke with a nurse from  earlier today. Patient has been contacted by  Home Care. SOC date established.     Patient scheduled to see General Surgeon 4/1/2024    Patient declined future outreach calls.  CM will close case.    Engagement  Call Start Time: 1528 (3/19/2024  3:28 PM)    Appointments  Does the patient have a primary care provider?: Yes (5/22/2024) (3/19/2024  3:28 PM)    Self Management  What is the home health agency?:  Home Care (3/19/2024  3:28 PM)  Has home health visited the patient within 72 hours of discharge?: Not applicable (3/19/2024  3:28 PM)    Patient Teaching  What is the patient's perception of their health status since discharge?: Improving (3/19/2024  3:28 PM)    Coreen Leroy RN/CM   [facility]  [phone]

## 2024-03-19 NOTE — HOME HEALTH
Sn arrived pt currently stayinh wiht her mother in [place],  pt wanting to go to [place] but not feeling well enoough travel .  pt has illeostomy that has wafter intact.  loose liquid stool noted.  and pt is aware ot take immodium to make stool firmer.  pt is drinking fluids but has not ate much solid food since then.  pts stoma is beefy red and proturding.  no incison ntoed since postop day 10 from [facility].    pt has apt on friday.  sn did request cbc order weekly to recheck hgb due to blood loss from rectal bleeding.  pt stated did have rectal bledding last pm    pt stated is having increased anxiety and messaged md about her anxiety and want for prescriptive med/

## 2024-03-19 NOTE — PROGRESS NOTES
No chief complaint on file.      History Of Present Illness  Kyra Behnfeldt is a 23 y.o. female here for follow up after surgery. She is s/p laparoscopic ileostomy creation on 3/10/24.     History of hemorrhoids, anxiety, anemia, and Crohn's colitis (diagnosed 12/2023) who has had worsening bloody, liquid stools (up to 14 times per day), abdominal pain, and multiple hospitalizations for trouble controlling symptoms since diagnosis. She was admitted for Crohn's flare, was on IV steroids and Remicade without improvement. Remicade resumed post-operatively. She was discharged to home on 3/18/24.      Kyra Behnfeldt reports that their energy is Fair.  reports has not lost weight since surgery and is eating well.  She is passing some mucous that is pink tinged from her bottom, we discussed this is normal. denies nausea or vomiting and denies fever or chills.     Average Stoma Output in 24 hour period:  - Averages 5-6 hours of sleep with some naps.   Imodium: No   Lomotil: No   Fiber Wafers No  Output consistency: toothpaste  Independent with appliance changes: Yes   Changing appliance every 3 days  Issues with leakage from appliance: No  Skin issues or breakdown under appliance: No           Home Medications  Prior to Admission medications    Medication Sig Start Date End Date Taking? Authorizing Provider   acetaminophen (Tylenol) 325 mg tablet Take 2 tablets (650 mg) by mouth every 4 hours if needed for mild pain (1 - 3). 2/5/24   Ari Harris MD   methocarbamol (Robaxin) 500 mg tablet Take 1 tablet (500 mg) by mouth every 6 hours for 8 days. 3/18/24 3/26/24  BRANDON Gates   oxyCODONE (Roxicodone) 5 mg immediate release tablet Take 1 tablet (5 mg) by mouth every 6 hours if needed for moderate pain (4 - 6). 3/18/24   BRANDON Gates   pantoprazole (ProtoNix) 40 mg EC tablet Take 1 tablet (40 mg) by mouth once daily. Do not crush, chew, or split. 3/18/24   BRANDON Gates   predniSONE  (Deltasone) 5 mg tablet Take 8 tablets (40 mg) by mouth once daily for 7 days, THEN 7 tablets (35 mg) once daily for 7 days, THEN 6 tablets (30 mg) once daily for 7 days, THEN 5 tablets (25 mg) once daily for 7 days, THEN 4 tablets (20 mg) once daily for 7 days, THEN 3 tablets (15 mg) once daily for 7 days, THEN 2 tablets (10 mg) once daily for 7 days, THEN 1 tablet (5 mg) once daily for 7 days. Take 5 tabs (25 mg) by mouth daily for 2 days, then 4 tabs (20 mg) daily for 2 days, then 3 tabs (15 mg) daily for 2 days, then 2 tabs (10 mg) daily for 2 days, then 1 tab (5 mg) daily for 2 days.. 3/18/24 5/13/24  LUZ Gates-CNP   simethicone (Mylicon) 80 mg chewable tablet Chew 0.5 tablets (40 mg) 4 times a day as needed for flatulence (gas pain). 3/18/24   BRANDON Gates   zolpidem (Ambien) 5 mg tablet Take 1 tablet (5 mg) by mouth as needed at bedtime for sleep. 3/18/24 4/17/24  BRANDON Gates   adalimumab (HUMIRA,CF, PEN SUBQ) Inject 40 mg under the skin. Every 14 days  3/18/24  Historical Provider, MD   ALPRAZolam (Xanax) 0.5 mg tablet Take 1 tablet (0.5 mg) by mouth 3 times a day as needed for anxiety.  3/18/24  Historical Provider, MD   cyclobenzaprine (Flexeril) 10 mg tablet Take 1 tablet (10 mg) by mouth 2 times a day as needed for muscle spasms. 2/5/24 3/18/24  Ari Harris MD   dicyclomine (Bentyl) 10 mg capsule Take 1 capsule (10 mg) by mouth 4 times a day as needed (cramping). 3/5/24 3/18/24  Haim Loo MD   famotidine (Pepcid) 20 mg tablet Take 1 tablet (20 mg) by mouth 2 times a day. 3/5/24 3/18/24  Haim Loo MD   hydrOXYzine HCL (Atarax) 50 mg tablet Take 1 tablet (50 mg) by mouth every 8 hours if needed (sleep).  3/18/24  Historical Provider, MD   melatonin 3 mg tablet Take 1 tablet (3 mg) by mouth once daily. 3/5/24 3/18/24  Haim Loo MD   mesalamine (Canasa) 1,000 mg suppository Insert 1 suppository (1,000 mg) into the rectum 2 times a day.  3/18/24  Historical  MD Radha   methylPREDNISolone sod succinate, PF, (SOLU-Medrol) 40 mg injection Infuse 0.5 mL (20 mg) into a venous catheter every 8 hours. 3/5/24 3/18/24  Haim Loo MD   morphine 2 mg/mL injection Infuse 1 mL (2 mg) into a venous catheter every 4 hours if needed for moderate pain (4 - 6). 3/5/24 3/18/24  Haim Loo MD   morphine 4 mg/mL injection Infuse 1 mL (4 mg) into a venous catheter every 4 hours if needed for severe pain (7 - 10). 3/5/24 3/18/24  Haim Loo MD   ondansetron (Zofran) 4 mg/2 mL injection Infuse 2 mL (4 mg) into a venous catheter every 6 hours if needed for nausea. 3/5/24 3/18/24  Haim Loo MD       Review of Systems   Constitutional:  Positive for fatigue.   Gastrointestinal:  Positive for abdominal pain. Negative for abdominal distention, anal bleeding, blood in stool, constipation, diarrhea, nausea, rectal pain and vomiting.   All other systems reviewed and are negative.       Physical Exam  Vitals reviewed.   HENT:      Head: Normocephalic.   Eyes:      Extraocular Movements: Extraocular movements intact.   Cardiovascular:      Rate and Rhythm: Normal rate.   Pulmonary:      Effort: Pulmonary effort is normal.   Abdominal:      General: There is no distension.      Palpations: Abdomen is soft. There is no mass.      Tenderness: There is no abdominal tenderness.      Hernia: No hernia is present.      Comments: Ileostomy pink, productive, protuberant   Musculoskeletal:         General: Normal range of motion.      Cervical back: Normal range of motion.   Skin:     General: Skin is warm and dry.   Neurological:      General: No focal deficit present.      Mental Status: She is alert.   Psychiatric:         Mood and Affect: Mood normal.           Last Recorded Vitals  Last menstrual period 01/30/2024.      Assessment/Plan   23 y.o. female here for follow up after recent surgery.     Kyra Behnfeldt will liberalize their diet and slowly increase activity, though avoid lifting  >10lbs until 6 weeks after surgery.     She has established care with Dr. Lozano and is working on steroid taper. Currently on 35mg/ day, she will taper to 30mg this week. Added azothiprine.     We discussed that we would consider reversal following a good scope.       Kyra Behnfeldt will follow up with our office in 3 months or sooner if needed.     I spent a total of 30 with the patient.      Dr. Lorena Castro  3/19/2024

## 2024-03-20 ENCOUNTER — LAB REQUISITION (OUTPATIENT)
Dept: LAB | Facility: HOSPITAL | Age: 24
End: 2024-03-20
Payer: COMMERCIAL

## 2024-03-20 ENCOUNTER — HOME CARE VISIT (OUTPATIENT)
Dept: HOME HEALTH SERVICES | Facility: HOME HEALTH | Age: 24
End: 2024-03-20
Payer: COMMERCIAL

## 2024-03-20 DIAGNOSIS — K50.10 CROHN'S DISEASE OF LARGE INTESTINE WITHOUT COMPLICATIONS (MULTI): ICD-10-CM

## 2024-03-20 LAB
ERYTHROCYTE [DISTWIDTH] IN BLOOD BY AUTOMATED COUNT: 13.5 % (ref 11.5–14.5)
HCT VFR BLD AUTO: 31.4 % (ref 36–46)
HGB BLD-MCNC: 9.8 G/DL (ref 12–16)
MCH RBC QN AUTO: 27.2 PG (ref 26–34)
MCHC RBC AUTO-ENTMCNC: 31.2 G/DL (ref 32–36)
MCV RBC AUTO: 87 FL (ref 80–100)
NRBC BLD-RTO: 0 /100 WBCS (ref 0–0)
PLATELET # BLD AUTO: 715 X10*3/UL (ref 150–450)
RBC # BLD AUTO: 3.6 X10*6/UL (ref 4–5.2)
WBC # BLD AUTO: 19.8 X10*3/UL (ref 4.4–11.3)

## 2024-03-20 PROCEDURE — G0299 HHS/HOSPICE OF RN EA 15 MIN: HCPCS

## 2024-03-20 PROCEDURE — 85027 COMPLETE CBC AUTOMATED: CPT

## 2024-03-21 ENCOUNTER — HOME CARE VISIT (OUTPATIENT)
Dept: HOME HEALTH SERVICES | Facility: HOME HEALTH | Age: 24
End: 2024-03-21
Payer: COMMERCIAL

## 2024-03-21 VITALS
HEART RATE: 91 BPM | SYSTOLIC BLOOD PRESSURE: 110 MMHG | DIASTOLIC BLOOD PRESSURE: 40 MMHG | RESPIRATION RATE: 16 BRPM | TEMPERATURE: 98.2 F

## 2024-03-21 PROCEDURE — G0299 HHS/HOSPICE OF RN EA 15 MIN: HCPCS

## 2024-03-21 ASSESSMENT — ENCOUNTER SYMPTOMS
PAIN: 1
PAIN LOCATION: RECTUM
PERSON REPORTING PAIN: PATIENT
APPETITE LEVEL: GOOD

## 2024-03-21 NOTE — HOME HEALTH
[facility] home nursing visit     stoma type: ileostomy  dr bruce ventura due to crohns.   size: 32mm  location: rt side   protrustion: yes  mucocutaneous junction:did not assess  mucosal condition and color: red moist .   Peristomal Skin: did not assess paitnet changed pouch last night due to leak   Peristomal contour: dikd not assess  character of output: vairies     pouching system used : patient used  8031 last night   accessories [id number]    still having rectal bleeding but it is improved.       [email or web address]    seeing new PCP tomorrow will be dicsussing antianxiety meds   potient teary on and off thorughout the visit dad came to visit from [place].     she is also concenred about FMLA since she was 20 hours short of working for a year full time.   advised to follow up with HR. she has an attorny friend she is going to discuss with .

## 2024-03-21 NOTE — CASE COMMUNICATION
Saw patient for you . she had changed her pouch last evening due to a leak so we did not change. she is seeing new PCP tomorrow and will discuss  labs and antianxiety meds. she was teary on and off during visit.     she continues with rectal bleeding. but it is better.     i will send samples. i can return once they are there.   thanks   Sebas

## 2024-03-22 VITALS
SYSTOLIC BLOOD PRESSURE: 102 MMHG | RESPIRATION RATE: 12 BRPM | HEART RATE: 110 BPM | DIASTOLIC BLOOD PRESSURE: 54 MMHG | TEMPERATURE: 97.2 F

## 2024-03-22 DIAGNOSIS — K50.111 CROHN'S COLITIS, WITH RECTAL BLEEDING (MULTI): Primary | ICD-10-CM

## 2024-03-22 RX ORDER — ALPRAZOLAM 0.5 MG/1
0.25 TABLET ORAL 2 TIMES DAILY PRN
Qty: 14 TABLET | Refills: 0 | Status: SHIPPED | OUTPATIENT
Start: 2024-03-22 | End: 2024-03-29

## 2024-03-22 SDOH — ECONOMIC STABILITY: GENERAL

## 2024-03-22 ASSESSMENT — ENCOUNTER SYMPTOMS
PERSON REPORTING PAIN: PATIENT
PAIN LOCATION - PAIN FREQUENCY: INTERMITTENT
PAIN LOCATION - RELIEVING FACTORS: MEDICATION REST
HIGHEST PAIN SEVERITY IN PAST 24 HOURS: 6/10
CHANGE IN APPETITE: DECREASED
PAIN: 1
APPETITE LEVEL: FAIR
ABDOMINAL PAIN: 1
SUBJECTIVE PAIN PROGRESSION: UNCHANGED
STOOL FREQUENCY: MORE THAN TWICE DAILY
PAIN SEVERITY GOAL: 1/10
LOWEST PAIN SEVERITY IN PAST 24 HOURS: 2/10
RECTAL BLEEDING: 1
FATIGUE: 1
PAIN LOCATION: ABDOMEN
PAIN LOCATION - PAIN DURATION: VARIES
PAIN LOCATION - PAIN SEVERITY: 5/10

## 2024-03-22 ASSESSMENT — ACTIVITIES OF DAILY LIVING (ADL)
MONEY MANAGEMENT (EXPENSES/BILLS): INDEPENDENT
AMBULATION ASSISTANCE: 1
AMBULATION ASSISTANCE: INDEPENDENT

## 2024-03-23 ENCOUNTER — HOME CARE VISIT (OUTPATIENT)
Dept: HOME HEALTH SERVICES | Facility: HOME HEALTH | Age: 24
End: 2024-03-23
Payer: COMMERCIAL

## 2024-03-23 NOTE — HOME HEALTH
coloplast samples reqeusted  SenSura Beverly 1-piece open  SenSura® Beverly 1-Piece Drainable Pouch  18475 - [facility]:  - Maxi - Stoma size: 3/8in - 2 1/8in - with filter, Opaque gray      Brava Adhesive Remover Wipe  Brava® Adhesive Remover Wipes  931853 - [facility]: ; Brava Adhesive remover wipe 30/bx      Brava Skin Barrier Wipe  Brava® Skin Barrier Wipes  652106 - [facility]:  - Skin barrier wipe 30/bx      Brava® Moldable Ring  Brava® Moldable Ring  752247 - [facility]:  - Moldable ring - 2mm      Brava Elastic Tape C-shape  Brava® Elastic Barrier Strips – Curved  155919 - [facility]:  - Elastic barrier strip      Brava Skin Cleanser  Brava® Skin Cleanser Wipes  70323 - [facility]: - Brava Skin Cleanser Wipes 15/pack      Brava Powder  Brava® Powder  51664 - [facility]:  - Powder - 25g  1oz    convatec samples requested  [id number]   [id number]    hollsiter samples requested  10574  7760  7917  [phone]  [phone]  thanks.

## 2024-03-25 DIAGNOSIS — K50.111 CROHN'S COLITIS, WITH RECTAL BLEEDING (MULTI): ICD-10-CM

## 2024-03-25 PROCEDURE — G0180 MD CERTIFICATION HHA PATIENT: HCPCS | Performed by: SURGERY

## 2024-03-25 RX ORDER — OXYCODONE HYDROCHLORIDE 5 MG/1
5 TABLET ORAL EVERY 6 HOURS PRN
Qty: 26 TABLET | Refills: 0 | Status: SHIPPED | OUTPATIENT
Start: 2024-03-25 | End: 2024-04-18 | Stop reason: ALTCHOICE

## 2024-03-26 ENCOUNTER — HOME CARE VISIT (OUTPATIENT)
Dept: HOME HEALTH SERVICES | Facility: HOME HEALTH | Age: 24
End: 2024-03-26
Payer: COMMERCIAL

## 2024-03-26 VITALS
DIASTOLIC BLOOD PRESSURE: 78 MMHG | OXYGEN SATURATION: 99 % | RESPIRATION RATE: 18 BRPM | SYSTOLIC BLOOD PRESSURE: 131 MMHG | TEMPERATURE: 98.2 F | HEART RATE: 104 BPM

## 2024-03-26 PROCEDURE — G0300 HHS/HOSPICE OF LPN EA 15 MIN: HCPCS

## 2024-03-26 PROCEDURE — 80053 COMPREHEN METABOLIC PANEL: CPT

## 2024-03-26 PROCEDURE — 86140 C-REACTIVE PROTEIN: CPT

## 2024-03-26 PROCEDURE — 85025 COMPLETE CBC W/AUTO DIFF WBC: CPT

## 2024-03-26 NOTE — HOME HEALTH
"supplies ordered via cardinal to [employer] and most likely to [employer]. after speaking with patient.   Image Not Available  Mouse over to zoom  ADHESIVE REMOVER WIPE  In Stock  Item # : [id number]   : [employer]   Item # : [id number]  Estimated Qty Available ([employer]) : 9,957 Each  50 Eaches = 1 Box  Order Type : MedConnect  Change Qty  Quantity : 50 Each  Click here to Delete Item  Image Not Available  Mouse over to zoom  BARRIER RING SLIM 2.3 MM, 2\" CERARING  In Stock  Sold by Box of 10  Item # : [id number]   : [employer]   Item # : [id number]  Estimated Qty Available ([employer]) : 158 Box  Order Type : MedConnect  Change Qty  Quantity : 2 Box  Click here to Delete Item  Image Not Available  Mouse over to zoom  1 PC CTF STD WEAR 3-8-2 1-8 SENSURA KARLIE DRAINABLE  In Stock  Sold by Box of 10  Item # : [id number]   : [employer]   Item # : [id number]  Estimated Qty Available ([employer]) : 168 Box  Order Type : MedConnect  Change Qty  Quantity : 2 Box  Click here to Delete Item"

## 2024-03-26 NOTE — HOME HEALTH
"email to patient .     From: Sebas Machado   Sent: Tuesday, March 26, 2024 4:34 PM  To: '[email or web address]' <[email or web address]>  Subject: supply order information     Amanda,     It was a pleasure speaking with you again.     Per our conversation and at the request of Joanie the nurse. Supplies were ordered via cardinal to [employer] and will most likely come from [employer]. [phone]   You can reorder from [employer] on or around 4/26/2024     You can set up an online account if you like. [email or web address]    If you  become dissatisfied with [employer] another option for supplies would be [employer].   [phone] they also allow online orders.     Here is what I ordered for you today is below.     Feel free to call with any questions or concerns.   Sebas           Mouse over to zoom  ADHESIVE REMOVER WIPE <[email or web address]>  In Stock  Item # : [id number]   : [employer]   Item # : [id number]  50 Eaches = 1 Box  Order Type : MedConnect  Change Qty  Quantity : 50 Each       Mouse over to zoom  BARRIER RING SLIM 2.3 MM, 2\" CERARING <[email or web address]>  In Stock  Sold by Box of 10  Item # : [id number]   : [employer]   Item # : [id number]  Order Type : MedConnect  Change Qty  Quantity : 2 Box       Mouse over to zoom  1 PC CTF STD WEAR 3-8-2 1-8 SENSURA KARLIE DRAINABLE <[email or web address]>  In Stock  Sold by Box of 10  Item # : [id number]   : [employer]   Item # : [id number]  Order Type : MedConnect  Change Qty  Quantity : 2 Box      And  [id number]  NO STING BARRIER FILM CAVILON 3/4ML WIPES, ALCOHOL-FREE  1 Box"

## 2024-03-27 ENCOUNTER — LAB REQUISITION (OUTPATIENT)
Dept: LAB | Facility: HOSPITAL | Age: 24
End: 2024-03-27
Payer: COMMERCIAL

## 2024-03-27 ENCOUNTER — HOSPITAL ENCOUNTER (EMERGENCY)
Facility: HOSPITAL | Age: 24
Discharge: HOME | End: 2024-03-27
Payer: COMMERCIAL

## 2024-03-27 VITALS
SYSTOLIC BLOOD PRESSURE: 128 MMHG | OXYGEN SATURATION: 98 % | WEIGHT: 95 LBS | BODY MASS INDEX: 16.83 KG/M2 | RESPIRATION RATE: 18 BRPM | HEIGHT: 63 IN | TEMPERATURE: 98.7 F | HEART RATE: 89 BPM | DIASTOLIC BLOOD PRESSURE: 88 MMHG

## 2024-03-27 DIAGNOSIS — K50.10 CROHN'S DISEASE OF LARGE INTESTINE WITHOUT COMPLICATIONS (MULTI): ICD-10-CM

## 2024-03-27 DIAGNOSIS — R53.83 LETHARGY: Primary | ICD-10-CM

## 2024-03-27 DIAGNOSIS — D50.0 IRON DEFICIENCY ANEMIA DUE TO CHRONIC BLOOD LOSS: ICD-10-CM

## 2024-03-27 DIAGNOSIS — Z43.2 ENCOUNTER FOR ATTENTION TO ILEOSTOMY (MULTI): ICD-10-CM

## 2024-03-27 LAB
ABO GROUP (TYPE) IN BLOOD: NORMAL
ABO GROUP (TYPE) IN BLOOD: NORMAL
ALBUMIN SERPL BCP-MCNC: 2.8 G/DL (ref 3.4–5)
ALBUMIN SERPL BCP-MCNC: 3.2 G/DL (ref 3.4–5)
ALP SERPL-CCNC: 71 U/L (ref 33–110)
ALP SERPL-CCNC: 73 U/L (ref 33–110)
ALT SERPL W P-5'-P-CCNC: 15 U/L (ref 7–45)
ALT SERPL W P-5'-P-CCNC: 17 U/L (ref 7–45)
ANION GAP SERPL CALC-SCNC: 11 MMOL/L (ref 10–20)
ANION GAP SERPL CALC-SCNC: 19 MMOL/L (ref 10–20)
ANTIBODY SCREEN: NORMAL
AST SERPL W P-5'-P-CCNC: 10 U/L (ref 9–39)
AST SERPL W P-5'-P-CCNC: 9 U/L (ref 9–39)
BASOPHILS # BLD AUTO: 0.01 X10*3/UL (ref 0–0.1)
BASOPHILS # BLD AUTO: 0.02 X10*3/UL (ref 0–0.1)
BASOPHILS NFR BLD AUTO: 0.1 %
BASOPHILS NFR BLD AUTO: 0.2 %
BILIRUB SERPL-MCNC: 0.2 MG/DL (ref 0–1.2)
BILIRUB SERPL-MCNC: 0.2 MG/DL (ref 0–1.2)
BLOOD EXPIRATION DATE: NORMAL
BUN SERPL-MCNC: 11 MG/DL (ref 6–23)
BUN SERPL-MCNC: 11 MG/DL (ref 6–23)
CALCIUM SERPL-MCNC: 8.4 MG/DL (ref 8.6–10.3)
CALCIUM SERPL-MCNC: 8.4 MG/DL (ref 8.6–10.6)
CHLORIDE SERPL-SCNC: 97 MMOL/L (ref 98–107)
CHLORIDE SERPL-SCNC: 99 MMOL/L (ref 98–107)
CO2 SERPL-SCNC: 25 MMOL/L (ref 21–32)
CO2 SERPL-SCNC: 29 MMOL/L (ref 21–32)
CREAT SERPL-MCNC: 0.42 MG/DL (ref 0.5–1.05)
CREAT SERPL-MCNC: 0.51 MG/DL (ref 0.5–1.05)
CRP SERPL-MCNC: 0.61 MG/DL
DISPENSE STATUS: NORMAL
EGFRCR SERPLBLD CKD-EPI 2021: >90 ML/MIN/1.73M*2
EGFRCR SERPLBLD CKD-EPI 2021: >90 ML/MIN/1.73M*2
EOSINOPHIL # BLD AUTO: 0.01 X10*3/UL (ref 0–0.7)
EOSINOPHIL # BLD AUTO: 0.03 X10*3/UL (ref 0–0.7)
EOSINOPHIL NFR BLD AUTO: 0.1 %
EOSINOPHIL NFR BLD AUTO: 0.2 %
ERYTHROCYTE [DISTWIDTH] IN BLOOD BY AUTOMATED COUNT: 13.8 % (ref 11.5–14.5)
ERYTHROCYTE [DISTWIDTH] IN BLOOD BY AUTOMATED COUNT: 14.1 % (ref 11.5–14.5)
GLUCOSE SERPL-MCNC: 111 MG/DL (ref 74–99)
GLUCOSE SERPL-MCNC: 134 MG/DL (ref 74–99)
HCT VFR BLD AUTO: 24 % (ref 36–46)
HCT VFR BLD AUTO: 26.1 % (ref 36–46)
HGB BLD-MCNC: 6.8 G/DL (ref 12–16)
HGB BLD-MCNC: 8.1 G/DL (ref 12–16)
IMM GRANULOCYTES # BLD AUTO: 0.06 X10*3/UL (ref 0–0.7)
IMM GRANULOCYTES # BLD AUTO: 0.11 X10*3/UL (ref 0–0.7)
IMM GRANULOCYTES NFR BLD AUTO: 0.7 % (ref 0–0.9)
IMM GRANULOCYTES NFR BLD AUTO: 0.9 % (ref 0–0.9)
LACTATE SERPL-SCNC: 1.8 MMOL/L (ref 0.4–2)
LYMPHOCYTES # BLD AUTO: 0.94 X10*3/UL (ref 1.2–4.8)
LYMPHOCYTES # BLD AUTO: 1.15 X10*3/UL (ref 1.2–4.8)
LYMPHOCYTES NFR BLD AUTO: 14.2 %
LYMPHOCYTES NFR BLD AUTO: 7.4 %
MCH RBC QN AUTO: 26 PG (ref 26–34)
MCH RBC QN AUTO: 26.7 PG (ref 26–34)
MCHC RBC AUTO-ENTMCNC: 28.3 G/DL (ref 32–36)
MCHC RBC AUTO-ENTMCNC: 31 G/DL (ref 32–36)
MCV RBC AUTO: 86 FL (ref 80–100)
MCV RBC AUTO: 92 FL (ref 80–100)
MONOCYTES # BLD AUTO: 0.86 X10*3/UL (ref 0.1–1)
MONOCYTES # BLD AUTO: 0.96 X10*3/UL (ref 0.1–1)
MONOCYTES NFR BLD AUTO: 11.9 %
MONOCYTES NFR BLD AUTO: 6.8 %
NEUTROPHILS # BLD AUTO: 10.75 X10*3/UL (ref 1.2–7.7)
NEUTROPHILS # BLD AUTO: 5.89 X10*3/UL (ref 1.2–7.7)
NEUTROPHILS NFR BLD AUTO: 73 %
NEUTROPHILS NFR BLD AUTO: 84.5 %
NRBC BLD-RTO: 0 /100 WBCS (ref 0–0)
NRBC BLD-RTO: 0 /100 WBCS (ref 0–0)
PLATELET # BLD AUTO: 458 X10*3/UL (ref 150–450)
PLATELET # BLD AUTO: 489 X10*3/UL (ref 150–450)
POTASSIUM SERPL-SCNC: 3.6 MMOL/L (ref 3.5–5.3)
POTASSIUM SERPL-SCNC: 3.7 MMOL/L (ref 3.5–5.3)
PRODUCT BLOOD TYPE: 1700
PRODUCT CODE: NORMAL
PROT SERPL-MCNC: 5.3 G/DL (ref 6.4–8.2)
PROT SERPL-MCNC: 6.4 G/DL (ref 6.4–8.2)
RBC # BLD AUTO: 2.62 X10*6/UL (ref 4–5.2)
RBC # BLD AUTO: 3.03 X10*6/UL (ref 4–5.2)
RH FACTOR (ANTIGEN D): NORMAL
RH FACTOR (ANTIGEN D): NORMAL
SODIUM SERPL-SCNC: 135 MMOL/L (ref 136–145)
SODIUM SERPL-SCNC: 137 MMOL/L (ref 136–145)
UNIT ABO: NORMAL
UNIT NUMBER: NORMAL
UNIT RH: NORMAL
UNIT VOLUME: 350
WBC # BLD AUTO: 12.7 X10*3/UL (ref 4.4–11.3)
WBC # BLD AUTO: 8.1 X10*3/UL (ref 4.4–11.3)
XM INTEP: NORMAL

## 2024-03-27 PROCEDURE — 36415 COLL VENOUS BLD VENIPUNCTURE: CPT

## 2024-03-27 PROCEDURE — 80053 COMPREHEN METABOLIC PANEL: CPT

## 2024-03-27 PROCEDURE — P9040 RBC LEUKOREDUCED IRRADIATED: HCPCS

## 2024-03-27 PROCEDURE — 36430 TRANSFUSION BLD/BLD COMPNT: CPT

## 2024-03-27 PROCEDURE — 86920 COMPATIBILITY TEST SPIN: CPT

## 2024-03-27 PROCEDURE — 85025 COMPLETE CBC W/AUTO DIFF WBC: CPT

## 2024-03-27 PROCEDURE — 99285 EMERGENCY DEPT VISIT HI MDM: CPT | Mod: 25

## 2024-03-27 PROCEDURE — 83605 ASSAY OF LACTIC ACID: CPT

## 2024-03-27 PROCEDURE — 86901 BLOOD TYPING SEROLOGIC RH(D): CPT

## 2024-03-27 PROCEDURE — 2500000001 HC RX 250 WO HCPCS SELF ADMINISTERED DRUGS (ALT 637 FOR MEDICARE OP)

## 2024-03-27 RX ORDER — OXYCODONE HYDROCHLORIDE 5 MG/1
5 TABLET ORAL ONCE
Status: COMPLETED | OUTPATIENT
Start: 2024-03-27 | End: 2024-03-27

## 2024-03-27 RX ADMIN — OXYCODONE HYDROCHLORIDE 5 MG: 5 TABLET ORAL at 19:48

## 2024-03-27 ASSESSMENT — PAIN SCALES - GENERAL
PAINLEVEL_OUTOF10: 0 - NO PAIN
PAINLEVEL_OUTOF10: 5 - MODERATE PAIN
PAINLEVEL_OUTOF10: 0 - NO PAIN

## 2024-03-27 ASSESSMENT — PAIN - FUNCTIONAL ASSESSMENT
PAIN_FUNCTIONAL_ASSESSMENT: 0-10
PAIN_FUNCTIONAL_ASSESSMENT: 0-10

## 2024-03-27 ASSESSMENT — ENCOUNTER SYMPTOMS
STOOL FREQUENCY: DAILY
CHANGE IN APPETITE: UNCHANGED
APPETITE LEVEL: GOOD

## 2024-03-27 NOTE — HOME HEALTH
Ostomy bag in place, draining properly, good seal to the skin. Labs for CBC w diff, CMP, C-reactive protein drawn today first attempt in left anticubital, dropped off to [facility].

## 2024-03-27 NOTE — ED PROVIDER NOTES
Chief Complaint   Patient presents with    Abnormal Lab     Hgb 6.8    Rectal Bleeding    Post-op Problem       23-year-old female arrives to the emergency department with a chief complaint of abnormal labs.  The patient had a recent ileostomy surgery secondary to Crohn's disease that the patient was newly diagnosed with in December 2023, the patient has been running on the low side with her hemoglobin requiring multiple transfusions in the past.  The patient has been having continued rectal bleeding despite her new ileostomy that has been being followed by her gastroenterologist.  The patient had diagnostic lab work done yesterday showing her hemoglobin to be 6.8 and was told to come to the emergency department.  The patient states that she has felt fatigued and lethargy that she has had in the past when her hemoglobin has dropped well, and she continues to feel well today.  The patient denies any new pain though does have chronic pain since the surgery in March 2024.  Patient denies any nausea or vomiting at this time.  The patient states that the blood from her rectum is no longer gross blood, that it is more mucousy at this time.  Patient does arrive tachycardic, though otherwise hemodynamically stable      History provided by:  Patient   used: No         PmHx, PsHx, Allergies, Family Hx, social Hx reviewed as documented    A complete 10 point review of systems was performed and is negative except for as mentioned in the HPI.    Physical Exam:    General: Patient appears emaciated, AAOx3, is a good historian, answers questions appropriately    HEENT: head normocephalic, atraumatic, PERRLA, EOMs intact, oropharynx without erythema or exudate, buccal mucosa intact without lesions, TMs unremarkable, nose is patent bilateral    Neck: supple, full ROM, negative for lymphadenopathy, JVD, thyromegaly, tracheal deviation, nuccal rigidity    Pulmonary: CTAB, no accessory muscle use, able to speak  full clear sentences    Cardiac: HRRR, no murmurs, rubs or gallops    GI: soft, non-tender, non-distended, BS + x 4, no masses or organomegaly, no guarding or CVA tenderness noted, negative weber's, mcburney's    Musculoskeletal: full weight bearing, CARDOZA, no joint effusions, clubbing or edema noted    Skin: Color overall pale, otherwise skin intact, no lesions or rashes noted.    Neuro: patient follow commands, cranial nerves 2-12 grossly intact, motor strengths 5/5 upper and lower extremities, DTR's and sensation are symmetrical. No focal deficits.    Rectal/: No urinary burning, urgency, change in frequency.  Continued rectal bleeding described as mucousy, on chaperoned rectal exam, there is no gross blood at the rectum        Medical Decision Making  This patient was seen in the emergency department with an attending physician available at all times throughout their ED course    Primary consideration for this patient would be further evaluation of her blood counts, electrolytes.  Though considered, through shared decision making no new imaging will be done at this time, the patient has no new complaints or new pains.  The patient's new ileostomy is draining, the appliance and surrounding area have no signs of infection or redness.  Diagnostic blood work including type and screen will be used to further evaluate.    The patient's diagnostic blood work shows an elevated white blood cell count of 12.7 compared to yesterday's 8.1, yesterday's hemoglobin was found to be 6.8, where today it is 8.1.    This was reviewed with both the patient as well as the gastroenterologist, the patient states that she is concerned given she feels the lethargy and fatigue that she has felt in the past.  The gastroenterologist feels that the patient receiving 1 unit of packed red blood cells could be therapeutic given her continued blood loss even at a minor level with her feelings of fatigue and lethargy.  The patient signed consent  form electronically to receive 1 unit of packed red blood cells.  She will receive them over 2 hours.     During her transfusion, the patient states that it was time for her at home dose of oxycodone 5 mg, patient given that here in the emergency department.    After the blood transfusion, the patient states that she feels much better, the patient remains hemodynamically stable, the patient is requesting discharge at this time and will follow-up with her gastroenterology appointment as stated and set for tomorrow.    The patient's ED course was reviewed with the attending physician, and they were amenable with the plan of discharge after reviewing pts ED course    Patient is amenable to the plan of discharge as outlined above, all patient's questions pertaining to their ED course were answered in their entirety.  Strict return precautions were discussed with the patient and they verbalized understanding.  Further, it was made clear to the patient that from an emergent basis, all effort and testing was done to eliminate any imminent dangerous or potentially dangerous conditions of the patient however if their symptoms get much worse or feel life-threatening, they are to return to the emergency department or call 911 immediately.    Amount and/or Complexity of Data Reviewed  Labs: ordered. Decision-making details documented in ED Course.       Diagnoses as of 03/27/24 2142   Lethargy   Iron deficiency anemia due to chronic blood loss       The patient has had the following imaging during this ER visit: VITAL SIGNS     Patient History   Past Medical History:   Diagnosis Date    Benign neoplasm of cecum     CC (Crohn's colitis) (CMS/HCC)     Colitis     Generalized abdominal pain     Hypokalemia     Internal hemorrhoid     Rectal bleeding      Past Surgical History:   Procedure Laterality Date    COLONOSCOPY      ILEOSTOMY       No family history on file.  Social History     Tobacco Use    Smoking status: Never      Passive exposure: Never    Smokeless tobacco: Never   Vaping Use    Vaping Use: Never used   Substance Use Topics    Alcohol use: Not Currently     Comment: occasional    Drug use: Yes     Types: Marijuana       ED Triage Vitals [03/27/24 1348]   Temperature Heart Rate Respirations BP   36.9 °C (98.4 °F) (!) 120 16 123/83      Pulse Ox Temp Source Heart Rate Source Patient Position   100 % Temporal -- --      BP Location FiO2 (%)     -- --       Vitals:    03/27/24 1955 03/27/24 2027 03/27/24 2121 03/27/24 2131   BP:  129/89 127/88 128/88   Pulse: 98 100 94 89   Resp: 18 18 18 18   Temp: 37.1 °C (98.7 °F)      TempSrc:       SpO2: 100% 98% 99% 98%   Weight:       Height:                   LUZ Stubbs-CNP  03/27/24 2148

## 2024-03-27 NOTE — ED TRIAGE NOTES
Pt to ED with c/o low hgb 6.8. Pt had surgery 2 weeks ago for a new ileostomy. Pt hx of chrons. Pt reports some red to darker rectal bleeding.

## 2024-03-28 ENCOUNTER — OFFICE VISIT (OUTPATIENT)
Dept: GASTROENTEROLOGY | Facility: CLINIC | Age: 24
End: 2024-03-28
Payer: COMMERCIAL

## 2024-03-28 VITALS
BODY MASS INDEX: 16.66 KG/M2 | SYSTOLIC BLOOD PRESSURE: 116 MMHG | HEIGHT: 63 IN | DIASTOLIC BLOOD PRESSURE: 77 MMHG | WEIGHT: 94 LBS | HEART RATE: 89 BPM | TEMPERATURE: 97.4 F

## 2024-03-28 DIAGNOSIS — R10.30 LOWER ABDOMINAL PAIN: ICD-10-CM

## 2024-03-28 DIAGNOSIS — K62.5 RECTAL BLEEDING: ICD-10-CM

## 2024-03-28 DIAGNOSIS — K50.111 CROHN'S COLITIS, WITH RECTAL BLEEDING (MULTI): Primary | ICD-10-CM

## 2024-03-28 PROCEDURE — 99214 OFFICE O/P EST MOD 30 MIN: CPT | Performed by: INTERNAL MEDICINE

## 2024-03-28 RX ORDER — ACETAMINOPHEN 325 MG/1
650 TABLET ORAL ONCE
Status: CANCELLED | OUTPATIENT
Start: 2024-04-08 | End: 2024-04-08

## 2024-03-28 RX ORDER — FAMOTIDINE 10 MG/ML
20 INJECTION INTRAVENOUS ONCE AS NEEDED
Status: CANCELLED | OUTPATIENT
Start: 2024-05-20

## 2024-03-28 RX ORDER — DIPHENHYDRAMINE HYDROCHLORIDE 50 MG/ML
50 INJECTION INTRAMUSCULAR; INTRAVENOUS AS NEEDED
Status: CANCELLED | OUTPATIENT
Start: 2024-05-20

## 2024-03-28 RX ORDER — FAMOTIDINE 10 MG/ML
20 INJECTION INTRAVENOUS ONCE AS NEEDED
Status: CANCELLED | OUTPATIENT
Start: 2024-04-08

## 2024-03-28 RX ORDER — HEPARIN SODIUM,PORCINE/PF 10 UNIT/ML
50 SYRINGE (ML) INTRAVENOUS AS NEEDED
OUTPATIENT
Start: 2024-04-08

## 2024-03-28 RX ORDER — ALBUTEROL SULFATE 0.83 MG/ML
3 SOLUTION RESPIRATORY (INHALATION) AS NEEDED
Status: CANCELLED | OUTPATIENT
Start: 2024-05-20

## 2024-03-28 RX ORDER — DIPHENHYDRAMINE HYDROCHLORIDE 50 MG/ML
50 INJECTION INTRAMUSCULAR; INTRAVENOUS AS NEEDED
Status: CANCELLED | OUTPATIENT
Start: 2024-04-08

## 2024-03-28 RX ORDER — ESCITALOPRAM OXALATE 5 MG/1
5 TABLET ORAL DAILY
COMMUNITY

## 2024-03-28 RX ORDER — ACETAMINOPHEN 325 MG/1
650 TABLET ORAL ONCE
Status: CANCELLED | OUTPATIENT
Start: 2024-06-03 | End: 2024-06-03

## 2024-03-28 RX ORDER — AZATHIOPRINE 50 MG/1
50 TABLET ORAL DAILY
Qty: 30 TABLET | Refills: 11 | Status: SHIPPED | OUTPATIENT
Start: 2024-03-28 | End: 2025-03-28

## 2024-03-28 RX ORDER — ALBUTEROL SULFATE 0.83 MG/ML
3 SOLUTION RESPIRATORY (INHALATION) AS NEEDED
Status: CANCELLED | OUTPATIENT
Start: 2024-04-08

## 2024-03-28 RX ORDER — EPINEPHRINE 0.3 MG/.3ML
0.3 INJECTION SUBCUTANEOUS EVERY 5 MIN PRN
Status: CANCELLED | OUTPATIENT
Start: 2024-04-08

## 2024-03-28 RX ORDER — EPINEPHRINE 0.3 MG/.3ML
0.3 INJECTION SUBCUTANEOUS EVERY 5 MIN PRN
Status: CANCELLED | OUTPATIENT
Start: 2024-05-20

## 2024-03-28 NOTE — PROGRESS NOTES
Department of Gastroenterology and Hepatology   Clinic Note (NEW patient)       History of Present Illness:   Kyra Behnfeldt is a 23 y.o. female with Crohn's colitis (Dx 12/2023) who presents to IBD clinic to establish care.      Patient developed rectal bleeding in Spring 2023.     Colonoscopy 12/7/23 by Dr. Mane Wang showed normal TI, one 7 mm SSA removed from cecum, moderate inflammation in the distal rectum and proximal transverse colon suspicious for Crohn's colitis. Path showed normal TI, chronic active colitis in transverse colon and rectum, the rest of the colonic biopsies were normal.    Patient follows with Dr. Mane Wang with plan to initiate Humira but due to insurance issues it was delayed. She was started on Predinisone 40mg with sxs improvement but worsens when tapered to 10mg. She was then started on Budesonide.     CT A/P with IV contrast 2/1/2024 showed multiple fluid-filled loops in the mid to lower abd, apparent circumferential wall thickening of multiple colonic loops, c/w acute enterocolitis.     She was admitted to [facility] on 2/2/2024 for abd pain, rectal bleeding and diarrhea.  Received first dose of Humira on 2/3/24.  Improved with IV steroid.  Discharged with prednisone 40 mg once daily.    Received his second loading dose of Humira on 2/17/2024.     Patient was again admitted to [facility] on 2/25/2024 for bloody diarrhea.  Improved with IV steroids and discharged with prednisone 40 mg on 2/26/24.  Readmitted 3 days later to [facility] on 3/1/2024 for worsening symptoms.  Severe anemia required blood transfusions.  Received 3rd dose of Humira 3/2/24. MRE on 3/4/2024 showed colonic wall thickening at transverse colon possibly also distal right and left colon and a single long contiguous segment. Flex sig 3/4/24 showed severe colitis from rectum to transverse colon, small hemorrhoids; CMV neg on path.  Dr. Wang had a multidisciplinary discussion with Dr. Dr. Lozano, and Ward.   Dr. Lozano felt Remicade could still be an option even with primary failure of Humira and Dr. Hopper felt diversion surgery is an option if needed.  Dr. Wang discussed these options with patient who expressed her wish to avoid ostomy if possible and opted for rescue therapy with Remicade.      Patient was subsequently transferred to [facility] on 3/7/24 for Remicade infusion.  She received Remicade 10mg/kg on 3/7/24. Patient continued to have severe bloody diarrhea and thus underwent diversion ileostomy on 3/10/24 . Postop overall improved symptoms but still have intermittent worsening sxs unable to wean off IV steroid.  Received 2nd dose of Remicade 10mg/kg around 3/11/24.     CT A/P with IV contrast on 3/16/2024 showed dilated SB loops are present to the ostomy with mild wall thickening and mucosal enhancement at the ostomy and mild interval increase in segments of large bowel wall thickening/enhancement and moderate amount of simple fluid density ascites layering within the pelvis. Patient was eventually transitioned to prednisone and discharged home 3/18/24.     In clinic today, patient reports overall improved sxs--has maroon blood clots from the rectum 3-4 times daily but it has been improving and pain has improved as well now mainly in am. Currently on Prednisone 40mg once daily. Outpatient CBC on 3/26/24 showed hgb 6.8 (was ~8 when discharged) and CRP 0.61 (significantly improved). She presented to [facility] ED yesterday due to low hgb and received one unit blood. Pre-transfusion hgb 8.1 (she got the blood transfusion prior to lab).    Lost 30 lbs since 2/2024; Wt has since been stable.   EIMs--oral ulcer, no joint pain/eye redness/eye pain/skin rash.    ROS:   Constitutional: denies fever, chills  HEENT: oral ulcer, no dysphagia,no  eye pain/redness.   Cardiovascular: denies chest pain  Respiratory: denies shortness of breath  GI: see HPI  Musculoskeletal: denies arthralgia, myalgia  Hem/Onc: denies easy  "bleeding or bruising  Dermatology: denies jaundice, rash  Psychology: denies depression    All ROS were negative unless otherwise stated above.       Past Medical History   has a past medical history of Benign neoplasm of cecum, CC (Crohn's colitis) (CMS/HCC), Colitis, Generalized abdominal pain, Hypokalemia, Internal hemorrhoid, and Rectal bleeding.     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    predniSONE (Deltasone) 5 mg tablet Take 8 tablets (40 mg) by mouth once daily for 7 days, THEN 7 tablets (35 mg) once daily for 7 days, THEN 6 tablets (30 mg) once daily for 7 days, THEN 5 tablets (25 mg) once daily for 7 days, THEN 4 tablets (20 mg) once daily for 7 days, THEN 3 tablets (15 mg) once daily for 7 days, THEN 2 tablets (10 mg) once daily for 7 days, THEN 1 tablet (5 mg) once daily for 7 days. Take 5 tabs (25 mg) by mouth daily for 2 days, then 4 tabs (20 mg) daily for 2 days, then 3 tabs (15 mg) daily for 2 days, then 2 tabs (10 mg) daily for 2 days, then 1 tab (5 mg) daily for 2 days..    simethicone (MYLICON) 40 mg, oral, 4 times daily PRN    zolpidem (AMBIEN) 5 mg, oral, Nightly PRN        Objective   /77   Pulse 89   Temp 36.3 °C (97.4 °F)   Ht 1.6 m (5' 3\")   Wt (!) 42.6 kg (94 lb)   LMP 02/26/2024 (Approximate)   BMI 16.65 kg/m²      Physical Exam  Vitals signs reviewed.    General: not in acute distress  HEENT: atraumatic, normocephalic, no oral lesions  CV: regular rate and rhythm, no " murmur  Resp: clear to auscultation bilaterally  GI: soft, active bowel sounds, non-tender to palpation, no rebound or guarding, no ascites; RLQ ileostomy appears well with semi-formed stool    Rectal: no perianal lesion or purulence  Msk: no lower extremity edema  Derm: no jaundice  Psych: normal affect     Labs  Lab Results   Component Value Date    WBC 12.7 (H) 03/27/2024    HGB 8.1 (L) 03/27/2024    HCT 26.1 (L) 03/27/2024    MCV 86 03/27/2024     (H) 03/27/2024     Lab Results   Component Value Date    GLUCOSE 111 (H) 03/27/2024    CALCIUM 8.4 (L) 03/27/2024     (L) 03/27/2024    K 3.7 03/27/2024    CO2 29 03/27/2024    CL 99 03/27/2024    BUN 11 03/27/2024    CREATININE 0.51 03/27/2024     Lab Results   Component Value Date    ALT 17 03/27/2024    AST 10 03/27/2024    ALKPHOS 73 03/27/2024    BILITOT 0.2 03/27/2024     Lab Results   Component Value Date    INR 1.1 03/09/2024     Lab Results   Component Value Date    CRP 0.61 03/26/2024     Lab Results   Component Value Date    CALPS 1320 (H) 02/02/2024       Assessment/Plan   #IBD colitis: Most likely Crohn's colitis vs ? UC colitis (initial skipped lesion could be early stage of UC)    Patient was officially diagnosed with Crohn's colitis after 12/7/2023 colonoscopy with Dr. Mane Wang which showed colitis in distal rectum and proximal transverse colon.  Her symptoms however has started since spring 2023.  Plan of initiating Humira was delayed due to insurance issues.  Her disease rapidly progressed and required prolonged steroid and multiple admissions.  She received a total of 3 doses of Humira (2/3, 2/17 and 3/2/2024).  Her symptoms continue to worsen.  Inpatient flex sig 3/4/24 at [facility] showed severe colitis from rectum to transverse colon, small hemorrhoids; CMV neg.  Patient wanted to avoid surgery if possible and decision was to proceed with rescue Remicade.  She was transferred to [facility] on 3/7/24 for Remicade infusion and she  received 10mg/kg on 3/7/24.  Patient feels slight improvement of symptoms after Remicade but her CRP continued to rise, therefore she underwent diversion ileostomy on 3/10/24 with Dr. Castro. Received 2nd dose of Remicade 10mg/kg on 3/11/24.  She was discharged home 3/18/24 and required 1 unit RBC transfusion on 3/27/2024 in [facility] ED.      Currently patient has maroon-colored bloody clots out of her rectum 2-3 times daily and some lower abdominal pain only in the morning, improved since discharge.  Her CRP on 3/26/2024 was normal 0.61 which was also reassuring. TPMT intermediate metabolizer.      Will plan to start patient on high-dose infliximab 10 mg/kg and azathioprine combination therapy given her aggressive disease.  We will start working to have insurance approval and hopefully can be approved for next dose on 4/8/2024 (4 weeks after the second inpatient dose of infliximab).      Patient Instructions  1) taper prednisone lowering by 5 mg every week.  If you feel like you are feeling worse as the prednisone is lowered, please call the office for instructions  2) increase oral iron tablets to 1 twice daily  3) begin azathioprine 1 tablet once daily.  Side effects may include headache, fever, muscle ache, nausea, vomiting, abdominal pain, rash, or joint aches.  If you think you are having side effects, stop the medication and call the office  4) on azathioprine, labs need to be monitored closely.  Please check labs beginning next week on Monday, 4/1/2024 and then weekly for the next month until follow-up  5) we will plan to give you continued infliximab infusions for the time being.  Your next infusion will be sometime between 3 to 4 weeks from now once we get insurance approval  6) follow-up in the office in 4 weeks      Sunny Domingo MD (GI fellow, PGY 6)  Javy Lozano MD (attending)

## 2024-03-28 NOTE — PATIENT INSTRUCTIONS
It was very nice to meet you today.  As we discussed, I think that you are gradually improving from your severe inflammatory bowel disease colitis, but you still have a long way to go to get back to normal good health.  I am hopeful that with medical therapy your colon can heal and we can then go about closing your loop ileostomy and keeping you well.  As we discussed today, you are going to need close follow-up and careful monitoring.  We will proceed as follows:    1) taper prednisone lowering by 5 mg every week.  If you feel like you are feeling worse as the prednisone is lowered, please call the office for instructions  2) increase oral iron tablets to 1 twice daily  3) begin azathioprine 1 tablet once daily.  Side effects may include headache, fever, muscle ache, nausea, vomiting, abdominal pain, rash, or joint aches.  If you think you are having side effects, stop the medication and call the office  4) on azathioprine, labs need to be monitored closely.  Please check labs beginning next week on Monday, 4/1/2024 and then weekly for the next month until follow-up  5) we will plan to give you continued infliximab infusions for the time being.  Your next infusion will be sometime between 3 to 4 weeks from now once we get insurance approval  6) follow-up in the office in 4 weeks  7) call the office at [phone] or my nurse, Bell Sanders RN at [phone] with any questions or concerns

## 2024-04-01 ENCOUNTER — LAB (OUTPATIENT)
Dept: LAB | Facility: LAB | Age: 24
End: 2024-04-01
Payer: COMMERCIAL

## 2024-04-01 ENCOUNTER — OFFICE VISIT (OUTPATIENT)
Dept: SURGERY | Facility: CLINIC | Age: 24
End: 2024-04-01
Payer: COMMERCIAL

## 2024-04-01 VITALS
SYSTOLIC BLOOD PRESSURE: 116 MMHG | BODY MASS INDEX: 16.83 KG/M2 | WEIGHT: 95 LBS | HEART RATE: 120 BPM | HEIGHT: 63 IN | DIASTOLIC BLOOD PRESSURE: 80 MMHG

## 2024-04-01 DIAGNOSIS — R10.9 ABDOMINAL PAIN, UNSPECIFIED ABDOMINAL LOCATION: ICD-10-CM

## 2024-04-01 DIAGNOSIS — R10.30 LOWER ABDOMINAL PAIN: ICD-10-CM

## 2024-04-01 DIAGNOSIS — K50.10 CROHN'S DISEASE OF COLON WITHOUT COMPLICATION (MULTI): ICD-10-CM

## 2024-04-01 DIAGNOSIS — K50.111 CROHN'S COLITIS, WITH RECTAL BLEEDING (MULTI): ICD-10-CM

## 2024-04-01 DIAGNOSIS — R19.7 DIARRHEA, UNSPECIFIED TYPE: ICD-10-CM

## 2024-04-01 LAB
ALBUMIN SERPL BCP-MCNC: 3.2 G/DL (ref 3.4–5)
ALP SERPL-CCNC: 80 U/L (ref 33–110)
ALT SERPL W P-5'-P-CCNC: 17 U/L (ref 7–45)
ANION GAP SERPL CALC-SCNC: 14 MMOL/L (ref 10–20)
AST SERPL W P-5'-P-CCNC: 14 U/L (ref 9–39)
BASOPHILS # BLD AUTO: 0.03 X10*3/UL (ref 0–0.1)
BASOPHILS NFR BLD AUTO: 0.2 %
BILIRUB DIRECT SERPL-MCNC: 0 MG/DL (ref 0–0.3)
BILIRUB SERPL-MCNC: 0.2 MG/DL (ref 0–1.2)
BUN SERPL-MCNC: 12 MG/DL (ref 6–23)
CALCIUM SERPL-MCNC: 9.2 MG/DL (ref 8.6–10.6)
CHLORIDE SERPL-SCNC: 100 MMOL/L (ref 98–107)
CO2 SERPL-SCNC: 29 MMOL/L (ref 21–32)
CREAT SERPL-MCNC: 0.48 MG/DL (ref 0.5–1.05)
CRP SERPL-MCNC: 0.14 MG/DL
EGFRCR SERPLBLD CKD-EPI 2021: >90 ML/MIN/1.73M*2
EOSINOPHIL # BLD AUTO: 0.09 X10*3/UL (ref 0–0.7)
EOSINOPHIL NFR BLD AUTO: 0.6 %
ERYTHROCYTE [DISTWIDTH] IN BLOOD BY AUTOMATED COUNT: 14.6 % (ref 11.5–14.5)
GLUCOSE SERPL-MCNC: 79 MG/DL (ref 74–99)
HCT VFR BLD AUTO: 31.5 % (ref 36–46)
HGB BLD-MCNC: 9.4 G/DL (ref 12–16)
IMM GRANULOCYTES # BLD AUTO: 0.29 X10*3/UL (ref 0–0.7)
IMM GRANULOCYTES NFR BLD AUTO: 1.8 % (ref 0–0.9)
LYMPHOCYTES # BLD AUTO: 2.02 X10*3/UL (ref 1.2–4.8)
LYMPHOCYTES NFR BLD AUTO: 12.5 %
MCH RBC QN AUTO: 27 PG (ref 26–34)
MCHC RBC AUTO-ENTMCNC: 29.8 G/DL (ref 32–36)
MCV RBC AUTO: 91 FL (ref 80–100)
MONOCYTES # BLD AUTO: 1.19 X10*3/UL (ref 0.1–1)
MONOCYTES NFR BLD AUTO: 7.4 %
NEUTROPHILS # BLD AUTO: 12.5 X10*3/UL (ref 1.2–7.7)
NEUTROPHILS NFR BLD AUTO: 77.5 %
NRBC BLD-RTO: 0 /100 WBCS (ref 0–0)
PLATELET # BLD AUTO: 449 X10*3/UL (ref 150–450)
POTASSIUM SERPL-SCNC: 3.7 MMOL/L (ref 3.5–5.3)
PROT SERPL-MCNC: 6.1 G/DL (ref 6.4–8.2)
RBC # BLD AUTO: 3.48 X10*6/UL (ref 4–5.2)
SODIUM SERPL-SCNC: 139 MMOL/L (ref 136–145)
WBC # BLD AUTO: 16.1 X10*3/UL (ref 4.4–11.3)

## 2024-04-01 PROCEDURE — 85025 COMPLETE CBC W/AUTO DIFF WBC: CPT

## 2024-04-01 PROCEDURE — 86140 C-REACTIVE PROTEIN: CPT

## 2024-04-01 PROCEDURE — 80230 DRUG ASSAY INFLIXIMAB: CPT

## 2024-04-01 PROCEDURE — 99024 POSTOP FOLLOW-UP VISIT: CPT | Performed by: SURGERY

## 2024-04-01 PROCEDURE — 80053 COMPREHEN METABOLIC PANEL: CPT

## 2024-04-01 PROCEDURE — 83993 ASSAY FOR CALPROTECTIN FECAL: CPT

## 2024-04-01 PROCEDURE — 36415 COLL VENOUS BLD VENIPUNCTURE: CPT

## 2024-04-01 PROCEDURE — 82248 BILIRUBIN DIRECT: CPT

## 2024-04-02 ENCOUNTER — HOME CARE VISIT (OUTPATIENT)
Dept: HOME HEALTH SERVICES | Facility: HOME HEALTH | Age: 24
End: 2024-04-02
Payer: COMMERCIAL

## 2024-04-02 LAB — CALPROTECTIN STL-MCNT: 98 UG/G

## 2024-04-02 PROCEDURE — G0300 HHS/HOSPICE OF LPN EA 15 MIN: HCPCS

## 2024-04-02 ASSESSMENT — ENCOUNTER SYMPTOMS
HIGHEST PAIN SEVERITY IN PAST 24 HOURS: 0/10
CHANGE IN APPETITE: UNCHANGED
LAST BOWEL MOVEMENT: 66932
STOOL FREQUENCY: DAILY
PAIN SEVERITY GOAL: 0/10
DENIES PAIN: 1
LOWEST PAIN SEVERITY IN PAST 24 HOURS: 0/10
APPETITE LEVEL: GOOD
PERSON REPORTING PAIN: PATIENT
SUBJECTIVE PAIN PROGRESSION: UNCHANGED

## 2024-04-02 NOTE — HOME HEALTH
Labs were drawn at lab yesterday. No need to draw today per patient.  Pt is independent with ostomy.  Supplies received.

## 2024-04-03 LAB
INFLIXIMAB AB SERPL IA-MCNC: <20 NG/ML
INFLIXIMAB SERPL IA-MCNC: 1.9 UG/ML

## 2024-04-05 ENCOUNTER — DOCUMENTATION (OUTPATIENT)
Dept: INFUSION THERAPY | Facility: CLINIC | Age: 24
End: 2024-04-05
Payer: COMMERCIAL

## 2024-04-05 NOTE — PROGRESS NOTES
Patient to be scheduled for Continuation of Infliximab infusions. INFLECTRA  Diagnosis: Crohns   Dosing is weight based at: 10 mg/kg  Dosing weight of: 43.1 kg  For a total dose of: 400 mg at weeks 0, 2 & 6 (induction) then every 8 weeks thereafter (maintenance)  Note: Induction dose week 0 and week 2 given in hospital already but at 500mg dose each    Labs…  TB drawn/results:   Lab Results   Component Value Date    TBSIN Negative 12/18/2023      Hep B SAg drawn/results:   Lab Results   Component Value Date    HEPBSAG Nonreactive 12/18/2023      Hep B Core antibody:   Lab Results   Component Value Date    HEPBCAB Nonreactive 12/18/2023     Lab Results   Component Value Date    HEPBCAB Nonreactive 12/18/2023      CBC drawn: (if available)   Lab Results   Component Value Date    WBC 16.1 (H) 04/01/2024    HGB 9.4 (L) 04/01/2024    HCT 31.5 (L) 04/01/2024    MCV 91 04/01/2024     04/01/2024      CMP drawn: (if available)     Chemistry    Lab Results   Component Value Date/Time     04/01/2024 0951    K 3.7 04/01/2024 0951     04/01/2024 0951    CO2 29 04/01/2024 0951    BUN 12 04/01/2024 0951    CREATININE 0.48 (L) 04/01/2024 0951    Lab Results   Component Value Date/Time    CALCIUM 9.2 04/01/2024 0951    ALKPHOS 80 04/01/2024 0951    AST 14 04/01/2024 0951    ALT 17 04/01/2024 0951    BILITOT 0.2 04/01/2024 0951           CRP drawn: (If available)   Lab Results   Component Value Date    CRP 0.14 04/01/2024        Does the patient have a history of demyelinating disease such as multiple sclerosis, optic neuritis, transverse myelitis, Guillain-Keota syndrome , chronic inflammatory demyelinating polyneuropathy (CIDP)? No    Does the patient have a history of heart failure? No      Last infusion received: 3/11/24 - week 2 induction dose (if continuation)   Due: 4/8/24 - week 6 induction dose    Induction and Maintenance Therapy Plans entered if needed? Yes (if no prescribing provider notified of need  to enter)    This result meets treatment criteria.     LUZ Ortega-CNP  Certified Nurse Practitioner    [facility])  [address] [place] [address], [place] [address]  Phone: [phone]  Fax: [phone]  Email: [email or web address]

## 2024-04-08 ENCOUNTER — APPOINTMENT (OUTPATIENT)
Dept: INFUSION THERAPY | Facility: CLINIC | Age: 24
End: 2024-04-08
Payer: COMMERCIAL

## 2024-04-08 DIAGNOSIS — K50.111 CROHN'S COLITIS, WITH RECTAL BLEEDING (MULTI): Primary | ICD-10-CM

## 2024-04-08 ASSESSMENT — ENCOUNTER SYMPTOMS
ABDOMINAL PAIN: 1
BLOOD IN STOOL: 0
CONSTIPATION: 0
VOMITING: 0
RECTAL PAIN: 0
FATIGUE: 1
DIARRHEA: 0
ABDOMINAL DISTENTION: 0
NAUSEA: 0
ANAL BLEEDING: 0

## 2024-04-09 ENCOUNTER — HOME CARE VISIT (OUTPATIENT)
Dept: HOME HEALTH SERVICES | Facility: HOME HEALTH | Age: 24
End: 2024-04-09
Payer: COMMERCIAL

## 2024-04-09 ENCOUNTER — INFUSION (OUTPATIENT)
Dept: INFUSION THERAPY | Facility: CLINIC | Age: 24
End: 2024-04-09
Payer: COMMERCIAL

## 2024-04-09 ENCOUNTER — LAB (OUTPATIENT)
Dept: LAB | Facility: LAB | Age: 24
End: 2024-04-09
Payer: COMMERCIAL

## 2024-04-09 VITALS
WEIGHT: 101.52 LBS | SYSTOLIC BLOOD PRESSURE: 114 MMHG | RESPIRATION RATE: 16 BRPM | DIASTOLIC BLOOD PRESSURE: 71 MMHG | OXYGEN SATURATION: 98 % | HEART RATE: 99 BPM | TEMPERATURE: 97.7 F | BODY MASS INDEX: 17.98 KG/M2

## 2024-04-09 DIAGNOSIS — K50.111 CROHN'S COLITIS, WITH RECTAL BLEEDING (MULTI): ICD-10-CM

## 2024-04-09 DIAGNOSIS — D50.0 IRON DEFICIENCY ANEMIA DUE TO CHRONIC BLOOD LOSS: Primary | ICD-10-CM

## 2024-04-09 LAB
ALBUMIN SERPL BCP-MCNC: 3.2 G/DL (ref 3.4–5)
ALP SERPL-CCNC: 56 U/L (ref 33–110)
ALT SERPL W P-5'-P-CCNC: 16 U/L (ref 7–45)
AST SERPL W P-5'-P-CCNC: 11 U/L (ref 9–39)
BASOPHILS # BLD AUTO: 0.02 X10*3/UL (ref 0–0.1)
BASOPHILS NFR BLD AUTO: 0.2 %
BILIRUB DIRECT SERPL-MCNC: 0.1 MG/DL (ref 0–0.3)
BILIRUB SERPL-MCNC: 0.2 MG/DL (ref 0–1.2)
EOSINOPHIL # BLD AUTO: 0.05 X10*3/UL (ref 0–0.7)
EOSINOPHIL NFR BLD AUTO: 0.6 %
ERYTHROCYTE [DISTWIDTH] IN BLOOD BY AUTOMATED COUNT: 16.5 % (ref 11.5–14.5)
HCT VFR BLD AUTO: 29 % (ref 36–46)
HGB BLD-MCNC: 8.4 G/DL (ref 12–16)
IMM GRANULOCYTES # BLD AUTO: 0.09 X10*3/UL (ref 0–0.7)
IMM GRANULOCYTES NFR BLD AUTO: 1 % (ref 0–0.9)
LYMPHOCYTES # BLD AUTO: 1.02 X10*3/UL (ref 1.2–4.8)
LYMPHOCYTES NFR BLD AUTO: 11.3 %
MCH RBC QN AUTO: 27.1 PG (ref 26–34)
MCHC RBC AUTO-ENTMCNC: 29 G/DL (ref 32–36)
MCV RBC AUTO: 94 FL (ref 80–100)
MONOCYTES # BLD AUTO: 0.57 X10*3/UL (ref 0.1–1)
MONOCYTES NFR BLD AUTO: 6.3 %
NEUTROPHILS # BLD AUTO: 7.26 X10*3/UL (ref 1.2–7.7)
NEUTROPHILS NFR BLD AUTO: 80.6 %
NRBC BLD-RTO: 0 /100 WBCS (ref 0–0)
PLATELET # BLD AUTO: 301 X10*3/UL (ref 150–450)
PROT SERPL-MCNC: 6.1 G/DL (ref 6.4–8.2)
RBC # BLD AUTO: 3.1 X10*6/UL (ref 4–5.2)
WBC # BLD AUTO: 9 X10*3/UL (ref 4.4–11.3)

## 2024-04-09 PROCEDURE — 80076 HEPATIC FUNCTION PANEL: CPT

## 2024-04-09 PROCEDURE — 85025 COMPLETE CBC W/AUTO DIFF WBC: CPT

## 2024-04-09 PROCEDURE — 36415 COLL VENOUS BLD VENIPUNCTURE: CPT

## 2024-04-09 PROCEDURE — 96413 CHEMO IV INFUSION 1 HR: CPT | Performed by: NURSE PRACTITIONER

## 2024-04-09 PROCEDURE — 96415 CHEMO IV INFUSION ADDL HR: CPT | Performed by: NURSE PRACTITIONER

## 2024-04-09 RX ORDER — ACETAMINOPHEN 325 MG/1
650 TABLET ORAL ONCE
Status: CANCELLED | OUTPATIENT
Start: 2024-04-23 | End: 2024-04-23

## 2024-04-09 RX ORDER — ACETAMINOPHEN 325 MG/1
650 TABLET ORAL ONCE
Status: DISCONTINUED | OUTPATIENT
Start: 2024-04-09 | End: 2024-04-09 | Stop reason: HOSPADM

## 2024-04-09 RX ORDER — FAMOTIDINE 10 MG/ML
20 INJECTION INTRAVENOUS ONCE AS NEEDED
OUTPATIENT
Start: 2024-04-23

## 2024-04-09 RX ORDER — ALBUTEROL SULFATE 0.83 MG/ML
3 SOLUTION RESPIRATORY (INHALATION) AS NEEDED
OUTPATIENT
Start: 2024-04-23

## 2024-04-09 RX ORDER — EPINEPHRINE 0.3 MG/.3ML
0.3 INJECTION SUBCUTANEOUS EVERY 5 MIN PRN
OUTPATIENT
Start: 2024-04-23

## 2024-04-09 RX ORDER — DIPHENHYDRAMINE HYDROCHLORIDE 50 MG/ML
50 INJECTION INTRAMUSCULAR; INTRAVENOUS AS NEEDED
OUTPATIENT
Start: 2024-04-23

## 2024-04-09 ASSESSMENT — ENCOUNTER SYMPTOMS
TROUBLE SWALLOWING: 0
FEVER: 0
UNEXPECTED WEIGHT CHANGE: 0
ARTHRALGIAS: 0
LIGHT-HEADEDNESS: 0
MYALGIAS: 0
ABDOMINAL PAIN: 0
DYSURIA: 0
SHORTNESS OF BREATH: 0
CHILLS: 0
APPETITE CHANGE: 0
EXTREMITY WEAKNESS: 0
DIARRHEA: 0
WOUND: 0
FREQUENCY: 0
WHEEZING: 0
LEG SWELLING: 0
BLOOD IN STOOL: 0
NAUSEA: 0
HEMATURIA: 0
DEPRESSION: 0
NUMBNESS: 0
CONSTIPATION: 0
DIZZINESS: 0
SORE THROAT: 0
VOMITING: 0
PALPITATIONS: 0
EYE PROBLEMS: 0
FATIGUE: 0
NERVOUS/ANXIOUS: 0
COUGH: 0
VOICE CHANGE: 0
HEADACHES: 0

## 2024-04-09 NOTE — PATIENT INSTRUCTIONS
Today :We administered inFLIXimab-dyyb (Inflectra) 500 mg in sodium chloride 0.9% 250 mL IV*.     For:   1. Crohn's colitis, with rectal bleeding (CMS/[facility])         Your next appointment is due in:  6 WEEKS         Please read the  Medication Guide that was given to you and reviewed during todays visit.     (Tell all doctors including dentists that you are taking this medication)     Go to the emergency room or call 911 if:  -You have signs of allergic reaction:   -Rash, hives, itching.   -Swollen, blistered, peeling skin.   -Swelling of face, lips, mouth, tongue or throat.   -Tightness of chest, trouble breathing, swallowing or talking     Call your doctor:  - If IV / injection site gets red, warm, swollen, itchy or leaks fluid or pus.     (Leave dressing on your IV site for at least 2 hours and keep area clean and dry  - If you get sick or have symptoms of infection or are not feeling well for any reason.    (Wash your hands often, stay away from people who are sick)  - If you have side effects from your medication that do not go away or are bothersome.     (Refer to the teaching your nurse gave you for side effects to call your doctor about)    - Common side effects may include:  stuffy nose, headache, feeling tired, muscle aches, upset stomach  - Before receiving any vaccines     - Call the [facility] at   If:  - You get sick, are on antibiotics, have had a recent vaccine, have surgery or dental work and your doctor wants your visit rescheduled.  - You need to cancel and reschedule your visit for any reason. Call at least 2 days before your visit if you need to cancel.   - Your insurance changes before your next visit.    (We will need to get approval from your new insurance. This can take up to two weeks.)     The [facility] is opened Monday thru Friday. We are closed on weekends and holidays.   Voice mail will take your call if the center is closed. If you leave a message  please allow 24 hours for a call back during weekdays. If you leave a message on a weekend/holiday, we will call you back the next business day.

## 2024-04-09 NOTE — PROGRESS NOTES
[facility]   Infusion Clinic Note   Date: 2024   Name: Kyra Behnfeldt  : 2000   MRN: [id number]         Reason for Visit: Follow-up and OP Infusion (PT HERE FOR INFLECTRA 600MG WEEK 6/NEXT APPT: 6 WEEKS )         Visit Type: INFUSION       Ordered By: SELINA      Accompanied by:Parent      Diagnosis: Crohn's colitis, with rectal bleeding (CMS/[facility])       Allergies:   Allergies as of 2024    (No Known Allergies)         Current Medications has a current medication list which includes the following prescription(s): acetaminophen, azathioprine, escitalopram, oxycodone, pantoprazole, prednisone, simethicone, zolpidem, alprazolam, infliximab-dyyb, and methocarbamol, and the following Facility-Administered Medications: acetaminophen.       Vitals:   Vitals:    24 1105 24 1130 24 1200 24 1300   BP: 121/73 120/70 118/72 109/71   Pulse: 102  Comment: nurse took vitals 100 93 94   Resp: 16 16 16 16   Temp: 36.8 °C (98.3 °F) 36.4 °C (97.6 °F) 36.1 °C (97 °F) 36.9 °C (98.4 °F)   SpO2: 98% 98% 98% 98%   Weight: 46.1 kg (101 lb 8.4 oz)       24 1330   BP: 114/71   Pulse: 99   Resp: 16   Temp: 36.5 °C (97.7 °F)   SpO2: 98%   Weight:    LMP: 2024             Infusion Pre-procedure Checklist:   - Allergies reviewed: yes   - Medications reviewed: yes       - Previous reaction to current treatment: no      Assess patient for the concerns below. Document provider notification as appropriate.  - Active or recent infection with/without current antibiotic use: no  - Recent or planned invasive dental work: no  - Recent or planned surgeries: no  - Recently received or plans to receive vaccinations: no  - Has treatment related toxicities: no  - Is pregnant:  no      Pain: 0   - Is the pain different from normal: n/a   - Is the pain tolerable: n/a   - Is your Doctor aware:  no, n/a      Labs: N/A         Fall Risk Screening: Cristian Fall Risk  History of Falling,  Immediate or Within 3 Months: No  Secondary Diagnosis: No  Ambulatory Aid: Walks without aid/bedrest/nurse assist  Intravenous Therapy/Heparin Lock: No  Gait/Transferring: Normal/bedrest/immobile  Mental Status: Oriented to own ability  Foster Fall Risk Score: 0       Review Of Systems:  Review of Systems   Constitutional:  Negative for appetite change, chills, fatigue, fever and unexpected weight change.   HENT:   Negative for hearing loss, mouth sores, sore throat, tinnitus, trouble swallowing and voice change.    Eyes:  Negative for eye problems.   Respiratory:  Negative for cough, shortness of breath and wheezing.    Cardiovascular:  Negative for chest pain, leg swelling and palpitations.   Gastrointestinal:  Negative for abdominal pain, blood in stool, constipation, diarrhea, nausea and vomiting.        PT WITH OSTOMY, REPORTS GOOD OUTPUT AND MINIMAL TO NO BLOOD. PT REPORTS MODERATE AMOUNTS OF MUCOUS. ABDOMINAL PAIN HAS SUBSIDED SUBSTANTIALLY.    Genitourinary:  Negative for dysuria, frequency and hematuria.    Musculoskeletal:  Negative for arthralgias and myalgias.   Skin:  Negative for itching, rash and wound.   Neurological:  Negative for dizziness, extremity weakness, headaches, light-headedness and numbness.   Psychiatric/Behavioral:  Negative for depression. The patient is not nervous/anxious.          Infusion Readiness:   - Assessment Concerns Related to Infusion: Yes  - Provider notified: n/a      Document Below Only If Indicated:   New Patient Education:    N/A (returning patient for continuation of therapy. Ongoing education provided as needed.)        Treatment Conditions & Drug Specific Questions:    InFLIXimab  (AVSOLA, INFLECTRA, REMICADE, RENFLEXIS)    (Unless otherwise specified on patient specific therapy plan):     TREATMENT CONDITIONS:  Unless otherwise specified on patient specific therapy plan HOLD and notify Provider prior to proceeding with today's infusion if patient with:  o Positive  "T-Spot  o Reactive Hep B sAg and/or Hep B Core Antibody    Lab Results   Component Value Date    TBSIN Negative 12/18/2023      Lab Results   Component Value Date    HEPBSAG Nonreactive 12/18/2023      No results found for: \"NONUHFIRE\", \"NONUHSWGH\", \"NONUHFISH\", \"EXTHEPBSAG\"  Lab Results   Component Value Date    HEPBCAB Nonreactive 12/18/2023     Lab Results   Component Value Date    HEPBCAB Nonreactive 12/18/2023        Labs reviewed and patient meets treatment conditions? Yes    DRUG SPECIFIC QUESTIONS:    Any new or worsening signs / symptoms of heart failure which may include things like worsening shortness of breath, swelling, fatigue? No   (If yes notify provider before proceeding with today's infusion. New onset or worsening HF have been reported with infliximab)    REMINDER:   WEIGHT BASED DRUG     Recommended Vitals/Observation:  Vitals:     Induction: Obtain vital signs every 30 minutes; at end of observation period and as needed.     Maintenance: Obtain vital signs at start and end of infusions  Observation:     Induction: Patient is monitored for 30 minutes post-infusion     Maintenance: No observation.        Weight Based Drug Calculations:    WEIGHT BASED DRUGS: Infliximab (REMICADE, INFLECTRA, RENFLEXIS)   Patient's dosing weight (kg): 43     10% weight variance for prescribed treatment: 38.7 kg to 47.3 kg     Patient's weight today:   Vitals:    04/09/24 1105   Weight: 46.1 kg (101 lb 8.4 oz)         weight range for prescribed dose:     Patient weight today falls outside of 10% variance or  weight range: No     Home Care pharmacist informed of weight variance: No    Doses that are weight based have an acceptable variance rule within 10% of the prescribed   order and/or within  weight range. If patient weight on day of infusion falls   outside of the 10% variance, or weight range, infusion is administered and   pharmacy contacted regarding future dosing " adjustments, per policy.         Initiated By: Nabil Reyes RN   Time: 3:52 PM     We administered inFLIXimab-dyyb (Inflectra) 500 mg in sodium chloride 0.9% 250 mL IV*.

## 2024-04-11 NOTE — DISCHARGE SUMMARY
Discharge Diagnosis  Crohn's colitis, with rectal bleeding (CMS/HCC)    Issues Requiring Follow-Up  Crohns colitis    Discharge Meds     Your medication list        CHANGE how you take these medications        Instructions Last Dose Given Next Dose Due   predniSONE 10 mg tablet  Commonly known as: Deltasone  Start taking on: February 26, 2024  What changed: See the new instructions.      Take 4 tablets (40 mg) by mouth once daily for 14 days, THEN 3 tablets (30 mg) once daily for 7 days, THEN 2 tablets (20 mg) once daily for 7 days, THEN 1 tablet (10 mg) once daily for 7 days. Take by mouth as directed.              CONTINUE taking these medications        Instructions Last Dose Given Next Dose Due   acetaminophen 325 mg tablet  Commonly known as: Tylenol      Take 2 tablets (650 mg) by mouth every 4 hours if needed for mild pain (1 - 3).       adalimumab 40 mg/0.4 mL pen injector kit pen-injector  Commonly known as: Humira Pen      Inject 1 Pen (40 mg) under the skin every 14 (fourteen) days.       Humira(CF) Pen Crohns-UC-HS 80 mg/0.8 mL pen injector kit pen-injector  Generic drug: adalimumab      adalimumab (Humira) dose instructions: On day 1, inject 160 mg under the skin. On day 15, inject 80 mg under skin. Change sites each time.       cyclobenzaprine 10 mg tablet  Commonly known as: Flexeril      Take 1 tablet (10 mg) by mouth 2 times a day as needed for muscle spasms.       hydrOXYzine HCL 50 mg tablet  Commonly known as: Atarax                  STOP taking these medications      mesalamine 1,000 mg suppository  Commonly known as: Canasa        methocarbamol 500 mg tablet  Commonly known as: Robaxin                  Where to Get Your Medications        These medications were sent to [address] [address]      Phone: [phone]   adalimumab 40 mg/0.4 mL pen injector kit pen-injector       These medications were sent to  [facility]  [address], [facility] [address], [facility] [address]      Hours: 8 AM to 6 PM Mon-Fri, 9 AM to 1 PM Saturday Phone: [phone]   Humira(CF) Pen Crohns-UC-HS 80 mg/0.8 mL pen injector kit pen-injector       These medications were sent to [id number] [place] [facility] [place] - [address]  [address], [place] [address]      Phone: [phone]   predniSONE 10 mg tablet         Test Results Pending At Discharge  Pending Labs       No current pending labs.            Hospital Course   Principal Problem:    Crohn's colitis, with rectal bleeding (CMS/HCC)  Active Problems:    Generalized abdominal pain     23 y.o. female with PMHx of Crohn's colitis presenting with Crohn's flare. She was just diagnosed in December and has had trouble controlling the symptoms since diagnosis, despite oral prednisone.  Started Humira 2/3 (got loading doses 2/3 and 2/17) but was still having 8-10 bloody stools on 40mg prednisone daily.  Admitted for IV steroids and is clinically improving, down to 2 formed blood streaked stools daily with rectal discomfort when moving bowels.     - discharge w/ 40 mg prednisone taper, decreasing by 10 mg every 2 weeks  - it is a little soon to see if Humira will work for her, so would continue this as planned  -- changed diet to regular  - follow up with Dr. Wang  Pt discharged to home with prednisone taper and followup with GI     Pertinent Physical Exam At Time of Discharge  Physical Exam    Outpatient Follow-Up  Future Appointments   Date Time Provider Department Center   4/18/2024  8:40 AM Javy Lozano MD [id number] [facility]   5/21/2024  3:00 PM INF 03 SHAWNEE EmeryAINF [facility]   7/1/2024  2:40 PM Lorena Castro MD [id number] [facility]         Haim Loo MD

## 2024-04-12 ENCOUNTER — HOME CARE VISIT (OUTPATIENT)
Dept: HOME HEALTH SERVICES | Facility: HOME HEALTH | Age: 24
End: 2024-04-12
Payer: COMMERCIAL

## 2024-04-12 ASSESSMENT — ENCOUNTER SYMPTOMS
PERSON REPORTING PAIN: PATIENT
PAIN: 1
PAIN LOCATION - PAIN DURATION: VARIES
PAIN LOCATION - PAIN QUALITY: ACHING
PAIN LOCATION - PAIN SEVERITY: 2/10
PAIN LOCATION - PAIN FREQUENCY: INTERMITTENT
PAIN LOCATION - RELIEVING FACTORS: MEDICATION, REST
PAIN LOCATION: ABDOMEN

## 2024-04-12 ASSESSMENT — ACTIVITIES OF DAILY LIVING (ADL)
OASIS_M1830: 01
HOME_HEALTH_OASIS: 00

## 2024-04-15 ENCOUNTER — APPOINTMENT (OUTPATIENT)
Dept: SURGERY | Facility: CLINIC | Age: 24
End: 2024-04-15
Payer: COMMERCIAL

## 2024-04-17 ENCOUNTER — LAB (OUTPATIENT)
Dept: LAB | Facility: LAB | Age: 24
End: 2024-04-17
Payer: COMMERCIAL

## 2024-04-17 DIAGNOSIS — K50.111 CROHN'S COLITIS, WITH RECTAL BLEEDING (MULTI): ICD-10-CM

## 2024-04-17 DIAGNOSIS — D50.0 IRON DEFICIENCY ANEMIA DUE TO CHRONIC BLOOD LOSS: ICD-10-CM

## 2024-04-17 LAB
ALBUMIN SERPL BCP-MCNC: 3.8 G/DL (ref 3.4–5)
ALP SERPL-CCNC: 60 U/L (ref 33–110)
ALT SERPL W P-5'-P-CCNC: 16 U/L (ref 7–45)
AST SERPL W P-5'-P-CCNC: 10 U/L (ref 9–39)
BASOPHILS # BLD AUTO: 0.04 X10*3/UL (ref 0–0.1)
BASOPHILS NFR BLD AUTO: 0.3 %
BILIRUB DIRECT SERPL-MCNC: 0 MG/DL (ref 0–0.3)
BILIRUB SERPL-MCNC: 0.2 MG/DL (ref 0–1.2)
EOSINOPHIL # BLD AUTO: 0.02 X10*3/UL (ref 0–0.7)
EOSINOPHIL NFR BLD AUTO: 0.1 %
ERYTHROCYTE [DISTWIDTH] IN BLOOD BY AUTOMATED COUNT: 16.9 % (ref 11.5–14.5)
FERRITIN SERPL-MCNC: 73 NG/ML (ref 8–150)
HCT VFR BLD AUTO: 37.1 % (ref 36–46)
HGB BLD-MCNC: 10.7 G/DL (ref 12–16)
IMM GRANULOCYTES # BLD AUTO: 0.15 X10*3/UL (ref 0–0.7)
IMM GRANULOCYTES NFR BLD AUTO: 1 % (ref 0–0.9)
IRON SATN MFR SERPL: 35 % (ref 25–45)
IRON SERPL-MCNC: 122 UG/DL (ref 35–150)
LYMPHOCYTES # BLD AUTO: 1.14 X10*3/UL (ref 1.2–4.8)
LYMPHOCYTES NFR BLD AUTO: 8 %
MCH RBC QN AUTO: 28 PG (ref 26–34)
MCHC RBC AUTO-ENTMCNC: 28.8 G/DL (ref 32–36)
MCV RBC AUTO: 97 FL (ref 80–100)
MONOCYTES # BLD AUTO: 0.34 X10*3/UL (ref 0.1–1)
MONOCYTES NFR BLD AUTO: 2.4 %
NEUTROPHILS # BLD AUTO: 12.62 X10*3/UL (ref 1.2–7.7)
NEUTROPHILS NFR BLD AUTO: 88.2 %
NRBC BLD-RTO: 0 /100 WBCS (ref 0–0)
PLATELET # BLD AUTO: 347 X10*3/UL (ref 150–450)
PROT SERPL-MCNC: 6.1 G/DL (ref 6.4–8.2)
RBC # BLD AUTO: 3.82 X10*6/UL (ref 4–5.2)
TIBC SERPL-MCNC: 351 UG/DL (ref 240–445)
UIBC SERPL-MCNC: 229 UG/DL (ref 110–370)
WBC # BLD AUTO: 14.3 X10*3/UL (ref 4.4–11.3)

## 2024-04-17 PROCEDURE — 83540 ASSAY OF IRON: CPT

## 2024-04-17 PROCEDURE — 85025 COMPLETE CBC W/AUTO DIFF WBC: CPT

## 2024-04-17 PROCEDURE — 36415 COLL VENOUS BLD VENIPUNCTURE: CPT

## 2024-04-17 PROCEDURE — 82728 ASSAY OF FERRITIN: CPT

## 2024-04-17 PROCEDURE — 83550 IRON BINDING TEST: CPT

## 2024-04-17 PROCEDURE — 80076 HEPATIC FUNCTION PANEL: CPT

## 2024-04-17 NOTE — PROGRESS NOTES
REASON FOR VISIT:  IBD Colitis-?Crohn's disease    HPI:  Kyra Behnfeldt is a 23 y.o. female who presents for follow-up of Crohn's (?) colitis (Dx 12/2023).  Symptoms started spring 2023.  Colonoscopy with Dr. Mane Wang which showed colitis in distal rectum and proximal transverse colon.  Her disease rapidly progressed and she required steroid and two hospitalization.  She received a total of 3 doses of Humira (2/3, 2/17 and 3/2/2024).  Her symptoms continued to worsen.  Inpatient flex sig 3/4/24 at [facility] showed severe colitis from rectum to transverse colon, small hemorrhoids; CMV neg.  Patient wanted to avoid surgery if possible and decision was to proceed with rescue Remicade.  She was transferred to [facility] on 3/7/24 for Remicade infusion and she received 10mg/kg on 3/7/24.  Patient felt a slight improvement of symptoms after initial Remicade but her CRP continued to rise, therefore, she underwent diverting loop ileostomy on 3/10/24 with Dr. Castro. Received 2nd dose of Remicade 10mg/kg on 3/11/24.  She was discharged home 3/18/24 and required 1 unit RBC transfusion on 3/27/2024 in [facility] ED.       Seen one month ago and reported maroon-colored bloody clots out of her rectum 2-3 times daily and some lower abdominal pain only in the morning, improved since discharge.  Her CRP on 3/26/2024 was normal 0.61 TPMT intermediate metabolizer.  Plan was to continue with infliximab 10 mg/kg, add azathioprine, taper prednisone, take oral iron and see how she did.    In follow-up today, received third infliximab dose 4/9/2024.  Anser IFX 4/1/24 just 1.9 9Approximately 21 day level.    In follow-up today, Ms. Behnfeldt reports significant improvement in her symptoms and overall wellbeing, particularly after receiving her third infliximab dose 4/9/2024. Patient has ileostomy in place, which she is emptying 8 to 10 times daily. There is no longer any jovita blood in her output and reports good consistency with minimal  mucus. She is occasionally having a small amount of output from the rectum which occurs in the mornings. Denies any eye pain, redness or issues with vision. No new rash or skin changes. Does not have any new joint pain or swelling. Her oral ulcers have now resolved. She reports increased energy and is able to exercise/engage in physical therapy on a daily basis. Her appetite has improved significantly and reports gaining 14-16 lbs in the past few weeks.     She is still on her prednisone taper, currently at 20mg daily which she reduces by 5mg every week. Tolerating well, no apparent side effects. She is taking azathioprine one pill daily, tolerating it well and recent labs are up to date and within normal limits. Her infliximab infusions have been going well, no reported infusion reactions and no pain at the site of infusion.       REVIEW OF SYSTEMS    No Known Allergies    Past Medical History:   Diagnosis Date    Benign neoplasm of cecum     CC (Crohn's colitis) (Multi)     Colitis     Generalized abdominal pain     Hypokalemia     Internal hemorrhoid     Rectal bleeding        Past Surgical History:   Procedure Laterality Date    COLONOSCOPY      ILEOSTOMY         Current Outpatient Medications   Medication Sig Dispense Refill    acetaminophen (Tylenol) 325 mg tablet Take 2 tablets (650 mg) by mouth every 4 hours if needed for mild pain (1 - 3). 30 tablet 1    ALPRAZolam (Xanax) 0.5 mg tablet Take 0.5 tablets (0.25 mg) by mouth 2 times a day as needed for anxiety for up to 7 days. 14 tablet 0    azaTHIOprine (Imuran) 50 mg tablet Take 1 tablet (50 mg) by mouth once daily. 30 tablet 11    escitalopram (Lexapro) 5 mg tablet Take 1 tablet (5 mg) by mouth once daily.      inFLIXimab-dyyb (Inflectra) 100 mg injection Infuse 500 mg into a venous catheter 1 time for 1 dose.  For Induction:  0, 2 and 6 weeks  dose is to be 500 mg. 5 each 0    methocarbamol (Robaxin) 500 mg tablet Take 1 tablet (500 mg) by mouth every 6  hours for 8 days. 32 tablet 0    oxyCODONE (Roxicodone) 5 mg immediate release tablet Take 1 tablet (5 mg) by mouth every 6 hours if needed for moderate pain (4 - 6). 26 tablet 0    pantoprazole (ProtoNix) 40 mg EC tablet Take 1 tablet (40 mg) by mouth once daily. Do not crush, chew, or split. 30 tablet 1    predniSONE (Deltasone) 5 mg tablet Take 8 tablets (40 mg) by mouth once daily for 7 days, THEN 7 tablets (35 mg) once daily for 7 days, THEN 6 tablets (30 mg) once daily for 7 days, THEN 5 tablets (25 mg) once daily for 7 days, THEN 4 tablets (20 mg) once daily for 7 days, THEN 3 tablets (15 mg) once daily for 7 days, THEN 2 tablets (10 mg) once daily for 7 days, THEN 1 tablet (5 mg) once daily for 7 days. Take 5 tabs (25 mg) by mouth daily for 2 days, then 4 tabs (20 mg) daily for 2 days, then 3 tabs (15 mg) daily for 2 days, then 2 tabs (10 mg) daily for 2 days, then 1 tab (5 mg) daily for 2 days.. 252 tablet 0    simethicone (Mylicon) 80 mg chewable tablet Chew 0.5 tablets (40 mg) 4 times a day as needed for flatulence (gas pain). 30 tablet 0    zolpidem (Ambien) 5 mg tablet Take 1 tablet (5 mg) by mouth as needed at bedtime for sleep. 30 tablet 0     No current facility-administered medications for this visit.       PHYSICAL EXAM:  LMP 02/26/2024 (Approximate)      Lab Results   Component Value Date    WBC 9.0 04/09/2024    HGB 8.4 (L) 04/09/2024    HCT 29.0 (L) 04/09/2024    MCV 94 04/09/2024     04/09/2024     Lab Results   Component Value Date    ALT 16 04/09/2024    AST 11 04/09/2024    ALKPHOS 56 04/09/2024    BILITOT 0.2 04/09/2024       === 03/07/24 ===    CT ABDOMEN PELVIS W IV CONTRAST    - Impression -  1. Dilated small bowel loops are present to the ostomy with mild wall  thickening and mucosal enhancement at the ostomy, suggestive of  inflammation. No discrete fluid collection or fistula is evident.  2. Mild interval increase in segments of large bowel wall  thickening/enhancement.  3.  Moderate amount of simple fluid density ascites layering within  the pelvis.    I personally reviewed the images/study and I agree with the findings  as stated by Dr. Jose Naik. This study was interpreted at  [facility], [place], [place].    MACRO:  None    Signed by: Kane Sanford 3/16/2024 12:40 PM  Dictation workstation:   [id number]      ASSESSMENT  #IBD colitis: Most likely Crohn's colitis though still could be rapidly progressive UC.  Her overall significant improvement with diversion and infliximab argues that this is, indeed, Crohn's disease.    Now s/p third infliximab dose 4/9/2024. From a clinical standpoint, her symptoms have significantly improved after her last infusion and she is feeling much closer to her baseline. Her ileostomy output is appropriate with regards to frequency and consistency. CRP on 3/26/2024 was normal 0.61 and her fecal calprotectin from the ileostomy was 98.  Infliximab level was quite low, but she is feeling better on therapy in combination with azathioprine, which she is tolerating without difficulty.    Overall she is making good progress.  Weight is going up.  We will taper her off of prednisone and continue current treatment.  Will monitor labs on therapy.  Plan for colonoscopy towards the end of the summer or early fall and if things look good and therapy is optimized, we will talk about closing her loop ileostomy sometime in the 6-8 months.    PLAN  1) taper off of prednisone as planned  2) lower iron to 1 tablet daily  3)  continue infliximab every 6 weeks  4) continue azathioprine 1 tablet daily  5) monitor labs as planned  6) follow-up in the office in mid July  7) call the office with any change in symptoms, questions, or concerns  8) schedule colonoscopy for sometime in September or October

## 2024-04-18 ENCOUNTER — OFFICE VISIT (OUTPATIENT)
Dept: GASTROENTEROLOGY | Facility: CLINIC | Age: 24
End: 2024-04-18
Payer: COMMERCIAL

## 2024-04-18 VITALS
BODY MASS INDEX: 18.54 KG/M2 | WEIGHT: 104.6 LBS | DIASTOLIC BLOOD PRESSURE: 68 MMHG | HEIGHT: 63 IN | HEART RATE: 98 BPM | TEMPERATURE: 98.2 F | SYSTOLIC BLOOD PRESSURE: 109 MMHG

## 2024-04-18 DIAGNOSIS — K50.10 CROHN'S DISEASE OF COLON WITHOUT COMPLICATION (MULTI): Primary | ICD-10-CM

## 2024-04-18 PROCEDURE — 99213 OFFICE O/P EST LOW 20 MIN: CPT | Performed by: INTERNAL MEDICINE

## 2024-04-18 RX ORDER — HYDROXYZINE HYDROCHLORIDE 50 MG/1
50 TABLET, FILM COATED ORAL NIGHTLY
COMMUNITY
Start: 2024-04-10

## 2024-04-18 ASSESSMENT — PAIN SCALES - GENERAL: PAINLEVEL: 0-NO PAIN

## 2024-04-18 NOTE — PATIENT INSTRUCTIONS
It is great that you are continuing to improve since her hospitalization.  We will continue current therapy, get you off prednisone, and let things continue to heal.  We will then make sure that treatment is optimized, repeat your colonoscopy, and if things look good start talking about closing your loop ileostomy.  As we discussed today:    1) taper off of prednisone as planned  2) lower iron to 1 tablet daily  3) continue infliximab every 6 weeks  4) continue azathioprine 1 tablet daily  5) monitor labs as planned  6) follow-up in the office in mid July  7) call the office with any change in symptoms, questions, or concerns  8) schedule colonoscopy for sometime in September or October

## 2024-04-22 ENCOUNTER — APPOINTMENT (OUTPATIENT)
Dept: SURGERY | Facility: CLINIC | Age: 24
End: 2024-04-22
Payer: COMMERCIAL

## 2024-04-23 ENCOUNTER — LAB (OUTPATIENT)
Dept: LAB | Facility: LAB | Age: 24
End: 2024-04-23
Payer: COMMERCIAL

## 2024-04-23 DIAGNOSIS — Z11.4 ENCOUNTER FOR SCREENING FOR HUMAN IMMUNODEFICIENCY VIRUS (HIV): ICD-10-CM

## 2024-04-23 DIAGNOSIS — K50.111 CROHN'S COLITIS, WITH RECTAL BLEEDING (MULTI): ICD-10-CM

## 2024-04-23 DIAGNOSIS — Z11.3 ENCOUNTER FOR SCREENING FOR INFECTIONS WITH A PREDOMINANTLY SEXUAL MODE OF TRANSMISSION: Primary | ICD-10-CM

## 2024-04-23 DIAGNOSIS — Z72.51 HIGH RISK HETEROSEXUAL BEHAVIOR: ICD-10-CM

## 2024-04-23 LAB
ALBUMIN SERPL BCP-MCNC: 3.8 G/DL (ref 3.4–5)
ALP SERPL-CCNC: 54 U/L (ref 33–110)
ALT SERPL W P-5'-P-CCNC: 14 U/L (ref 7–45)
AST SERPL W P-5'-P-CCNC: 14 U/L (ref 9–39)
BASOPHILS # BLD AUTO: 0.03 X10*3/UL (ref 0–0.1)
BASOPHILS NFR BLD AUTO: 0.3 %
BILIRUB DIRECT SERPL-MCNC: 0 MG/DL (ref 0–0.3)
BILIRUB SERPL-MCNC: 0.2 MG/DL (ref 0–1.2)
EOSINOPHIL # BLD AUTO: 0.06 X10*3/UL (ref 0–0.7)
EOSINOPHIL NFR BLD AUTO: 0.7 %
ERYTHROCYTE [DISTWIDTH] IN BLOOD BY AUTOMATED COUNT: 16 % (ref 11.5–14.5)
HBV SURFACE AG SERPL QL IA: NONREACTIVE
HCT VFR BLD AUTO: 36.7 % (ref 36–46)
HCV AB SER QL: NONREACTIVE
HGB BLD-MCNC: 11.2 G/DL (ref 12–16)
HIV 1+2 AB+HIV1 P24 AG SERPL QL IA: NONREACTIVE
IMM GRANULOCYTES # BLD AUTO: 0.06 X10*3/UL (ref 0–0.7)
IMM GRANULOCYTES NFR BLD AUTO: 0.7 % (ref 0–0.9)
LYMPHOCYTES # BLD AUTO: 0.82 X10*3/UL (ref 1.2–4.8)
LYMPHOCYTES NFR BLD AUTO: 9 %
MCH RBC QN AUTO: 28.9 PG (ref 26–34)
MCHC RBC AUTO-ENTMCNC: 30.5 G/DL (ref 32–36)
MCV RBC AUTO: 95 FL (ref 80–100)
MONOCYTES # BLD AUTO: 0.23 X10*3/UL (ref 0.1–1)
MONOCYTES NFR BLD AUTO: 2.5 %
NEUTROPHILS # BLD AUTO: 7.94 X10*3/UL (ref 1.2–7.7)
NEUTROPHILS NFR BLD AUTO: 86.8 %
NRBC BLD-RTO: 0 /100 WBCS (ref 0–0)
PLATELET # BLD AUTO: 256 X10*3/UL (ref 150–450)
PROT SERPL-MCNC: 5.9 G/DL (ref 6.4–8.2)
RBC # BLD AUTO: 3.87 X10*6/UL (ref 4–5.2)
WBC # BLD AUTO: 9.1 X10*3/UL (ref 4.4–11.3)

## 2024-04-23 PROCEDURE — 85025 COMPLETE CBC W/AUTO DIFF WBC: CPT

## 2024-04-23 PROCEDURE — 86803 HEPATITIS C AB TEST: CPT

## 2024-04-23 PROCEDURE — 87340 HEPATITIS B SURFACE AG IA: CPT

## 2024-04-23 PROCEDURE — 87389 HIV-1 AG W/HIV-1&-2 AB AG IA: CPT

## 2024-04-23 PROCEDURE — 36415 COLL VENOUS BLD VENIPUNCTURE: CPT

## 2024-04-23 PROCEDURE — 80076 HEPATIC FUNCTION PANEL: CPT

## 2024-04-25 ENCOUNTER — APPOINTMENT (OUTPATIENT)
Dept: INFUSION THERAPY | Facility: CLINIC | Age: 24
End: 2024-04-25
Payer: COMMERCIAL

## 2024-04-29 LAB — SCAN RESULT: NORMAL

## 2024-04-30 ENCOUNTER — APPOINTMENT (OUTPATIENT)
Dept: PRIMARY CARE | Facility: CLINIC | Age: 24
End: 2024-04-30
Payer: COMMERCIAL

## 2024-05-01 VITALS
TEMPERATURE: 97.9 F | SYSTOLIC BLOOD PRESSURE: 111 MMHG | BODY MASS INDEX: 18.44 KG/M2 | HEIGHT: 63 IN | OXYGEN SATURATION: 98 % | DIASTOLIC BLOOD PRESSURE: 43 MMHG | WEIGHT: 104.06 LBS | HEART RATE: 68 BPM | RESPIRATION RATE: 18 BRPM

## 2024-05-13 DIAGNOSIS — K50.111 CROHN'S COLITIS, WITH RECTAL BLEEDING (MULTI): Primary | ICD-10-CM

## 2024-05-13 RX ORDER — ACETAMINOPHEN 325 MG/1
650 TABLET ORAL ONCE
Status: CANCELLED | OUTPATIENT
Start: 2024-05-20 | End: 2024-05-20

## 2024-05-14 DIAGNOSIS — K50.111 CROHN'S COLITIS, WITH RECTAL BLEEDING (MULTI): Primary | ICD-10-CM

## 2024-05-14 DIAGNOSIS — K50.111 CROHN'S COLITIS, WITH RECTAL BLEEDING (MULTI): ICD-10-CM

## 2024-05-14 NOTE — PROGRESS NOTES
Spoke with patient today.  She is having some increased blood per rectum as well as mild cramping now 5 weeks from her last infusion.  She is due for her next infliximab (first maintenance infusion at 10 mg/kg every 6 weeks) in approximately 1 week.  She will monitor symptoms and let us know if she does not improve after her next infliximab infusion.    We will have her check labs ahead of her second maintenance infusion on 7/2/2024.  She will follow-up with me in the office about 1 week later

## 2024-05-21 ENCOUNTER — INFUSION (OUTPATIENT)
Dept: INFUSION THERAPY | Facility: CLINIC | Age: 24
End: 2024-05-21
Payer: COMMERCIAL

## 2024-05-21 VITALS
TEMPERATURE: 98.1 F | BODY MASS INDEX: 20.76 KG/M2 | WEIGHT: 117.17 LBS | RESPIRATION RATE: 16 BRPM | OXYGEN SATURATION: 98 % | SYSTOLIC BLOOD PRESSURE: 110 MMHG | HEART RATE: 89 BPM | DIASTOLIC BLOOD PRESSURE: 74 MMHG

## 2024-05-21 DIAGNOSIS — K50.111 CROHN'S COLITIS, WITH RECTAL BLEEDING (MULTI): ICD-10-CM

## 2024-05-21 PROCEDURE — 96415 CHEMO IV INFUSION ADDL HR: CPT | Performed by: NURSE PRACTITIONER

## 2024-05-21 PROCEDURE — 96413 CHEMO IV INFUSION 1 HR: CPT | Performed by: NURSE PRACTITIONER

## 2024-05-21 RX ORDER — DIPHENHYDRAMINE HYDROCHLORIDE 50 MG/ML
50 INJECTION INTRAMUSCULAR; INTRAVENOUS AS NEEDED
OUTPATIENT
Start: 2024-07-02

## 2024-05-21 RX ORDER — EPINEPHRINE 0.3 MG/.3ML
0.3 INJECTION SUBCUTANEOUS EVERY 5 MIN PRN
OUTPATIENT
Start: 2024-07-02

## 2024-05-21 RX ORDER — ACETAMINOPHEN 325 MG/1
650 TABLET ORAL ONCE
Status: COMPLETED | OUTPATIENT
Start: 2024-05-21 | End: 2024-05-21

## 2024-05-21 RX ORDER — ALBUTEROL SULFATE 0.83 MG/ML
3 SOLUTION RESPIRATORY (INHALATION) AS NEEDED
OUTPATIENT
Start: 2024-07-02

## 2024-05-21 RX ORDER — ACETAMINOPHEN 325 MG/1
650 TABLET ORAL ONCE
OUTPATIENT
Start: 2024-07-02 | End: 2024-07-02

## 2024-05-21 RX ORDER — FAMOTIDINE 10 MG/ML
20 INJECTION INTRAVENOUS ONCE AS NEEDED
OUTPATIENT
Start: 2024-07-02

## 2024-05-21 RX ADMIN — ACETAMINOPHEN 650 MG: 325 TABLET ORAL at 15:40

## 2024-05-21 ASSESSMENT — ENCOUNTER SYMPTOMS
PALPITATIONS: 0
VOMITING: 0
EXTREMITY WEAKNESS: 0
BLOOD IN STOOL: 1
NERVOUS/ANXIOUS: 0
APPETITE CHANGE: 0
FREQUENCY: 0
HEMATURIA: 0
WOUND: 0
COUGH: 0
LIGHT-HEADEDNESS: 0
DIZZINESS: 0
DEPRESSION: 0
FATIGUE: 0
NAUSEA: 0
UNEXPECTED WEIGHT CHANGE: 0
HEADACHES: 0
DYSURIA: 0
DIARRHEA: 0
VOICE CHANGE: 0
CONSTIPATION: 0
LEG SWELLING: 0
FEVER: 0
SHORTNESS OF BREATH: 0
TROUBLE SWALLOWING: 0
ARTHRALGIAS: 0
ABDOMINAL PAIN: 0
WHEEZING: 0
NUMBNESS: 0
MYALGIAS: 0
SORE THROAT: 0
CHILLS: 0
EYE PROBLEMS: 0

## 2024-05-21 NOTE — PROGRESS NOTES
[facility]   Infusion Clinic Note   Date: May 21, 2024   Name: Kyra Behnfeldt  : 2000   MRN: [id number]         Reason for Visit: Follow-up and OP Infusion (PATIENT IS HERE FOR FIRST MAINTENANCE DOSE OF INFLECTRA 500 MG EVERY 6 WEEKS.)         Visit Type: INFUSION       Ordered By:  DR. JANESSA MARKS      Accompanied by:Self      Diagnosis: Crohn's colitis, with rectal bleeding (Multi)       Allergies:   Allergies as of 2024    (No Known Allergies)         Current Medications has a current medication list which includes the following prescription(s): acetaminophen, azathioprine, escitalopram, ferrous fumarate-vitamin c, infliximab-dyyb, alprazolam, hydroxyzine hcl, methocarbamol, pantoprazole, and zolpidem.       Vitals:   Vitals:    24 1510 24 1630 24 1639 24 1645   BP: 113/80  Comment: nurse took vitals 126/85  126/88   Pulse: 109 89  97   Resp: 18 20  18   Temp: 36.7 °C (98.1 °F) 36.7 °C (98.1 °F)  36.5 °C (97.7 °F)   SpO2: 99% 98%  97%   Weight: 53.1 kg (117 lb 2.8 oz)  53.1 kg (117 lb 2.7 oz)     24 1700 24 1715 24 1830   BP: 127/84 127/85 110/74   Pulse: 89 89 89   Resp: 16 16 16   Temp: 36.4 °C (97.6 °F) 36.5 °C (97.7 °F) 36.7 °C (98.1 °F)   SpO2: 98% 98% 98%   Weight:                Infusion Pre-procedure Checklist:   - Allergies reviewed: yes   - Medications reviewed: yes       - Previous reaction to current treatment: no      Assess patient for the concerns below. Document provider notification as appropriate.  - Active or recent infection with/without current antibiotic use: no  - Recent or planned invasive dental work: no  - Recent or planned surgeries: no  - Recently received or plans to receive vaccinations: no  - Has treatment related toxicities: no  - Is pregnant:  no      Pain: 0   - Is the pain different from normal: n/a   - Is the pain tolerable: n/a   - Is your Doctor aware:  n/a      Labs: N/A         Fall Risk  Screening: Foster Fall Risk  History of Falling, Immediate or Within 3 Months: No  Secondary Diagnosis: No  Ambulatory Aid: Walks without aid/bedrest/nurse assist  Intravenous Therapy/Heparin Lock: Yes  Gait/Transferring: Normal/bedrest/immobile  Mental Status: Oriented to own ability  Foster Fall Risk Score: 20       Review Of Systems:  Review of Systems   Constitutional:  Negative for appetite change, chills, fatigue, fever and unexpected weight change.   HENT:   Positive for mouth sores. Negative for hearing loss, sore throat, tinnitus, trouble swallowing and voice change.         COLD SORES IN MOUTH/NEAR TONSIL ON LEFT SIDE WITH SORE THROAT ON BOTH SIDES. SEEING PCP TOMORROW.   Eyes:  Negative for eye problems.   Respiratory:  Negative for cough, shortness of breath and wheezing.    Cardiovascular:  Negative for chest pain, leg swelling and palpitations.   Gastrointestinal:  Positive for blood in stool. Negative for abdominal pain, constipation, diarrhea, nausea and vomiting.        PATIENT HAS ILEOSTOMY. HAVING BLOODY MUCOUS IN STOOL THAT IS INCREASING. DR. SELINA GARY WILL FOLLOW UP WITH HIM AFTER THIS INFUSION.   Genitourinary:  Negative for dysuria, frequency and hematuria.    Musculoskeletal:  Negative for arthralgias and myalgias.   Skin:  Negative for itching, rash and wound.   Neurological:  Negative for dizziness, extremity weakness, headaches, light-headedness and numbness.   Psychiatric/Behavioral:  Negative for depression. The patient is not nervous/anxious.          Infusion Readiness:   - Assessment Concerns Related to Infusion: No  - Provider notified: n/a      Document Below Only If Indicated:   New Patient Education:    N/A (returning patient for continuation of therapy. Ongoing education provided as needed.)        Treatment Conditions & Drug Specific Questions:    InFLIXimab  (AVSOLA, INFLECTRA, REMICADE, RENFLEXIS)    (Unless otherwise specified on patient specific therapy plan):     TREATMENT  "CONDITIONS:  Unless otherwise specified on patient specific therapy plan HOLD and notify Provider prior to proceeding with today's infusion if patient with:  o Positive T-Spot  o Reactive Hep B sAg and/or Hep B Core Antibody    Lab Results   Component Value Date    TBSIN Negative 12/18/2023      Lab Results   Component Value Date    HEPBSAG Nonreactive 04/23/2024      No results found for: \"NONUHFIRE\", \"NONUHSWGH\", \"NONUHFISH\", \"EXTHEPBSAG\"  Lab Results   Component Value Date    HEPBCAB Nonreactive 12/18/2023     Lab Results   Component Value Date    HEPBCAB Nonreactive 12/18/2023    HEPCAB Nonreactive 04/23/2024        Labs reviewed and patient meets treatment conditions? Yes    DRUG SPECIFIC QUESTIONS:    Any new or worsening signs / symptoms of heart failure which may include things like worsening shortness of breath, swelling, fatigue? No   (If yes notify provider before proceeding with today's infusion. New onset or worsening HF have been reported with infliximab)    REMINDER:   WEIGHT BASED DRUG     Recommended Vitals/Observation:  Vitals:     Induction: Obtain vital signs every 30 minutes; at end of observation period and as needed.     Maintenance: Obtain vital signs at start and end of infusions  Observation:     Induction: Patient is monitored for 30 minutes post-infusion     Maintenance: No observation.        Weight Based Drug Calculations:    WEIGHT BASED DRUGS: Infliximab (REMICADE, INFLECTRA, RENFLEXIS)   Patient's dosing weight (kg): 43 KG    10% weight variance for prescribed treatment: 38.7 kg to 47.3 kg     Patient's weight today:    Vitals:    05/21/24 1639   Weight: 53.1 kg (117 lb 2.7 oz)         weight range for prescribed dose:     Patient weight today falls outside of 10% variance or  weight range: Yes     Home Care pharmacist informed of weight variance: Yes    Doses that are weight based have an acceptable variance rule within 10% of the prescribed   order and/or " within  weight range. If patient weight on day of infusion falls   outside of the 10% variance, or weight range, infusion is administered and   pharmacy contacted regarding future dosing adjustments, per policy.         Initiated By: BRANDON Faustin   Time: 8:04 PM     We administered acetaminophen and inFLIXimab-dyyb (Inflectra) 500 mg in sodium chloride 0.9% 250 mL IV*.

## 2024-05-22 ENCOUNTER — APPOINTMENT (OUTPATIENT)
Dept: GASTROENTEROLOGY | Facility: CLINIC | Age: 24
End: 2024-05-22
Payer: COMMERCIAL

## 2024-05-28 ENCOUNTER — LAB (OUTPATIENT)
Dept: LAB | Facility: LAB | Age: 24
End: 2024-05-28
Payer: COMMERCIAL

## 2024-05-28 DIAGNOSIS — K50.111 CROHN'S COLITIS, WITH RECTAL BLEEDING (MULTI): ICD-10-CM

## 2024-05-28 DIAGNOSIS — K50.10 CROHN'S DISEASE OF COLON WITHOUT COMPLICATION (MULTI): ICD-10-CM

## 2024-05-28 LAB
ALBUMIN SERPL BCP-MCNC: 4.1 G/DL (ref 3.4–5)
ALP SERPL-CCNC: 70 U/L (ref 33–110)
ALT SERPL W P-5'-P-CCNC: 14 U/L (ref 7–45)
AST SERPL W P-5'-P-CCNC: 21 U/L (ref 9–39)
BASOPHILS # BLD AUTO: 0.11 X10*3/UL (ref 0–0.1)
BASOPHILS NFR BLD AUTO: 1.1 %
BILIRUB DIRECT SERPL-MCNC: 0 MG/DL (ref 0–0.3)
BILIRUB SERPL-MCNC: 0.2 MG/DL (ref 0–1.2)
EOSINOPHIL # BLD AUTO: 0.35 X10*3/UL (ref 0–0.7)
EOSINOPHIL NFR BLD AUTO: 3.3 %
ERYTHROCYTE [DISTWIDTH] IN BLOOD BY AUTOMATED COUNT: 13.4 % (ref 11.5–14.5)
HCT VFR BLD AUTO: 38.5 % (ref 36–46)
HGB BLD-MCNC: 12 G/DL (ref 12–16)
IMM GRANULOCYTES # BLD AUTO: 0.03 X10*3/UL (ref 0–0.7)
IMM GRANULOCYTES NFR BLD AUTO: 0.3 % (ref 0–0.9)
LYMPHOCYTES # BLD AUTO: 1.68 X10*3/UL (ref 1.2–4.8)
LYMPHOCYTES NFR BLD AUTO: 16.1 %
MCH RBC QN AUTO: 27.5 PG (ref 26–34)
MCHC RBC AUTO-ENTMCNC: 31.2 G/DL (ref 32–36)
MCV RBC AUTO: 88 FL (ref 80–100)
MONOCYTES # BLD AUTO: 0.61 X10*3/UL (ref 0.1–1)
MONOCYTES NFR BLD AUTO: 5.8 %
NEUTROPHILS # BLD AUTO: 7.68 X10*3/UL (ref 1.2–7.7)
NEUTROPHILS NFR BLD AUTO: 73.4 %
NRBC BLD-RTO: 0 /100 WBCS (ref 0–0)
PLATELET # BLD AUTO: 399 X10*3/UL (ref 150–450)
PROT SERPL-MCNC: 6.9 G/DL (ref 6.4–8.2)
RBC # BLD AUTO: 4.36 X10*6/UL (ref 4–5.2)
WBC # BLD AUTO: 10.5 X10*3/UL (ref 4.4–11.3)

## 2024-05-28 PROCEDURE — 86140 C-REACTIVE PROTEIN: CPT

## 2024-05-28 PROCEDURE — 80076 HEPATIC FUNCTION PANEL: CPT

## 2024-05-28 PROCEDURE — 36415 COLL VENOUS BLD VENIPUNCTURE: CPT

## 2024-05-28 PROCEDURE — 85025 COMPLETE CBC W/AUTO DIFF WBC: CPT

## 2024-05-29 ENCOUNTER — TELEPHONE (OUTPATIENT)
Dept: GASTROENTEROLOGY | Facility: HOSPITAL | Age: 24
End: 2024-05-29
Payer: COMMERCIAL

## 2024-05-29 DIAGNOSIS — K50.111 CROHN'S COLITIS, WITH RECTAL BLEEDING (MULTI): Primary | ICD-10-CM

## 2024-05-29 DIAGNOSIS — K50.10 CROHN'S DISEASE OF COLON WITHOUT COMPLICATION (MULTI): Primary | ICD-10-CM

## 2024-05-29 LAB — CRP SERPL-MCNC: <0.1 MG/DL

## 2024-05-29 NOTE — TELEPHONE ENCOUNTER
Spoke with patient today.  She received infliximab on 5/22/2024.  After treatment, for about 2 to 3 days the passage of blood per rectum decreased.  However, it is now increased and she is having 3-4 episodes of bloody mucus discharge from the rectum daily.  She also has some pressure and occasional discomfort when she passes this.  The pain had previously resolved and then gradually returned.    Recent labs show that hemoglobin is normal.  Liver function test are normal.    It is difficult to know whether the patient's symptoms are related to diversion colitis or to worsening ulcerative colitis despite ongoing therapy with infliximab 10 mg/kg every 6 weeks and azathioprine 50 mg daily.    Will monitor symptoms and she will receive her next infusion on 7/3/2024.  If symptoms worsen and she feels unwell, we will repeat a CAT scan before then.  We will also check a CRP and if it is elevated, we will consider adjusting therapy.    She will follow-up on 7/11/2024 as planned.  She will call if symptoms worsen before then.

## 2024-06-12 ENCOUNTER — OFFICE VISIT (OUTPATIENT)
Dept: OTOLARYNGOLOGY | Facility: CLINIC | Age: 24
End: 2024-06-12
Payer: COMMERCIAL

## 2024-06-12 ENCOUNTER — TELEPHONE (OUTPATIENT)
Dept: OTOLARYNGOLOGY | Facility: CLINIC | Age: 24
End: 2024-06-12

## 2024-06-12 DIAGNOSIS — G89.29 CHRONIC THROAT PAIN: ICD-10-CM

## 2024-06-12 DIAGNOSIS — R07.0 CHRONIC THROAT PAIN: ICD-10-CM

## 2024-06-12 DIAGNOSIS — J35.9 LESION OF TONSIL: Primary | ICD-10-CM

## 2024-06-12 PROCEDURE — 99214 OFFICE O/P EST MOD 30 MIN: CPT | Performed by: NURSE PRACTITIONER

## 2024-06-12 PROCEDURE — 1036F TOBACCO NON-USER: CPT | Performed by: NURSE PRACTITIONER

## 2024-06-12 PROCEDURE — 31505 DIAGNOSTIC LARYNGOSCOPY: CPT | Performed by: NURSE PRACTITIONER

## 2024-06-12 ASSESSMENT — PATIENT HEALTH QUESTIONNAIRE - PHQ9
2. FEELING DOWN, DEPRESSED OR HOPELESS: NOT AT ALL
1. LITTLE INTEREST OR PLEASURE IN DOING THINGS: NOT AT ALL
SUM OF ALL RESPONSES TO PHQ9 QUESTIONS 1 AND 2: 0

## 2024-06-12 ASSESSMENT — PAIN SCALES - GENERAL: PAINLEVEL: 0-NO PAIN

## 2024-06-12 ASSESSMENT — COLUMBIA-SUICIDE SEVERITY RATING SCALE - C-SSRS: 1. IN THE PAST MONTH, HAVE YOU WISHED YOU WERE DEAD OR WISHED YOU COULD GO TO SLEEP AND NOT WAKE UP?: NO

## 2024-06-12 NOTE — PATIENT INSTRUCTIONS
Use the Benadryl lidocaine and Maalox as directed for 30 days.  If the lesion is still present in the left tonsil please call my office [phone] and we will put in a referral to head and neck for possible biopsy.

## 2024-06-12 NOTE — PROGRESS NOTES
Subjective   Patient ID: Kyra Behnfeldt is a 23 y.o. female who presents for Establish Care and Sore Throat.  HPI    Patient with history of Crohn's colitis presents here today for evaluation of ulceration of left tonsil as well as chronic sore throat.    Patient reports that since hospitalization in March 2024 (diverting loop ileostomy 3/10/2024 with Dr. Castro) , it has been noted that she has a small ulceration in the left tonsil.  She had an ENT evaluation in the hospital and was recommended to use First mouthwash which seem to work to lessen the irritation but lately has been more irritated and starting to notice the same symptom in the right tonsil.  She has no difficulty swallowing right now.  No hoarseness but has odynophagia. No hemoptysis or trismus. Patient is non-smoker.  Patient is motivated to have this addressed before it becomes a problem for not being able to swallow.  Otherwise denies ever having problems with tonsil in the past.  No family history of head and neck cancer.  Family does have history of canker sores.    Review of Systems      All other systems have been reviewed and are negative for complaints except for those mentioned in history of present illness, past medical history and problem list       Objective   Physical Exam    CONSTITUTIONAL: No acute distress, normal facial features; No fever; no chills  VOICE: No hoarseness or other audible abnormality  RESPIRATION: Breathing comfortably, no stridor; normal breathing effort  CV: No cyanosis visible on the face and neck area  EYES:Pupils equal and round ; no erythema; conjunctiva clear; sclera white  NEURO: Alert and oriented, able to raise eyebrows symmetrical bilateral, smile with no facial droop, able to swallow  HEAD AND FACE: Symmetric facial features, no masses or lesions    Right ear examination: External ear normal.  EAC with mild cerumen.  TM is intact with no effusion, retraction or perforation.  Left ear examination:  External ear normal.  EAC with mild cerumen.  TM intact with no effusion, retraction or perforation.    NOSE: External nose midline; inferior nasal turbinates normal no mucopus or polyps.  ORAL CAVITY: No lesions of external lips; uvula is midline; tongue with good mobility; no gross mass in oral cavity; mucosa appears pink ; tonsils are 1+ with small ulceration left superior aspect of tonsil.  Mild erythema noted but no exudates bilaterally.  NECK/LYMPH: No obvious deformity or lesions; trachea is midline  PSYCH: Alert and oriented with appropriate mood and affect.      Patient ID: Kyra Behnfeldt is a 23 y.o. female.    Procedures    FOL  PROCEDURE NOTE:  Recommended flexible nasopharyngoscopy.  Risks, benefits, personnel and alternatives were explained.  The patient wished to proceed.  S/he was re-identified.  PROCEDURE:  Flexible nasopharyngoscopy  DIAGNOSIS: Chronic throat pain    INDICATIONS: Inability to tolerate mirror exam  PROCEDURE NOTE:  Recommended flexible nasopharyngoscopy.  Risks, benefits, personnel and alternatives were explained.  The patient wished to proceed.  Patient was re-identified.  FINDINGS:  The nasopharynx and oropharynx were normal without any lesions or masses visualized.  No masses or lesions were visualized at the base of tongue, vallecula, epiglottis, aryepiglottic folds, pyriform sinuses, and lateral pharyngeal walls.  Noted mild septal deviation left side.  Mild cobblestoning of the posterior pharyngeal wall otherwise no exudates and no pharyngeal or laryngeal lesions or ulcerations found. The true vocal fold movement was normal no nodules or lesions.  The patient's airway was widely patent with no evidence of obstruction.  Patient tolerated the procedure well, and there were no complications.           Assessment/Plan       1. Lesion of tonsil        2. Chronic throat pain          Her clinical exam today showed a small white ulceration on the left superior aspect of the left  tonsils.  The tonsils are 1+ mildly erythematous but no exudates or deep ulcerations.  The tonsils are not hypertrophic.  We discussed that we can treat with Benadryl, lidocaine and Maalox oral solution for 30 days.  If the lesion is still present after this time period, I recommend a referral to head and neck team to see if she needs biopsy.  We did discuss possibility of tonsillectomy but with her immune compromised status, this may pose more risk than benefits.  Advised patient to call my office if any problems arise between now and 30 days and she will let me know when she is ready for referral to head and neck team.  All questions answered to patient's satisfaction.    This note was created using speech recognition transcription software. Despite proofreading, several typographical errors might be present that might affect the meaning of the content. Please call with any questions.           BRANDON Nunn 06/12/24 10:03 AM

## 2024-06-24 ENCOUNTER — DOCUMENTATION (OUTPATIENT)
Dept: GASTROENTEROLOGY | Facility: HOSPITAL | Age: 24
End: 2024-06-24
Payer: COMMERCIAL

## 2024-06-24 DIAGNOSIS — K50.111 CROHN'S COLITIS, WITH RECTAL BLEEDING (MULTI): ICD-10-CM

## 2024-06-24 DIAGNOSIS — K50.111 CROHN'S COLITIS, WITH RECTAL BLEEDING (MULTI): Primary | ICD-10-CM

## 2024-06-24 RX ORDER — AZATHIOPRINE 50 MG/1
50 TABLET ORAL DAILY
Qty: 30 TABLET | Refills: 11 | Status: SHIPPED | OUTPATIENT
Start: 2024-06-24 | End: 2025-06-24

## 2024-06-24 NOTE — PROGRESS NOTES
Spoke with patient today.  Over the past several weeks, she has noted increasing blood per rectum along with mucus/tissue.  This is now 7-8 times daily.  Is associated with increasing discomfort with each passage of material.  She feels more fatigued.  She says that her friends think she looks a little pale.    She is traveling out of town and was seen in a local emergency department in [facility].  CT scan of the abdomen showed findings consistent with acute colitis.  There was a 2 cm right adnexal cyst noted.    Labs show a hemoglobin now at 10.5 down from over 12 about a month ago.  White count was 10.8.    She is due for infliximab infusion on 7/2/2024.  This is a 6-week infusion at 10 mg/kg.  We are going to check labs immediately prior to the infusion including C-reactive protein, infliximab level, blood counts, and azathioprine metabolites.  Pending this information and her treatment, we will give her a short burst of prednisone to see if we can improve symptoms.  She will take 40 mg daily (4 tablets) for 4 days and then taper by 1 tablet every 4 days until off prednisone.  She has follow-up in the office on 7/11/2024.  She knows to call if she is feeling worse.

## 2024-07-01 ENCOUNTER — APPOINTMENT (OUTPATIENT)
Dept: SURGERY | Facility: CLINIC | Age: 24
End: 2024-07-01
Payer: COMMERCIAL

## 2024-07-01 VITALS
RESPIRATION RATE: 18 BRPM | DIASTOLIC BLOOD PRESSURE: 70 MMHG | OXYGEN SATURATION: 97 % | HEART RATE: 97 BPM | BODY MASS INDEX: 20.9 KG/M2 | TEMPERATURE: 98.1 F | SYSTOLIC BLOOD PRESSURE: 108 MMHG | WEIGHT: 118 LBS

## 2024-07-01 DIAGNOSIS — K50.119 CROHN'S COLITIS, UNSPECIFIED COMPLICATION (MULTI): Primary | ICD-10-CM

## 2024-07-01 PROCEDURE — 99213 OFFICE O/P EST LOW 20 MIN: CPT | Performed by: NURSE PRACTITIONER

## 2024-07-01 ASSESSMENT — PAIN SCALES - GENERAL: PAINLEVEL: 5

## 2024-07-01 NOTE — PROGRESS NOTES
History Of Present Illness  Kyra Behnfeldt is a 23 y.o. female with a history of hemorrhoids, anxiety, anemia, and Crohn's colitis (diagnosed 12/2023) who is s/p lx ileostomy creastion on 3/10/24 by Dr. Patel.     She went to the ED a few weeks ago or a Crohn's flare.  She was having more bleeding and pain from the anus with mucus drainage.  The pain is a deep achy pains in the pelvis.  No c/o any n/v.  Her H&H dropped to 10 from 12 and she is back on prednisone.    Her next Inflectra infusion is tomorrow     She has used the Canassa suppositories in the past but she did not think that they helped much.      Sees Dr. Lozano on 7/11    She has no issues with her ostomy bag and will change the wafer every 2 days.      Past Medical History  She has a past medical history of Benign neoplasm of cecum, CC (Crohn's colitis) (Multi), Colitis, Generalized abdominal pain, Hypokalemia, Internal hemorrhoid, and Rectal bleeding.    Surgical History  She has a past surgical history that includes Colonoscopy and Ileostomy.     Social History  She reports that she has never smoked. She has never been exposed to tobacco smoke. She has never used smokeless tobacco. She reports that she does not currently use alcohol. She reports current drug use. Drug: Marijuana.    Family History  No family history on file.     Allergies  Patient has no known allergies.    Review of Systems   Gastrointestinal:  Positive for rectal pain.   All other systems reviewed and are negative.       Physical Exam  Constitutional:       Appearance: Normal appearance.   HENT:      Head: Normocephalic and atraumatic.   Pulmonary:      Effort: Pulmonary effort is normal.   Genitourinary:     Comments: Deferred anorectal exam today  Musculoskeletal:         General: Normal range of motion.   Neurological:      General: No focal deficit present.      Mental Status: She is alert and oriented to person, place, and time.   Psychiatric:         Mood and Affect: Mood  normal.         Behavior: Behavior normal.          Last Recorded Vitals  /70 (BP Location: Left arm, Patient Position: Sitting, BP Cuff Size: Adult)   Pulse 97   Temp 36.7 °C (98.1 °F)   Resp 18   Wt 53.5 kg (118 lb)   SpO2 97%        Assessment/Plan   She has a few Canassa suppositories at home and will restart at bedtime to see if that will help decrease her rectal pain.  If the pain starts to subside, I will send another prescription for a month supply.  She will call with any issues and will follow up on an as needed basis.       Johanna Mays, APRN-CNP

## 2024-07-02 ENCOUNTER — APPOINTMENT (OUTPATIENT)
Dept: INFUSION THERAPY | Facility: CLINIC | Age: 24
End: 2024-07-02
Payer: COMMERCIAL

## 2024-07-02 VITALS
HEART RATE: 85 BPM | OXYGEN SATURATION: 99 % | TEMPERATURE: 97.4 F | WEIGHT: 121.14 LBS | DIASTOLIC BLOOD PRESSURE: 73 MMHG | BODY MASS INDEX: 21.46 KG/M2 | SYSTOLIC BLOOD PRESSURE: 108 MMHG | RESPIRATION RATE: 18 BRPM

## 2024-07-02 DIAGNOSIS — K50.111 CROHN'S COLITIS, WITH RECTAL BLEEDING (MULTI): ICD-10-CM

## 2024-07-02 PROCEDURE — 86140 C-REACTIVE PROTEIN: CPT

## 2024-07-02 PROCEDURE — 85025 COMPLETE CBC W/AUTO DIFF WBC: CPT | Performed by: INTERNAL MEDICINE

## 2024-07-02 PROCEDURE — 96413 CHEMO IV INFUSION 1 HR: CPT | Performed by: NURSE PRACTITIONER

## 2024-07-02 PROCEDURE — 80076 HEPATIC FUNCTION PANEL: CPT

## 2024-07-02 PROCEDURE — 36415 COLL VENOUS BLD VENIPUNCTURE: CPT

## 2024-07-02 RX ORDER — ACETAMINOPHEN 325 MG/1
650 TABLET ORAL ONCE
OUTPATIENT
Start: 2024-08-13 | End: 2024-08-13

## 2024-07-02 RX ORDER — ACETAMINOPHEN 325 MG/1
650 TABLET ORAL ONCE
Status: DISCONTINUED | OUTPATIENT
Start: 2024-07-02 | End: 2024-07-02 | Stop reason: HOSPADM

## 2024-07-02 RX ORDER — ALBUTEROL SULFATE 0.83 MG/ML
3 SOLUTION RESPIRATORY (INHALATION) AS NEEDED
OUTPATIENT
Start: 2024-08-13

## 2024-07-02 RX ORDER — DIPHENHYDRAMINE HYDROCHLORIDE 50 MG/ML
50 INJECTION INTRAMUSCULAR; INTRAVENOUS AS NEEDED
OUTPATIENT
Start: 2024-08-13

## 2024-07-02 RX ORDER — EPINEPHRINE 0.3 MG/.3ML
0.3 INJECTION SUBCUTANEOUS EVERY 5 MIN PRN
OUTPATIENT
Start: 2024-08-13

## 2024-07-02 RX ORDER — FAMOTIDINE 10 MG/ML
20 INJECTION INTRAVENOUS ONCE AS NEEDED
OUTPATIENT
Start: 2024-08-13

## 2024-07-02 ASSESSMENT — ENCOUNTER SYMPTOMS
MYALGIAS: 0
NERVOUS/ANXIOUS: 0
LIGHT-HEADEDNESS: 0
EYE PROBLEMS: 0
DYSURIA: 0
HEMATURIA: 0
ABDOMINAL PAIN: 1
VOMITING: 0
APPETITE CHANGE: 0
FEVER: 0
WHEEZING: 0
SHORTNESS OF BREATH: 0
DEPRESSION: 0
BLOOD IN STOOL: 0
LEG SWELLING: 0
COUGH: 0
WOUND: 0
DIARRHEA: 0
FATIGUE: 0
RECTAL PAIN: 1
VOICE CHANGE: 0
NAUSEA: 0
ARTHRALGIAS: 0
HEADACHES: 0
UNEXPECTED WEIGHT CHANGE: 0
SORE THROAT: 0
CONSTIPATION: 0
CHILLS: 0
NUMBNESS: 0
FREQUENCY: 0
PALPITATIONS: 0
DIZZINESS: 0
EXTREMITY WEAKNESS: 0
TROUBLE SWALLOWING: 0

## 2024-07-02 NOTE — PROGRESS NOTES
[facility]   Infusion Clinic Note   Date: 2024   Name: Kyra Behnfeldt  : 2000   MRN: [id number]         Reason for Visit: OP Infusion and Follow-up (PT HERE FOR INFLECTRA 500 MG INFUSION/NEXT APPT: 42 DAYS)         Today: We administered inFLIXimab-dyyb (Inflectra) 500 mg in sodium chloride 0.9% 250 mL IV.       Visit Type: INFUSION       Ordered By: DR MARKS      Accompanied by:Self      Diagnosis: Crohn's colitis, with rectal bleeding (Multi)       Allergies:   Allergies as of 2024    (No Known Allergies)         Current Medications has a current medication list which includes the following prescription(s): acetaminophen, azathioprine, diphenhydramine/Maalox/lidocaine (Magic Mouthwash) - Compounded - Outpatient, escitalopram, ferrous fumarate-vitamin c, hydroxyzine hcl, infliximab-dyyb, alprazolam, and methocarbamol, and the following Facility-Administered Medications: acetaminophen.       Vitals:   Vitals:    24 1410 24 1605 24 1650   BP: 116/77 101/64 108/73   Pulse: 99 96 85   Resp: 16 16 18   Temp: 36.3 °C (97.4 °F) 36.5 °C (97.7 °F) 36.3 °C (97.4 °F)   SpO2: 97% 99% 99%   Weight: 54.9 kg (121 lb 2.3 oz)               Infusion Pre-procedure Checklist:   - Allergies reviewed: yes   - Medications reviewed: yes       - Previous reaction to current treatment: no      Assess patient for the concerns below. Document provider notification as appropriate.  - Active or recent infection with/without current antibiotic use: no  - Recent or planned invasive dental work: no  - Recent or planned surgeries: no  - Recently received or plans to receive vaccinations: no  - Has treatment related toxicities: no  - Is pregnant:  no      Pain: 5, LOWER ABDOMINAL   - Is the pain different from normal: no   - Is the pain tolerable: yes   - Is your Doctor aware:  yes      Labs: Labs drawn and sent per order         Fall Risk Screening: Cristian Fall Risk  History of Falling,  Immediate or Within 3 Months: No  Secondary Diagnosis: No  Ambulatory Aid: Walks without aid/bedrest/nurse assist  Intravenous Therapy/Heparin Lock: Yes  Gait/Transferring: Normal/bedrest/immobile  Mental Status: Oriented to own ability  Foster Fall Risk Score: 20       Review Of Systems:  Review of Systems   Constitutional:  Negative for appetite change, chills, fatigue, fever and unexpected weight change.   HENT:   Negative for hearing loss, mouth sores, sore throat, tinnitus, trouble swallowing and voice change.    Eyes:  Negative for eye problems.   Respiratory:  Negative for cough, shortness of breath and wheezing.    Cardiovascular:  Negative for chest pain, leg swelling and palpitations.   Gastrointestinal:  Positive for abdominal pain. Negative for blood in stool, constipation, diarrhea, nausea and vomiting.        BLOOD FROM RECTRUM. PT REPORTS ABDOMINAL PAIN.   Genitourinary:  Negative for dysuria, frequency and hematuria.    Musculoskeletal:  Negative for arthralgias and myalgias.   Skin:  Negative for itching, rash and wound.   Neurological:  Negative for dizziness, extremity weakness, headaches, light-headedness and numbness.   Psychiatric/Behavioral:  Negative for depression. The patient is not nervous/anxious.          Infusion Readiness:   - Assessment Concerns Related to Infusion: No  - Provider notified: n/a      Document Below Only If Indicated:   New Patient Education:    N/A (returning patient for continuation of therapy. Ongoing education provided as needed.)        Treatment Conditions & Drug Specific Questions:    InFLIXimab  (AVSOLA, INFLECTRA, REMICADE, RENFLEXIS)    (Unless otherwise specified on patient specific therapy plan):     TREATMENT CONDITIONS:  Unless otherwise specified on patient specific therapy plan HOLD and notify Provider prior to proceeding with today's infusion if patient with:  o Positive T-Spot  o Reactive Hep B sAg and/or Hep B Core Antibody    Lab Results   Component  "Value Date    TBSIN Negative 12/18/2023      Lab Results   Component Value Date    HEPBSAG Nonreactive 04/23/2024      No results found for: \"NONUHFIRE\", \"NONUHSWGH\", \"NONUHFISH\", \"EXTHEPBSAG\"  Lab Results   Component Value Date    HEPBCAB Nonreactive 12/18/2023     Lab Results   Component Value Date    HEPBCAB Nonreactive 12/18/2023    HEPCAB Nonreactive 04/23/2024        Labs reviewed and patient meets treatment conditions? Yes    DRUG SPECIFIC QUESTIONS:    Any new or worsening signs / symptoms of heart failure which may include things like worsening shortness of breath, swelling, fatigue? No   (If yes notify provider before proceeding with today's infusion. New onset or worsening HF have been reported with infliximab)    REMINDER:   WEIGHT BASED DRUG     Recommended Vitals/Observation:  Vitals:     Induction: Obtain vital signs every 30 minutes; at end of observation period and as needed.     Maintenance: Obtain vital signs at start and end of infusions  Observation:     Induction: Patient is monitored for 30 minutes post-infusion     Maintenance: No observation.        Weight Based Drug Calculations:    WEIGHT BASED DRUGS: Infliximab (REMICADE, INFLECTRA, RENFLEXIS)   Patient's dosing weight (kg): 43KG     10% weight variance for prescribed treatment: 38.7 kg to 47.3 kg     Patient's weight today:   Vitals:    07/02/24 1410   Weight: 54.9 kg (121 lb 2.3 oz)         weight range for prescribed dose:     Patient weight today falls outside of 10% variance or  weight range: Yes     Home Care pharmacist informed of weight variance: Yes    Doses that are weight based have an acceptable variance rule within 10% of the prescribed   order and/or within  weight range. If patient weight on day of infusion falls   outside of the 10% variance, or weight range, infusion is administered and   pharmacy contacted regarding future dosing adjustments, per policy.         Initiated By: " Felicitas Herrera RN

## 2024-07-02 NOTE — PATIENT INSTRUCTIONS
Today :We administered inFLIXimab-dyyb (Inflectra) 500 mg in sodium chloride 0.9% 250 mL IV.     For:   1. Crohn's colitis, with rectal bleeding (Multi)         Your next appointment is due in:  42 DAYS        Please read the  Medication Guide that was given to you and reviewed during todays visit.     (Tell all doctors including dentists that you are taking this medication)     Go to the emergency room or call 911 if:  -You have signs of allergic reaction:   -Rash, hives, itching.   -Swollen, blistered, peeling skin.   -Swelling of face, lips, mouth, tongue or throat.   -Tightness of chest, trouble breathing, swallowing or talking     Call your doctor:  - If IV / injection site gets red, warm, swollen, itchy or leaks fluid or pus.     (Leave dressing on your IV site for at least 2 hours and keep area clean and dry  - If you get sick or have symptoms of infection or are not feeling well for any reason.    (Wash your hands often, stay away from people who are sick)  - If you have side effects from your medication that do not go away or are bothersome.     (Refer to the teaching your nurse gave you for side effects to call your doctor about)    - Common side effects may include:  stuffy nose, headache, feeling tired, muscle aches, upset stomach  - Before receiving any vaccines     - Call the [facility] at   If:  - You get sick, are on antibiotics, have had a recent vaccine, have surgery or dental work and your doctor wants your visit rescheduled.  - You need to cancel and reschedule your visit for any reason. Call at least 2 days before your visit if you need to cancel.   - Your insurance changes before your next visit.    (We will need to get approval from your new insurance. This can take up to two weeks.)     The [facility] is opened Monday thru Friday. We are closed on weekends and holidays.   Voice mail will take your call if the center is closed. If you leave a message please  allow 24 hours for a call back during weekdays. If you leave a message on a weekend/holiday, we will call you back the next business day.

## 2024-07-03 LAB
ALBUMIN SERPL BCP-MCNC: 3.7 G/DL (ref 3.4–5)
ALP SERPL-CCNC: 75 U/L (ref 33–110)
ALT SERPL W P-5'-P-CCNC: 13 U/L (ref 7–45)
AST SERPL W P-5'-P-CCNC: 11 U/L (ref 9–39)
BASOPHILS # BLD AUTO: 0.05 X10*3/UL (ref 0–0.1)
BASOPHILS NFR BLD AUTO: 0.3 %
BILIRUB DIRECT SERPL-MCNC: 0 MG/DL (ref 0–0.3)
BILIRUB SERPL-MCNC: 0.2 MG/DL (ref 0–1.2)
CRP SERPL-MCNC: <0.1 MG/DL
EOSINOPHIL # BLD AUTO: 0.03 X10*3/UL (ref 0–0.7)
EOSINOPHIL NFR BLD AUTO: 0.2 %
ERYTHROCYTE [DISTWIDTH] IN BLOOD BY AUTOMATED COUNT: 12.9 % (ref 11.5–14.5)
HCT VFR BLD AUTO: 28.3 % (ref 36–46)
HGB BLD-MCNC: 8.6 G/DL (ref 12–16)
IMM GRANULOCYTES # BLD AUTO: 0.1 X10*3/UL (ref 0–0.7)
IMM GRANULOCYTES NFR BLD AUTO: 0.6 % (ref 0–0.9)
LYMPHOCYTES # BLD AUTO: 0.92 X10*3/UL (ref 1.2–4.8)
LYMPHOCYTES NFR BLD AUTO: 5.1 %
MCH RBC QN AUTO: 26.3 PG (ref 26–34)
MCHC RBC AUTO-ENTMCNC: 30.4 G/DL (ref 32–36)
MCV RBC AUTO: 87 FL (ref 80–100)
MONOCYTES # BLD AUTO: 0.29 X10*3/UL (ref 0.1–1)
MONOCYTES NFR BLD AUTO: 1.6 %
NEUTROPHILS # BLD AUTO: 16.55 X10*3/UL (ref 1.2–7.7)
NEUTROPHILS NFR BLD AUTO: 92.2 %
NRBC BLD-RTO: 0 /100 WBCS (ref 0–0)
PLATELET # BLD AUTO: 457 X10*3/UL (ref 150–450)
PROT SERPL-MCNC: 6.2 G/DL (ref 6.4–8.2)
RBC # BLD AUTO: 3.27 X10*6/UL (ref 4–5.2)
WBC # BLD AUTO: 17.9 X10*3/UL (ref 4.4–11.3)

## 2024-07-04 ENCOUNTER — HOSPITAL ENCOUNTER (EMERGENCY)
Facility: HOSPITAL | Age: 24
Discharge: HOME | End: 2024-07-05
Attending: EMERGENCY MEDICINE
Payer: COMMERCIAL

## 2024-07-04 DIAGNOSIS — R53.83 OTHER FATIGUE: ICD-10-CM

## 2024-07-04 DIAGNOSIS — D64.9 ANEMIA, UNSPECIFIED TYPE: Primary | ICD-10-CM

## 2024-07-04 DIAGNOSIS — D50.0 IRON DEFICIENCY ANEMIA DUE TO CHRONIC BLOOD LOSS: ICD-10-CM

## 2024-07-04 LAB
ABO GROUP (TYPE) IN BLOOD: NORMAL
ALBUMIN SERPL BCP-MCNC: 4.2 G/DL (ref 3.4–5)
ALP SERPL-CCNC: 98 U/L (ref 33–110)
ALT SERPL W P-5'-P-CCNC: 16 U/L (ref 7–45)
ANION GAP SERPL CALC-SCNC: 11 MMOL/L (ref 10–20)
ANTIBODY SCREEN: NORMAL
AST SERPL W P-5'-P-CCNC: 13 U/L (ref 9–39)
B-HCG SERPL-ACNC: <2 MIU/ML
BASOPHILS # BLD AUTO: 0.05 X10*3/UL (ref 0–0.1)
BASOPHILS NFR BLD AUTO: 0.3 %
BILIRUB SERPL-MCNC: 0.2 MG/DL (ref 0–1.2)
BUN SERPL-MCNC: 13 MG/DL (ref 6–23)
CALCIUM SERPL-MCNC: 8.8 MG/DL (ref 8.6–10.3)
CHLORIDE SERPL-SCNC: 102 MMOL/L (ref 98–107)
CO2 SERPL-SCNC: 27 MMOL/L (ref 21–32)
CREAT SERPL-MCNC: 0.81 MG/DL (ref 0.5–1.05)
EGFRCR SERPLBLD CKD-EPI 2021: >90 ML/MIN/1.73M*2
EOSINOPHIL # BLD AUTO: 0.07 X10*3/UL (ref 0–0.7)
EOSINOPHIL NFR BLD AUTO: 0.5 %
ERYTHROCYTE [DISTWIDTH] IN BLOOD BY AUTOMATED COUNT: 13 % (ref 11.5–14.5)
GLUCOSE SERPL-MCNC: 111 MG/DL (ref 74–99)
HCT VFR BLD AUTO: 31.2 % (ref 36–46)
HGB BLD-MCNC: 9.2 G/DL (ref 12–16)
IMM GRANULOCYTES # BLD AUTO: 0.08 X10*3/UL (ref 0–0.7)
IMM GRANULOCYTES NFR BLD AUTO: 0.5 % (ref 0–0.9)
INR PPP: 1 (ref 0.9–1.1)
LYMPHOCYTES # BLD AUTO: 2 X10*3/UL (ref 1.2–4.8)
LYMPHOCYTES NFR BLD AUTO: 13.7 %
MCH RBC QN AUTO: 24.9 PG (ref 26–34)
MCHC RBC AUTO-ENTMCNC: 29.5 G/DL (ref 32–36)
MCV RBC AUTO: 85 FL (ref 80–100)
MONOCYTES # BLD AUTO: 0.93 X10*3/UL (ref 0.1–1)
MONOCYTES NFR BLD AUTO: 6.4 %
NEUTROPHILS # BLD AUTO: 11.44 X10*3/UL (ref 1.2–7.7)
NEUTROPHILS NFR BLD AUTO: 78.6 %
NRBC BLD-RTO: 0 /100 WBCS (ref 0–0)
PLATELET # BLD AUTO: 587 X10*3/UL (ref 150–450)
POTASSIUM SERPL-SCNC: 3.9 MMOL/L (ref 3.5–5.3)
PROT SERPL-MCNC: 7.6 G/DL (ref 6.4–8.2)
PROTHROMBIN TIME: 11.1 SECONDS (ref 9.8–12.8)
RBC # BLD AUTO: 3.69 X10*6/UL (ref 4–5.2)
RH FACTOR (ANTIGEN D): NORMAL
SODIUM SERPL-SCNC: 136 MMOL/L (ref 136–145)
WBC # BLD AUTO: 14.6 X10*3/UL (ref 4.4–11.3)

## 2024-07-04 PROCEDURE — 80053 COMPREHEN METABOLIC PANEL: CPT | Performed by: EMERGENCY MEDICINE

## 2024-07-04 PROCEDURE — 86901 BLOOD TYPING SEROLOGIC RH(D): CPT | Performed by: EMERGENCY MEDICINE

## 2024-07-04 PROCEDURE — 84702 CHORIONIC GONADOTROPIN TEST: CPT | Performed by: EMERGENCY MEDICINE

## 2024-07-04 PROCEDURE — 99283 EMERGENCY DEPT VISIT LOW MDM: CPT

## 2024-07-04 PROCEDURE — 85610 PROTHROMBIN TIME: CPT | Performed by: EMERGENCY MEDICINE

## 2024-07-04 PROCEDURE — 36415 COLL VENOUS BLD VENIPUNCTURE: CPT | Performed by: EMERGENCY MEDICINE

## 2024-07-04 PROCEDURE — 83540 ASSAY OF IRON: CPT | Performed by: INTERNAL MEDICINE

## 2024-07-04 PROCEDURE — 85025 COMPLETE CBC W/AUTO DIFF WBC: CPT | Performed by: EMERGENCY MEDICINE

## 2024-07-04 ASSESSMENT — PAIN - FUNCTIONAL ASSESSMENT: PAIN_FUNCTIONAL_ASSESSMENT: 0-10

## 2024-07-04 ASSESSMENT — PAIN SCALES - GENERAL: PAINLEVEL_OUTOF10: 0 - NO PAIN

## 2024-07-05 VITALS
OXYGEN SATURATION: 100 % | RESPIRATION RATE: 16 BRPM | HEART RATE: 94 BPM | DIASTOLIC BLOOD PRESSURE: 98 MMHG | SYSTOLIC BLOOD PRESSURE: 138 MMHG

## 2024-07-05 DIAGNOSIS — D50.0 IRON DEFICIENCY ANEMIA DUE TO CHRONIC BLOOD LOSS: Primary | ICD-10-CM

## 2024-07-05 LAB
6-TGN ENTSUB RBC: 99 PMOL/8X10(8)RBC (ref 235–450)
6MMP ENTSUB RBC: ABNORMAL PMOL/8X10(8)RBC
IRON SATN MFR SERPL: 20 % (ref 25–45)
IRON SERPL-MCNC: 85 UG/DL (ref 35–150)
TIBC SERPL-MCNC: 426 UG/DL (ref 240–445)
UIBC SERPL-MCNC: 341 UG/DL (ref 110–370)

## 2024-07-05 NOTE — DISCHARGE INSTRUCTIONS
You were seen emergency room today for evaluation of fatigue in the setting of recently low blood counts and platelets.  Your hemoglobin was 9.2 improved from most recent value of 8.6.  Please return if you have any recurrent or worsening symptoms include not limited to worsening bloody stools, worsening weakness, fever, or if you have any concerns.  Please follow-up with your GI office via phone tomorrow to discuss next steps.

## 2024-07-05 NOTE — ED PROVIDER NOTES
History/Exam limitations: none.   Additional history was obtained from patient and past medical records.          HPI:    Kyra Behnfeldt is a 23 y.o. female PMH Crohn colitis, ileostomy creation, presenting for evaluation of increasing fatigue in setting of downtrending hemoglobin.  Patient states that she received her most recent Remicade infusion on 7/2.  Prior to this over the last few weeks has had increasing blood in stool and has now been decreasing.  Gets pain in the mornings and evenings with shedding and some bloody stools.  States that she has been feeling increasingly fatigued and her most recent CBC displayed a hemoglobin of 8.6 on 7/2 from 10.5 previously.  He is currently on a prednisone taper (40>10 mg), currently on 20 mg, tapering every 4 days.  No headache or vision change, chest pain, shortness breath.  No current abdominal pain.  Was having some rectal pain that has improved since the most recent Remicade infusion.          Physical Exam:  ED Triage Vitals [07/04/24 2245]   Temp Heart Rate Respirations BP   -- 95 18 (!) 147/101      Pulse Ox Temp src Heart Rate Source Patient Position   100 % -- Monitor --      BP Location FiO2 (%)     -- --        GEN:      Alert, NAD  Eyes:       PERRL, EOMI  HENT:      NC/AT, OP clear, airway patent, MM  CV:      RRR, no MRG, no edema  PULM:     CTAB, no w/r/r, easy WOB, symmetric chest rise  ABD:      Soft, NT, ND, no masses, BS +, right lower abdominal ostomy pink, no surrounding erythema, ostomy appliance intact with brown stool  :       No CVA TTP  NEURO:   A&Ox3, no focal deficits    MSK:      FROM, no joint deformities or swelling, no e/o trauma  SKIN:       Warm and dry  PSYCH:    Appropriate mood and affect         MDM/ED Course:     Kyra Behnfeldt is a 23 y.o. female PMH Crohn colitis, ileostomy creation, presenting for evaluation of increasing fatigue in setting of downtrending hemoglobin.  Vitals reassuring.  Exam as documented above.  Given  increasing fatigue and recently downtrending hemoglobin, differential with symptomatic anemia.  No chest pain or shortness of breath, no respecters, low suspicion for ACS.  Differential includes viral syndrome.  Differential includes medication reaction.  Patient has been on a prednisone taper however has been gradual, low suspicion for adrenal suppression.  Patient is closely managed by her GI provider.  GI symptoms have been improving recently potentially due to most recent Remicade infusion.  Plan to obtain labs and reassess.  IV placed and labs drawn.  CBC with WBC 14.6 improved from 17.92 days ago.  Hemoglobin 9.2 from last 8.6.  Chemistry with reassuring renal function, electrolytes, liver enzymes, and glucose.  Symptoms potentially related to symptomatic anemia however uptrending which is reassuring.  Discussed findings with patient and ultimately plan was made to follow-up with her GI provider via phone tomorrow and return with any worsening symptoms.  Patient was explained findings, exam/study results, the importance of follow up and the plan moving forward; patient verbalized understanding and agreement. All questions were answered and no new questions at this time. Patient will be discharged with strict return precautions, follow up plan with PCP.     ED Course as of 07/07/24 0836   Thu Jul 04, 2024   2354 Hemoglobin 9.2 from 8.6, patient feels comfortable discharge.  Hemodynamically stable.  Continues have leukocytosis however is on steroids, suspect this is the cause.  Abdomen soft nontender.  Plan for her to follow-up with her GI provider office tomorrow via phone.  Return precaution discussed. [JM]      ED Course User Index  [JM] Twan Castro MD         Diagnoses as of 07/07/24 0836   Anemia, unspecified type   Other fatigue         Chronic medical conditions affecting care listed in MDM. I independently reviewed imaging studies and agreed with radiology reads. I reviewed recent and relevant  outside records including PCP notes, Prior discharge summaries, and prior radiology reports.    Procedure  Procedures    Diagnosis:   1.  Generalized fatigue  2.  Anemia    Dispo: Discharged in stable condition      Disclaimer: Portions of this note were dictated by speech recognition. An attempt at proof reading was made to minimize errors. Minor errors in transcription may be present.  Please call if questions.     Twan Castro MD  07/07/24 2115

## 2024-07-08 LAB
INFLIXIMAB AB SERPL IA-MCNC: <20 NG/ML
INFLIXIMAB SERPL IA-MCNC: 4.3 UG/ML

## 2024-07-10 NOTE — PROGRESS NOTES
REASON FOR VISIT:  Crohn's disease    HPI:  Kyra Behnfeldt is a 23 y.o. female who presents for follow-up.  Last seen 4/2024.  Suspected Crohn's (?) colitis dx 12/2023.  Symptoms started spring 2023.  Colonoscopy with Dr. Mane Wang which showed colitis in distal rectum and proximal transverse colon.  Her disease rapidly progressed and she required steroid and two hospitalization.  She received a total of 3 doses of Humira (2/3, 2/17 and 3/2/2024).  Her symptoms continued to worsen.  Inpatient flex sig 3/4/24 at [facility] showed severe colitis from rectum to transverse colon, small hemorrhoids; CMV neg.  Patient wanted to avoid surgery if possible and decision was to proceed with rescue Remicade.  She was transferred to [facility] on 3/7/24 for Remicade infusion and she received 10mg/kg on 3/7/24.  Patient felt a slight improvement of symptoms after initial Remicade but her CRP continued to rise, therefore, she underwent diverting loop ileostomy on 3/10/24 with Dr. Castro. Received 2nd dose of Remicade 10mg/kg on 3/11/24.  She was discharged home 3/18/24 and required 1 unit RBC transfusion on 3/27/2024 in [facility] ED.    Continued on infliximab 10 mg/kg and azathioprine 50 mg daily added.  Received third infliximab dose 4/9/2024.  Anser IFX 4/1/24 just 1.9; , approximately 21 day level.     Initially felt well on infliximab, but as tapered off prednisone developed increasing bloody mucoid discharge and worsened iron deficiency anemia.  Has now had 2 maintenance doses of 10 mg/kg every 6 weeks, last 7/2/24.    7/2/24  hgb 9.2, WBC 14.6,  6TG 99 with 6MMP undetectable, IFX 4.3 with no antibodies.    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  Hb was >9.    Reports since the infusion 7/2/24 feeling better, still having some pain (lower abdominal and rectal) and bleeding daily but maroon mucus and mainly in the  morning 3 times a day; prior to this infusion it was 7+ times a day and bleeding was more jovita blood. Reports that after the infusions previously things seem to get better but then the closer to the next infusion they would worsen and she is worried this may happen again. Mostly in morning having the pain. No rashes, no joint aches or pains.     Does report about a week prior to the last infusion she did stop taking the Azathioprine as she noticed she was losing hair and her symptoms were worse overall so she stopped taking the medication.     Reports her diverting ileostomy is working very well and no issues and no blood in her bag which she is emptying 7 times a day. No fever or chills.  Weight is OK      REVIEW OF SYSTEMS  Complete review of systems otherwise negative per complaint    No Known Allergies    Past Medical History:   Diagnosis Date    Benign neoplasm of cecum     CC (Crohn's colitis) (Multi)     Colitis     Generalized abdominal pain     Hypokalemia     Internal hemorrhoid     Rectal bleeding        Past Surgical History:   Procedure Laterality Date    COLONOSCOPY      ILEOSTOMY         Current Outpatient Medications   Medication Sig Dispense Refill    acetaminophen (Tylenol) 325 mg tablet Take 2 tablets (650 mg) by mouth every 4 hours if needed for mild pain (1 - 3). 30 tablet 1    ALPRAZolam (Xanax) 0.5 mg tablet Take 0.5 tablets (0.25 mg) by mouth 2 times a day as needed for anxiety for up to 7 days. 14 tablet 0    azaTHIOprine (Imuran) 50 mg tablet Take 1 tablet (50 mg) by mouth once daily. 30 tablet 11    diphenhydramine/Maalox/lidocaine (Magic Mouthwash) - Compounded - Outpatient Use 5 ml swish and spit as needed for 30 days 120 mL 1    escitalopram (Lexapro) 5 mg tablet Take 1 tablet (5 mg) by mouth once daily.      ferrous fumarate-vitamin C (Mey-Sequeles 65-25) Take 1 tablet by mouth 3 times daily (morning, midday, late afternoon). Do not crush, chew, or split.      hydrOXYzine HCL  (Atarax) 50 mg tablet Take 1 tablet (50 mg) by mouth once daily at bedtime.      inFLIXimab-dyyb (Inflectra) 100 mg injection Infuse 500 mg into a venous catheter see administration instructions.  For Maintenance: Every 6 weeks 5 each 5    methocarbamol (Robaxin) 500 mg tablet Take 1 tablet (500 mg) by mouth every 6 hours for 8 days. 32 tablet 0     No current facility-administered medications for this visit.       Vitals:    07/11/24 0936   BP: 111/72   Pulse: 97   Temp: 36.4 °C (97.6 °F)       PHYSICAL EXAM:  General: well-nourished, no acute distress  HEENT: PERRLA, EOM intact, no scleral icterus, moist MM  Respiratory: CTA bilaterally, normal work of breathing  Cardiovascular: RRR, no murmurs/rubs/gallops  Abdomen: Soft, nontender, nondistended, BS+, diverting ileostomy in place with bag covering and loose stool seen   Extremities: no edema, no asterixis  Neuro: alert and oriented, moves all 4 extremities with no focal deficits     There were no vitals taken for this visit.     Lab Results   Component Value Date    WBC 14.6 (H) 07/04/2024    HGB 9.2 (L) 07/04/2024    HCT 31.2 (L) 07/04/2024    MCV 85 07/04/2024     (H) 07/04/2024     Lab Results   Component Value Date    ALT 16 07/04/2024    AST 13 07/04/2024    ALKPHOS 98 07/04/2024    BILITOT 0.2 07/04/2024       === 03/07/24 ===    CT ABDOMEN PELVIS W IV CONTRAST    - Impression -  1. Dilated small bowel loops are present to the ostomy with mild wall  thickening and mucosal enhancement at the ostomy, suggestive of  inflammation. No discrete fluid collection or fistula is evident.  2. Mild interval increase in segments of large bowel wall  thickening/enhancement.  3. Moderate amount of simple fluid density ascites layering within  the pelvis.    I personally reviewed the images/study and I agree with the findings  as stated by Dr. Jose Naik. This study was interpreted at  [facility], [place],  [place].    MACRO:  None    Signed by: Kane Sanford 3/16/2024 12:40 PM  Dictation workstation:   [id number]      ASSESSMENT  #IBD colitis: Most likely Crohn's colitis though still could be rapidly progressive UC.  Although she has improved on infliximab therapy, she remains tenuous, with recurrent anemia and symptoms of abdominal discomfort.    Currently on q6 week Infliximab infusions. From a clinical standpoint, her symptoms have significantly improved after her last infusion 7/2/24 however still having daily rectal bleeding.  Unclear if related to diversion colitis or active disease.  However, the improvement in symptoms after her recent infliximab infusion 7/2/2024 suggest that this is indeed disease driven and may be related to infliximab levels.  Her last level was improved at 4.3, though it is still below the ideal infliximab level.  Her ileostomy output is appropriate with regards to frequency and consistency.     Her 6-thioguanine level was a bit low at 99 and she recently stopped her azathioprine due to hair loss, though suspect this may have been due to how sick she previously and may not be the azathioprine.  Will increase azathioprine to 100 mg daily to see if this can help control her disease.    We will go ahead and get her on for colonoscopy next week to evaluate mucosal disease/healing.  Based on this, we can decide if infliximab is the way to go, possibly moving to every 4-week infusions, or if we should consider change in therapy, likely to a Lizandro inhibitor.    # Iron deficiency anemia-she is losing blood and likely has recurring iron deficiency anemia from this.  We will go ahead and get her set up for intravenous iron.    PLAN  1) increase azathioprine from 50 mg daily to 50 mg twice daily  2) we will plan to move infliximab infusions to every 4 weeks  3) we will get you approved for and scheduled for intravenous iron therapy  4) we will plan for colonoscopy on Friday, 7/19/2024 at the [facility]  5) follow-up in the office in 4 weeks  6) repeat labs as ordered 1 week/several days prior to follow-up

## 2024-07-11 ENCOUNTER — OFFICE VISIT (OUTPATIENT)
Dept: GASTROENTEROLOGY | Facility: CLINIC | Age: 24
End: 2024-07-11
Payer: COMMERCIAL

## 2024-07-11 ENCOUNTER — DOCUMENTATION (OUTPATIENT)
Dept: INFUSION THERAPY | Facility: CLINIC | Age: 24
End: 2024-07-11

## 2024-07-11 VITALS
SYSTOLIC BLOOD PRESSURE: 111 MMHG | DIASTOLIC BLOOD PRESSURE: 72 MMHG | HEART RATE: 97 BPM | BODY MASS INDEX: 20.55 KG/M2 | TEMPERATURE: 97.6 F | HEIGHT: 63 IN | WEIGHT: 116 LBS

## 2024-07-11 DIAGNOSIS — D50.0 IRON DEFICIENCY ANEMIA DUE TO CHRONIC BLOOD LOSS: ICD-10-CM

## 2024-07-11 DIAGNOSIS — K62.5 RECTAL BLEEDING: ICD-10-CM

## 2024-07-11 DIAGNOSIS — D50.0 IRON DEFICIENCY ANEMIA DUE TO CHRONIC BLOOD LOSS: Primary | ICD-10-CM

## 2024-07-11 DIAGNOSIS — K50.10 CROHN'S DISEASE OF COLON WITHOUT COMPLICATION (MULTI): Primary | ICD-10-CM

## 2024-07-11 PROCEDURE — 99214 OFFICE O/P EST MOD 30 MIN: CPT | Performed by: INTERNAL MEDICINE

## 2024-07-11 PROCEDURE — 1036F TOBACCO NON-USER: CPT | Performed by: INTERNAL MEDICINE

## 2024-07-11 RX ORDER — DIPHENHYDRAMINE HYDROCHLORIDE 50 MG/ML
50 INJECTION INTRAMUSCULAR; INTRAVENOUS AS NEEDED
OUTPATIENT
Start: 2024-07-11

## 2024-07-11 RX ORDER — FAMOTIDINE 10 MG/ML
20 INJECTION INTRAVENOUS ONCE AS NEEDED
OUTPATIENT
Start: 2024-07-11

## 2024-07-11 RX ORDER — EPINEPHRINE 0.3 MG/.3ML
0.3 INJECTION SUBCUTANEOUS EVERY 5 MIN PRN
OUTPATIENT
Start: 2024-07-11

## 2024-07-11 RX ORDER — FERUMOXYTOL 510 MG/17ML
510 INJECTION INTRAVENOUS
Status: SHIPPED | OUTPATIENT
Start: 2024-07-11 | End: 2024-07-25

## 2024-07-11 RX ORDER — ALBUTEROL SULFATE 0.83 MG/ML
3 SOLUTION RESPIRATORY (INHALATION) AS NEEDED
OUTPATIENT
Start: 2024-07-11

## 2024-07-11 RX ORDER — FERUMOXYTOL 510 MG/17ML
510 INJECTION INTRAVENOUS ONCE
Status: CANCELLED | OUTPATIENT
Start: 2024-07-11 | End: 2024-07-11

## 2024-07-11 ASSESSMENT — PAIN SCALES - GENERAL: PAINLEVEL: 0-NO PAIN

## 2024-07-11 NOTE — PATIENT INSTRUCTIONS
As we discussed, it is encouraging that you felt better after the recent infliximab infusion.  However, it is a bit difficult to know for certain how well you are responding to infliximab therapy.  At this time I recommend the following approach:    1) increase azathioprine from 50 mg daily to 50 mg twice daily  2) we will plan to move infliximab infusions to every 4 weeks  3) we will get you approved for and scheduled for intravenous iron therapy  4) we will plan for colonoscopy on Friday, 7/19/2024 at the [facility]  5) follow-up in the office in 4 weeks  6) repeat labs as ordered 1 week/several days prior to follow-up

## 2024-07-18 ENCOUNTER — ANESTHESIA EVENT (OUTPATIENT)
Dept: GASTROENTEROLOGY | Facility: HOSPITAL | Age: 24
End: 2024-07-18
Payer: COMMERCIAL

## 2024-07-18 RX ORDER — ONDANSETRON HYDROCHLORIDE 2 MG/ML
4 INJECTION, SOLUTION INTRAVENOUS ONCE AS NEEDED
Status: CANCELLED | OUTPATIENT
Start: 2024-07-18

## 2024-07-18 RX ORDER — SODIUM CHLORIDE, SODIUM LACTATE, POTASSIUM CHLORIDE, CALCIUM CHLORIDE 600; 310; 30; 20 MG/100ML; MG/100ML; MG/100ML; MG/100ML
20 INJECTION, SOLUTION INTRAVENOUS CONTINUOUS
Status: CANCELLED | OUTPATIENT
Start: 2024-07-18

## 2024-07-18 NOTE — ANESTHESIA PREPROCEDURE EVALUATION
Patient: Kyra Behnfeldt    Procedure Information       Date/Time: 07/19/24 1100    Scheduled providers: Javy Lozano MD    Procedure: COLONOSCOPY    Location: [facility]            Relevant Problems   GI   (+) Crohn's colitis, unspecified complication (Multi)   (+) Crohn's colitis, with rectal bleeding (Multi)      Hematology   (+) Iron deficiency anemia due to chronic blood loss       Clinical information reviewed:                   NPO Detail:  No data recorded     Physical Exam    Airway  Mallampati: II     Cardiovascular   Rhythm: regular  Rate: normal     Dental    Pulmonary    Abdominal            Anesthesia Plan    History of general anesthesia?: yes  History of complications of general anesthesia?: no    ASA 2     MAC     intravenous induction   Anesthetic plan and risks discussed with patient.    Plan discussed with CRNA and CAA.

## 2024-07-19 ENCOUNTER — ANESTHESIA (OUTPATIENT)
Dept: GASTROENTEROLOGY | Facility: HOSPITAL | Age: 24
End: 2024-07-19
Payer: COMMERCIAL

## 2024-07-19 ENCOUNTER — DOCUMENTATION (OUTPATIENT)
Dept: GASTROENTEROLOGY | Facility: HOSPITAL | Age: 24
End: 2024-07-19

## 2024-07-19 ENCOUNTER — HOSPITAL ENCOUNTER (OUTPATIENT)
Dept: GASTROENTEROLOGY | Facility: HOSPITAL | Age: 24
Setting detail: OUTPATIENT SURGERY
Discharge: HOME | End: 2024-07-19
Payer: COMMERCIAL

## 2024-07-19 VITALS
TEMPERATURE: 97.2 F | DIASTOLIC BLOOD PRESSURE: 68 MMHG | OXYGEN SATURATION: 100 % | RESPIRATION RATE: 16 BRPM | WEIGHT: 115.52 LBS | HEART RATE: 87 BPM | SYSTOLIC BLOOD PRESSURE: 112 MMHG | HEIGHT: 63 IN | BODY MASS INDEX: 20.47 KG/M2

## 2024-07-19 DIAGNOSIS — K50.10 CROHN'S DISEASE OF COLON WITHOUT COMPLICATION (MULTI): ICD-10-CM

## 2024-07-19 LAB — PREGNANCY TEST URINE, POC: NEGATIVE

## 2024-07-19 PROCEDURE — 3700000002 HC GENERAL ANESTHESIA TIME - EACH INCREMENTAL 1 MINUTE

## 2024-07-19 PROCEDURE — 2500000004 HC RX 250 GENERAL PHARMACY W/ HCPCS (ALT 636 FOR OP/ED): Performed by: INTERNAL MEDICINE

## 2024-07-19 PROCEDURE — 81025 URINE PREGNANCY TEST: CPT | Performed by: INTERNAL MEDICINE

## 2024-07-19 PROCEDURE — 45380 COLONOSCOPY AND BIOPSY: CPT | Mod: 52 | Performed by: INTERNAL MEDICINE

## 2024-07-19 PROCEDURE — 2500000005 HC RX 250 GENERAL PHARMACY W/O HCPCS: Performed by: ANESTHESIOLOGIST ASSISTANT

## 2024-07-19 PROCEDURE — 2500000004 HC RX 250 GENERAL PHARMACY W/ HCPCS (ALT 636 FOR OP/ED): Performed by: ANESTHESIOLOGIST ASSISTANT

## 2024-07-19 PROCEDURE — 7100000009 HC PHASE TWO TIME - INITIAL BASE CHARGE

## 2024-07-19 PROCEDURE — 7100000010 HC PHASE TWO TIME - EACH INCREMENTAL 1 MINUTE

## 2024-07-19 PROCEDURE — 3700000001 HC GENERAL ANESTHESIA TIME - INITIAL BASE CHARGE

## 2024-07-19 RX ORDER — SODIUM CHLORIDE, SODIUM LACTATE, POTASSIUM CHLORIDE, CALCIUM CHLORIDE 600; 310; 30; 20 MG/100ML; MG/100ML; MG/100ML; MG/100ML
20 INJECTION, SOLUTION INTRAVENOUS CONTINUOUS
Status: DISCONTINUED | OUTPATIENT
Start: 2024-07-19 | End: 2024-07-20 | Stop reason: HOSPADM

## 2024-07-19 RX ORDER — MIDAZOLAM HYDROCHLORIDE 1 MG/ML
INJECTION INTRAMUSCULAR; INTRAVENOUS AS NEEDED
Status: DISCONTINUED | OUTPATIENT
Start: 2024-07-19 | End: 2024-07-19

## 2024-07-19 RX ORDER — PROPOFOL 10 MG/ML
INJECTION, EMULSION INTRAVENOUS CONTINUOUS PRN
Status: DISCONTINUED | OUTPATIENT
Start: 2024-07-19 | End: 2024-07-19

## 2024-07-19 RX ORDER — LIDOCAINE HYDROCHLORIDE 20 MG/ML
INJECTION, SOLUTION EPIDURAL; INFILTRATION; INTRACAUDAL; PERINEURAL AS NEEDED
Status: DISCONTINUED | OUTPATIENT
Start: 2024-07-19 | End: 2024-07-19

## 2024-07-19 ASSESSMENT — PAIN - FUNCTIONAL ASSESSMENT
PAIN_FUNCTIONAL_ASSESSMENT: 0-10

## 2024-07-19 ASSESSMENT — PAIN SCALES - GENERAL
PAINLEVEL_OUTOF10: 6
PAINLEVEL_OUTOF10: 6
PAINLEVEL_OUTOF10: 0 - NO PAIN
PAINLEVEL_OUTOF10: 6
PAINLEVEL_OUTOF10: 0 - NO PAIN

## 2024-07-19 NOTE — SIGNIFICANT EVENT
Pt dressed with minimal assist of friend.  Ambulated to bathroom with steady gait and return to room.  Discharge completed

## 2024-07-19 NOTE — ANESTHESIA POSTPROCEDURE EVALUATION
Patient: Kyra Behnfeldt    Procedure Summary       Date: 07/19/24 Room / Location: [facility]    Anesthesia Start: 1045 Anesthesia Stop: 1122    Procedure: COLONOSCOPY Diagnosis: Crohn's disease of colon without complication (Multi)    Scheduled Providers: Javy Lozano MD; Javy Robertson MD; MERCEDES Castañeda; James De La Vega RN Responsible Provider: Javy Robertson MD    Anesthesia Type: MAC ASA Status: 2            Anesthesia Type: MAC    Vitals Value Taken Time   /71 07/19/24 1116   Temp 36.2 °C (97.2 °F) 07/19/24 1116   Pulse 72 07/19/24 1116   Resp 16 07/19/24 1116   SpO2 100 % 07/19/24 1116       Anesthesia Post Evaluation    Patient location during evaluation: bedside  Patient participation: complete - patient participated  Level of consciousness: awake  Pain management: adequate  Airway patency: patent  Cardiovascular status: acceptable  Respiratory status: acceptable  Hydration status: acceptable  Postoperative Nausea and Vomiting: none        There were no known notable events for this encounter.

## 2024-07-19 NOTE — H&P
History Of Present Illness  Kyra Behnfeldt is a 23 y.o. female presenting with IBD colitis s/p loop ileostomy on combination therapy with infliximab and azathioprine for disease assessment.       Past Medical History  Past Medical History:   Diagnosis Date    Benign neoplasm of cecum     CC (Crohn's colitis) (Multi)     Colitis     Generalized abdominal pain     Hypokalemia     Internal hemorrhoid     Rectal bleeding      Surgical History  Past Surgical History:   Procedure Laterality Date    COLONOSCOPY      ILEOSTOMY       Social History  She reports that she has never smoked. She has never been exposed to tobacco smoke. She has never used smokeless tobacco. She reports that she does not currently use alcohol. She reports current drug use. Drug: Marijuana.    Family History  No family history on file.     Allergies  No Known Allergies  Review of Systems     Physical Exam   Vital signs reviewed  Patient alert and oriented in no acute distress  Cardiac exam regular rate and rhythm S1-S2 without murmurs gallops or rubs  Lungs clear to auscultation bilaterally  Abdomen soft and nontender    Last Recorded Vitals  LMP 07/05/2024 Comment: hcg negative    Last menstrual period 07/05/2024.    Assessment/Plan   IBD colitis for disease assessment.     Javy Lozano MD

## 2024-07-19 NOTE — PROGRESS NOTES
Now 2 weeks from IFX infusion and feels OK.  Still with pain whenever passes mucus/blood per rectum.  Q 4 week IFX denied so far.    Colonoscopy shows improvement in rectum and sigmoid, but persistent inflammatory disease with nodular and cobblestoned mucosa in descending and transverse colon.   Could not pass proximal TC/hepatic flexure-?stricture.  Biopsies obtained.    Perhaps partial response to combination therapy with IFX/AZA.  Rectum and recto-sigmoid better, but rest of colon inflamed and unable to pass into the right colon-? Stricture?    Will try for Q 4 week infliximab as recent level low at 4.3.  If no response to Q4 week infusions, may need to consider surgery vs. change in therapy.  Might be a candidate for STC with IR anastomosis.  Original presentation with clear skip areas favors diagnosis of Crohn's colitis over UC.

## 2024-08-01 ENCOUNTER — APPOINTMENT (OUTPATIENT)
Dept: INFUSION THERAPY | Facility: CLINIC | Age: 24
End: 2024-08-01
Payer: COMMERCIAL

## 2024-08-01 VITALS
DIASTOLIC BLOOD PRESSURE: 78 MMHG | TEMPERATURE: 97.7 F | HEART RATE: 85 BPM | SYSTOLIC BLOOD PRESSURE: 111 MMHG | RESPIRATION RATE: 16 BRPM | OXYGEN SATURATION: 98 % | BODY MASS INDEX: 21.56 KG/M2 | WEIGHT: 121.69 LBS

## 2024-08-01 DIAGNOSIS — K50.111 CROHN'S COLITIS, WITH RECTAL BLEEDING (MULTI): ICD-10-CM

## 2024-08-01 DIAGNOSIS — D50.0 IRON DEFICIENCY ANEMIA DUE TO CHRONIC BLOOD LOSS: ICD-10-CM

## 2024-08-01 DIAGNOSIS — K50.111 CROHN'S COLITIS, WITH RECTAL BLEEDING (MULTI): Primary | ICD-10-CM

## 2024-08-01 PROCEDURE — 96365 THER/PROPH/DIAG IV INF INIT: CPT | Performed by: NURSE PRACTITIONER

## 2024-08-01 RX ORDER — ALBUTEROL SULFATE 0.83 MG/ML
3 SOLUTION RESPIRATORY (INHALATION) AS NEEDED
OUTPATIENT
Start: 2024-08-08

## 2024-08-01 RX ORDER — FAMOTIDINE 10 MG/ML
20 INJECTION INTRAVENOUS ONCE AS NEEDED
OUTPATIENT
Start: 2024-08-01

## 2024-08-01 RX ORDER — DIPHENHYDRAMINE HYDROCHLORIDE 50 MG/ML
50 INJECTION INTRAMUSCULAR; INTRAVENOUS AS NEEDED
OUTPATIENT
Start: 2024-08-08

## 2024-08-01 RX ORDER — DIPHENHYDRAMINE HYDROCHLORIDE 50 MG/ML
50 INJECTION INTRAMUSCULAR; INTRAVENOUS AS NEEDED
OUTPATIENT
Start: 2024-08-01

## 2024-08-01 RX ORDER — EPINEPHRINE 0.3 MG/.3ML
0.3 INJECTION SUBCUTANEOUS EVERY 5 MIN PRN
OUTPATIENT
Start: 2024-08-01

## 2024-08-01 RX ORDER — ALBUTEROL SULFATE 0.83 MG/ML
3 SOLUTION RESPIRATORY (INHALATION) AS NEEDED
OUTPATIENT
Start: 2024-08-01

## 2024-08-01 RX ORDER — FAMOTIDINE 10 MG/ML
20 INJECTION INTRAVENOUS ONCE AS NEEDED
OUTPATIENT
Start: 2024-08-08

## 2024-08-01 RX ORDER — EPINEPHRINE 0.3 MG/.3ML
0.3 INJECTION SUBCUTANEOUS EVERY 5 MIN PRN
OUTPATIENT
Start: 2024-08-08

## 2024-08-01 RX ORDER — ACETAMINOPHEN 325 MG/1
650 TABLET ORAL ONCE
OUTPATIENT
Start: 2024-08-01 | End: 2024-08-01

## 2024-08-01 ASSESSMENT — ENCOUNTER SYMPTOMS
WHEEZING: 0
BLOOD IN STOOL: 0
DIZZINESS: 0
TROUBLE SWALLOWING: 0
VOICE CHANGE: 0
HEMATURIA: 0
PALPITATIONS: 0
HEADACHES: 0
FEVER: 0
WOUND: 0
DYSURIA: 0
CHILLS: 0
ABDOMINAL PAIN: 0
EXTREMITY WEAKNESS: 0
NAUSEA: 0
VOMITING: 0
EYE PROBLEMS: 0
CONSTIPATION: 0
FREQUENCY: 0
SHORTNESS OF BREATH: 0
NERVOUS/ANXIOUS: 0
MYALGIAS: 0
COUGH: 0
FATIGUE: 0
SORE THROAT: 0
LEG SWELLING: 0
UNEXPECTED WEIGHT CHANGE: 0
NUMBNESS: 0
LIGHT-HEADEDNESS: 0
ARTHRALGIAS: 0
APPETITE CHANGE: 0
DEPRESSION: 0
DIARRHEA: 0

## 2024-08-01 NOTE — PROGRESS NOTES
[facility]   Infusion Clinic Note   Date: 2024   Name: Kyra Behnfeldt  : 2000   MRN: [id number]         Reason for Visit: Follow-up and OP Infusion (PATIENT IS HERE FOR DOSE 1 OF 2 OF FERAHEME 510 MG INFUSION EVERY 7 DAYS. PATIENT IS ALSO SCHEDULED FOR INFLECTRA 600 MG INFUSION EVERY 4 WEEKS. )         Today: We administered ferumoxytol (Feraheme) 510 mg in sodium chloride 0.9% 117 mL IV.       Visit Type: INFUSION       Ordered By: DR. MARKS      Accompanied by:Self      Diagnosis: Iron deficiency anemia due to chronic blood loss       Allergies:   Allergies as of 2024    (No Known Allergies)         Current Medications has a current medication list which includes the following prescription(s): acetaminophen, escitalopram, ferrous fumarate-vitamin c, hydroxyzine hcl, infliximab-dyyb, alprazolam, azathioprine, diphenhydramine/Maalox/lidocaine (Magic Mouthwash) - Compounded - Outpatient, infliximab-dyyb, and methocarbamol.       Vitals:   Vitals:    24 0810 24 0930 24 0945   BP: 110/70 109/74 111/78   Pulse: 88 98 85   Resp: 16 16 16   Temp: 36.5 °C (97.7 °F) 36.5 °C (97.7 °F) 36.5 °C (97.7 °F)   SpO2: 96% 99% 98%   Weight: 55.2 kg (121 lb 11.1 oz)     LMP: 2024             Infusion Pre-procedure Checklist:   - Allergies reviewed: yes   - Medications reviewed: yes       - Previous reaction to current treatment: no NOT TO INFLECTRA. FIRST DOSE OF FERAHEME TODAY, BUT HAS HAD VENOFER IN THE PAST.      Assess patient for the concerns below. Document provider notification as appropriate.  - Active or recent infection with/without current antibiotic use: no  - Recent or planned invasive dental work: no  - Recent or planned surgeries: no  - Recently received or plans to receive vaccinations: no  - Has treatment related toxicities: no NO REACTION IN THE PAST TO VENOFER.  - Is pregnant:  no      Pain: 0   - Is the pain different from normal: n/a   - Is the  pain tolerable: n/a   - Is your Doctor aware:  n/a      Labs: N/A         Fall Risk Screening: Foster Fall Risk  History of Falling, Immediate or Within 3 Months: No  Secondary Diagnosis: No  Ambulatory Aid: Walks without aid/bedrest/nurse assist  Intravenous Therapy/Heparin Lock: Yes  Gait/Transferring: Normal/bedrest/immobile  Mental Status: Oriented to own ability  Foster Fall Risk Score: 20       Review Of Systems:  Review of Systems   Constitutional:  Negative for appetite change, chills, fatigue, fever and unexpected weight change.   HENT:   Negative for hearing loss, mouth sores, sore throat, tinnitus, trouble swallowing and voice change.    Eyes:  Negative for eye problems.   Respiratory:  Negative for cough, shortness of breath and wheezing.    Cardiovascular:  Negative for chest pain, leg swelling and palpitations.   Gastrointestinal:  Negative for abdominal pain, blood in stool, constipation, diarrhea, nausea and vomiting.        PATIENT HAS ILEOSTOMY.  RECTAL BLOOD DAILY   Genitourinary:  Negative for dysuria, frequency and hematuria.    Musculoskeletal:  Negative for arthralgias and myalgias.   Skin:  Negative for itching, rash and wound.   Neurological:  Negative for dizziness, extremity weakness, headaches, light-headedness and numbness.   Psychiatric/Behavioral:  Negative for depression. The patient is not nervous/anxious.         ON LEXAPRO FOR ANXIETY. PATIENT STATES MAY STOP TAKING. FEELS SHE DOES NOT NEED IT.         Infusion Readiness:   - Assessment Concerns Related to Infusion: No  - Provider notified: n/a      Document Below Only If Indicated:   New Patient Education:    NEW PATIENT MEDICATION EDUCATION PT PROVIDED WITH WRITTEN ([employer] PT EDUCATION SHEET) AND VERBAL EDUCATION REGARDING MEDICATION GIVEN. VERIFIED MEDICATION NAME WITH PATIENT AND DISCUSSED REASON FOR USE. BRIEFLY DISCUSSED HOW MEDICATION WORKS AND EDUCATED ON GOAL OF TREATMENT, FREQUENCY OF TREATMENT, ADVERSE RXN'S AND COMMON  SIDE EFFECTS TO MONITOR FOR. INSTRUCTED PT TO ASSURE THAT ALL PROVIDERS INCLUDING DENTISTS ARE AWARE OF MEDICATION RECEIVED. DISCUSSED FLOW OF VISIT AND ORIENTED TO INFUSION CENTER. PT VERBALIZES UNDERSTANDING. CALL LIGHT PROVIDED AND PT AWARE TO ALERT STAFF OF ANY CONCERNS DURING TREATMENT.  **PATIENT DECLINE WRITTEN MATERIALS FOR FERAHEME.      Treatment Conditions & Drug Specific Questions:    Ferumoxytol  (FERAHEME)    (Unless otherwise specified on patient specific therapy plan):    REMINDERS:  May cause hypotension, patient should be in a reclined or semi-reclined position during infusion.    Ferumoxytol can interfere with MR imaging and Radiology should be notified if a patient has received ferumoxytol within 3 months of the anticipated MR.    Recommended Vitals/Observation:  Vitals: Obtain vital signs before, immediately following infusion, and 30 minutes after completion of infusion.  Observation: 30 minutes after each infusion.     Lab Results   Component Value Date    IRON 85 07/04/2024    TIBC 426 07/04/2024    FERRITIN 73 04/17/2024      Lab Results   Component Value Date    IRONSAT 20 (L) 07/04/2024      Lab Results   Component Value Date    WBC 14.6 (H) 07/04/2024    HGB 9.2 (L) 07/04/2024    HCT 31.2 (L) 07/04/2024    MCV 85 07/04/2024     (H) 07/04/2024            Weight Based Drug Calculations:    WEIGHT BASED DRUGS: PATIENT DID NOT RECEIVE INFLECTRA INFUSION TODAY.    10% weight variance for prescribed treatment    Patient's weight today:   Vitals:    08/01/24 0810   Weight: 55.2 kg (121 lb 11.1 oz)         weight range for prescribed dose:     Patient weight today falls outside of 10% variance or  weight range: NA     Home Care pharmacist informed of weight variance: NA    Doses that are weight based have an acceptable variance rule within 10% of the prescribed   order and/or within  weight range. If patient weight on day of infusion falls   outside of  the 10% variance, or weight range, infusion is administered and   pharmacy contacted regarding future dosing adjustments, per policy.         Initiated By: Staci Rojas RN

## 2024-08-01 NOTE — PATIENT INSTRUCTIONS
Today :We administered ferumoxytol (Feraheme) 510 mg in sodium chloride 0.9% 117 mL IV.     For:   1. Iron deficiency anemia due to chronic blood loss         Your next appointment is due in:  7 DAYS FOR DOSE 2.       Please read the  Medication Guide that was given to you and reviewed during todays visit.     (Tell all doctors including dentists that you are taking this medication)     Go to the emergency room or call 911 if:  -You have signs of allergic reaction:   -Rash, hives, itching.   -Swollen, blistered, peeling skin.   -Swelling of face, lips, mouth, tongue or throat.   -Tightness of chest, trouble breathing, swallowing or talking     Call your doctor:  - If IV / injection site gets red, warm, swollen, itchy or leaks fluid or pus.     (Leave dressing on your IV site for at least 2 hours and keep area clean and dry  - If you get sick or have symptoms of infection or are not feeling well for any reason.    (Wash your hands often, stay away from people who are sick)  - If you have side effects from your medication that do not go away or are bothersome.     (Refer to the teaching your nurse gave you for side effects to call your doctor about)    - Common side effects may include:  stuffy nose, headache, feeling tired, muscle aches, upset stomach  - Before receiving any vaccines     - Call [facility] at   If:  - You get sick, are on antibiotics, have had a recent vaccine, have surgery or dental work and your doctor wants your visit rescheduled.  - You need to cancel and reschedule your visit for any reason. Call at least 2 days before your visit if you need to cancel.   - Your insurance changes before your next visit.    (We will need to get approval from your new insurance. This can take up to two weeks.)     [facility] is opened Monday thru Friday. We are closed on weekends and holidays.   Voice mail will take your call if the center is closed. If you leave a message  please allow 24 hours for a call back during weekdays. If you leave a message on a weekend/holiday, we will call you back the next business day.

## 2024-08-02 LAB
LABORATORY COMMENT REPORT: NORMAL
PATH REPORT.COMMENTS IMP SPEC: NORMAL
PATH REPORT.FINAL DX SPEC: NORMAL
PATH REPORT.GROSS SPEC: NORMAL
PATH REPORT.TOTAL CANCER: NORMAL

## 2024-08-08 ENCOUNTER — APPOINTMENT (OUTPATIENT)
Dept: INFUSION THERAPY | Facility: CLINIC | Age: 24
End: 2024-08-08
Payer: COMMERCIAL

## 2024-08-08 VITALS
RESPIRATION RATE: 16 BRPM | BODY MASS INDEX: 21.87 KG/M2 | WEIGHT: 123.46 LBS | DIASTOLIC BLOOD PRESSURE: 73 MMHG | HEART RATE: 85 BPM | OXYGEN SATURATION: 99 % | TEMPERATURE: 98.1 F | SYSTOLIC BLOOD PRESSURE: 110 MMHG

## 2024-08-08 DIAGNOSIS — D50.0 IRON DEFICIENCY ANEMIA DUE TO CHRONIC BLOOD LOSS: ICD-10-CM

## 2024-08-08 DIAGNOSIS — K50.111 CROHN'S COLITIS, WITH RECTAL BLEEDING (MULTI): Primary | ICD-10-CM

## 2024-08-08 DIAGNOSIS — K50.111 CROHN'S COLITIS, WITH RECTAL BLEEDING (MULTI): ICD-10-CM

## 2024-08-08 PROCEDURE — 96413 CHEMO IV INFUSION 1 HR: CPT | Performed by: NURSE PRACTITIONER

## 2024-08-08 PROCEDURE — 96365 THER/PROPH/DIAG IV INF INIT: CPT | Performed by: NURSE PRACTITIONER

## 2024-08-08 RX ORDER — FAMOTIDINE 10 MG/ML
20 INJECTION INTRAVENOUS ONCE AS NEEDED
OUTPATIENT
Start: 2024-08-08

## 2024-08-08 RX ORDER — FAMOTIDINE 10 MG/ML
20 INJECTION INTRAVENOUS ONCE AS NEEDED
OUTPATIENT
Start: 2024-08-29

## 2024-08-08 RX ORDER — ALBUTEROL SULFATE 0.83 MG/ML
3 SOLUTION RESPIRATORY (INHALATION) AS NEEDED
OUTPATIENT
Start: 2024-08-29

## 2024-08-08 RX ORDER — DIPHENHYDRAMINE HYDROCHLORIDE 50 MG/ML
50 INJECTION INTRAMUSCULAR; INTRAVENOUS AS NEEDED
OUTPATIENT
Start: 2024-08-08

## 2024-08-08 RX ORDER — EPINEPHRINE 0.3 MG/.3ML
0.3 INJECTION SUBCUTANEOUS EVERY 5 MIN PRN
OUTPATIENT
Start: 2024-08-29

## 2024-08-08 RX ORDER — INFLIXIMAB 100 MG/10ML
10 INJECTION, POWDER, LYOPHILIZED, FOR SOLUTION INTRAVENOUS ONCE
OUTPATIENT
Start: 2024-08-29 | End: 2024-08-29

## 2024-08-08 RX ORDER — EPINEPHRINE 0.3 MG/.3ML
0.3 INJECTION SUBCUTANEOUS EVERY 5 MIN PRN
OUTPATIENT
Start: 2024-08-08

## 2024-08-08 RX ORDER — DIPHENHYDRAMINE HYDROCHLORIDE 50 MG/ML
50 INJECTION INTRAMUSCULAR; INTRAVENOUS AS NEEDED
OUTPATIENT
Start: 2024-08-29

## 2024-08-08 RX ORDER — ACETAMINOPHEN 325 MG/1
650 TABLET ORAL ONCE
OUTPATIENT
Start: 2024-08-29 | End: 2024-08-29

## 2024-08-08 RX ORDER — INFLIXIMAB 100 MG/10ML
10 INJECTION, POWDER, LYOPHILIZED, FOR SOLUTION INTRAVENOUS SEE ADMIN INSTRUCTIONS
Qty: 600 MG | Refills: 6 | OUTPATIENT
Start: 2024-08-08

## 2024-08-08 RX ORDER — ALBUTEROL SULFATE 0.83 MG/ML
3 SOLUTION RESPIRATORY (INHALATION) AS NEEDED
OUTPATIENT
Start: 2024-08-08

## 2024-08-08 ASSESSMENT — ENCOUNTER SYMPTOMS
CONSTIPATION: 0
VOMITING: 0
SHORTNESS OF BREATH: 0
FEVER: 0
APPETITE CHANGE: 0
NUMBNESS: 0
DYSURIA: 0
CHILLS: 0
MYALGIAS: 0
NERVOUS/ANXIOUS: 0
FREQUENCY: 0
DIZZINESS: 0
LIGHT-HEADEDNESS: 0
EXTREMITY WEAKNESS: 0
SORE THROAT: 0
HEADACHES: 0
COUGH: 0
LEG SWELLING: 0
BLOOD IN STOOL: 0
WHEEZING: 0
DIARRHEA: 0
NAUSEA: 0
ABDOMINAL PAIN: 0
ARTHRALGIAS: 0
WOUND: 0
FATIGUE: 0
DEPRESSION: 0
VOICE CHANGE: 0
TROUBLE SWALLOWING: 0
PALPITATIONS: 0
EYE PROBLEMS: 0
UNEXPECTED WEIGHT CHANGE: 0
HEMATURIA: 0

## 2024-08-08 NOTE — PROGRESS NOTES
[facility]   Infusion Clinic Note   Date: 2024   Name: Kyra Behnfeldt  : 2000   MRN: [id number]          Reason for Visit: OP Infusion and Follow-up (PT HERE FOR FERAHEME 510 MG INFUSION AND INFLIXIMAB 500 MG INFUSION / NEXT APPT: 4 WEEKS)         Today: We administered ferumoxytol (Feraheme) 510 mg in sodium chloride 0.9% 117 mL IV and inFLIXimab-dyyb (Inflectra) 500 mg in sodium chloride 0.9% 250 mL IV.       Visit Type: INFUSION       Ordered By: DR MARKS       Accompanied by:Self       Diagnosis: Iron deficiency anemia due to chronic blood loss    Crohn's colitis, with rectal bleeding (Multi)        Allergies:   Allergies as of 2024    (No Known Allergies)          Current Medications has a current medication list which includes the following prescription(s): acetaminophen, alprazolam, azathioprine, diphenhydramine/Maalox/lidocaine (Magic Mouthwash) - Compounded - Outpatient, escitalopram, ferrous fumarate-vitamin c, hydroxyzine hcl, infliximab-dyyb, infliximab-dyyb, and methocarbamol.       Vitals:   Vitals:    24 0809 24 0900 24 0920 24 1021   BP: 118/77 117/82 119/82 110/73   Pulse: 94 87 89 85   Resp: 18 18 16 16   Temp: 36.5 °C (97.7 °F) 36.6 °C (97.9 °F) 36.6 °C (97.9 °F) 36.7 °C (98.1 °F)   SpO2: 98% 99% 99% 99%   Weight: 56 kg (123 lb 7.3 oz)      LMP: 2024             Infusion Pre-procedure Checklist:   - Allergies reviewed: yes   - Medications reviewed: yes       - Previous reaction to current treatment: no      Assess patient for the concerns below. Document provider notification as appropriate.  - Active or recent infection with/without current antibiotic use: no  - Recent or planned invasive dental work: no  - Recent or planned surgeries: no  - Recently received or plans to receive vaccinations: no  - Has treatment related toxicities: no  - Is pregnant:  no      Pain: 0   - Is the pain different from normal: n/a   - Is the  pain tolerable: n/a   - Is your Doctor aware:  n/a       Labs: N/A          Fall Risk Screening: Foster Fall Risk  History of Falling, Immediate or Within 3 Months: No  Secondary Diagnosis: No  Ambulatory Aid: Walks without aid/bedrest/nurse assist  Intravenous Therapy/Heparin Lock: Yes  Gait/Transferring: Normal/bedrest/immobile  Mental Status: Oriented to own ability  Foster Fall Risk Score: 20       Review Of Systems:  Review of Systems   Constitutional:  Negative for appetite change, chills, fatigue, fever and unexpected weight change.   HENT:   Negative for hearing loss, mouth sores, sore throat, tinnitus, trouble swallowing and voice change.    Eyes:  Negative for eye problems.   Respiratory:  Negative for cough, shortness of breath and wheezing.    Cardiovascular:  Negative for chest pain, leg swelling and palpitations.   Gastrointestinal:  Negative for abdominal pain, blood in stool, constipation, diarrhea, nausea and vomiting.        PT HAS AN OSTOMY. ALSO REPORTS BLOOD FROM THE RECTRUM   Genitourinary:  Negative for dysuria, frequency and hematuria.    Musculoskeletal:  Negative for arthralgias and myalgias.   Skin:  Negative for itching, rash and wound.   Neurological:  Negative for dizziness, extremity weakness, headaches, light-headedness and numbness.   Psychiatric/Behavioral:  Negative for depression. The patient is not nervous/anxious.          ROS completed? Yes      Infusion Readiness:  - Assessment Concerns Related to Infusion: No  - Provider notified: n/a      Document Below Only If Indicated:   New Patient Education:    N/A (returning patient for continuation of therapy. Ongoing education provided as needed.)        Treatment Conditions & Drug Specific Questions:    (Unless otherwise specified on patient specific therapy plan):     TREATMENT CONDITIONS:  Unless otherwise specified on patient specific therapy plan HOLD and notify Provider prior to proceeding with today's infusion if patient  "with:  o Positive T-Spot  o Reactive Hep B sAg and/or Hep B Core Antibody    Lab Results   Component Value Date    TBSIN Negative 12/18/2023      Lab Results   Component Value Date    HEPBSAG Nonreactive 04/23/2024      No results found for: \"NONUHFIRE\", \"NONUHSWGH\", \"NONUHFISH\", \"EXTHEPBSAG\"  Lab Results   Component Value Date    HEPBCAB Nonreactive 12/18/2023     Lab Results   Component Value Date    HEPBCAB Nonreactive 12/18/2023    HEPCAB Nonreactive 04/23/2024        Labs reviewed and patient meets treatment conditions? Yes    DRUG SPECIFIC QUESTIONS:    Any new or worsening signs / symptoms of heart failure which may include things like worsening shortness of breath, swelling, fatigue? No   (If yes notify provider before proceeding with today's infusion. New onset or worsening HF have been reported with infliximab)    REMINDER:   WEIGHT BASED DRUG     Recommended Vitals/Observation:  Vitals:     Induction: Obtain vital signs every 30 minutes; at end of observation period and as needed.     Maintenance: Obtain vital signs at start and end of infusions  Observation:     Induction: Patient is monitored for 30 minutes post-infusion     Maintenance: No observation.     Ferumoxytol  (FERAHEME)    (Unless otherwise specified on patient specific therapy plan):    REMINDERS:  May cause hypotension, patient should be in a reclined or semi-reclined position during infusion.    Ferumoxytol can interfere with MR imaging and Radiology should be notified if a patient has received ferumoxytol within 3 months of the anticipated MR.    Recommended Vitals/Observation:  Vitals: Obtain vital signs before, immediately following infusion, and 30 minutes after completion of infusion.  Observation: 30 minutes after each infusion.     Lab Results   Component Value Date    IRON 85 07/04/2024    TIBC 426 07/04/2024    FERRITIN 73 04/17/2024      Lab Results   Component Value Date    IRONSAT 20 (L) 07/04/2024      Lab Results   Component " Value Date    WBC 14.6 (H) 07/04/2024    HGB 9.2 (L) 07/04/2024    HCT 31.2 (L) 07/04/2024    MCV 85 07/04/2024     (H) 07/04/2024            Weight Based Drug Calculations:    WEIGHT BASED DRUGS: NOT APPLICABLE / FLAT DOSE        INFLIXIMAB    Patient's dosing weight (kg): 43KG     10% weight variance for prescribed treatment: 38.7 kg to 47.3 kg     Patient's weight today:   Vitals:    08/08/24 0809   Weight: 56 kg (123 lb 7.3 oz)         weight range for prescribed dose:     Patient weight today falls outside of 10% variance or  weight range: Yes     Home Care pharmacist informed of weight variance: Yes    Doses that are weight based have an acceptable variance rule within 10% of the prescribed   order and/or within  weight range. If patient weight on day of infusion falls   outside of the 10% variance, or weight range, infusion is administered and   pharmacy contacted regarding future dosing adjustments, per policy.         Initiated By: Felicitas Herrera RN

## 2024-08-08 NOTE — PATIENT INSTRUCTIONS
Today :We administered ferumoxytol (Feraheme) 510 mg in sodium chloride 0.9% 117 mL IV and inFLIXimab-dyyb (Inflectra) 500 mg in sodium chloride 0.9% 250 mL IV.     For:   1. Iron deficiency anemia due to chronic blood loss    2. Crohn's colitis, with rectal bleeding (Multi)         Your next appointment is due in:  4 WEEKS        Please read the  Medication Guide that was given to you and reviewed during todays visit.     (Tell all doctors including dentists that you are taking this medication)     Go to the emergency room or call 911 if:  -You have signs of allergic reaction:   -Rash, hives, itching.   -Swollen, blistered, peeling skin.   -Swelling of face, lips, mouth, tongue or throat.   -Tightness of chest, trouble breathing, swallowing or talking     Call your doctor:  - If IV / injection site gets red, warm, swollen, itchy or leaks fluid or pus.     (Leave dressing on your IV site for at least 2 hours and keep area clean and dry  - If you get sick or have symptoms of infection or are not feeling well for any reason.    (Wash your hands often, stay away from people who are sick)  - If you have side effects from your medication that do not go away or are bothersome.     (Refer to the teaching your nurse gave you for side effects to call your doctor about)    - Common side effects may include:  stuffy nose, headache, feeling tired, muscle aches, upset stomach  - Before receiving any vaccines     - Call [facility] at   If:  - You get sick, are on antibiotics, have had a recent vaccine, have surgery or dental work and your doctor wants your visit rescheduled.  - You need to cancel and reschedule your visit for any reason. Call at least 2 days before your visit if you need to cancel.   - Your insurance changes before your next visit.    (We will need to get approval from your new insurance. This can take up to two weeks.)     [facility] is opened Monday thru Friday. We are  closed on weekends and holidays.   Voice mail will take your call if the center is closed. If you leave a message please allow 24 hours for a call back during weekdays. If you leave a message on a weekend/holiday, we will call you back the next business day.

## 2024-08-14 ENCOUNTER — APPOINTMENT (OUTPATIENT)
Dept: INFUSION THERAPY | Facility: CLINIC | Age: 24
End: 2024-08-14
Payer: COMMERCIAL

## 2024-08-16 ENCOUNTER — TELEPHONE (OUTPATIENT)
Dept: GASTROENTEROLOGY | Facility: HOSPITAL | Age: 24
End: 2024-08-16
Payer: COMMERCIAL

## 2024-08-26 DIAGNOSIS — K50.111 CROHN'S COLITIS, WITH RECTAL BLEEDING (MULTI): Primary | ICD-10-CM

## 2024-08-29 ENCOUNTER — OFFICE VISIT (OUTPATIENT)
Dept: GASTROENTEROLOGY | Facility: CLINIC | Age: 24
End: 2024-08-29
Payer: COMMERCIAL

## 2024-08-29 VITALS
SYSTOLIC BLOOD PRESSURE: 105 MMHG | TEMPERATURE: 97.6 F | DIASTOLIC BLOOD PRESSURE: 67 MMHG | HEIGHT: 63 IN | WEIGHT: 120 LBS | BODY MASS INDEX: 21.26 KG/M2 | HEART RATE: 98 BPM

## 2024-08-29 DIAGNOSIS — Z93.2 S/P ILEOSTOMY (MULTI): ICD-10-CM

## 2024-08-29 DIAGNOSIS — K50.119 CROHN'S DISEASE OF COLON WITH COMPLICATION (MULTI): Primary | ICD-10-CM

## 2024-08-29 DIAGNOSIS — K50.111 CROHN'S COLITIS, WITH RECTAL BLEEDING (MULTI): Primary | ICD-10-CM

## 2024-08-29 PROCEDURE — 1036F TOBACCO NON-USER: CPT | Performed by: INTERNAL MEDICINE

## 2024-08-29 PROCEDURE — 99214 OFFICE O/P EST MOD 30 MIN: CPT | Performed by: INTERNAL MEDICINE

## 2024-08-29 PROCEDURE — 3008F BODY MASS INDEX DOCD: CPT | Performed by: INTERNAL MEDICINE

## 2024-08-29 RX ORDER — FOLIC ACID 1 MG/1
TABLET ORAL
Qty: 90 TABLET | Refills: 3 | Status: SHIPPED | OUTPATIENT
Start: 2024-08-29

## 2024-08-29 RX ORDER — LIDOCAINE 50 MG/G
OINTMENT TOPICAL
Qty: 30 G | Refills: 3 | Status: SHIPPED | OUTPATIENT
Start: 2024-08-29

## 2024-08-29 RX ORDER — METHOTREXATE 25 MG/ML
25 INJECTION INTRA-ARTERIAL; INTRAMUSCULAR; INTRATHECAL; INTRAVENOUS
Qty: 8 ML | Refills: 11 | Status: SHIPPED | OUTPATIENT
Start: 2024-09-01 | End: 2025-08-25

## 2024-08-29 RX ORDER — TRETINOIN 0.25 MG/G
CREAM TOPICAL
COMMUNITY
Start: 2024-07-16

## 2024-08-29 NOTE — PROGRESS NOTES
REASON FOR VISIT:  Crohn's disease    HPI:  Kyra Behnfeldt is a 23 y.o. female who presents for follow-up of complicated IBD.  Likely Crohn's disease of the colon.  Now on IFX 10 mg/kg every 4 weeks.  Last dose 8/8/24.  She went to [facility] and saw Dr. Wil Haskins in consultation.  Recommended continued infliximab and consideration of combination therapy as before with azathioprine or with methotrexate.      In follow-up today, she complains of persistent prema-anal pain with mucoid and bloody output daily. For about 5 days after her Remicade infusion in early August, her prema-anal pain was more tolerable and she was noticing rectal bleeding 3-4 times daily. After that, her prema-anal pain worsened and she is now experiencing 5-6 bloody output daily. Her prema-anal pain appears to be worse when passing mucus/blood.   She also reports worsening lower abdominal pain for the past week. Due to significant prema-anal and lower abdominal pain, she tried a prednisone taper (40 mg for 4 days, then 30 mg for 4 days, and currently on 20 mg) which slightly improved her symptoms). Her weight is stable and she otherwise denies fever, chills, nausea, vomiting, new skin lesions, change in vision or joint pain/swelling.       REVIEW OF SYSTEMS    No Known Allergies    Past Medical History:   Diagnosis Date    Benign neoplasm of cecum     CC (Crohn's colitis) (Multi)     Colitis     Generalized abdominal pain     Hypokalemia     Internal hemorrhoid     Rectal bleeding        Past Surgical History:   Procedure Laterality Date    COLONOSCOPY      ILEOSTOMY         Current Outpatient Medications   Medication Sig Dispense Refill    acetaminophen (Tylenol) 325 mg tablet Take 2 tablets (650 mg) by mouth every 4 hours if needed for mild pain (1 - 3). 30 tablet 1    ALPRAZolam (Xanax) 0.5 mg tablet Take 0.5 tablets (0.25 mg) by mouth 2 times a day as needed for anxiety for up to 7 days. 14 tablet 0    azaTHIOprine (Imuran) 50 mg tablet  Take 1 tablet (50 mg) by mouth once daily. (Patient not taking: Reported on 8/1/2024) 30 tablet 11    diphenhydramine/Maalox/lidocaine (Magic Mouthwash) - Compounded - Outpatient Use 5 ml swish and spit as needed for 30 days (Patient not taking: Reported on 8/1/2024) 120 mL 1    escitalopram (Lexapro) 5 mg tablet Take 1 tablet (5 mg) by mouth once daily.      ferrous fumarate-vitamin C (Mey-Sequeles 65-25) Take 1 tablet by mouth 3 times daily (morning, midday, late afternoon). Do not crush, chew, or split.      hydrOXYzine HCL (Atarax) 50 mg tablet Take 1 tablet (50 mg) by mouth once daily at bedtime.      inFLIXimab (Remicade) IV in 250 mL NS Infuse 600 mg into a venous catheter see administration instructions. Every 28 days 1 each 0    inFLIXimab 100 mg injection Infuse 600 mg (10 mg/kg) into a venous catheter see administration instructions.  For Maintenance: Every 4 weeks  (every 28 days) 600 mg 6    inFLIXimab-dyyb (Inflectra) 100 mg injection Infuse 500 mg into a venous catheter see administration instructions.  For Maintenance: Every 6 weeks 5 each 5    methocarbamol (Robaxin) 500 mg tablet Take 1 tablet (500 mg) by mouth every 6 hours for 8 days. 32 tablet 0     No current facility-administered medications for this visit.       PHYSICAL EXAM:  Vitals:    08/29/24 1235   BP: 105/67   Pulse: 98   Temp: 36.4 °C (97.6 °F)       Physical Exam  Eyes:      General: No scleral icterus.     Pupils: Pupils are equal, round, and reactive to light.   Cardiovascular:      Rate and Rhythm: Normal rate and regular rhythm.      Pulses: Normal pulses.   Pulmonary:      Effort: Pulmonary effort is normal.      Breath sounds: Normal breath sounds.   Abdominal:      General: Abdomen is flat. There is no distension.      Palpations: Abdomen is soft.      Tenderness: There is no abdominal tenderness. There is no guarding.      Comments: RLQ ileostomy bag without surrounding erythema or tenderness    Skin:     General: Skin is  warm.      Capillary Refill: Capillary refill takes less than 2 seconds.   Neurological:      General: No focal deficit present.      Mental Status: She is alert and oriented to person, place, and time.   Psychiatric:         Mood and Affect: Mood normal.         Thought Content: Thought content normal.         Judgment: Judgment normal.          Lab Results   Component Value Date    WBC 14.6 (H) 07/04/2024    HGB 9.2 (L) 07/04/2024    HCT 31.2 (L) 07/04/2024    MCV 85 07/04/2024     (H) 07/04/2024     Lab Results   Component Value Date    ALT 16 07/04/2024    AST 13 07/04/2024    ALKPHOS 98 07/04/2024    BILITOT 0.2 07/04/2024       === 03/07/24 ===    CT ABDOMEN PELVIS W IV CONTRAST    - Impression -  1. Dilated small bowel loops are present to the ostomy with mild wall  thickening and mucosal enhancement at the ostomy, suggestive of  inflammation. No discrete fluid collection or fistula is evident.  2. Mild interval increase in segments of large bowel wall  thickening/enhancement.  3. Moderate amount of simple fluid density ascites layering within  the pelvis.    I personally reviewed the images/study and I agree with the findings  as stated by Dr. Jose Naik. This study was interpreted at  [facility], [place], [place].    MACRO:  None    Signed by: Kane Sanford 3/16/2024 12:40 PM  Dictation workstation:   [id number]      ASSESSMENT  Kyra Behnfeldt is a 23 y.o. female who presents for follow-up of complicated IBD.  Likely Crohn's disease of the colon    #Crohn's diease- s/p diverting loop ileostomy. Continues with c/o persistent prema-anal pain, rectal bleeding and lower abdominal pain. Her symptoms appear to improve for a few days after she receives her Infliximab dose of 10 mg/kg. Her IFX dose frequency was increased after her last visit to every 4 weeks, as level was low and symptoms were not-adequately controlled.     Her prema-anal pain may be due to her poorly  controlled underlying inflammatory disease (diverging colitis vs Crohn's disease), though could be an occult fissure not seen on recent colonoscopyed  Her most recent colonoscopy (7/2024) shows improvement in rectum and sigmoid but with persistent inflammatory disease in descending and transverse colon (unable to pass scope past TC/hepatic flexure). 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 It is challenging to pinpoint the main  of her inflammation at this point. ? IBD/Crohn's, vs diversion colitis vs. Both.    In line with recommendations of [facility] consultant, Dr. Haskins, since she cannot tolerate AZA, will plan to add weekly subcutaneous methotrexate to her Infliximab therapy to place her on combination treatment.  Risk of this therapy and potential side effects reviewed.  Need for strict contraception reviewed.      Plan recheck another IFX level prior to next scheduled dose (9/5). If her level is low and symptoms persist despite being on combination therapy, we will discuss switching to a different biologic therapy in the next couple of months.     Her prema-anal pain could also be due to an anal fissure so will add compound fissure cream which could provide relief. There is less concern for perirectal abscess or fistula formation but we will obtain and MRI pelvis to rule out perirectal complications.     In the future, if medical therapies fail, she would need to be assessed for total colectomy with an ileorectal anastomosis vs an end ileostomy. Will ask her to establish with [facility] and she would like to see  Dr Hopper for colorectal surgical evaluation.     PLAN  1) Continue Infliximab every 4 weeks   2) Will check labs as ordered prior to next infusion   3) Begin methotrexate 25 mg subcutaneously once weekly. Recommended effective contraception while on methotrexate   4) Begin folic acid 1 mg daily except the day of methotrexate  injection  5) Will obtain MRI of the pelvis to evaluate for any perirectal abscess or fistula  6) Begin fissure cream (nifedipine 0.2%/lidocaine 5%) 3-4 times daily as needed for perianal discomfort  7) Recommend making an appointment with Dr Hopper for colorectal surgical evaluation   8) Return to clinic in Mid October   9) Please contact the clinic if your symptoms worsen

## 2024-09-03 DIAGNOSIS — K50.111 CROHN'S COLITIS, WITH RECTAL BLEEDING (MULTI): Primary | ICD-10-CM

## 2024-09-05 ENCOUNTER — APPOINTMENT (OUTPATIENT)
Dept: INFUSION THERAPY | Facility: CLINIC | Age: 24
End: 2024-09-05
Payer: COMMERCIAL

## 2024-09-05 VITALS
SYSTOLIC BLOOD PRESSURE: 114 MMHG | BODY MASS INDEX: 22.26 KG/M2 | HEART RATE: 84 BPM | TEMPERATURE: 97.6 F | DIASTOLIC BLOOD PRESSURE: 81 MMHG | RESPIRATION RATE: 18 BRPM | OXYGEN SATURATION: 99 % | WEIGHT: 125.66 LBS

## 2024-09-05 DIAGNOSIS — D50.0 IRON DEFICIENCY ANEMIA DUE TO CHRONIC BLOOD LOSS: ICD-10-CM

## 2024-09-05 DIAGNOSIS — K50.119 CROHN'S DISEASE OF COLON WITH COMPLICATION (MULTI): ICD-10-CM

## 2024-09-05 DIAGNOSIS — K50.111 CROHN'S COLITIS, WITH RECTAL BLEEDING (MULTI): ICD-10-CM

## 2024-09-05 DIAGNOSIS — Z93.2 S/P ILEOSTOMY (MULTI): ICD-10-CM

## 2024-09-05 LAB
ALBUMIN SERPL BCP-MCNC: 3.8 G/DL (ref 3.4–5)
ALP SERPL-CCNC: 94 U/L (ref 33–110)
ALT SERPL W P-5'-P-CCNC: 11 U/L (ref 7–45)
AST SERPL W P-5'-P-CCNC: 12 U/L (ref 9–39)
BASOPHILS # BLD AUTO: 0.05 X10*3/UL (ref 0–0.1)
BASOPHILS NFR BLD AUTO: 0.5 %
BILIRUB DIRECT SERPL-MCNC: 0 MG/DL (ref 0–0.3)
BILIRUB SERPL-MCNC: 0.2 MG/DL (ref 0–1.2)
BURR CELLS BLD QL SMEAR: NORMAL
CRP SERPL-MCNC: <0.1 MG/DL
EOSINOPHIL # BLD AUTO: 0.17 X10*3/UL (ref 0–0.7)
EOSINOPHIL NFR BLD AUTO: 1.7 %
ERYTHROCYTE [DISTWIDTH] IN BLOOD BY AUTOMATED COUNT: 20.3 % (ref 11.5–14.5)
FERRITIN SERPL-MCNC: 48 NG/ML (ref 8–150)
HCT VFR BLD AUTO: 37.6 % (ref 36–46)
HGB BLD-MCNC: 11.4 G/DL (ref 12–16)
IMM GRANULOCYTES # BLD AUTO: 0.05 X10*3/UL (ref 0–0.7)
IMM GRANULOCYTES NFR BLD AUTO: 0.5 % (ref 0–0.9)
LYMPHOCYTES # BLD AUTO: 1.29 X10*3/UL (ref 1.2–4.8)
LYMPHOCYTES NFR BLD AUTO: 12.9 %
MCH RBC QN AUTO: 27 PG (ref 26–34)
MCHC RBC AUTO-ENTMCNC: 30.3 G/DL (ref 32–36)
MCV RBC AUTO: 89 FL (ref 80–100)
MONOCYTES # BLD AUTO: 0.76 X10*3/UL (ref 0.1–1)
MONOCYTES NFR BLD AUTO: 7.6 %
NEUTROPHILS # BLD AUTO: 7.66 X10*3/UL (ref 1.2–7.7)
NEUTROPHILS NFR BLD AUTO: 76.8 %
NRBC BLD-RTO: 0 /100 WBCS (ref 0–0)
OVALOCYTES BLD QL SMEAR: NORMAL
PLATELET # BLD AUTO: 302 X10*3/UL (ref 150–450)
PROT SERPL-MCNC: 6.5 G/DL (ref 6.4–8.2)
RBC # BLD AUTO: 4.23 X10*6/UL (ref 4–5.2)
RBC MORPH BLD: NORMAL
SCHISTOCYTES BLD QL SMEAR: NORMAL
WBC # BLD AUTO: 10 X10*3/UL (ref 4.4–11.3)

## 2024-09-05 PROCEDURE — 96413 CHEMO IV INFUSION 1 HR: CPT | Performed by: NURSE PRACTITIONER

## 2024-09-05 PROCEDURE — 84075 ASSAY ALKALINE PHOSPHATASE: CPT

## 2024-09-05 PROCEDURE — 84450 TRANSFERASE (AST) (SGOT): CPT

## 2024-09-05 PROCEDURE — 82040 ASSAY OF SERUM ALBUMIN: CPT

## 2024-09-05 PROCEDURE — 84155 ASSAY OF PROTEIN SERUM: CPT

## 2024-09-05 PROCEDURE — 82247 BILIRUBIN TOTAL: CPT

## 2024-09-05 PROCEDURE — 86140 C-REACTIVE PROTEIN: CPT

## 2024-09-05 PROCEDURE — 80230 DRUG ASSAY INFLIXIMAB: CPT

## 2024-09-05 PROCEDURE — 84460 ALANINE AMINO (ALT) (SGPT): CPT

## 2024-09-05 PROCEDURE — 85025 COMPLETE CBC W/AUTO DIFF WBC: CPT

## 2024-09-05 PROCEDURE — 82728 ASSAY OF FERRITIN: CPT

## 2024-09-05 RX ORDER — ALBUTEROL SULFATE 0.83 MG/ML
3 SOLUTION RESPIRATORY (INHALATION) AS NEEDED
OUTPATIENT
Start: 2024-09-26

## 2024-09-05 RX ORDER — ACETAMINOPHEN 325 MG/1
650 TABLET ORAL ONCE
Status: DISCONTINUED | OUTPATIENT
Start: 2024-09-05 | End: 2024-09-09 | Stop reason: HOSPADM

## 2024-09-05 RX ORDER — ACETAMINOPHEN 325 MG/1
650 TABLET ORAL ONCE
OUTPATIENT
Start: 2024-09-26 | End: 2024-09-26

## 2024-09-05 RX ORDER — FAMOTIDINE 10 MG/ML
20 INJECTION INTRAVENOUS ONCE AS NEEDED
OUTPATIENT
Start: 2024-09-26

## 2024-09-05 RX ORDER — DIPHENHYDRAMINE HYDROCHLORIDE 50 MG/ML
50 INJECTION INTRAMUSCULAR; INTRAVENOUS AS NEEDED
OUTPATIENT
Start: 2024-09-26

## 2024-09-05 RX ORDER — EPINEPHRINE 0.3 MG/.3ML
0.3 INJECTION SUBCUTANEOUS EVERY 5 MIN PRN
OUTPATIENT
Start: 2024-09-26

## 2024-09-05 ASSESSMENT — ENCOUNTER SYMPTOMS
VOICE CHANGE: 0
COUGH: 0
LIGHT-HEADEDNESS: 0
APPETITE CHANGE: 0
PALPITATIONS: 0
EYE PROBLEMS: 0
DIZZINESS: 0
NERVOUS/ANXIOUS: 0
NAUSEA: 0
DYSURIA: 0
EXTREMITY WEAKNESS: 0
WOUND: 0
DEPRESSION: 0
NUMBNESS: 0
HEMATURIA: 0
FEVER: 0
WHEEZING: 0
UNEXPECTED WEIGHT CHANGE: 0
TROUBLE SWALLOWING: 0
SHORTNESS OF BREATH: 0
ABDOMINAL PAIN: 1
FATIGUE: 0
ARTHRALGIAS: 0
SORE THROAT: 0
MYALGIAS: 0
DIARRHEA: 0
LEG SWELLING: 0
BLOOD IN STOOL: 0
CHILLS: 0
FREQUENCY: 0
VOMITING: 0
HEADACHES: 0
CONSTIPATION: 0

## 2024-09-05 NOTE — PATIENT INSTRUCTIONS
Today :We administered inFLIXimab (Remicade) 600 mg in sodium chloride 0.9% 250 mL IV.     For:   1. Crohn's colitis, with rectal bleeding (Multi)    2. Crohn's disease of colon with complication (Multi)    3. S/P ileostomy (Multi)    4. Iron deficiency anemia due to chronic blood loss         Your next appointment is due in:  28 DAYS        Please read the  Medication Guide that was given to you and reviewed during todays visit.     (Tell all doctors including dentists that you are taking this medication)     Go to the emergency room or call 911 if:  -You have signs of allergic reaction:   -Rash, hives, itching.   -Swollen, blistered, peeling skin.   -Swelling of face, lips, mouth, tongue or throat.   -Tightness of chest, trouble breathing, swallowing or talking     Call your doctor:  - If IV / injection site gets red, warm, swollen, itchy or leaks fluid or pus.     (Leave dressing on your IV site for at least 2 hours and keep area clean and dry  - If you get sick or have symptoms of infection or are not feeling well for any reason.    (Wash your hands often, stay away from people who are sick)  - If you have side effects from your medication that do not go away or are bothersome.     (Refer to the teaching your nurse gave you for side effects to call your doctor about)    - Common side effects may include:  stuffy nose, headache, feeling tired, muscle aches, upset stomach  - Before receiving any vaccines     - Call the [facility] at   If:  - You get sick, are on antibiotics, have had a recent vaccine, have surgery or dental work and your doctor wants your visit rescheduled.  - You need to cancel and reschedule your visit for any reason. Call at least 2 days before your visit if you need to cancel.   - Your insurance changes before your next visit.    (We will need to get approval from your new insurance. This can take up to two weeks.)     The [facility] is opened Monday thru  Friday. We are closed on weekends and holidays.   Voice mail will take your call if the center is closed. If you leave a message please allow 24 hours for a call back during weekdays. If you leave a message on a weekend/holiday, we will call you back the next business day.

## 2024-09-05 NOTE — PROGRESS NOTES
[facility]   Infusion Clinic Note   Date: 2024   Name: Kyra Behnfeldt  : 2000   MRN: [id number]          Reason for Visit: OP Infusion and Follow-up (PT HERE FOR REMICADE 600 MG INFUSION/NEXT APPT: 28 DAYS)         Today: We administered inFLIXimab (Remicade) 600 mg in sodium chloride 0.9% 250 mL IV.       Visit Type: INFUSION       Ordered By: DR MARKS       Accompanied by:Self       Diagnosis: Crohn's colitis, with rectal bleeding (Multi)    Crohn's disease of colon with complication (Multi)    S/P ileostomy (Multi)    Iron deficiency anemia due to chronic blood loss        Allergies:   Allergies as of 2024    (No Known Allergies)          Current Medications has a current medication list which includes the following prescription(s): acetaminophen, alprazolam, ferrous fumarate-vitamin c, folic acid, hydroxyzine hcl, inFLIXimab (Remicade) IV in 250 mL NS, infliximab, infliximab-dyyb, lidocaine, methocarbamol, methotrexate, and retin-a, and the following Facility-Administered Medications: acetaminophen.       Vitals:   Vitals:    24 1015   BP: 114/72   Pulse: 86   Resp: 18   Temp: 36.4 °C (97.5 °F)   SpO2: 99%   Weight: 57 kg (125 lb 10.6 oz)             Infusion Pre-procedure Checklist:   - Allergies reviewed: yes   - Medications reviewed: yes       - Previous reaction to current treatment: no      Assess patient for the concerns below. Document provider notification as appropriate.  - Active or recent infection with/without current antibiotic use: no  - Recent or planned invasive dental work: no  - Recent or planned surgeries: no  - Recently received or plans to receive vaccinations: no  - Has treatment related toxicities: no  - Is pregnant:  no      Pain: 3, ABDOMINAL   - Is the pain different from normal: no   - Is your Doctor aware:  yes       Labs: N/A          Fall Risk Screening: Cristian Fall Risk  History of Falling, Immediate or Within 3 Months:  No  Secondary Diagnosis: No  Ambulatory Aid: Walks without aid/bedrest/nurse assist  Intravenous Therapy/Heparin Lock: Yes  Gait/Transferring: Normal/bedrest/immobile  Mental Status: Oriented to own ability  Foster Fall Risk Score: 20       Review Of Systems:  Review of Systems   Constitutional:  Negative for appetite change, chills, fatigue, fever and unexpected weight change.   HENT:   Negative for hearing loss, mouth sores, sore throat, tinnitus, trouble swallowing and voice change.    Eyes:  Negative for eye problems.   Respiratory:  Negative for cough, shortness of breath and wheezing.    Cardiovascular:  Negative for chest pain, leg swelling and palpitations.   Gastrointestinal:  Positive for abdominal pain. Negative for blood in stool, constipation, diarrhea, nausea and vomiting.        BASELINE PT HAS AN OSTOMY   Genitourinary:  Negative for dysuria, frequency and hematuria.    Musculoskeletal:  Negative for arthralgias and myalgias.   Skin:  Negative for itching, rash and wound.   Neurological:  Negative for dizziness, extremity weakness, headaches, light-headedness and numbness.   Psychiatric/Behavioral:  Negative for depression. The patient is not nervous/anxious.          ROS completed? Yes      Infusion Readiness:  - Assessment Concerns Related to Infusion: No  - Provider notified: n/a      Document Below Only If Indicated:   New Patient Education:    N/A (returning patient for continuation of therapy. Ongoing education provided as needed.)        Treatment Conditions & Drug Specific Questions:    InFLIXimab  (AVSOLA, INFLECTRA, REMICADE, RENFLEXIS)    (Unless otherwise specified on patient specific therapy plan):     TREATMENT CONDITIONS:  Unless otherwise specified on patient specific therapy plan HOLD and notify Provider prior to proceeding with today's infusion if patient with:  o Positive T-Spot  o Reactive Hep B sAg and/or Hep B Core Antibody    Lab Results   Component Value Date    TBSIN Negative  "12/18/2023      Lab Results   Component Value Date    HEPBSAG Nonreactive 04/23/2024      No results found for: \"NONUHFIRE\", \"NONUHSWGH\", \"NONUHFISH\", \"EXTHEPBSAG\"  Lab Results   Component Value Date    HEPBCAB Nonreactive 12/18/2023     Lab Results   Component Value Date    HEPBCAB Nonreactive 12/18/2023    HEPCAB Nonreactive 04/23/2024        Labs reviewed and patient meets treatment conditions? Yes    DRUG SPECIFIC QUESTIONS:    Any new or worsening signs / symptoms of heart failure which may include things like worsening shortness of breath, swelling, fatigue? No   (If yes notify provider before proceeding with today's infusion. New onset or worsening HF have been reported with infliximab)    REMINDER:   WEIGHT BASED DRUG     Recommended Vitals/Observation:  Vitals:     Induction: Obtain vital signs every 30 minutes; at end of observation period and as needed.     Maintenance: Obtain vital signs at start and end of infusions  Observation:     Induction: Patient is monitored for 30 minutes post-infusion     Maintenance: No observation.        Weight Based Drug Calculations:    WEIGHT BASED DRUGS: Infliximab (REMICADE, INFLECTRA, RENFLEXIS)   Patient's dosing weight (kg): 56KG     10% weight variance for prescribed treatment: 50.4 kg to 61.6 kg     Patient's weight today:   Vitals:    09/05/24 1015   Weight: 57 kg (125 lb 10.6 oz)         weight range for prescribed dose:     Patient weight today falls outside of 10% variance or  weight range: No     Home Care pharmacist informed of weight variance: Not applicable    Doses that are weight based have an acceptable variance rule within 10% of the prescribed   order and/or within  weight range. If patient weight on day of infusion falls   outside of the 10% variance, or weight range, infusion is administered and   pharmacy contacted regarding future dosing adjustments, per policy.         Initiated By: Felicitas Herrera RN        "

## 2024-09-08 LAB
INFLIXIMAB AB SERPL IA-MCNC: <20 NG/ML
INFLIXIMAB SERPL IA-MCNC: 7.1 UG/ML

## 2024-09-13 ENCOUNTER — HOSPITAL ENCOUNTER (OUTPATIENT)
Dept: RADIOLOGY | Facility: CLINIC | Age: 24
Discharge: HOME | End: 2024-09-13
Payer: COMMERCIAL

## 2024-09-13 DIAGNOSIS — K50.119 CROHN'S DISEASE OF COLON WITH COMPLICATION (MULTI): ICD-10-CM

## 2024-09-13 PROCEDURE — A9575 INJ GADOTERATE MEGLUMI 0.1ML: HCPCS | Performed by: STUDENT IN AN ORGANIZED HEALTH CARE EDUCATION/TRAINING PROGRAM

## 2024-09-13 PROCEDURE — 72197 MRI PELVIS W/O & W/DYE: CPT

## 2024-09-13 PROCEDURE — 2550000001 HC RX 255 CONTRASTS: Performed by: STUDENT IN AN ORGANIZED HEALTH CARE EDUCATION/TRAINING PROGRAM

## 2024-09-13 RX ORDER — GADOTERATE MEGLUMINE 376.9 MG/ML
10 INJECTION INTRAVENOUS
Status: COMPLETED | OUTPATIENT
Start: 2024-09-13 | End: 2024-09-13

## 2024-09-19 DIAGNOSIS — K50.119 CROHN'S DISEASE OF COLON WITH COMPLICATION (MULTI): Primary | ICD-10-CM

## 2024-09-24 NOTE — PROGRESS NOTES
Kyra Behnfeldt is a 23 year old referred by Dr. Arthur Lozano for consult on Crohn's disease.    Diagnosis year: 12/2023      Crohn's medications:  Methotrexate  Remicade      She first started experiencing intermittent blood in the stools during 2023 with no increase in frequency.  It started out with blood upon wiping but then worsened in December of 2023 which led to a colonoscopy to evaluate the underlying etiology.  This showed evidence of a normal TI and moderate inflammation at the hepatic flexure and in the rectum with biopsies showing evidence of chronic active colitis from these 2 regions and there were skip lesions with biopsies showing normal colonic mucosa in these areas.  She had a single SSA in the cecum that was resected.  The patient was started on prednisone and was awaiting approval for Humira.  She ultimately started on Humira in February of 2024 (first dose 2/3/2024).  The patient had 3 hospital admissions between December 2023 and March of 2024.  The patient required IV steroids during these hospitalizations and was discharged on oral prednisone 40 mg daily.  Her third admission in March of 2024 was due to worsening blood per rectum and bloody diarrhea and abdominal pain she with severe anemia requiring blood transfusions.  An MR on 3/4/2024 showed colonic wall thickening at the transverse colon and the distal and right and left colon.  She had a flexible sigmoidoscopy during this hospitalization that revealed evidence of severe colitis from the rectum to the transverse colon with biopsies confirming the diagnosis and negative for CMV.  It was decided at the time that the patient would get  infliximab during her hospitalization and then she received a diverting loop ileostomy to help cool down the colon as she is treated with infliximab.  She received 10 milligrams/kilogram on 3/7/2024 and underwent diverting loop ileostomy on 3/10/2024.  With the diversion, the patient had improvement in her  symptoms.  She received a second dose of infliximab on 3/11/2024 (systemic dosing).  She was discharged home on oral prednisone.      Initially felt well on infliximab, but as tapered off prednisone developed increasing bloody mucoid discharge and worsened iron deficiency anemia.  Has now had 2 maintenance doses of 10 mg/kg every 6 weeks, last 7/2/24.     7/2/24  hgb 9.2, WBC 14.6,  6TG 99 with 6MMP undetectable, IFX 4.3 with no antibodies.  Went to the ED 7/4 given appearing pale and feeling weak , Hb was >9.     7/19/2024 Colonoscopy shows improvement in rectum and sigmoid, but persistent inflammatory disease with nodular and cobblestoned mucosa in descending and transverse colon. Could not pass proximal TC/hepatic flexure-?stricture. Biopsies showed overlapping features of both diversion colitis and IBD     Since the ileostomy she is active.   She is having pain and bleeding daily.  She is having cramping in the colon and lower abdomen happening 6-7x/day and happens preceding passing mucous and blood and Hb has recovered.    Energy levels are good, she is working out and playing volleyball.  She is back to her normal weight.      She recently increased remicade and she feels that she is getting worse.  No nausea/vomiting.  She just transitioned to Rinvoq  She is doing great with the stoma.  No skin issues.  Output is pasty        HISTORY:  12/07/2023 Colonoscopy to TI  The perianal and digital rectal exams were normal.    The terminal ileum appeared normal.  Biopsies were taken with cold forceps for histology.  A 7 mm polyp was found in the cecum. The polyp was sessile.  The polyp was removed with a cold snare.  Resection and retrieval were complete.  Localized moderate inflammation characterized by congestion (edema), erosions, erythema, friability, granularity, loss of vascularity and deep ulcerations was found in the distal rectum up to 5 cm proximal to the anal verge and in the proximal transverse colon. Just  distal to the hepatic flexure.  These were two very localized areas of discrete significant inflammation.  The remaining mucosa in these colonic segments was normal appearing.  Biopsies were taken with a cold forceps for histology from the transverse colon and rectum, both from areas of inflammation as well as the normal appearing mucosa in the same segments.  Pathology:  A. CECUM POLYP:   -- FRAGMENTS OF SESSILE SERRATED ADENOMA   B. ILEUM BIOPSY:   -- ILEAL MUCOSA WITH NO SIGNIFICANT PATHOLOGICAL FINDINGS   C. CECUM, ASCENDING COLON BIOPSY:   -- COLONIC MUCOSA WITH NO SIGNIFICANT PATHOLOGIC FINDINGS   D. TRANSVERSE COLON BIOPSY:   -- CHRONIC ACTIVE COLITIS  -- NEGATIVE FOR DYSPLASIA    Note: No granuloma is identified.  E. SIGMOID, DESCENDING COLON BIOPSY:   -- COLONIC MUCOSA WITH NO SIGNIFICANT PATHOLOGICAL FINDINGS   F. RECTUM BIOPSY:    -- CHRONIC ACTIVE COLITIS  -- NEGATIVE FOR DYSPLASIA     Note: No granuloma is identified.      2/01/2024 CT Abd/Pelvis w contrast  1. Questionable acute enterocolitis.  2. No other acute finding in the abdomen and pelvis.  3. Normal appendix.    3/04/2024 Colonoscopy to Transverse colon  Findings  Severe, continuous edematous, erythematous, friable, granular and ulcerated mucosa with erosion and loss of vascular pattern in the descending colon, sigmoid colon, rectosigmoid and rectum, consistent with Crohn's disease; performed cold forceps biopsy. There was diffuse and continuous inflammation from the anal verge all the way up through the transverse colon (which was the limit of this examination) associated with diffuse and deep ulcerations.  There were no areas of normal appearing mucosa throughout the entire examined colon.  The inflammation was significantly worsened from prior colonoscopy.  Decision was made not to try to push more proximally up through the colon given the severity of the inflammation and risk of perforation.  Biopsies were obtained from the transverse  (Bottle Number 1), descending and sigmoid colon (Bottle Number 2), and rectum (Bottle Number 3) for the evaluation of inflammatory bowel disease and to rule-out CMV.  External small hemorrhoids observed during retroflexion  The colon was otherwise normal on direct and retroflexion views.  Pathology:  A. Transverse colon, biopsy:  - Chronic active colitis with erosion, negative for granulomas or dysplasia.  - Negative for CMV by immunostain.  B. Descending colon, biopsy:  - Chronic active colitis with erosion, negative for granulomas or dysplasia.  - Negative for CMV by immunostain.  C. Rectum, biopsy:  - Chronic active colitis with erosion, negative for granulomas or dysplasia.  - Negative for CMV by immunostain.      3/05/2024 MRI Enterography  -Colonic wall thickening involving at least the transverse segment if not also the distal right and left colon in a single long contiguous segment. Little if any surrounding inflammatory change, but nevertheless suspect for active colitis which may be inflammatory in nature  -Restricted diffusion in the terminal ileum, but no associated wall thickening or hyperenhancement  -Remainder of small bowel definitely negative for active inflammatory small bowel disease  -No complication from Crohn's or other inflammatory small bowel disease including no perforation/abscess, stricture/obstruction or sinus tract/fistula    3/10/2024 Operation by Haas-Ocuin:  Creation Ileostomy       3/16/2024 CT Abd/Pelvis with contrast  1. Dilated small bowel loops are present to the ostomy with mild wall thickening and mucosal enhancement at the ostomy, suggestive of inflammation. No discrete fluid collection or fistula is evident.  2. Mild interval increase in segments of large bowel wall thickening/enhancement.  3. Moderate amount of simple fluid density ascites layering within the pelvis.      7/19/2024 Colonoscopy to hepatic flexure  Findings  The perianal exam and digital rectal exam were  normal.  Moderate, generalized cobblestone, edematous and friable mucosa with loss of vascular pattern in the transverse colon, descending colon, sigmoid colon and rectum. Inflammatory changes were mild in the rectum and sigmoid colon with pale. Slightly friable mucosa and loss of the normal vascular pattern.  However in the descending ands sigmoid colon, inflammatory changes wwere more marked with increased firability, mucus, edema, and nodular mucosa.  In the region of the proximal transverse colon/hepatic flexure, there was marked nodularity and suspected stenosis.  Desptie several attempts and changing patient position, this region was not able to be passed.  Performed pancolonic forceps biopsies for dysplasia screening. Random surveillance biopsies were taken from the transverse colon (jar 1), descending and sigmoid colon (jar 2), and rectum (jar 3) to evaluate for inflammation.  Pathology  A.  Transverse colon biopsy (history of IBD):  -Colonic mucosa with focal ulceration, cryptitis, increased lymphocytes in the lamina propria and lymphoid follicles, see comment..  B.  Descending colon:  -Colonic mucosa with ulceration, focal cryptitis, increased lymphocytes in the lamina propria and focal lymphoid follicles, see comment..  C.  Rectum biopsy:  -Colorectal mucosa with increased lymphocytes in the lamina propria and focal multinucleated giant cells.      9/13/2024 MRI Pelvis w/wo contrast  1. No evidence of perianal abscess or fistula.      Past Medical History  Anemia  Crohn's     Surgical History  Creation of ileostomy      Social History  Smoking:  Nonsmoker   ETOH: None   Working: She is PRN currently - ED nurse     Family History:   Paternal GF - Lung CA     Allergies  Patient has no known allergies.      Review of Systems  Constitutional: Negative for fever, chills, anorexia, weight loss, malaise     ENMT: Negative for nasal discharge, congestion, ear pain, mouth pain, throat pain     Respiratory:  Negative for cough, hemoptysis, wheezing, shortness of breath     Cardiac: Negative for chest pain, dyspnea on exertion, orthopnea, palpitations, syncope     Gastrointestinal: Negative for nausea, vomiting, diarrhea, constipation, abdominal pain, (+)CROHN'S DISEASE, (+)S/P ILEOSTOMY     Genitourinary: Negative for discharge, dysuria, flank pain, frequency, hematuria     Musculoskeletal: Negative for decreased ROM, pain, swelling, weakness     Neurological: Negative for dizziness, confusion, headache, seizures, syncope     Psychiatric: Negative for mood changes, anxiety, hallucinations, sleep changes, suicidal ideas     Skin: Negative for mass, pain, itching, rash, ulcer     Endocrine: Negative for heat intolerance, cold intolerance, excessive sweating, polyuria, excess thirst     Hematologic/Lymph: Negative for anemia, bruising, easy bleeding, night sweats, petechiae, history of DVT/PE or cancer     Allergic/Immunologic: Negative for anaphylaxis, itchy/ teary eyes, itching, sneezing, swelling    Physical Exam  Constitutional: Well developed, awake/alert/oriented x3, no distress, alert and cooperative             Eyes: Sclera anicteric, no conjunctival inflammation, conjugate gaze    ENMT: mucous membranes moist, no apparent injury,            Head/Neck: Neck supple, no apparent injury, No JVD, trachea midline, no bruits              Respiratory/Thorax: Patent airways, CTAB, normal breath sounds with good chest expansion, thorax symmetric         Cardiovascular: Regular, rate and rhythm, no murmurs, normal S1 and S2         Gastrointestinal: Nondistended, soft, non-tender, no rebound tenderness or guarding, no masses palpable, no organomegaly, +BS, no bruits               Extremities: normal extremities, no cyanosis edema, contusions or wounds, 2+ femoral pulses B/L              Neurological: alert and oriented x3, normal strength, Normal gait          Lymphatic: No palpable inguinal lymphadenopathy   Psychological:  Appropriate mood and behavior         Skin: Warm and dry, no lesions, no rashes                Anorectal: Pt has a normal external anus - no clear Crohn's signs.  With eversion there is a clear fissure in the just offmidline right posterior anal fissure. Internal exam deferred  On vaginal exam I was able to reproduced some pain with levator palpation.       Impression:  Pt with severe Crohn's colitis with atypical anal fissure - close to midline - could be Crohn's vs caustic from mucous drainage.  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

## 2024-10-01 DIAGNOSIS — K50.111 CROHN'S COLITIS, WITH RECTAL BLEEDING (MULTI): Primary | ICD-10-CM

## 2024-10-01 DIAGNOSIS — K50.111 CROHN'S COLITIS, WITH RECTAL BLEEDING (MULTI): ICD-10-CM

## 2024-10-02 ENCOUNTER — OFFICE VISIT (OUTPATIENT)
Dept: SURGERY | Facility: CLINIC | Age: 24
End: 2024-10-02
Payer: COMMERCIAL

## 2024-10-02 VITALS
WEIGHT: 125.6 LBS | BODY MASS INDEX: 22.25 KG/M2 | SYSTOLIC BLOOD PRESSURE: 131 MMHG | DIASTOLIC BLOOD PRESSURE: 87 MMHG | HEART RATE: 76 BPM | HEIGHT: 63 IN | TEMPERATURE: 97.5 F

## 2024-10-02 DIAGNOSIS — K50.111 CROHN'S COLITIS, WITH RECTAL BLEEDING (MULTI): Primary | ICD-10-CM

## 2024-10-02 DIAGNOSIS — K60.2 ANAL FISSURE: ICD-10-CM

## 2024-10-02 PROCEDURE — 99215 OFFICE O/P EST HI 40 MIN: CPT | Performed by: COLON & RECTAL SURGERY

## 2024-10-02 PROCEDURE — 1036F TOBACCO NON-USER: CPT | Performed by: COLON & RECTAL SURGERY

## 2024-10-02 PROCEDURE — 3008F BODY MASS INDEX DOCD: CPT | Performed by: COLON & RECTAL SURGERY

## 2024-10-02 RX ORDER — HYDROCORTISONE 10 MG/G
CREAM TOPICAL 2 TIMES DAILY
Qty: 28.4 G | Refills: 1 | Status: SHIPPED | OUTPATIENT
Start: 2024-10-02

## 2024-10-02 RX ORDER — LIDOCAINE 40 MG/G
CREAM TOPICAL 4 TIMES DAILY PRN
Qty: 120 G | Refills: 1 | Status: SHIPPED | OUTPATIENT
Start: 2024-10-02 | End: 2025-10-02

## 2024-10-02 ASSESSMENT — PAIN SCALES - GENERAL: PAINLEVEL: 6

## 2024-10-03 ENCOUNTER — APPOINTMENT (OUTPATIENT)
Dept: INFUSION THERAPY | Facility: CLINIC | Age: 24
End: 2024-10-03
Payer: COMMERCIAL

## 2024-10-03 VITALS
BODY MASS INDEX: 22.28 KG/M2 | OXYGEN SATURATION: 99 % | SYSTOLIC BLOOD PRESSURE: 118 MMHG | DIASTOLIC BLOOD PRESSURE: 84 MMHG | RESPIRATION RATE: 16 BRPM | WEIGHT: 125.77 LBS | HEART RATE: 76 BPM | TEMPERATURE: 97.3 F

## 2024-10-03 DIAGNOSIS — K50.111 CROHN'S COLITIS, WITH RECTAL BLEEDING (MULTI): ICD-10-CM

## 2024-10-03 PROCEDURE — 96413 CHEMO IV INFUSION 1 HR: CPT | Performed by: NURSE PRACTITIONER

## 2024-10-03 RX ORDER — ALBUTEROL SULFATE 0.83 MG/ML
3 SOLUTION RESPIRATORY (INHALATION) AS NEEDED
Status: DISCONTINUED | OUTPATIENT
Start: 2024-10-03 | End: 2024-10-03 | Stop reason: HOSPADM

## 2024-10-03 RX ORDER — FAMOTIDINE 10 MG/ML
20 INJECTION INTRAVENOUS ONCE AS NEEDED
OUTPATIENT
Start: 2024-10-24

## 2024-10-03 RX ORDER — DIPHENHYDRAMINE HYDROCHLORIDE 50 MG/ML
50 INJECTION INTRAMUSCULAR; INTRAVENOUS AS NEEDED
OUTPATIENT
Start: 2024-10-24

## 2024-10-03 RX ORDER — ACETAMINOPHEN 325 MG/1
650 TABLET ORAL ONCE
OUTPATIENT
Start: 2024-10-24 | End: 2024-10-24

## 2024-10-03 RX ORDER — DIPHENHYDRAMINE HYDROCHLORIDE 50 MG/ML
50 INJECTION INTRAMUSCULAR; INTRAVENOUS AS NEEDED
Status: DISCONTINUED | OUTPATIENT
Start: 2024-10-03 | End: 2024-10-03 | Stop reason: HOSPADM

## 2024-10-03 RX ORDER — ALBUTEROL SULFATE 0.83 MG/ML
3 SOLUTION RESPIRATORY (INHALATION) AS NEEDED
OUTPATIENT
Start: 2024-10-24

## 2024-10-03 RX ORDER — EPINEPHRINE 0.3 MG/.3ML
0.3 INJECTION SUBCUTANEOUS EVERY 5 MIN PRN
Status: DISCONTINUED | OUTPATIENT
Start: 2024-10-03 | End: 2024-10-03 | Stop reason: HOSPADM

## 2024-10-03 RX ORDER — ACETAMINOPHEN 325 MG/1
650 TABLET ORAL ONCE
Status: DISCONTINUED | OUTPATIENT
Start: 2024-10-03 | End: 2024-10-03 | Stop reason: HOSPADM

## 2024-10-03 RX ORDER — EPINEPHRINE 0.3 MG/.3ML
0.3 INJECTION SUBCUTANEOUS EVERY 5 MIN PRN
OUTPATIENT
Start: 2024-10-24

## 2024-10-03 RX ORDER — FAMOTIDINE 10 MG/ML
20 INJECTION INTRAVENOUS ONCE AS NEEDED
Status: DISCONTINUED | OUTPATIENT
Start: 2024-10-03 | End: 2024-10-03 | Stop reason: HOSPADM

## 2024-10-03 ASSESSMENT — ENCOUNTER SYMPTOMS
DECREASED CONCENTRATION: 0
NUMBNESS: 0
RECTAL PAIN: 1
PALPITATIONS: 0
FEVER: 0
FATIGUE: 0
ARTHRALGIAS: 0
WOUND: 0
APPETITE CHANGE: 0
VOMITING: 0
CONFUSION: 0
SORE THROAT: 0
ABDOMINAL PAIN: 1
EYE PROBLEMS: 0
HEADACHES: 0
DEPRESSION: 0
ADENOPATHY: 0
COUGH: 0
NAUSEA: 0
MYALGIAS: 0
DIZZINESS: 0
SHORTNESS OF BREATH: 0
CONSTIPATION: 0
DYSURIA: 0
LEG SWELLING: 0
BLOOD IN STOOL: 0
NERVOUS/ANXIOUS: 0
TROUBLE SWALLOWING: 0

## 2024-10-03 NOTE — PROGRESS NOTES
[facility]   Infusion Clinic Note   Date: October 3, 2024   Name: Kyra Behnfeldt  : 2000   MRN: [id number]          Reason for Visit: OP Infusion (PT. HERE FOR Q 4 WEEK RAPID REMICADE 600 MG IV INFUSION.)         Today: We administered inFLIXimab (Remicade) 600 mg in sodium chloride 0.9% 250 mL IV.       Visit Type: INFUSION       Ordered By: DR. MARKS       Accompanied by:Self       Diagnosis: Crohn's colitis, with rectal bleeding (Multi)        Allergies:   Allergies as of 10/03/2024    (No Known Allergies)          Current Medications has a current medication list which includes the following prescription(s): acetaminophen, ferrous fumarate-vitamin c, hydrocortisone, infliximab-dyyb, lmx 4, lidocaine, retin-a, upadacitinib er, and upadacitinib er, and the following Facility-Administered Medications: acetaminophen, albuterol, dextrose, diphenhydramine, epinephrine, famotidine pf, methylprednisolone sod succinate, and sodium chloride.       Vitals:   Vitals:    10/03/24 1121   BP: 121/90   Pulse: 96   Resp: 16   Temp: 35.6 °C (96 °F)   TempSrc: Temporal   SpO2: 92%   Weight: 57.1 kg (125 lb 12.4 oz)             Infusion Pre-procedure Checklist:   - Allergies reviewed: yes   - Medications reviewed: yes       - Previous reaction to current treatment: no      Assess patient for the concerns below. Document provider notification as appropriate.  - Active or recent infection with/without current antibiotic use: no  - Recent or planned invasive dental work: no  - Recent or planned surgeries: no  - Recently received or plans to receive vaccinations: no  - Has treatment related toxicities: no  - Is pregnant:  no      Pain: 5, PT. HAS RECTAL AREA PAIN. NOT NEW.   - Is the pain different from normal: no   - Is your Doctor aware:  yes       Labs: N/A          Fall Risk Screening: Cristian Fall Risk  History of Falling, Immediate or Within 3 Months: No  Secondary Diagnosis: No  Ambulatory Aid: Walks  "without aid/bedrest/nurse assist  Intravenous Therapy/Heparin Lock: Yes  Gait/Transferring: Normal/bedrest/immobile  Mental Status: Oriented to own ability  Foster Fall Risk Score: 20       Review Of Systems:  Review of Systems   Constitutional:  Negative for appetite change, fatigue and fever.   HENT:   Negative for hearing loss, mouth sores, sore throat and trouble swallowing.    Eyes:  Negative for eye problems.   Respiratory:  Negative for cough and shortness of breath.    Cardiovascular:  Negative for chest pain, leg swelling and palpitations.   Gastrointestinal:  Positive for abdominal pain and rectal pain. Negative for blood in stool, constipation, nausea and vomiting.        PT. WITH CROHN'S DZ, HERE FOR LAST REMICADE 600 MG IV INFUSION. PT. FEELS REMICADE HAS NOT WORKED. HAS BEEN ON IT FOR 6 MONTHS.  PT. STARTED RINVOQ APPROX 2 WEEKS AGO.  PT. DOES HAVE AN ILEOSTOMY, OUTPUT \"NORMAL\"  DENIES BLOOD, MUCUS  HAVE PAIN AT ANAL AREA, RATED 5/10.  ALSO HAS ALTERNATING ABD PAIN.     Genitourinary:  Negative for dysuria.    Musculoskeletal:  Negative for arthralgias, gait problem and myalgias.   Skin:  Negative for itching, rash and wound.   Neurological:  Negative for dizziness, gait problem, headaches and numbness.   Hematological:  Negative for adenopathy.   Psychiatric/Behavioral:  Negative for confusion, decreased concentration and depression. The patient is not nervous/anxious.          ROS completed? Yes      Infusion Readiness:  - Assessment Concerns Related to Infusion: Yes, PT. RECENTLY STARTED ON RINVOQ 2 WEEKS AGO. PHARMACIST BEBETO AWARE. INFUSION AUTHORIZED FOR TODAY.  - Provider notified: DISCUSSED WITH GABY TATE.      Document Below Only If Indicated:   New Patient Education:    N/A (returning patient for continuation of therapy. Ongoing education provided as needed.)        Treatment Conditions & Drug Specific Questions:    InFLIXimab  (AVSOLA, INFLECTRA, REMICADE, RENFLEXIS)    (Unless " "otherwise specified on patient specific therapy plan):     TREATMENT CONDITIONS:  Unless otherwise specified on patient specific therapy plan HOLD and notify Provider prior to proceeding with today's infusion if patient with:  o Positive T-Spot  o Reactive Hep B sAg and/or Hep B Core Antibody    Lab Results   Component Value Date    TBSIN Negative 12/18/2023      Lab Results   Component Value Date    HEPBSAG Nonreactive 04/23/2024      No results found for: \"NONUHFIRE\", \"NONUHSWGH\", \"NONUHFISH\", \"EXTHEPBSAG\"  Lab Results   Component Value Date    HEPBCAB Nonreactive 12/18/2023     Lab Results   Component Value Date    HEPBCAB Nonreactive 12/18/2023    HEPCAB Nonreactive 04/23/2024        Labs reviewed and patient meets treatment conditions? Yes    DRUG SPECIFIC QUESTIONS:    Any new or worsening signs / symptoms of heart failure which may include things like worsening shortness of breath, swelling, fatigue? No   (If yes notify provider before proceeding with today's infusion. New onset or worsening HF have been reported with infliximab)    REMINDER:   WEIGHT BASED DRUG     Recommended Vitals/Observation:  Vitals:     Induction: Obtain vital signs every 30 minutes; at end of observation period and as needed.     Maintenance: Obtain vital signs at start and end of infusions  Observation:     Induction: Patient is monitored for 30 minutes post-infusion     Maintenance: No observation.        Weight Based Drug Calculations:    WEIGHT BASED DRUGS: Infliximab (REMICADE, INFLECTRA, RENFLEXIS)   Patient's dosing weight (kg): 56 KG    10% weight variance for prescribed treatment: 50.4 kg to 61.6kg     Patient's weight today:   Vitals:    10/03/24 1121   Weight: 57.1 kg (125 lb 12.4 oz)         weight range for prescribed dose:     Patient weight today falls outside of 10% variance or  weight range: No     Home Care pharmacist informed of weight variance: Not applicable    Doses that are weight based " have an acceptable variance rule within 10% of the prescribed   order and/or within  weight range. If patient weight on day of infusion falls   outside of the 10% variance, or weight range, infusion is administered and   pharmacy contacted regarding future dosing adjustments, per policy.         Initiated By: Chanda Perry RN

## 2024-10-03 NOTE — PATIENT INSTRUCTIONS
Today :We administered inFLIXimab (Remicade) 600 mg in sodium chloride 0.9% 250 mL IV.     For:   1. Crohn's colitis, with rectal bleeding (Multi)         Your next appointment is due in: TBD     Please read the  Medication Guide that was given to you and reviewed during todays visit.     (Tell all doctors including dentists that you are taking this medication)     Go to the emergency room or call 911 if:  -You have signs of allergic reaction:   -Rash, hives, itching.   -Swollen, blistered, peeling skin.   -Swelling of face, lips, mouth, tongue or throat.   -Tightness of chest, trouble breathing, swallowing or talking     Call your doctor:  - If IV / injection site gets red, warm, swollen, itchy or leaks fluid or pus.     (Leave dressing on your IV site for at least 2 hours and keep area clean and dry  - If you get sick or have symptoms of infection or are not feeling well for any reason.    (Wash your hands often, stay away from people who are sick)  - If you have side effects from your medication that do not go away or are bothersome.     (Refer to the teaching your nurse gave you for side effects to call your doctor about)    - Common side effects may include:  stuffy nose, headache, feeling tired, muscle aches, upset stomach  - Before receiving any vaccines     - Call the [facility] at   If:  - You get sick, are on antibiotics, have had a recent vaccine, have surgery or dental work and your doctor wants your visit rescheduled.  - You need to cancel and reschedule your visit for any reason. Call at least 2 days before your visit if you need to cancel.   - Your insurance changes before your next visit.    (We will need to get approval from your new insurance. This can take up to two weeks.)     The [facility] is opened Monday thru Friday. We are closed on weekends and holidays.   Voice mail will take your call if the center is closed. If you leave a message please allow 24 hours  for a call back during weekdays. If you leave a message on a weekend/holiday, we will call you back the next business day.

## 2024-10-14 ENCOUNTER — HOSPITAL ENCOUNTER (OUTPATIENT)
Facility: HOSPITAL | Age: 24
Setting detail: OUTPATIENT SURGERY
End: 2024-10-14
Attending: COLON & RECTAL SURGERY | Admitting: COLON & RECTAL SURGERY
Payer: COMMERCIAL

## 2024-10-14 DIAGNOSIS — K60.2 ANAL FISSURE: ICD-10-CM

## 2024-10-14 DIAGNOSIS — K50.10 CROHN'S DISEASE OF COLON WITHOUT COMPLICATION (MULTI): ICD-10-CM

## 2024-10-17 ENCOUNTER — OFFICE VISIT (OUTPATIENT)
Dept: GASTROENTEROLOGY | Facility: CLINIC | Age: 24
End: 2024-10-17
Payer: COMMERCIAL

## 2024-10-17 VITALS
HEART RATE: 88 BPM | HEIGHT: 63 IN | SYSTOLIC BLOOD PRESSURE: 108 MMHG | BODY MASS INDEX: 22.5 KG/M2 | TEMPERATURE: 97.8 F | DIASTOLIC BLOOD PRESSURE: 71 MMHG | WEIGHT: 127 LBS

## 2024-10-17 DIAGNOSIS — K50.111 CROHN'S COLITIS, WITH RECTAL BLEEDING (MULTI): Primary | ICD-10-CM

## 2024-10-17 PROCEDURE — 99214 OFFICE O/P EST MOD 30 MIN: CPT | Performed by: INTERNAL MEDICINE

## 2024-10-17 PROCEDURE — 3008F BODY MASS INDEX DOCD: CPT | Performed by: INTERNAL MEDICINE

## 2024-10-17 RX ORDER — HEPARIN SODIUM,PORCINE/PF 10 UNIT/ML
50 SYRINGE (ML) INTRAVENOUS AS NEEDED
OUTPATIENT
Start: 2024-10-17

## 2024-10-17 RX ORDER — HEPARIN 100 UNIT/ML
500 SYRINGE INTRAVENOUS AS NEEDED
OUTPATIENT
Start: 2024-10-17

## 2024-10-17 NOTE — PATIENT INSTRUCTIONS
Thank you for coming in for follow-up evaluation today.  It is exciting that you are improving on Rinvoq therapy.  As we discussed today:    1) check labs in early November; please do these as fasting labs  2) we will plan for colonoscopy in early 2025  3) we will stop further infliximab infusions  4) continue Rinvoq 45 mg daily for 3 months and then lowered to 30 mg daily  5) follow-up in the office in early January 2025  6) call the office if symptoms are changing/worsening

## 2024-10-17 NOTE — PROGRESS NOTES
REASON FOR VISIT:  Crohn's disease    HPI:  Kyra Behnfeldt is a 24 y.o. female who presents for follow-up of complicated IBD. Likely Crohn's disease of the colon. Now on IFX 10 mg/kg every 4 weeks. Last dose 10/03/24.  She went to [facility] and saw Dr. Wil Haskins in consultation. Recommended continued infliximab and consideration of combination therapy as before with azathioprine or with methotrexate. We added methotrexate 25 mg subcutaneous Q week to IFX 10 mg/kg Q4 at end of 8/2024.      Despite increase infliximab dose and frequency she has continued to feel poorly. C/O persistent prema-anal pain with mucoid and bloody output daily. Feels this has been getting worse. She also reports worsening lower abdominal pain. She saw Dr. 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 Component of levator spasm?. Scheduled for EUA in early 11/2024.      Recently changed to Rinvoq as did not feel infliximab was helping any longer. Started around 10/11/24.      In follow-up today, she says that she is doing significantly better. Her symptoms have started improving in this last week. She has noted decrease in prema-anal pain and bloody mucus discharge. The abdominal pain is down to once a day, and prior it was happening 6-7 times/day and it was unbearable.  Since she saw Dr. Hopper her symptoms have improved significantly, so the EUA that is scheduled for 11/2024 might be cancelled. When she saw Dr. Hopper she was diagnosed with an anal fissure, but now she is worried she has a prolapsed internal hemorrhoid, which might be contributing to her perianal pain.    No changes in ostomy output. She denied fever, chills, n/v, dysphagia, bloody ostomy output, rashes, joint pain, and vision changes.    REVIEW OF SYSTEMS  12-point ROS was reviewed and pertinent findings a noted above.    No Known Allergies    Past Medical History:   Diagnosis Date    Benign neoplasm of cecum     CC (Crohn's  "colitis) (Multi)     Colitis     Generalized abdominal pain     Hypokalemia     Internal hemorrhoid     Rectal bleeding        Past Surgical History:   Procedure Laterality Date    COLONOSCOPY      ILEOSTOMY         Current Outpatient Medications   Medication Sig Dispense Refill    acetaminophen (Tylenol) 325 mg tablet Take 2 tablets (650 mg) by mouth every 4 hours if needed for mild pain (1 - 3). 30 tablet 1    ferrous fumarate-vitamin C (Mey-Sequeles 65-25) Take 1 tablet by mouth 3 times daily (morning, midday, late afternoon). Do not crush, chew, or split.      hydrocortisone 1 % cream Apply topically 2 times a day. 28.4 g 1    inFLIXimab-dyyb (Inflectra) 100 mg injection Infuse 500 mg into a venous catheter see administration instructions.  For Maintenance: Every 6 weeks (Patient taking differently: Infuse 600 mg into a venous catheter every 30 (thirty) days.  For Maintenance: Every 4 weeks) 5 each 5    lidocaine (LMX 4) 4 % cream Apply topically 4 times a day as needed for mild pain (1 - 3). 120 g 1    lidocaine (Xylocaine) 5 % ointment Apply to perianal area 3-4 times daily as needed 30 g 3    Retin-A 0.025 % cream       upadacitinib ER (Rinvoq) 30 mg tablet extended release 24 hr Take 1 tablet (30 mg) by mouth once daily. 90 tablet 3    upadacitinib ER (Rinvoq) 45 mg tablet extended release 24 hr Take 1 tablet (45 mg) by mouth once daily. CaseId:[id number];Status:Approved;Review Type:Prior Auth;Coverage Start Date:09/26/2023;Coverage End Date:04/23/2024;    Rinvoq 45 mg for 84 days 30 tablet 2     No current facility-administered medications for this visit.       PHYSICAL EXAM:  /71   Pulse 88   Temp 36.6 °C (97.8 °F)   Ht 1.6 m (5' 3\")   Wt 57.6 kg (127 lb)   BMI 22.50 kg/m²   GENERAL: In no acute distress.  HEENT: Pupils are equal, round, and reactive to light and accommodation. Extraocular motion intact.   CARDIAC: S1 + S2, RRR, no M/R/G.    LUNGS: CTA BL. No wheezes or coarse breath sounds. " Symmetric respirations. No increased work of breathing.   ABDOMEN: Soft, non-tender to palpation. No rebound or guarding.  RECTAL: No external bleeding or masses. One large skin tag present.  PSYCH: Appropriate mood and behavior.     Lab Results   Component Value Date    WBC 10.0 09/05/2024    HGB 11.4 (L) 09/05/2024    HCT 37.6 09/05/2024    MCV 89 09/05/2024     09/05/2024     Lab Results   Component Value Date    ALT 11 09/05/2024    AST 12 09/05/2024    ALKPHOS 94 09/05/2024    BILITOT 0.2 09/05/2024     === 03/07/24 ===  CT ABDOMEN PELVIS W IV CONTRAST  - Impression -  1. Dilated small bowel loops are present to the ostomy with mild wall  thickening and mucosal enhancement at the ostomy, suggestive of  inflammation. No discrete fluid collection or fistula is evident.  2. Mild interval increase in segments of large bowel wall  thickening/enhancement.  3. Moderate amount of simple fluid density ascites layering within  the pelvis.    ASSESSMENT  Kyra Behnfeldt is a 23 y.o. female who presents for follow-up of complicated IBD.  Suspected Crohn's disease of the colon.    #Crohn's diease - she is doing significantly clinically better now on Rinvoq.  Pain and rectal discharge has improved.  Bleeding has decreased.  She is about 4 weeks into therapy at 45 mg daily.  She did not have continued improvement despite increase in infliximab to 10 mg/kg every 4 weeks and this was stopped with her last dose in early October.  It is possible that the combination of therapies is what led to her improvement and we will need to watch to make sure that she does not worsen again as we are now holding infliximab.    We will go ahead and repeat labs after another month of upadacitinib therapy.  We will schedule her for colonoscopy in early 2025 and if things look better, we can consider surgical closure of the stoma.    As her perianal discomfort and symptoms have drastically improved, she may be able to hold off on the EUA  plan for early November.    PLAN  1) fasting labs (CBC, CMP, lipid panel, CRP) in early November  2) colonoscopy in early 2025  3) stop further infliximab infusions  4) continue Rinvoq 45 mg daily for 3 months and then lowered to 30 mg daily  5) follow-up in the office in early January 2025

## 2024-10-18 DIAGNOSIS — K50.119 CROHN'S DISEASE OF COLON WITH COMPLICATION (MULTI): ICD-10-CM

## 2024-10-22 DIAGNOSIS — K50.119 CROHN'S DISEASE OF COLON WITH COMPLICATION (MULTI): ICD-10-CM

## 2024-10-28 DIAGNOSIS — K50.111 CROHN'S COLITIS, WITH RECTAL BLEEDING (MULTI): ICD-10-CM

## 2024-10-28 DIAGNOSIS — K60.2 ANAL FISSURE: ICD-10-CM

## 2024-10-31 ENCOUNTER — APPOINTMENT (OUTPATIENT)
Dept: INFUSION THERAPY | Facility: CLINIC | Age: 24
End: 2024-10-31
Payer: COMMERCIAL

## 2024-10-31 ENCOUNTER — TELEPHONE (OUTPATIENT)
Dept: GASTROENTEROLOGY | Facility: CLINIC | Age: 24
End: 2024-10-31

## 2024-10-31 DIAGNOSIS — K50.119 CROHN'S DISEASE OF COLON WITH COMPLICATION (MULTI): ICD-10-CM

## 2024-11-07 ENCOUNTER — LAB (OUTPATIENT)
Dept: LAB | Facility: LAB | Age: 24
End: 2024-11-07
Payer: COMMERCIAL

## 2024-11-07 DIAGNOSIS — K50.111 CROHN'S COLITIS, WITH RECTAL BLEEDING (MULTI): ICD-10-CM

## 2024-11-07 LAB
ALBUMIN SERPL BCP-MCNC: 4.4 G/DL (ref 3.4–5)
ALP SERPL-CCNC: 119 U/L (ref 33–110)
ALT SERPL W P-5'-P-CCNC: 14 U/L (ref 7–45)
ANION GAP SERPL CALC-SCNC: 9 MMOL/L (ref 10–20)
AST SERPL W P-5'-P-CCNC: 17 U/L (ref 9–39)
BASOPHILS # BLD AUTO: 0.08 X10*3/UL (ref 0–0.1)
BASOPHILS NFR BLD AUTO: 1.4 %
BILIRUB DIRECT SERPL-MCNC: 0.1 MG/DL (ref 0–0.3)
BILIRUB SERPL-MCNC: 0.4 MG/DL (ref 0–1.2)
BUN SERPL-MCNC: 12 MG/DL (ref 6–23)
CALCIUM SERPL-MCNC: 9.3 MG/DL (ref 8.6–10.3)
CHLORIDE SERPL-SCNC: 106 MMOL/L (ref 98–107)
CHOLEST SERPL-MCNC: 181 MG/DL (ref 0–199)
CHOLESTEROL/HDL RATIO: 1.9
CO2 SERPL-SCNC: 27 MMOL/L (ref 21–32)
CREAT SERPL-MCNC: 0.75 MG/DL (ref 0.5–1.05)
CRP SERPL-MCNC: <0.1 MG/DL
EGFRCR SERPLBLD CKD-EPI 2021: >90 ML/MIN/1.73M*2
EOSINOPHIL # BLD AUTO: 0.1 X10*3/UL (ref 0–0.7)
EOSINOPHIL NFR BLD AUTO: 1.7 %
ERYTHROCYTE [DISTWIDTH] IN BLOOD BY AUTOMATED COUNT: 13 % (ref 11.5–14.5)
GLUCOSE SERPL-MCNC: 76 MG/DL (ref 74–99)
HCT VFR BLD AUTO: 44.9 % (ref 36–46)
HDLC SERPL-MCNC: 97.6 MG/DL
HGB BLD-MCNC: 14.2 G/DL (ref 12–16)
IMM GRANULOCYTES # BLD AUTO: 0.02 X10*3/UL (ref 0–0.7)
IMM GRANULOCYTES NFR BLD AUTO: 0.3 % (ref 0–0.9)
LDLC SERPL CALC-MCNC: 67 MG/DL
LYMPHOCYTES # BLD AUTO: 1.82 X10*3/UL (ref 1.2–4.8)
LYMPHOCYTES NFR BLD AUTO: 31.8 %
MCH RBC QN AUTO: 28.6 PG (ref 26–34)
MCHC RBC AUTO-ENTMCNC: 31.6 G/DL (ref 32–36)
MCV RBC AUTO: 91 FL (ref 80–100)
MONOCYTES # BLD AUTO: 0.37 X10*3/UL (ref 0.1–1)
MONOCYTES NFR BLD AUTO: 6.5 %
NEUTROPHILS # BLD AUTO: 3.33 X10*3/UL (ref 1.2–7.7)
NEUTROPHILS NFR BLD AUTO: 58.3 %
NON HDL CHOLESTEROL: 83 MG/DL (ref 0–149)
NRBC BLD-RTO: 0 /100 WBCS (ref 0–0)
PLATELET # BLD AUTO: 282 X10*3/UL (ref 150–450)
POTASSIUM SERPL-SCNC: 4.4 MMOL/L (ref 3.5–5.3)
PROT SERPL-MCNC: 7.2 G/DL (ref 6.4–8.2)
RBC # BLD AUTO: 4.96 X10*6/UL (ref 4–5.2)
SODIUM SERPL-SCNC: 138 MMOL/L (ref 136–145)
TRIGL SERPL-MCNC: 82 MG/DL (ref 0–149)
VLDL: 16 MG/DL (ref 0–40)
WBC # BLD AUTO: 5.7 X10*3/UL (ref 4.4–11.3)

## 2024-11-07 PROCEDURE — 86140 C-REACTIVE PROTEIN: CPT

## 2024-11-07 PROCEDURE — 80053 COMPREHEN METABOLIC PANEL: CPT

## 2024-11-07 PROCEDURE — 85025 COMPLETE CBC W/AUTO DIFF WBC: CPT

## 2024-11-07 PROCEDURE — 80061 LIPID PANEL: CPT

## 2024-11-07 PROCEDURE — 82248 BILIRUBIN DIRECT: CPT

## 2024-11-11 DIAGNOSIS — K50.119 CROHN'S DISEASE OF COLON WITH COMPLICATION (MULTI): ICD-10-CM

## 2024-11-14 LAB — SCAN RESULT: NORMAL

## 2024-12-03 ENCOUNTER — TELEPHONE (OUTPATIENT)
Dept: GASTROENTEROLOGY | Facility: HOSPITAL | Age: 24
End: 2024-12-03
Payer: COMMERCIAL

## 2024-12-04 ENCOUNTER — TELEPHONE (OUTPATIENT)
Dept: GASTROENTEROLOGY | Facility: HOSPITAL | Age: 24
End: 2024-12-04
Payer: COMMERCIAL

## 2024-12-27 ENCOUNTER — HOSPITAL ENCOUNTER (OUTPATIENT)
Dept: GASTROENTEROLOGY | Facility: HOSPITAL | Age: 24
Discharge: HOME | End: 2024-12-27
Payer: COMMERCIAL

## 2024-12-27 ENCOUNTER — PATIENT MESSAGE (OUTPATIENT)
Dept: SURGERY | Facility: CLINIC | Age: 24
End: 2024-12-27

## 2024-12-27 VITALS
TEMPERATURE: 97.9 F | WEIGHT: 127.87 LBS | BODY MASS INDEX: 22.66 KG/M2 | HEIGHT: 63 IN | SYSTOLIC BLOOD PRESSURE: 107 MMHG | OXYGEN SATURATION: 98 % | RESPIRATION RATE: 16 BRPM | HEART RATE: 69 BPM | DIASTOLIC BLOOD PRESSURE: 80 MMHG

## 2024-12-27 DIAGNOSIS — K50.111 CROHN'S COLITIS, WITH RECTAL BLEEDING (MULTI): Primary | ICD-10-CM

## 2024-12-27 LAB — PREGNANCY TEST URINE, POC: NEGATIVE

## 2024-12-27 PROCEDURE — 7100000009 HC PHASE TWO TIME - INITIAL BASE CHARGE

## 2024-12-27 PROCEDURE — 81025 URINE PREGNANCY TEST: CPT | Performed by: INTERNAL MEDICINE

## 2024-12-27 PROCEDURE — 7100000010 HC PHASE TWO TIME - EACH INCREMENTAL 1 MINUTE

## 2024-12-27 PROCEDURE — 3700000013 HC SEDATION LEVEL 5+ TIME - EACH ADDITIONAL 15 MINUTES

## 2024-12-27 PROCEDURE — 3700000012 HC SEDATION LEVEL 5+ TIME - INITIAL 15 MINUTES 5/> YEARS

## 2024-12-27 PROCEDURE — 45378 DIAGNOSTIC COLONOSCOPY: CPT | Mod: 52 | Performed by: INTERNAL MEDICINE

## 2024-12-27 PROCEDURE — 2500000004 HC RX 250 GENERAL PHARMACY W/ HCPCS (ALT 636 FOR OP/ED): Performed by: INTERNAL MEDICINE

## 2024-12-27 RX ORDER — MIDAZOLAM HYDROCHLORIDE 1 MG/ML
INJECTION, SOLUTION INTRAMUSCULAR; INTRAVENOUS AS NEEDED
Status: COMPLETED | OUTPATIENT
Start: 2024-12-27 | End: 2024-12-27

## 2024-12-27 RX ORDER — ONDANSETRON HYDROCHLORIDE 2 MG/ML
4 INJECTION, SOLUTION INTRAVENOUS ONCE AS NEEDED
Status: CANCELLED | OUTPATIENT
Start: 2024-12-27

## 2024-12-27 RX ORDER — MEPERIDINE HYDROCHLORIDE 50 MG/ML
INJECTION INTRAMUSCULAR; INTRAVENOUS; SUBCUTANEOUS AS NEEDED
Status: COMPLETED | OUTPATIENT
Start: 2024-12-27 | End: 2024-12-27

## 2024-12-27 RX ORDER — DIPHENHYDRAMINE HYDROCHLORIDE 50 MG/ML
INJECTION INTRAMUSCULAR; INTRAVENOUS AS NEEDED
Status: COMPLETED | OUTPATIENT
Start: 2024-12-27 | End: 2024-12-27

## 2024-12-27 RX ORDER — MEPERIDINE HYDROCHLORIDE 25 MG/ML
INJECTION INTRAMUSCULAR; INTRAVENOUS; SUBCUTANEOUS AS NEEDED
Status: COMPLETED | OUTPATIENT
Start: 2024-12-27 | End: 2024-12-27

## 2024-12-27 ASSESSMENT — PAIN SCALES - GENERAL
PAINLEVEL_OUTOF10: 0 - NO PAIN
PAINLEVEL_OUTOF10: 3
PAINLEVEL_OUTOF10: 0 - NO PAIN
PAINLEVEL_OUTOF10: 3
PAINLEVEL_OUTOF10: 0 - NO PAIN

## 2024-12-27 ASSESSMENT — PAIN - FUNCTIONAL ASSESSMENT
PAIN_FUNCTIONAL_ASSESSMENT: 0-10

## 2024-12-27 NOTE — H&P
"History Of Present Illness  Kyra Behnfeldt is a 24 y.o. female presenting with Crohn's disease for endoscpic re-evaluation on Rinvoq therapy.     Past Medical History  Past Medical History:   Diagnosis Date    Benign neoplasm of cecum     CC (Crohn's colitis) (Multi)     Colitis     Generalized abdominal pain     Hypokalemia     Internal hemorrhoid     Rectal bleeding      Surgical History  Past Surgical History:   Procedure Laterality Date    COLONOSCOPY      ILEOSTOMY       Social History  She reports that she has never smoked. She has never been exposed to tobacco smoke. She has never used smokeless tobacco. She reports that she does not currently use alcohol. She reports current drug use. Drug: Marijuana.    Family History  No family history on file.     Allergies  No Known Allergies  Review of Systems  Pre-sedation Evaluation:  ASA Classification - ASA 2 - Patient with mild systemic disease with no functional limitations  Mallampati Score - II (hard and soft palate, upper portion of tonsils and uvula visible)    Physical Exam  Vital signs reviewed  Patient alert and oriented in no acute distress  Cardiac exam regular rate and rhythm S1-S2 without murmurs gallops or rubs  Lungs clear to auscultation bilaterally  Abdomen soft and nontender     Last Recorded Vitals  /72   Pulse 96   Temp 36.3 °C (97.3 °F) (Temporal)   Resp 16   Ht 1.6 m (5' 3\")   Wt 58 kg (127 lb 13.9 oz)   LMP 12/06/2024 Comment: Urine HCG Negative  SpO2 100%   BMI 22.65 kg/m²      Assessment/Plan   Crohn's disease for endoscopic re-evaluation     PTA/Current Medications:  (Not in a hospital admission)    Current Outpatient Medications   Medication Sig Dispense Refill    acetaminophen (Tylenol) 325 mg tablet Take 2 tablets (650 mg) by mouth every 4 hours if needed for mild pain (1 - 3). 30 tablet 1    ferrous fumarate-vitamin C (Mey-Sequeles 65-25) Take 1 tablet by mouth 3 times daily (morning, midday, late afternoon). Do not " crush, chew, or split.      hydrocortisone 1 % cream Apply topically 2 times a day. 28.4 g 1    lidocaine (LMX 4) 4 % cream Apply topically 4 times a day as needed for mild pain (1 - 3). (Patient not taking: Reported on 12/20/2024) 120 g 1    lidocaine (Xylocaine) 5 % ointment Apply to perianal area 3-4 times daily as needed (Patient not taking: Reported on 12/20/2024) 30 g 3    Retin-A 0.025 % cream       upadacitinib ER (Rinvoq) 30 mg tablet extended release 24 hr Take 1 tablet (30 mg) by mouth once daily. 90 tablet 3    upadacitinib ER (Rinvoq) 45 mg tablet extended release 24 hr Take 1 tablet (45 mg) by mouth once daily. CaseId:[id number];Status:Approved;Review Type:Prior Auth;Coverage Start Date:09/26/2023;Coverage End Date:04/23/2024;    Rinvoq 45 mg for 84 days (Patient not taking: Reported on 12/20/2024) 30 tablet 2     No current facility-administered medications for this encounter.     Javy Lozano MD

## 2024-12-31 ENCOUNTER — DOCUMENTATION (OUTPATIENT)
Dept: GASTROENTEROLOGY | Facility: HOSPITAL | Age: 24
End: 2024-12-31
Payer: COMMERCIAL

## 2024-12-31 DIAGNOSIS — K50.10 CROHN'S DISEASE OF COLON WITHOUT COMPLICATION (MULTI): ICD-10-CM

## 2024-12-31 NOTE — PROGRESS NOTES
Patient feeling well on Rinvoq 30 mg daily.  She did have some increased symptoms in October, but this has resolved.    12/27/24 colonoscopy shows overall improvement.  Nearly normal rectum and rectosigmoid colon to 35 cm then a few pseudopolyps.  Pseudopolyps become larger and more confluent in the transverse colon.  Procedure stopped due to solid stool in proximal transverse colon.  There was diversion colitis but no obvious active mucosal disease (i.e. no ulcerations).    I recommend she consult with Dr. Hopper re: surgical evaluation to either close loop ileostomy or consider total abdominal colectomy with ileorectal anastomosis.  This may have to be a decision made in OR at time of surgery.  Otherwise, continue Rinvoq.

## 2025-01-02 LAB
LABORATORY COMMENT REPORT: NORMAL
PATH REPORT.FINAL DX SPEC: NORMAL
PATH REPORT.GROSS SPEC: NORMAL
PATH REPORT.RELEVANT HX SPEC: NORMAL
PATH REPORT.TOTAL CANCER: NORMAL

## 2025-01-28 DIAGNOSIS — K50.119 CROHN'S DISEASE OF COLON WITH COMPLICATION (MULTI): ICD-10-CM

## 2025-01-31 RX ORDER — UPADACITINIB 45 MG/1
TABLET, EXTENDED RELEASE ORAL
Qty: 84 TABLET | Refills: 0 | Status: SHIPPED | OUTPATIENT
Start: 2025-01-31

## 2025-02-08 ENCOUNTER — HOSPITAL ENCOUNTER (EMERGENCY)
Facility: HOSPITAL | Age: 25
Discharge: HOME | End: 2025-02-08
Attending: EMERGENCY MEDICINE
Payer: COMMERCIAL

## 2025-02-08 ENCOUNTER — APPOINTMENT (OUTPATIENT)
Dept: RADIOLOGY | Facility: HOSPITAL | Age: 25
End: 2025-02-08
Payer: COMMERCIAL

## 2025-02-08 VITALS
RESPIRATION RATE: 16 BRPM | TEMPERATURE: 98.3 F | WEIGHT: 127 LBS | HEIGHT: 63 IN | HEART RATE: 104 BPM | DIASTOLIC BLOOD PRESSURE: 82 MMHG | BODY MASS INDEX: 22.5 KG/M2 | SYSTOLIC BLOOD PRESSURE: 120 MMHG | OXYGEN SATURATION: 100 %

## 2025-02-08 DIAGNOSIS — S52.502A CLOSED FRACTURE OF DISTAL END OF LEFT RADIUS, UNSPECIFIED FRACTURE MORPHOLOGY, INITIAL ENCOUNTER: Primary | ICD-10-CM

## 2025-02-08 PROCEDURE — 99283 EMERGENCY DEPT VISIT LOW MDM: CPT | Performed by: EMERGENCY MEDICINE

## 2025-02-08 PROCEDURE — 73110 X-RAY EXAM OF WRIST: CPT | Mod: LT

## 2025-02-08 PROCEDURE — 29125 APPL SHORT ARM SPLINT STATIC: CPT | Mod: LT

## 2025-02-08 PROCEDURE — 73110 X-RAY EXAM OF WRIST: CPT | Mod: LEFT SIDE | Performed by: STUDENT IN AN ORGANIZED HEALTH CARE EDUCATION/TRAINING PROGRAM

## 2025-02-08 RX ORDER — OXYCODONE AND ACETAMINOPHEN 5; 325 MG/1; MG/1
1 TABLET ORAL EVERY 6 HOURS PRN
Qty: 12 TABLET | Refills: 0 | Status: SHIPPED | OUTPATIENT
Start: 2025-02-08 | End: 2025-02-11

## 2025-02-08 ASSESSMENT — PAIN SCALES - GENERAL: PAINLEVEL_OUTOF10: 7

## 2025-02-08 ASSESSMENT — PAIN DESCRIPTION - PROGRESSION: CLINICAL_PROGRESSION: GRADUALLY WORSENING

## 2025-02-08 ASSESSMENT — PAIN DESCRIPTION - FREQUENCY: FREQUENCY: CONSTANT/CONTINUOUS

## 2025-02-08 ASSESSMENT — PAIN DESCRIPTION - DESCRIPTORS: DESCRIPTORS: SHARP

## 2025-02-08 ASSESSMENT — PAIN - FUNCTIONAL ASSESSMENT: PAIN_FUNCTIONAL_ASSESSMENT: 0-10

## 2025-02-08 ASSESSMENT — PAIN DESCRIPTION - ONSET: ONSET: GRADUAL

## 2025-02-08 ASSESSMENT — PAIN DESCRIPTION - PAIN TYPE: TYPE: ACUTE PAIN

## 2025-02-08 ASSESSMENT — PAIN DESCRIPTION - LOCATION: LOCATION: WRIST

## 2025-02-08 ASSESSMENT — PAIN DESCRIPTION - ORIENTATION: ORIENTATION: LEFT

## 2025-02-08 NOTE — Clinical Note
Kyra Behnfeldt was seen and treated in our emergency department on 2/8/2025.  She may return to work on 02/10/2025.  Right-handed work only.  Must wear splint left wrist     If you have any questions or concerns, please don't hesitate to call.      Scott Rodríguez MD

## 2025-02-08 NOTE — ED TRIAGE NOTES
Pt came in for having left wrist injury after a volleyball hit her wrist and her wrist started to hurt. It is swollen

## 2025-02-08 NOTE — Clinical Note
Kyra Behnfeldt was seen and treated in our emergency department on 2/8/2025.  She may return to work on 02/11/2025.  Right-handed work.  Must wear splint left wrist     If you have any questions or concerns, please don't hesitate to call.      Scott Rodríguez MD

## 2025-02-08 NOTE — ED PROVIDER NOTES
HPI   Chief Complaint   Patient presents with    Wrist Injury     left       24-year-old female injured her left wrist playing volleyball.  Isolated injury to the wrist.  Pain distal radius region with mild swelling.  No bony deformity.  No functional deficit.  Patient is right-handed.  Took Tylenol prior to arrival.      History provided by:  Patient   used: No            Patient History   Past Medical History:   Diagnosis Date    Benign neoplasm of cecum     CC (Crohn's colitis) (Multi)     Colitis     Generalized abdominal pain     Hypokalemia     Internal hemorrhoid     Rectal bleeding      Past Surgical History:   Procedure Laterality Date    COLONOSCOPY      ILEOSTOMY       No family history on file.  Social History     Tobacco Use    Smoking status: Never     Passive exposure: Never    Smokeless tobacco: Never   Vaping Use    Vaping status: Never Used   Substance Use Topics    Alcohol use: Not Currently     Comment: occasional    Drug use: Yes     Types: Marijuana     Comment: weekly       Physical Exam   ED Triage Vitals [02/08/25 1834]   Temperature Heart Rate Respirations BP   36.8 °C (98.3 °F) (!) 104 16 120/82      Pulse Ox Temp Source Heart Rate Source Patient Position   100 % Temporal Monitor Sitting      BP Location FiO2 (%)     Left arm --       Physical Exam  Vitals and nursing note reviewed.   Constitutional:       General: She is not in acute distress.     Appearance: Normal appearance. She is not ill-appearing, toxic-appearing or diaphoretic.   Cardiovascular:      Pulses: Normal pulses.   Musculoskeletal:         General: Swelling, tenderness and signs of injury present. No deformity. Normal range of motion.      Comments: Left wrist: TTP distal radius.  No pain with axial loading on the thumb.  No bony deformity.  Good range of motion of the wrist with mild discomfort.  Left hand: No bony tenderness to metacarpals or digits.  Full range of motion of all digits.  Thumb is  nontender with full range of motion.  No pain with axial loading on the thumb.  Proximal forearm elbow nontender uninjured       Skin:     General: Skin is warm and dry.      Capillary Refill: Capillary refill takes less than 2 seconds.      Findings: No bruising.   Neurological:      General: No focal deficit present.      Mental Status: She is alert and oriented to person, place, and time.   Psychiatric:         Mood and Affect: Mood normal.         Behavior: Behavior normal.         Thought Content: Thought content normal.         Judgment: Judgment normal.           ED Course & MDM   ED Course as of 02/08/25 2105   Sat Feb 08, 2025 2057 IMPRESSION:  Acute comminuted nondisplaced distal radial metaphyseal fracture  extending into the epiphysis and possibly of the joint space. CT  would better evaluate or radiographic follow-up for healing changes  may be considered.   [GA]      ED Course User Index  [GA] Guilherme Saldivar PA-C         Diagnoses as of 02/08/25 2105   Closed fracture of distal end of left radius, unspecified fracture morphology, initial encounter                 No data recorded     Jeanette Coma Scale Score: 15 (02/08/25 1836 : Nessa Light, EMT)                         XR wrist left 3+ views   Final Result   Acute comminuted nondisplaced distal radial metaphyseal fracture   extending into the epiphysis and possibly of the joint space. CT   would better evaluate or radiographic follow-up for healing changes   may be considered.             MACRO:   None.        Signed by: Jason Milligan 2/8/2025 7:56 PM   Dictation workstation:   [id number]         Medical Decision Making  Mechanical injury left wrist: Sprain fracture dislocation  X-ray: Distal radius fracture see rad report.  Patient had Tylenol prior to arrival declines further pain medication.    Distal radius fracture nondisplaced.  Discussed splinting with patient.  She has a colostomy and needs use of her left hand in order to care  for herself.  She declines a thumb spica or sugar-tong splint.  Agreeable to AP splint of the wrist.  Discussed radiology report in regards to CT imaging.  She will follow-up with orthopedics for any needed further imaging for care.  Stable for discharge outpatient management follow-up.  Normal neurovascular exam pre and post splinting.  Splint applied by nursing staff.  Orthopedic referrals provided.  Prescribed Percocet for pain..    The patient has also been seen and evaluated by the ER physician.  Recommended sugar-tong splint which patient declined.  Splinting as noted above.  Agrees with ER assessment, disposition and plan.          Amount and/or Complexity of Data Reviewed  Radiology: ordered. Decision-making details documented in ED Course.     Details: As above    Risk  Prescription drug management.        Procedure  Procedures     Guilherme Saldivar PA-C  02/08/25 6448

## 2025-02-09 NOTE — PROGRESS NOTES
Chief Complaint   Patient presents with    Left Wrist - Fracture     Consulting physician: LUZ Maravilla-CNP  I saw and evaluated the patient. I personally obtained the key and critical portions of the history and physical exam or was physically present for key and critical portions performed by the resident/fellow. I reviewed the resident/fellow's documentation and discussed the patient with the resident/fellow. I agree with the resident/fellow's medical decision making as documented in the note.  A report with my findings and recommendations will be sent to the primary and referring physician via written or electronic means when information is available    History of Present Illness:  Kyra Behnfeldt is a 24 y.o. RHD female  with a history of Crohn's who presented on 02/10/2025 with L wrist pain.    On 2/8/2025, while playing volleyball, the ball struck her left hand, resulting in a hyperextension injury. She had immediate left wrist pain and felt like she had broken something. She subsequently developed swelling and bruising to the wrist and forearm. She went to the ED and was diagnosed with a distal radius fracture on x-ray. She was placed in an AP wrist splint and referred to orthopedics. Overall, her pain and swelling have remained relatively unchanged. No numbness or tingling. No prior left wrist injuries or history of fractures.    Past [facility] HX:  Specialty Problems    None     ROS  12 point ROS reviewed and is negative except for items listed: Fracture (current)    Social Hx:  Home:  Self  Sports: Volleyball  School:  N/A  Grade 8340-4802: N/A    Medications:   Current Outpatient Medications on File Prior to Visit   Medication Sig Dispense Refill    acetaminophen (Tylenol) 325 mg tablet Take 2 tablets (650 mg) by mouth every 4 hours if needed for mild pain (1 - 3). 30 tablet 1    ferrous fumarate-vitamin C (Mey-Sequeles 65-25) Take 1 tablet by mouth 3 times daily (morning, midday,  "late afternoon). Do not crush, chew, or split.      hydrocortisone 1 % cream Apply topically 2 times a day. 28.4 g 1    lidocaine (LMX 4) 4 % cream Apply topically 4 times a day as needed for mild pain (1 - 3). 120 g 1    lidocaine (Xylocaine) 5 % ointment Apply to perianal area 3-4 times daily as needed 30 g 3    oxyCODONE-acetaminophen (Percocet) 5-325 mg tablet Take 1 tablet by mouth every 6 hours if needed for severe pain (7 - 10) for up to 3 days. 12 tablet 0    Retin-A 0.025 % cream       Rinvoq 45 mg tablet extended release 24 hr TAKE 1 TABLET DAILY X 84 DAYS 84 tablet 0    upadacitinib ER (Rinvoq) 30 mg tablet extended release 24 hr Take 1 tablet (30 mg) by mouth once daily. 90 tablet 3     No current facility-administered medications on file prior to visit.       Allergies:  No Known Allergies     Physical Exam:    Visit Vitals  /81   Pulse 83   Ht 1.6 m (5' 3\")   Wt 56.7 kg (125 lb)   SpO2 100%   BMI 22.14 kg/m²   OB Status Having periods   Smoking Status Never   BSA 1.59 m²      Vitals reviewed    General appearance: Well-appearing well-nourished  Psych: Normal mood and affect    Neuro: Normal sensation to light touch throughout the involved extremities and symmetric  Vascular: No extremity edema or discoloration.  Skin: negative.  Lymphatic: no regional lymphadenopathy present.  Eyes: no conjunctival injection.    Bilateral   WRIST EXAM    Inspection:   Erythema none  Swelling about the left wrist and distal forearm  Bruising to the volar aspect of the left distal forearm and wrist  Deformity none    Range of motion:   Extension (70) full and pain free R, limited and painful L  Flexion (80-90) full and pain free R, limited and painful L  Radial deviation (20) full and pain free R, limited and painful L  Ulnar deviation (30-50) full and pain free R, limited and painful L  Forearm pronation (90) full and pain free R, limited and painful L  Forearm supination (90) full and pain free R, limited and " "painful L    Palpation:  TTP distal radius +L  TTP distal ulna +L  TTP of the snuffbox, dorsal and volar scaphoid none   TTP of the dorsal joint line none   TTP of the volar joint line none   TTP of the lunate none   TTP scapho-lunate interval none   TTP of the triquetrum none   TTP trapezium  none   TTP trapezoid  none   TTP capitate none   TTP hamate none   TTP pisiform none   TTP ulnotriquetral joint space  none     TTP 1st dorsal compartment (ext poll brev, abd poll long) none  TTP 2nd dorsal comp (Ext carpi rad longus + brevis) none  TTP 3rd dorsal comp (Ext poll longus) none  TTP 4th dorsal comp (Ext dig + Ext indicis) none  TTP 5th Dorsal comp (Ext dig Minimi) none  TTP 6th dorsal comp (Ext carpi ulnaris) none    Strength:  Extension pain free and 5/5 R, painful and 4/5 L  Flexion pain free and 5/5 R, painful and 4/5 L  Radial deviation pain free and 5/5 R, painful and 4/5 L  Ulnar deviation pain free and 5/5 R, painful and 4/5 L   pain free and 5/5 R, painful and 4/5 L  Forearm pronation pain free and 5/5 R, painful and 4/5 L  Forearm supination pain free and 5/5 R, painful and 4/5 L    Special Tests:  Can perform \"OK\" sign    Imaging:  Left wrist x-rays obtained on 2/8/2025 demonstrate nondisplaced distal radius fracture.    Imaging was personally interpreted and reviewed with the patient and/or family    Impression and Plan:  Kyra Behnfeldt is a 24 y.o. RHD female  with Crohn's who presented on 02/10/2025 with a left nondisplaced distal radius fracture from a hyperextension volleyball injury on 2/8/2025. Exam with swelling and volar ecchymosis to the distal forearm and wrist. TTP distal radius and ulna. Pain and 4/5 strength with all motion and strength testing of the left wrist. Sensation intact and symmetric. Given her need to have both hands to manage her ostomy, we agreed to providing her with a short arm Exos of the left wrist today. In light of her Crohn's and the mechanism of " "injury leading to her fracture, we will plan to get a DXA scan after she is healed. Plan to follow-up in 4-6 weeks. She is scheduled for closure of loop ileostomy on 3/3/2025, and she can get repeat x-rays during her admission if feasible; otherwise will plan to get repeat x-rays at her follow-up appointment.    Order DXA when out of cast    Patient was prescribed a short arm no thumb Exos of the left wrist for a left distal radius fracture.    The patient has weakness, instability and/or deformity of their Left Wrist  which requires stabilization from this orthosis to improve their function.      Verbal and written instructions for the use, wear schedule, cleaning and application of this item were given.  Patient was instructed that should the brace result in increased pain, decreased sensation, increased swelling, or an overall worsening of their medical condition, to please contact our office immediately.     Orthotic management and training was provided for skin care, modifications due to healing tissues, edema changes, interruption in skin integrity, and safety precautions with the orthosis.    A Amind Exos was molded today to the patient. Care of the brace and how to take it on and off was reviewed.     Less than 10% of patients will get a reaction to the brace with bumps / itchiness on their skin. Consider wearing a \"sleeve\" underneath the exos. You can use an old long sock and cut off the toes of the sock to use as a sleeve on your arm to protect your skin. Please wash your sleeve regularly. You can wash the Exos brace in cold tap water and clean with dish soap. Rinse well and pat dry before wearing it.     Do not heat the Exos or wear it in a hot tub or sauna.    Call the office at [phone] to discuss any issues that arise. The Exos should be warn full time until the next visit unless directed by your doctor.    Dao Will MD, MEd  Primary Care Sports Medicine Fellow, PGY-4  [facility]    I saw and evaluated the patient. I personally obtained the key and critical portions of the history and physical exam or was physically present for key and critical portions performed by the resident/fellow. I reviewed the resident/fellow's documentation and discussed the patient with the resident/fellow. I agree with the resident/fellow's medical decision making as documented in the note.    ** Please excuse any errors in grammar or translation related to this dictation. Voice recognition software was utilized to prepare this document. **

## 2025-02-09 NOTE — DISCHARGE INSTRUCTIONS
Keep splint on.  Keep hand elevated.  Percocet as prescribed for pain.  Follow-up with orthopedics in 2 to 3 days.  Return to ED if condition worsens or any problems.

## 2025-02-10 ENCOUNTER — OFFICE VISIT (OUTPATIENT)
Dept: SPORTS MEDICINE | Facility: HOSPITAL | Age: 25
End: 2025-02-10
Payer: COMMERCIAL

## 2025-02-10 VITALS
SYSTOLIC BLOOD PRESSURE: 113 MMHG | WEIGHT: 125 LBS | OXYGEN SATURATION: 100 % | HEART RATE: 83 BPM | DIASTOLIC BLOOD PRESSURE: 81 MMHG | HEIGHT: 63 IN | BODY MASS INDEX: 22.15 KG/M2

## 2025-02-10 DIAGNOSIS — S52.502A TRAUMATIC CLOSED NONDISPLACED FRACTURE OF DISTAL END OF RADIUS, LEFT, INITIAL ENCOUNTER: Primary | ICD-10-CM

## 2025-02-10 DIAGNOSIS — S52.502A CLOSED FRACTURE OF DISTAL END OF LEFT RADIUS, UNSPECIFIED FRACTURE MORPHOLOGY, INITIAL ENCOUNTER: ICD-10-CM

## 2025-02-10 PROCEDURE — 99204 OFFICE O/P NEW MOD 45 MIN: CPT | Performed by: PEDIATRICS

## 2025-02-10 PROCEDURE — 99214 OFFICE O/P EST MOD 30 MIN: CPT | Performed by: PEDIATRICS

## 2025-02-10 PROCEDURE — 3008F BODY MASS INDEX DOCD: CPT | Performed by: PEDIATRICS

## 2025-02-10 PROCEDURE — 1036F TOBACCO NON-USER: CPT | Performed by: PEDIATRICS

## 2025-02-10 NOTE — LETTER
February 10, 2025     BRANDON Maravilla  [address] [facility] [place]  Pre-Admission Testing  [facility] [id number]    Patient: Kyra Behnfeldt   YOB: 2000   Date of Visit: 2/10/2025       Dear BRANDON Hwang:    Thank you for referring Kyra Behnfeldt to me for evaluation. Below are my notes for this consultation.  If you have questions, please do not hesitate to call me. I look forward to following your patient along with you.       Sincerely,     Megan Tinajero MD      CC: Guilherme Saldivar PA-C  ______________________________________________________________________________________    Chief Complaint   Patient presents with   • Left Wrist - Fracture     Consulting physician: BRANDON Maravilla  I saw and evaluated the patient. I personally obtained the key and critical portions of the history and physical exam or was physically present for key and critical portions performed by the resident/fellow. I reviewed the resident/fellow's documentation and discussed the patient with the resident/fellow. I agree with the resident/fellow's medical decision making as documented in the note.  A report with my findings and recommendations will be sent to the primary and referring physician via written or electronic means when information is available    History of Present Illness:  Kyra Behnfeldt is a 24 y.o. RHD female  with a history of Crohn's who presented on 02/10/2025 with L wrist pain.    On 2/8/2025, while playing volleyball, the ball struck her left hand, resulting in a hyperextension injury. She had immediate left wrist pain and felt like she had broken something. She subsequently developed swelling and bruising to the wrist and forearm. She went to the ED and was diagnosed with a distal radius fracture on x-ray. She was placed in an AP wrist splint and referred to orthopedics. Overall, her pain and swelling have remained relatively unchanged. No numbness or  "tingling. No prior left wrist injuries or history of fractures.    Past [facility] HX:  Specialty Problems    None     ROS  12 point ROS reviewed and is negative except for items listed: Fracture (current)    Social Hx:  Home:  Self  Sports: Volleyball  School:  N/A  Grade 3431-3217: N/A    Medications:   Current Outpatient Medications on File Prior to Visit   Medication Sig Dispense Refill   • acetaminophen (Tylenol) 325 mg tablet Take 2 tablets (650 mg) by mouth every 4 hours if needed for mild pain (1 - 3). 30 tablet 1   • ferrous fumarate-vitamin C (Mey-Sequeles 65-25) Take 1 tablet by mouth 3 times daily (morning, midday, late afternoon). Do not crush, chew, or split.     • hydrocortisone 1 % cream Apply topically 2 times a day. 28.4 g 1   • lidocaine (LMX 4) 4 % cream Apply topically 4 times a day as needed for mild pain (1 - 3). 120 g 1   • lidocaine (Xylocaine) 5 % ointment Apply to perianal area 3-4 times daily as needed 30 g 3   • oxyCODONE-acetaminophen (Percocet) 5-325 mg tablet Take 1 tablet by mouth every 6 hours if needed for severe pain (7 - 10) for up to 3 days. 12 tablet 0   • Retin-A 0.025 % cream      • Rinvoq 45 mg tablet extended release 24 hr TAKE 1 TABLET DAILY X 84 DAYS 84 tablet 0   • upadacitinib ER (Rinvoq) 30 mg tablet extended release 24 hr Take 1 tablet (30 mg) by mouth once daily. 90 tablet 3     No current facility-administered medications on file prior to visit.       Allergies:  No Known Allergies     Physical Exam:    Visit Vitals  /81   Pulse 83   Ht 1.6 m (5' 3\")   Wt 56.7 kg (125 lb)   SpO2 100%   BMI 22.14 kg/m²   OB Status Having periods   Smoking Status Never   BSA 1.59 m²      Vitals reviewed    General appearance: Well-appearing well-nourished  Psych: Normal mood and affect    Neuro: Normal sensation to light touch throughout the involved extremities and symmetric  Vascular: No extremity edema or discoloration.  Skin: negative.  Lymphatic: no regional lymphadenopathy " "present.  Eyes: no conjunctival injection.    Bilateral   WRIST EXAM    Inspection:   Erythema none  Swelling about the left wrist and distal forearm  Bruising to the volar aspect of the left distal forearm and wrist  Deformity none    Range of motion:   Extension (70) full and pain free R, limited and painful L  Flexion (80-90) full and pain free R, limited and painful L  Radial deviation (20) full and pain free R, limited and painful L  Ulnar deviation (30-50) full and pain free R, limited and painful L  Forearm pronation (90) full and pain free R, limited and painful L  Forearm supination (90) full and pain free R, limited and painful L    Palpation:  TTP distal radius +L  TTP distal ulna +L  TTP of the snuffbox, dorsal and volar scaphoid none   TTP of the dorsal joint line none   TTP of the volar joint line none   TTP of the lunate none   TTP scapho-lunate interval none   TTP of the triquetrum none   TTP trapezium  none   TTP trapezoid  none   TTP capitate none   TTP hamate none   TTP pisiform none   TTP ulnotriquetral joint space  none     TTP 1st dorsal compartment (ext poll brev, abd poll long) none  TTP 2nd dorsal comp (Ext carpi rad longus + brevis) none  TTP 3rd dorsal comp (Ext poll longus) none  TTP 4th dorsal comp (Ext dig + Ext indicis) none  TTP 5th Dorsal comp (Ext dig Minimi) none  TTP 6th dorsal comp (Ext carpi ulnaris) none    Strength:  Extension pain free and 5/5 R, painful and 4/5 L  Flexion pain free and 5/5 R, painful and 4/5 L  Radial deviation pain free and 5/5 R, painful and 4/5 L  Ulnar deviation pain free and 5/5 R, painful and 4/5 L   pain free and 5/5 R, painful and 4/5 L  Forearm pronation pain free and 5/5 R, painful and 4/5 L  Forearm supination pain free and 5/5 R, painful and 4/5 L    Special Tests:  Can perform \"OK\" sign    Imaging:  Left wrist x-rays obtained on 2/8/2025 demonstrate nondisplaced distal radius fracture.    Imaging was personally interpreted and reviewed with " the patient and/or family    Impression and Plan:  Kyra Behnfeldt is a 24 y.o. RHD female  with Crohn's who presented on 02/10/2025 with a left nondisplaced distal radius fracture from a hyperextension volleyball injury on 2/8/2025. Exam with swelling and volar ecchymosis to the distal forearm and wrist. TTP distal radius and ulna. Pain and 4/5 strength with all motion and strength testing of the left wrist. Sensation intact and symmetric. Given her need to have both hands to manage her ostomy, we agreed to providing her with a short arm Exos of the left wrist today. In light of her Crohn's and the mechanism of injury leading to her fracture, we will plan to get a DXA scan after she is healed. Plan to follow-up in 4-6 weeks. She is scheduled for closure of loop ileostomy on 3/3/2025, and she can get repeat x-rays during her admission if feasible; otherwise will plan to get repeat x-rays at her follow-up appointment.    Order DXA when out of cast    Patient was prescribed a short arm no thumb Exos of the left wrist for a left distal radius fracture.    The patient has weakness, instability and/or deformity of their Left Wrist  which requires stabilization from this orthosis to improve their function.      Verbal and written instructions for the use, wear schedule, cleaning and application of this item were given.  Patient was instructed that should the brace result in increased pain, decreased sensation, increased swelling, or an overall worsening of their medical condition, to please contact our office immediately.     Orthotic management and training was provided for skin care, modifications due to healing tissues, edema changes, interruption in skin integrity, and safety precautions with the orthosis.    A [email or web address] Exos was molded today to the patient. Care of the brace and how to take it on and off was reviewed.     Less than 10% of patients will get a reaction to the brace with bumps / itchiness  "on their skin. Consider wearing a \"sleeve\" underneath the exos. You can use an old long sock and cut off the toes of the sock to use as a sleeve on your arm to protect your skin. Please wash your sleeve regularly. You can wash the Exos brace in cold tap water and clean with dish soap. Rinse well and pat dry before wearing it.     Do not heat the Exos or wear it in a hot tub or sauna.    Call the office at [phone] to discuss any issues that arise. The Exos should be warn full time until the next visit unless directed by your doctor.    Dao Will MD, MEd  Primary Care Sports Medicine Fellow, PGY-4  [facility]    I saw and evaluated the patient. I personally obtained the key and critical portions of the history and physical exam or was physically present for key and critical portions performed by the resident/fellow. I reviewed the resident/fellow's documentation and discussed the patient with the resident/fellow. I agree with the resident/fellow's medical decision making as documented in the note.    ** Please excuse any errors in grammar or translation related to this dictation. Voice recognition software was utilized to prepare this document. **  "

## 2025-02-19 NOTE — PROGRESS NOTES
Kyra Behnfeldt is a 24 year old female with history of Crohn's disease with severe colitis and anal fissure. S/P 3/2024 loop ileostomy.  Presents to office to discuss reversal of ileostomy.    Diagnosis year: 12/2023     Crohn's medications:  Rinvoq     HISTORY:  She first started experiencing intermittent blood in the stools during 2023 with no increase in frequency.  It started out with blood upon wiping but then worsened in December of 2023 which led to a colonoscopy to evaluate the underlying etiology.  This showed evidence of a normal TI and moderate inflammation at the hepatic flexure and in the rectum with biopsies showing evidence of chronic active colitis from these 2 regions and there were skip lesions with biopsies showing normal colonic mucosa in these areas.  She had a single SSA in the cecum that was resected.  The patient was started on prednisone and was awaiting approval for Humira.  She ultimately started on Humira in February of 2024 (first dose 2/3/2024).  The patient had 3 hospital admissions between December 2023 and March of 2024.  The patient required IV steroids during these hospitalizations and was discharged on oral prednisone 40 mg daily.  Her third admission in March of 2024 was due to worsening blood per rectum and bloody diarrhea and abdominal pain she with severe anemia requiring blood transfusions.  An MR on 3/4/2024 showed colonic wall thickening at the transverse colon and the distal and right and left colon.  She had a flexible sigmoidoscopy during this hospitalization that revealed evidence of severe colitis from the rectum to the transverse colon with biopsies confirming the diagnosis and negative for CMV.  It was decided at the time that the patient would get  infliximab during her hospitalization and then she received a diverting loop ileostomy to help cool down the colon as she is treated with infliximab.  She received 10 milligrams/kilogram on 3/7/2024 and underwent  diverting loop ileostomy on 3/10/2024.  With the diversion, the patient had improvement in her symptoms.  She received a second dose of infliximab on 3/11/2024 (systemic dosing).  She was discharged home on oral prednisone.       Initially felt well on infliximab, but as tapered off prednisone developed increasing bloody mucoid discharge and worsened iron deficiency anemia.  Has now had 2 maintenance doses of 10 mg/kg every 6 weeks, last 7/2/24.     7/2/24  hgb 9.2, WBC 14.6,  6TG 99 with 6MMP undetectable, IFX 4.3 with no antibodies.  Went to the ED 7/4 given appearing pale and feeling weak , Hb was >9.     7/19/2024 Colonoscopy shows improvement in rectum and sigmoid, but persistent inflammatory disease with nodular and cobblestoned mucosa in descending and transverse colon. Could not pass proximal TC/hepatic flexure-?stricture. Biopsies showed overlapping features of both diversion colitis and IBD       When rinvoq calmed things down, her anal pain got better - 0 anal pain currently  She is passing mucous once every 3 days.        12/07/2023 Colonoscopy to TI  The perianal and digital rectal exams were normal.    The terminal ileum appeared normal.  Biopsies were taken with cold forceps for histology.  A 7 mm polyp was found in the cecum. The polyp was sessile.  The polyp was removed with a cold snare.  Resection and retrieval were complete.  Localized moderate inflammation characterized by congestion (edema), erosions, erythema, friability, granularity, loss of vascularity and deep ulcerations was found in the distal rectum up to 5 cm proximal to the anal verge and in the proximal transverse colon. Just distal to the hepatic flexure.  These were two very localized areas of discrete significant inflammation.  The remaining mucosa in these colonic segments was normal appearing.  Biopsies were taken with a cold forceps for histology from the transverse colon and rectum, both from areas of inflammation as well as  the normal appearing mucosa in the same segments.  Pathology:  A. CECUM POLYP:   -- FRAGMENTS OF SESSILE SERRATED ADENOMA   B. ILEUM BIOPSY:   -- ILEAL MUCOSA WITH NO SIGNIFICANT PATHOLOGICAL FINDINGS   C. CECUM, ASCENDING COLON BIOPSY:   -- COLONIC MUCOSA WITH NO SIGNIFICANT PATHOLOGIC FINDINGS   D. TRANSVERSE COLON BIOPSY:   -- CHRONIC ACTIVE COLITIS  -- NEGATIVE FOR DYSPLASIA    Note: No granuloma is identified.  E. SIGMOID, DESCENDING COLON BIOPSY:   -- COLONIC MUCOSA WITH NO SIGNIFICANT PATHOLOGICAL FINDINGS   F. RECTUM BIOPSY:    -- CHRONIC ACTIVE COLITIS  -- NEGATIVE FOR DYSPLASIA     Note: No granuloma is identified.        2/01/2024 CT Abd/Pelvis w contrast  1. Questionable acute enterocolitis.  2. No other acute finding in the abdomen and pelvis.  3. Normal appendix.     3/04/2024 Colonoscopy to Transverse colon  Findings  Severe, continuous edematous, erythematous, friable, granular and ulcerated mucosa with erosion and loss of vascular pattern in the descending colon, sigmoid colon, rectosigmoid and rectum, consistent with Crohn's disease; performed cold forceps biopsy. There was diffuse and continuous inflammation from the anal verge all the way up through the transverse colon (which was the limit of this examination) associated with diffuse and deep ulcerations.  There were no areas of normal appearing mucosa throughout the entire examined colon.  The inflammation was significantly worsened from prior colonoscopy.  Decision was made not to try to push more proximally up through the colon given the severity of the inflammation and risk of perforation.  Biopsies were obtained from the transverse (Bottle Number 1), descending and sigmoid colon (Bottle Number 2), and rectum (Bottle Number 3) for the evaluation of inflammatory bowel disease and to rule-out CMV.  External small hemorrhoids observed during retroflexion  The colon was otherwise normal on direct and retroflexion views.  Pathology:  A.  Transverse colon, biopsy:  - Chronic active colitis with erosion, negative for granulomas or dysplasia.  - Negative for CMV by immunostain.  B. Descending colon, biopsy:  - Chronic active colitis with erosion, negative for granulomas or dysplasia.  - Negative for CMV by immunostain.  C. Rectum, biopsy:  - Chronic active colitis with erosion, negative for granulomas or dysplasia.  - Negative for CMV by immunostain.      3/05/2024 MRI Enterography  -Colonic wall thickening involving at least the transverse segment if not also the distal right and left colon in a single long contiguous segment. Little if any surrounding inflammatory change, but nevertheless suspect for active colitis which may be inflammatory in nature  -Restricted diffusion in the terminal ileum, but no associated wall thickening or hyperenhancement  -Remainder of small bowel definitely negative for active inflammatory small bowel disease  -No complication from Crohn's or other inflammatory small bowel disease including no perforation/abscess, stricture/obstruction or sinus tract/fistula     3/10/2024 Operation by Haas-Ocuin:  Creation Ileostomy       3/16/2024 CT Abd/Pelvis with contrast  1. Dilated small bowel loops are present to the ostomy with mild wall thickening and mucosal enhancement at the ostomy, suggestive of inflammation. No discrete fluid collection or fistula is evident.  2. Mild interval increase in segments of large bowel wall thickening/enhancement.  3. Moderate amount of simple fluid density ascites layering within the pelvis.      7/19/2024 Colonoscopy to hepatic flexure  Findings  The perianal exam and digital rectal exam were normal.  Moderate, generalized cobblestone, edematous and friable mucosa with loss of vascular pattern in the transverse colon, descending colon, sigmoid colon and rectum. Inflammatory changes were mild in the rectum and sigmoid colon with pale. Slightly friable mucosa and loss of the normal vascular  pattern.  However in the descending ands sigmoid colon, inflammatory changes wwere more marked with increased firability, mucus, edema, and nodular mucosa.  In the region of the proximal transverse colon/hepatic flexure, there was marked nodularity and suspected stenosis.  Desptie several attempts and changing patient position, this region was not able to be passed.  Performed pancolonic forceps biopsies for dysplasia screening. Random surveillance biopsies were taken from the transverse colon (jar 1), descending and sigmoid colon (jar 2), and rectum (jar 3) to evaluate for inflammation.  Pathology  A.  Transverse colon biopsy (history of IBD):  -Colonic mucosa with focal ulceration, cryptitis, increased lymphocytes in the lamina propria and lymphoid follicles, see comment..  B.  Descending colon:  -Colonic mucosa with ulceration, focal cryptitis, increased lymphocytes in the lamina propria and focal lymphoid follicles, see comment..  C.  Rectum biopsy:  -Colorectal mucosa with increased lymphocytes in the lamina propria and focal multinucleated giant cells.     9/13/2024 MRI Pelvis w/wo contrast  1. No evidence of perianal abscess or fistula.    12/27/2024 Colonoscopy to Transverse colon:  Findings  The perianal exam showed a small right-sided non-inflamed skin tag.  The digital rectal exam was mildly uncomfortable for the patient, but no stenosis noted.  Friable mucosa with loss of vascular pattern in the transverse colon, descending colon, sigmoid colon and rectum, consistent with Crohn's disease. Mostly the mucosal was pale and friable, but starting in the sigmoid colon and progressing into the descending colon and then worsening in the transverse colon was increasing scattered pseudopolyps.  No mucosal ulceration was noted, but mucosa was friable.  Compared to the previous examination, the mucosa was significantly improved without any ulcerations noted.  The rectum and recto-sigmoid to around 354 cm was normal  appearing except for suspected diversion colitis.  Biopsies taken from the transverse colon (jar 1), descending/sigmoid colon (Jar 2) and rectum (Jar 3).;  The exam was otherwise normal.  Pathology:   A. Colon, Transverse, Biopsy:   -- Colonic mucosa with active chronic inflammatory disease (cryptitis, crypt abscess, lymphoplasmacytic expansion of lamina propria, and crypt distortion).      B. Colon, Descending, Biopsy:   -- Colonic mucosa with active chronic inflammatory disease (cryptitis, lymphoplasmacytic expansion of lamina propria, Paneth cell metaplasia, and crypt distortion).      C. Rectum, Biopsy:   -- Colonic mucosa with active chronic inflammatory disease (cryptitis, crypt abscess, and crypt distortion).      Note: The patient's history of Crohn's disease is noted. Negative for dysplasia, granulomata, and viral cytopathic effect, all parts       Review of Systems  Constitutional: Negative for fever, chills, anorexia, weight loss, malaise             ENMT: Negative for nasal discharge, congestion, ear pain, mouth pain, throat pain                 Respiratory: Negative for cough, hemoptysis, wheezing, shortness of breath                Cardiac: Negative for chest pain, dyspnea on exertion, orthopnea, palpitations, syncope         Gastrointestinal: Negative for nausea, vomiting, diarrhea, constipation, abdominal pain, (+)CROHN'S DISEASE, (+)S/P ILEOSTOMY      Genitourinary: Negative for discharge, dysuria, flank pain, frequency, hematuria          Musculoskeletal: Negative for decreased ROM, pain, swelling, weakness          Neurological: Negative for dizziness, confusion, headache, seizures, syncope               Psychiatric: Negative for mood changes, anxiety, hallucinations, sleep changes, suicidal ideas        Skin: Negative for mass, pain, itching, rash, ulcer            Endocrine: Negative for heat intolerance, cold intolerance, excessive sweating, polyuria, excess thirst         Hematologic/Lymph:  Negative for anemia, bruising, easy bleeding, night sweats, petechiae, history of DVT/PE or cancer               Allergic/Immunologic: Negative for anaphylaxis, itchy/ teary eyes, itching, sneezing, swelling     Physical Exam  Constitutional: Well developed, awake/alert/oriented x3, no distress, alert and cooperative, appears much better than prior visits           Eyes: Sclera anicteric, no conjunctival inflammation, conjugate gaze    ENMT: mucous membranes moist, no apparent injury,            Head/Neck: Neck supple, no apparent injury, No JVD, trachea midline, no bruits              Respiratory/Thorax: Patent airways, CTAB, normal breath sounds with good chest expansion, thorax symmetric         Cardiovascular: Regular, rate and rhythm, no murmurs, normal S1 and S2         Gastrointestinal: Nondistended, soft, non-tender, no rebound tenderness or guarding, no masses palpable, no organomegaly, +BS, stoma in place, soft   Extremities: normal extremities, no cyanosis edema, contusions or wounds, 2+ femoral pulses B/L              Neurological: alert and oriented x3, normal strength, Normal gait          Lymphatic: No palpable inguinal lymphadenopathy   Psychological: Appropriate mood and behavior         Skin: Warm and dry, no lesions, no rashes                  Impression:  Pt with severe Crohn's colitis with atypical anal fissure - better now on Rinvoq.  We discussed options of stoma closure vs subtotal colectomy - based on last imaging would be ascending - sigmoid anastomosis. Where she had no response from meds prior to diversion and had progressive sx despite diversion, I think that we should give the Rinvoq a chance and reverse the stoma which is small procedure which will leave little scar.     IF she has worsening symptoms the next step would be imaging and scope and if pattern of disease is similar perform STC with ascending sigmoid anastomosis.      Pt agrees with plan and would like to proceed      Plan:  ileostomy closure - SDD

## 2025-02-24 DIAGNOSIS — K50.819 CROHN'S DISEASE OF BOTH SMALL AND LARGE INTESTINE WITH COMPLICATION (MULTI): Primary | ICD-10-CM

## 2025-02-24 RX ORDER — METRONIDAZOLE 500 MG/100ML
500 INJECTION, SOLUTION INTRAVENOUS ONCE
Status: CANCELLED | OUTPATIENT
Start: 2025-02-24 | End: 2025-02-24

## 2025-02-24 RX ORDER — HEPARIN SODIUM 5000 [USP'U]/ML
5000 INJECTION, SOLUTION INTRAVENOUS; SUBCUTANEOUS ONCE
Status: CANCELLED | OUTPATIENT
Start: 2025-02-24 | End: 2025-02-24

## 2025-02-26 ENCOUNTER — OFFICE VISIT (OUTPATIENT)
Dept: SURGERY | Facility: CLINIC | Age: 25
End: 2025-02-26
Payer: COMMERCIAL

## 2025-02-26 VITALS
HEIGHT: 63 IN | HEART RATE: 101 BPM | BODY MASS INDEX: 23.53 KG/M2 | DIASTOLIC BLOOD PRESSURE: 76 MMHG | WEIGHT: 132.8 LBS | SYSTOLIC BLOOD PRESSURE: 118 MMHG | TEMPERATURE: 97.3 F

## 2025-02-26 DIAGNOSIS — K50.111 CROHN'S COLITIS, WITH RECTAL BLEEDING (MULTI): Primary | ICD-10-CM

## 2025-02-26 DIAGNOSIS — K50.10 CROHN'S DISEASE OF COLON WITHOUT COMPLICATION (MULTI): ICD-10-CM

## 2025-02-26 DIAGNOSIS — K60.2 ANAL FISSURE: ICD-10-CM

## 2025-02-26 PROCEDURE — 99213 OFFICE O/P EST LOW 20 MIN: CPT | Performed by: COLON & RECTAL SURGERY

## 2025-02-26 PROCEDURE — 1036F TOBACCO NON-USER: CPT | Performed by: COLON & RECTAL SURGERY

## 2025-02-26 PROCEDURE — 3008F BODY MASS INDEX DOCD: CPT | Performed by: COLON & RECTAL SURGERY

## 2025-02-26 ASSESSMENT — PAIN SCALES - GENERAL: PAINLEVEL_OUTOF10: 0-NO PAIN

## 2025-02-27 DIAGNOSIS — K50.111 CROHN'S COLITIS, WITH RECTAL BLEEDING (MULTI): ICD-10-CM

## 2025-02-27 LAB
ANION GAP SERPL CALCULATED.4IONS-SCNC: 8 MMOL/L (CALC) (ref 7–17)
BUN SERPL-MCNC: 15 MG/DL (ref 7–25)
BUN/CREAT SERPL: ABNORMAL (CALC) (ref 6–22)
CALCIUM SERPL-MCNC: 9.3 MG/DL (ref 8.6–10.2)
CHLORIDE SERPL-SCNC: 109 MMOL/L (ref 98–110)
CO2 SERPL-SCNC: 23 MMOL/L (ref 20–32)
CREAT SERPL-MCNC: 0.77 MG/DL (ref 0.5–0.96)
EGFRCR SERPLBLD CKD-EPI 2021: 110 ML/MIN/1.73M2
ERYTHROCYTE [DISTWIDTH] IN BLOOD BY AUTOMATED COUNT: 12.1 % (ref 11–15)
GLUCOSE SERPL-MCNC: 123 MG/DL (ref 65–99)
HCT VFR BLD AUTO: 41.6 % (ref 35–45)
HGB BLD-MCNC: 13.6 G/DL (ref 11.7–15.5)
MCH RBC QN AUTO: 28.7 PG (ref 27–33)
MCHC RBC AUTO-ENTMCNC: 32.7 G/DL (ref 32–36)
MCV RBC AUTO: 87.8 FL (ref 80–100)
PLATELET # BLD AUTO: 236 THOUSAND/UL (ref 140–400)
PMV BLD REES-ECKER: 11.6 FL (ref 7.5–12.5)
POTASSIUM SERPL-SCNC: 4.1 MMOL/L (ref 3.5–5.3)
RBC # BLD AUTO: 4.74 MILLION/UL (ref 3.8–5.1)
SODIUM SERPL-SCNC: 140 MMOL/L (ref 135–146)
WBC # BLD AUTO: 8.5 THOUSAND/UL (ref 3.8–10.8)

## 2025-02-27 RX ORDER — CHLORHEXIDINE GLUCONATE ORAL RINSE 1.2 MG/ML
SOLUTION DENTAL
Qty: 120 ML | Refills: 0 | Status: SHIPPED | OUTPATIENT
Start: 2025-02-27

## 2025-02-27 RX ORDER — GABAPENTIN 100 MG/1
CAPSULE ORAL
Qty: 3 CAPSULE | Refills: 0 | Status: SHIPPED | OUTPATIENT
Start: 2025-02-27

## 2025-03-03 ENCOUNTER — ANESTHESIA (OUTPATIENT)
Dept: OPERATING ROOM | Facility: HOSPITAL | Age: 25
DRG: 330 | End: 2025-03-03
Payer: COMMERCIAL

## 2025-03-03 ENCOUNTER — ANESTHESIA EVENT (OUTPATIENT)
Dept: OPERATING ROOM | Facility: HOSPITAL | Age: 25
DRG: 330 | End: 2025-03-03
Payer: COMMERCIAL

## 2025-03-03 ENCOUNTER — HOSPITAL ENCOUNTER (INPATIENT)
Facility: HOSPITAL | Age: 25
LOS: 1 days | Discharge: HOME | DRG: 330 | End: 2025-03-03
Attending: COLON & RECTAL SURGERY | Admitting: COLON & RECTAL SURGERY
Payer: COMMERCIAL

## 2025-03-03 VITALS
TEMPERATURE: 96.8 F | SYSTOLIC BLOOD PRESSURE: 113 MMHG | OXYGEN SATURATION: 100 % | BODY MASS INDEX: 23.04 KG/M2 | RESPIRATION RATE: 18 BRPM | WEIGHT: 130.07 LBS | DIASTOLIC BLOOD PRESSURE: 63 MMHG | HEART RATE: 90 BPM

## 2025-03-03 DIAGNOSIS — K50.10 CROHN'S DISEASE OF COLON WITHOUT COMPLICATION (MULTI): ICD-10-CM

## 2025-03-03 DIAGNOSIS — K50.819 CROHN'S DISEASE OF BOTH SMALL AND LARGE INTESTINE WITH COMPLICATION (MULTI): Primary | ICD-10-CM

## 2025-03-03 LAB
ABO GROUP (TYPE) IN BLOOD: NORMAL
ANTIBODY SCREEN: NORMAL
PREGNANCY TEST URINE, POC: NEGATIVE
RH FACTOR (ANTIGEN D): NORMAL

## 2025-03-03 PROCEDURE — 81025 URINE PREGNANCY TEST: CPT | Performed by: COLON & RECTAL SURGERY

## 2025-03-03 PROCEDURE — 2500000005 HC RX 250 GENERAL PHARMACY W/O HCPCS: Performed by: COLON & RECTAL SURGERY

## 2025-03-03 PROCEDURE — 2500000004 HC RX 250 GENERAL PHARMACY W/ HCPCS (ALT 636 FOR OP/ED): Performed by: ANESTHESIOLOGY

## 2025-03-03 PROCEDURE — 64486 TAP BLOCK UNIL BY INJECTION: CPT | Performed by: NURSE ANESTHETIST, CERTIFIED REGISTERED

## 2025-03-03 PROCEDURE — 86850 RBC ANTIBODY SCREEN: CPT | Performed by: COLON & RECTAL SURGERY

## 2025-03-03 PROCEDURE — 7100000002 HC RECOVERY ROOM TIME - EACH INCREMENTAL 1 MINUTE: Performed by: COLON & RECTAL SURGERY

## 2025-03-03 PROCEDURE — 44620 REPAIR BOWEL OPENING: CPT | Performed by: COLON & RECTAL SURGERY

## 2025-03-03 PROCEDURE — 86901 BLOOD TYPING SEROLOGIC RH(D): CPT | Performed by: COLON & RECTAL SURGERY

## 2025-03-03 PROCEDURE — 2720000007 HC OR 272 NO HCPCS: Performed by: COLON & RECTAL SURGERY

## 2025-03-03 PROCEDURE — 3600000003 HC OR TIME - INITIAL BASE CHARGE - PROCEDURE LEVEL THREE: Performed by: COLON & RECTAL SURGERY

## 2025-03-03 PROCEDURE — 2500000005 HC RX 250 GENERAL PHARMACY W/O HCPCS: Performed by: ANESTHESIOLOGIST ASSISTANT

## 2025-03-03 PROCEDURE — 1210000001 HC SEMI-PRIVATE ROOM DAILY

## 2025-03-03 PROCEDURE — 2500000004 HC RX 250 GENERAL PHARMACY W/ HCPCS (ALT 636 FOR OP/ED): Performed by: COLON & RECTAL SURGERY

## 2025-03-03 PROCEDURE — A44620 PR CLOSE ENTEROSTOMY: Performed by: ANESTHESIOLOGIST ASSISTANT

## 2025-03-03 PROCEDURE — 2500000004 HC RX 250 GENERAL PHARMACY W/ HCPCS (ALT 636 FOR OP/ED): Performed by: ANESTHESIOLOGIST ASSISTANT

## 2025-03-03 PROCEDURE — 36415 COLL VENOUS BLD VENIPUNCTURE: CPT | Performed by: COLON & RECTAL SURGERY

## 2025-03-03 PROCEDURE — 3700000001 HC GENERAL ANESTHESIA TIME - INITIAL BASE CHARGE: Performed by: COLON & RECTAL SURGERY

## 2025-03-03 PROCEDURE — 3600000008 HC OR TIME - EACH INCREMENTAL 1 MINUTE - PROCEDURE LEVEL THREE: Performed by: COLON & RECTAL SURGERY

## 2025-03-03 PROCEDURE — A44620 PR CLOSE ENTEROSTOMY: Performed by: ANESTHESIOLOGY

## 2025-03-03 PROCEDURE — 7100000001 HC RECOVERY ROOM TIME - INITIAL BASE CHARGE: Performed by: COLON & RECTAL SURGERY

## 2025-03-03 PROCEDURE — 7100000010 HC PHASE TWO TIME - EACH INCREMENTAL 1 MINUTE: Performed by: COLON & RECTAL SURGERY

## 2025-03-03 PROCEDURE — 3700000002 HC GENERAL ANESTHESIA TIME - EACH INCREMENTAL 1 MINUTE: Performed by: COLON & RECTAL SURGERY

## 2025-03-03 PROCEDURE — 0DQB0ZZ REPAIR ILEUM, OPEN APPROACH: ICD-10-PCS | Performed by: COLON & RECTAL SURGERY

## 2025-03-03 PROCEDURE — 1210000006 HC SEMI PRIVATE ROOM - IP ONLY PROCEDURE WITH INTENT

## 2025-03-03 PROCEDURE — 7100000009 HC PHASE TWO TIME - INITIAL BASE CHARGE: Performed by: COLON & RECTAL SURGERY

## 2025-03-03 PROCEDURE — 2500000001 HC RX 250 WO HCPCS SELF ADMINISTERED DRUGS (ALT 637 FOR MEDICARE OP): Performed by: ANESTHESIOLOGY

## 2025-03-03 RX ORDER — HEPARIN SODIUM 5000 [USP'U]/ML
5000 INJECTION, SOLUTION INTRAVENOUS; SUBCUTANEOUS ONCE
Status: COMPLETED | OUTPATIENT
Start: 2025-03-03 | End: 2025-03-03

## 2025-03-03 RX ORDER — MIDAZOLAM HYDROCHLORIDE 1 MG/ML
INJECTION INTRAMUSCULAR; INTRAVENOUS AS NEEDED
Status: DISCONTINUED | OUTPATIENT
Start: 2025-03-03 | End: 2025-03-03

## 2025-03-03 RX ORDER — GLYCOPYRROLATE 0.2 MG/ML
INJECTION INTRAMUSCULAR; INTRAVENOUS AS NEEDED
Status: DISCONTINUED | OUTPATIENT
Start: 2025-03-03 | End: 2025-03-03

## 2025-03-03 RX ORDER — ONDANSETRON 4 MG/1
4 TABLET, ORALLY DISINTEGRATING ORAL EVERY 8 HOURS PRN
Qty: 20 TABLET | Refills: 0 | Status: SHIPPED | OUTPATIENT
Start: 2025-03-03

## 2025-03-03 RX ORDER — PROPOFOL 10 MG/ML
INJECTION, EMULSION INTRAVENOUS AS NEEDED
Status: DISCONTINUED | OUTPATIENT
Start: 2025-03-03 | End: 2025-03-03

## 2025-03-03 RX ORDER — ONDANSETRON HYDROCHLORIDE 2 MG/ML
INJECTION, SOLUTION INTRAVENOUS AS NEEDED
Status: DISCONTINUED | OUTPATIENT
Start: 2025-03-03 | End: 2025-03-03

## 2025-03-03 RX ORDER — CEFAZOLIN SODIUM 1 G/50ML
1 SOLUTION INTRAVENOUS ONCE
Status: DISCONTINUED | OUTPATIENT
Start: 2025-03-03 | End: 2025-03-03 | Stop reason: HOSPADM

## 2025-03-03 RX ORDER — METRONIDAZOLE 500 MG/100ML
500 INJECTION, SOLUTION INTRAVENOUS ONCE
Status: COMPLETED | OUTPATIENT
Start: 2025-03-03 | End: 2025-03-03

## 2025-03-03 RX ORDER — HYDROMORPHONE HYDROCHLORIDE 1 MG/ML
INJECTION, SOLUTION INTRAMUSCULAR; INTRAVENOUS; SUBCUTANEOUS CONTINUOUS PRN
Status: DISCONTINUED | OUTPATIENT
Start: 2025-03-03 | End: 2025-03-03

## 2025-03-03 RX ORDER — LIDOCAINE HYDROCHLORIDE 10 MG/ML
0.1 INJECTION, SOLUTION EPIDURAL; INFILTRATION; INTRACAUDAL; PERINEURAL ONCE
Status: DISCONTINUED | OUTPATIENT
Start: 2025-03-03 | End: 2025-03-03 | Stop reason: HOSPADM

## 2025-03-03 RX ORDER — DEXMEDETOMIDINE HYDROCHLORIDE 100 UG/ML
INJECTION, SOLUTION INTRAVENOUS AS NEEDED
Status: DISCONTINUED | OUTPATIENT
Start: 2025-03-03 | End: 2025-03-03

## 2025-03-03 RX ORDER — BUPIVACAINE HYDROCHLORIDE 5 MG/ML
INJECTION, SOLUTION PERINEURAL AS NEEDED
Status: DISCONTINUED | OUTPATIENT
Start: 2025-03-03 | End: 2025-03-03 | Stop reason: HOSPADM

## 2025-03-03 RX ORDER — ROCURONIUM BROMIDE 10 MG/ML
INJECTION, SOLUTION INTRAVENOUS AS NEEDED
Status: DISCONTINUED | OUTPATIENT
Start: 2025-03-03 | End: 2025-03-03

## 2025-03-03 RX ORDER — OXYCODONE HYDROCHLORIDE 5 MG/1
5 TABLET ORAL EVERY 6 HOURS PRN
Qty: 15 TABLET | Refills: 0 | Status: SHIPPED | OUTPATIENT
Start: 2025-03-03

## 2025-03-03 RX ORDER — FENTANYL CITRATE 50 UG/ML
INJECTION, SOLUTION INTRAMUSCULAR; INTRAVENOUS AS NEEDED
Status: DISCONTINUED | OUTPATIENT
Start: 2025-03-03 | End: 2025-03-03

## 2025-03-03 RX ORDER — SODIUM CHLORIDE, SODIUM LACTATE, POTASSIUM CHLORIDE, CALCIUM CHLORIDE 600; 310; 30; 20 MG/100ML; MG/100ML; MG/100ML; MG/100ML
100 INJECTION, SOLUTION INTRAVENOUS CONTINUOUS
Status: ACTIVE | OUTPATIENT
Start: 2025-03-03 | End: 2025-03-03

## 2025-03-03 RX ORDER — ACETAMINOPHEN 325 MG/1
650 TABLET ORAL EVERY 4 HOURS PRN
Status: DISCONTINUED | OUTPATIENT
Start: 2025-03-03 | End: 2025-03-03 | Stop reason: HOSPADM

## 2025-03-03 RX ORDER — METOCLOPRAMIDE HYDROCHLORIDE 5 MG/ML
10 INJECTION INTRAMUSCULAR; INTRAVENOUS ONCE AS NEEDED
Status: COMPLETED | OUTPATIENT
Start: 2025-03-03 | End: 2025-03-03

## 2025-03-03 RX ORDER — LIDOCAINE HCL/PF 100 MG/5ML
SYRINGE (ML) INTRAVENOUS AS NEEDED
Status: DISCONTINUED | OUTPATIENT
Start: 2025-03-03 | End: 2025-03-03

## 2025-03-03 RX ORDER — OXYCODONE HYDROCHLORIDE 5 MG/1
5 TABLET ORAL EVERY 4 HOURS PRN
Status: DISCONTINUED | OUTPATIENT
Start: 2025-03-03 | End: 2025-03-03 | Stop reason: HOSPADM

## 2025-03-03 RX ORDER — ACETAMINOPHEN 325 MG/1
650 TABLET ORAL EVERY 6 HOURS
Qty: 60 TABLET | Refills: 0 | Status: SHIPPED | OUTPATIENT
Start: 2025-03-03

## 2025-03-03 RX ORDER — SODIUM CHLORIDE 0.9 G/100ML
INJECTION, SOLUTION IRRIGATION AS NEEDED
Status: DISCONTINUED | OUTPATIENT
Start: 2025-03-03 | End: 2025-03-03 | Stop reason: HOSPADM

## 2025-03-03 RX ORDER — CEFAZOLIN 1 G/1
INJECTION, POWDER, FOR SOLUTION INTRAVENOUS AS NEEDED
Status: DISCONTINUED | OUTPATIENT
Start: 2025-03-03 | End: 2025-03-03

## 2025-03-03 RX ADMIN — HYDROMORPHONE HYDROCHLORIDE 0.5 MG: 1 INJECTION, SOLUTION INTRAMUSCULAR; INTRAVENOUS; SUBCUTANEOUS at 15:07

## 2025-03-03 RX ADMIN — SODIUM CHLORIDE, SODIUM LACTATE, POTASSIUM CHLORIDE, AND CALCIUM CHLORIDE: .6; .31; .03; .02 INJECTION, SOLUTION INTRAVENOUS at 12:32

## 2025-03-03 RX ADMIN — METOCLOPRAMIDE 10 MG: 5 INJECTION, SOLUTION INTRAMUSCULAR; INTRAVENOUS at 15:19

## 2025-03-03 RX ADMIN — LIDOCAINE HYDROCHLORIDE 100 MG: 20 INJECTION, SOLUTION INTRAVENOUS at 12:40

## 2025-03-03 RX ADMIN — DEXMEDETOMIDINE HYDROCHLORIDE 16 MCG: 100 INJECTION, SOLUTION INTRAVENOUS at 12:53

## 2025-03-03 RX ADMIN — ROCURONIUM BROMIDE 50 MG: 10 INJECTION, SOLUTION INTRAVENOUS at 12:41

## 2025-03-03 RX ADMIN — OXYCODONE HYDROCHLORIDE 5 MG: 5 TABLET ORAL at 15:55

## 2025-03-03 RX ADMIN — ONDANSETRON 4 MG: 2 INJECTION, SOLUTION INTRAMUSCULAR; INTRAVENOUS at 13:35

## 2025-03-03 RX ADMIN — GLYCOPYRROLATE 0.1 MG: 0.2 INJECTION INTRAMUSCULAR; INTRAVENOUS at 12:32

## 2025-03-03 RX ADMIN — METRONIDAZOLE 500 MG: 500 SOLUTION INTRAVENOUS at 12:47

## 2025-03-03 RX ADMIN — Medication 30 MG: at 12:49

## 2025-03-03 RX ADMIN — HEPARIN SODIUM 5000 UNITS: 5000 INJECTION, SOLUTION INTRAVENOUS; SUBCUTANEOUS at 11:49

## 2025-03-03 RX ADMIN — FENTANYL CITRATE 50 MCG: 50 INJECTION, SOLUTION INTRAMUSCULAR; INTRAVENOUS at 12:54

## 2025-03-03 RX ADMIN — CEFAZOLIN 2 G: 330 INJECTION, POWDER, FOR SOLUTION INTRAMUSCULAR; INTRAVENOUS at 12:47

## 2025-03-03 RX ADMIN — DEXAMETHASONE SODIUM PHOSPHATE 8 MG: 4 INJECTION, SOLUTION INTRAMUSCULAR; INTRAVENOUS at 12:47

## 2025-03-03 RX ADMIN — BUPIVACAINE HYDROCHLORIDE 19 ML: 5 INJECTION, SOLUTION PERINEURAL at 13:53

## 2025-03-03 RX ADMIN — PROPOFOL 200 MG: 10 INJECTION, EMULSION INTRAVENOUS at 12:40

## 2025-03-03 RX ADMIN — MIDAZOLAM HYDROCHLORIDE 2 MG: 1 INJECTION, SOLUTION INTRAMUSCULAR; INTRAVENOUS at 12:32

## 2025-03-03 RX ADMIN — DEXMEDETOMIDINE HYDROCHLORIDE 8 MCG: 100 INJECTION, SOLUTION INTRAVENOUS at 13:39

## 2025-03-03 RX ADMIN — HYDROMORPHONE HYDROCHLORIDE 0.5 MG: 1 INJECTION, SOLUTION INTRAMUSCULAR; INTRAVENOUS; SUBCUTANEOUS at 15:15

## 2025-03-03 RX ADMIN — Medication 20 MG: at 13:48

## 2025-03-03 RX ADMIN — SUGAMMADEX 200 MG: 100 INJECTION, SOLUTION INTRAVENOUS at 13:52

## 2025-03-03 RX ADMIN — ROCURONIUM BROMIDE 10 MG: 10 INJECTION, SOLUTION INTRAVENOUS at 13:34

## 2025-03-03 RX ADMIN — FENTANYL CITRATE 50 MCG: 50 INJECTION, SOLUTION INTRAMUSCULAR; INTRAVENOUS at 12:39

## 2025-03-03 SDOH — HEALTH STABILITY: MENTAL HEALTH: CURRENT SMOKER: 0

## 2025-03-03 ASSESSMENT — PAIN - FUNCTIONAL ASSESSMENT
PAIN_FUNCTIONAL_ASSESSMENT: UNABLE TO SELF-REPORT
PAIN_FUNCTIONAL_ASSESSMENT: 0-10
PAIN_FUNCTIONAL_ASSESSMENT: UNABLE TO SELF-REPORT
PAIN_FUNCTIONAL_ASSESSMENT: 0-10
PAIN_FUNCTIONAL_ASSESSMENT: UNABLE TO SELF-REPORT
PAIN_FUNCTIONAL_ASSESSMENT: 0-10
PAIN_FUNCTIONAL_ASSESSMENT: 0-10
PAIN_FUNCTIONAL_ASSESSMENT: UNABLE TO SELF-REPORT
PAIN_FUNCTIONAL_ASSESSMENT: UNABLE TO SELF-REPORT
PAIN_FUNCTIONAL_ASSESSMENT: 0-10
PAIN_FUNCTIONAL_ASSESSMENT: 0-10
PAIN_FUNCTIONAL_ASSESSMENT: UNABLE TO SELF-REPORT

## 2025-03-03 ASSESSMENT — PAIN SCALES - GENERAL
PAINLEVEL_OUTOF10: 3
PAIN_LEVEL: 0
PAINLEVEL_OUTOF10: 4
PAINLEVEL_OUTOF10: 7
PAINLEVEL_OUTOF10: 0 - NO PAIN
PAINLEVEL_OUTOF10: 6
PAINLEVEL_OUTOF10: 4

## 2025-03-03 ASSESSMENT — PAIN DESCRIPTION - DESCRIPTORS
DESCRIPTORS: SORE

## 2025-03-03 NOTE — ANESTHESIA POSTPROCEDURE EVALUATION
Patient: Kyra Behnfeldt    Procedure Summary       Date: 03/03/25 Room / Location: [facility] 08 / Virtual [facility]    Anesthesia Start: 1232 Anesthesia Stop: 1412    Procedure: Closure of Loop Ileostomy Diagnosis:       Crohn's disease of colon without complication (Multi)      (Crohn's disease of colon without complication (Multi) [K50.10])    Surgeons: Nadiya Hopper MD Responsible Provider: Jimmy Love MD    Anesthesia Type: general ASA Status: 2            Anesthesia Type: general    Vitals Value Taken Time   /76 03/03/25 1412   Temp 36.0 03/03/25 1412   Pulse 76 03/03/25 1412   Resp 16 03/03/25 1412   SpO2 100 03/03/25 1412       Anesthesia Post Evaluation    Patient location during evaluation: PACU  Patient participation: waiting for patient participation  Level of consciousness: responsive to verbal stimuli  Pain score: 0  Pain management: adequate  Airway patency: patent  Cardiovascular status: acceptable  Respiratory status: acceptable  Hydration status: acceptable  Postoperative Nausea and Vomiting: none      No notable events documented.

## 2025-03-03 NOTE — ANESTHESIA PREPROCEDURE EVALUATION
Patient: Kyra Behnfeldt    Procedure Information       Date/Time: 03/03/25 1230    Procedure: Closure of Loop Ileostomy - CLOSURE OF LOOP ILEOSTOMY IN SUPINE    Location: [facility] / [facility]    Surgeons: Nadiya Hopper MD            Relevant Problems   GI   (+) Crohn's colitis, unspecified complication (Multi)   (+) Crohn's colitis, with rectal bleeding (Multi)   (+) Crohn's disease of colon without complication (Multi)      Hematology   (+) Iron deficiency anemia due to chronic blood loss       Clinical information reviewed:                    Past Medical History:   Diagnosis Date    Benign neoplasm of cecum     CC (Crohn's colitis) (Multi)     Colitis     Generalized abdominal pain     Hypokalemia     Internal hemorrhoid     Rectal bleeding       Past Surgical History:   Procedure Laterality Date    COLONOSCOPY      ILEOSTOMY       Social History     Tobacco Use    Smoking status: Never     Passive exposure: Never    Smokeless tobacco: Never   Vaping Use    Vaping status: Never Used   Substance Use Topics    Alcohol use: Not Currently     Comment: occasional    Drug use: Yes     Types: Marijuana     Comment: weekly      Current Outpatient Medications   Medication Instructions    acetaminophen (TYLENOL) 650 mg, oral, Every 4 hours PRN    chlorhexidine (Peridex) 0.12 % solution Rinse mouth with 15 ml after toothbrushing the night before surgery and on the morning of surgery.  Expectorate after rinsing.  Do not swallow.    ferrous fumarate-vitamin C (Mey-Sequeles 65-25) 1 tablet, 3 times daily (morning, midday, late afternoon)    gabapentin (Neurontin) 100 mg capsule Take one capsule by mouth starting three days before surgery at bedtime.    hydrocortisone 1 % cream Topical, 2 times daily    lidocaine (LMX 4) 4 % cream Topical, 4 times daily PRN    lidocaine (Xylocaine) 5 % ointment Apply to perianal area 3-4 times daily as needed    Retin-A 0.025 % cream     Rinvoq 45 mg tablet extended release 24  "hr TAKE 1 TABLET DAILY X 84 DAYS    upadacitinib ER (RINVOQ) 30 mg, oral, Daily      No Known Allergies     Chemistry    Lab Results   Component Value Date/Time     02/26/2025 1555    K 4.1 02/26/2025 1555     02/26/2025 1555    CO2 23 02/26/2025 1555    BUN 15 02/26/2025 1555    CREATININE 0.77 02/26/2025 1555    Lab Results   Component Value Date/Time    CALCIUM 9.3 02/26/2025 1555    ALKPHOS 119 (H) 11/07/2024 1030    AST 17 11/07/2024 1030    ALT 14 11/07/2024 1030    BILITOT 0.4 11/07/2024 1030          No results found for: \"HGBA1C\"  Lab Results   Component Value Date/Time    WBC 8.5 02/26/2025 1555    HGB 13.6 02/26/2025 1555    HCT 41.6 02/26/2025 1555     02/26/2025 1555     Lab Results   Component Value Date/Time    PROTIME 11.1 07/04/2024 2239    INR 1.0 07/04/2024 2239     No results found for: \"ABORH\"  No results found for this or any previous visit (from the past 4464 hours).  No results found for this or any previous visit from the past 1095 days.       Visit Vitals  OB Status Having periods   Smoking Status Never     No data recorded    Physical Exam    Airway  Mallampati: II  TM distance: >3 FB  Neck ROM: full     Cardiovascular - normal exam     Dental - normal exam     Pulmonary - normal exam     Abdominal - normal exam             Anesthesia Plan    History of general anesthesia?: yes  History of complications of general anesthesia?: no    ASA 2     general     The patient is not a current smoker.    intravenous induction   Postoperative administration of opioids is intended.  Anesthetic plan and risks discussed with patient.  Use of blood products discussed with patient who.    Plan discussed with CAA and attending.        "

## 2025-03-03 NOTE — OP NOTE
Closure of Loop Ileostomy Operative Note     Date: 3/3/2025  OR Location: [facility]    Name: Kyra Behnfeldt, : 2000, Age: 24 y.o., MRN: [id number], Sex: female    Diagnosis  Pre-op Diagnosis      * Crohn's disease of colon without complication (Multi) [K50.10] Post-op Diagnosis     * Crohn's disease of colon without complication (Multi) [K50.10]     Procedures  Closure of Loop Ileostomy  76375 - [place] CLOSURE ENTEROSTOMY LG/SMALL INTESTINE      Surgeons      * Nadiya Hopper - Primary    Resident/Fellow/Other Assistant:  Surgeons and Role:  * No surgeons found with a matching role *    Staff:   Surgical Assistant:   Circulator: Twan Costello Scrub: Lula  Scrub Person: Anai  Scrub Person: Saul    Anesthesia Staff: Anesthesiologist: Jimmy Love MD; Musa Moreno MD  CRNA: LUZ Holly-CRNA  C-AA: MERCEDES Hernandez    Procedure Summary  Anesthesia: General  ASA: II  Estimated Blood Loss: 10mL  Intra-op Medications:   Administrations occurring from 1335 to 1505 on 25:   Medication Name Total Dose   BUPivacaine HCl (Marcaine) 0.5 % (5 mg/mL) injection 19 mL   oxygen (O2) therapy Cannot be calculated   dexmedeTOMIDine (Precedex) 100 mcg/mL 2 mL single dose vial 8 mcg   ketamine injection 50 mg/ 5 mL (10 mg/mL) 20 mg   LR bolus Cannot be calculated   ondansetron (Zofran) 2 mg/mL injection 4 mg   sugammadex (Bridion) 200 mg/2 mL injection 200 mg              Anesthesia Record               Intraprocedure I/O Totals          Intake    LR bolus 500.00 mL    Total Intake 500 mL       Output    Est. Blood Loss 10 mL    Total Output 10 mL       Net    Net Volume 490 mL          Specimen: No specimens collected              Drains and/or Catheters: * None in log *    Findings: No visible Crohn's of the ascending colon or last foot of TI. No adhesions    Indications: Kyra Behnfeldt is an 24 y.o. female who is having surgery for Crohn's disease of colon without complication (Multi) [K50.10].      The patient was seen in the preoperative area. The risks, benefits, complications, treatment options, non-operative alternatives, expected recovery and outcomes were discussed with the patient. The possibilities of reaction to medication, pulmonary aspiration, injury to surrounding structures, bleeding, recurrent infection, the need for additional procedures, failure to diagnose a condition, and creating a complication requiring transfusion or operation were discussed with the patient. The patient concurred with the proposed plan, giving informed consent.  The site of surgery was properly noted/marked if necessary per policy. The patient has been actively warmed in preoperative area. Preoperative antibiotics have been ordered and given within 1 hours of incision. Venous thrombosis prophylaxis have been ordered including bilateral sequential compression devices and chemical prophylaxis    Procedure Details: Patient was brought to the operating room in the supine position.  The huddle was performed, and general endotracheal anesthesia was induced.  IV antibiotics and a heparin shot were administered, and the patient was prepped using a combination of liquid Betadine and ChloraPrep and taped and draped in the usual sterile fashion.    The stoma was instilled using 0.5% marcaine.  A circumferential incision was made around the stoma using the Bovie cautery the subcutaneous tissues were divided using Metzenbaum scissors and Bovie cautery.  The peritoneal cavity was entered and the stoma was dissected free from the surrounding fascial tissues.  No serosal tears were created.  There was a fistula noted at the base of the stoma.     The stoma was everted and the skin was excised and discarded.  A handsewn end to end anastomosis was created.   The bowel was reapproximated using 3.0 Vicryl seromuscular stitches, followed by 3.0 Ethibond full thickness sutures.  The proximal fistula was excised and repaired using 3.0  Vicryl seromuscular stitches followed by full thickness 3.0 ethibond sutures.  The bowel was stool and airtight and was returned to the peritoneal cavity there were minimal adhesions.  The fascia was then closed using interrupted figure-of-eight 0 PDS sutures the area was instilled using the remainder of the 0.5% Marcaine.  The skin was pursestringed down using 3.0 Vicryl and covered using a Primapore and ABD and tape. Unilateral TAP block was performed.    Complications:  None; patient tolerated the procedure well.    Disposition: PACU - hemodynamically stable.  Condition: stable       Attending Attestation: I was present and scrubbed for the entire procedure.    Nadiya Hopper  Phone Number: [phone]

## 2025-03-03 NOTE — DISCHARGE INSTRUCTIONS
Your prior stoma site is partially open at the skin. This is to minimize infection as it closes. You have gauze overlying. You can replace the gauze as needed.     You may shower in 24 hours.    No heavy lifting (>20lbs) for the next 2 weeks.     You will follow up with Dr. Hopper in 2 weeks.     We have prescribed you tylenol and oxycodone for pain control. Utilize the tylenol first and the oxycodone for pain not controlled by tylenol.   Utilize the zofran for any nausea that you may have.

## 2025-03-03 NOTE — PERIOPERATIVE NURSING NOTE
Pt to bay 6. Family brought back to bedside. Pt ambulated to bathroom and urinated without difficulty.    Pt assisted with dressing with help of family. IV removed dressing applied.     Discharge instructions provided to pt and family. Educated on medications, effects of anesthesia, and homecoming care. Pt and family verbalizing understanding of all instructions provided at this time. All questions and concerns answered. Pt given contact information for provider.

## 2025-03-03 NOTE — ANESTHESIA PROCEDURE NOTES
Peripheral Block    Start time: 3/3/2025 1:45 PM  End time: 3/3/2025 1:58 PM  Reason for block: at surgeon's request and post-op pain management  Staffing  Performed: CRNA   Authorized by: Jimmy Love MD    Performed by: ABDIRASHID Holly  Preanesthetic Checklist  Completed: patient identified, IV checked, site marked, risks and benefits discussed, surgical consent, monitors and equipment checked, pre-op evaluation and timeout performed   Timeout performed at: 3/3/2025 1:45 PM  Peripheral Block  Patient position: laying flat  Prep: ChloraPrep  Patient monitoring: heart rate, cardiac monitor and continuous pulse ox  Block type: TAP  Laterality: right  Injection technique: single-shot  Guidance: Ultrasound Guided  Local infiltration: bupivicaine (Patient under general anesthesia)  Infiltration strength: 0.5 %  Dose: 19 mL  Needle  Needle type: short-bevel   Needle gauge: 22 G  Needle length: 5 cm  Needle localization: ultrasound guidance and anatomical landmarks  Needle insertion depth: 3.5 cm  Assessment  Injection assessment: negative aspiration for heme and incremental injection (General anesthesia)  Heart rate change: no  Slow fractionated injection: yes  Additional Notes  Uneventful insertion under general anesthesia using ultrasound.

## 2025-03-03 NOTE — ANESTHESIA PROCEDURE NOTES
Airway  Date/Time: 3/3/2025 12:44 PM  Urgency: elective    Airway not difficult    Staffing  Performed: MERCEDES   Authorized by: Musa Moreno MD    Performed by: MERCEDES Hernandez  Patient location during procedure: OR    Indications and Patient Condition  Indications for airway management: anesthesia  Sedation level: deep  Preoxygenated: yes  Patient position: sniffing  MILS maintained throughout  Mask difficulty assessment: 1 - vent by mask    Final Airway Details  Final airway type: endotracheal airway      Successful airway: ETT  Cuffed: yes   Successful intubation technique: video laryngoscopy  Blade: Violeta  Blade size: #3  ETT size (mm): 7.0  Cormack-Lehane Classification: grade I - full view of glottis  Placement verified by: chest auscultation and capnometry   Measured from: lips  ETT to lips (cm): 21  Number of attempts at approach: 1

## 2025-03-06 ENCOUNTER — TELEPHONE (OUTPATIENT)
Dept: SURGERY | Facility: CLINIC | Age: 25
End: 2025-03-06
Payer: COMMERCIAL

## 2025-03-06 NOTE — TELEPHONE ENCOUNTER
Post op call. She is s/p ileotomy closure on 3/03/2025.  She reports that overall she is doing well.  She rates her pain 2/10 with ten being the worst pain.  Only taking Tylenol for her discomfort.  Able to complete ADL's.  Bowels are working.  She had about ten movements yesterday.  She had five bowel motions today.  Stool consistency ranges from watery to oatmeal.  She does have the feeling in the rectum that she always has to move her bowels.    Appetite is ok.  Denies bloating, nausea/vomiting.  Denies fever/chills.  Closure site covered with gauze.  No signs of infection.  Patient teary at time of call. She is very worried about intermittent abdominal pains and anal pain/feeling of having to go all the time.  She has intermittent cramping, gas pains and some urgency before a bowel motion.  Goes away after a bowel motion.  She has some anal discomfort after having ten bowel motions yesterday.    She is using baby wipes to clean.  Applying skin barrier ointment.  I advised that right after surgery it's quite normal to have erratic bowel function, gas pains and cramps as the bowels are waking up.  We reviewed thickening foods and Metamucil wafers to help bulk up the stools.  No baby wipes.  Use warm water and toilet paper to clean.  Apply skin barrier ointment after a movement.  The bowels should settle down in the next couple of weeks.  I invited her to call if she needs anything.    Dimple Larose RN

## 2025-03-06 NOTE — TELEPHONE ENCOUNTER
Attempt to reach patient for post op call.  She is s/p 3/03/2025 LI Closure.  Message left inviting her to please call us with an update at [phone].  Dimple Larose RN

## 2025-03-07 ENCOUNTER — PATIENT MESSAGE (OUTPATIENT)
Dept: SURGERY | Facility: CLINIC | Age: 25
End: 2025-03-07
Payer: COMMERCIAL

## 2025-03-12 ENCOUNTER — APPOINTMENT (OUTPATIENT)
Dept: SURGERY | Facility: CLINIC | Age: 25
End: 2025-03-12
Payer: COMMERCIAL

## 2025-03-12 NOTE — PROGRESS NOTES
Chief Complaint   Patient presents with    Left Wrist - Fracture     Consulting physician: LUZ Maravilla-CNP  I saw and evaluated the patient. I personally obtained the key and critical portions of the history and physical exam or was physically present for key and critical portions performed by the resident/fellow. I reviewed the resident/fellow's documentation and discussed the patient with the resident/fellow. I agree with the resident/fellow's medical decision making as documented in the note.  A report with my findings and recommendations will be sent to the primary and referring physician via written or electronic means when information is available    History of Present Illness:  Kyra Behnfeldt is a 24 y.o. RHD female  with a history of Crohn's who presented on 02/10/2025 with L wrist pain.    On 2/8/2025, while playing volleyball, the ball struck her left hand, resulting in a hyperextension injury. She had immediate left wrist pain and felt like she had broken something. She subsequently developed swelling and bruising to the wrist and forearm. She went to the ED and was diagnosed with a distal radius fracture on x-ray. She was placed in an AP wrist splint and referred to orthopedics. Overall, her pain and swelling have remained relatively unchanged. No numbness or tingling. No prior left wrist injuries or history of fractures.    On 3/13/25    Past [facility] HX:  Specialty Problems    None     ROS  12 point ROS reviewed and is negative except for items listed: Fracture (current)    Social Hx:  Home:  Self  Sports: Volleyball  School:  N/A  Grade 5473-4882: N/A    Medications:   Current Outpatient Medications on File Prior to Visit   Medication Sig Dispense Refill    acetaminophen (Tylenol) 325 mg tablet Take 2 tablets (650 mg) by mouth every 4 hours if needed for mild pain (1 - 3). 30 tablet 1    ferrous fumarate-vitamin C (Mey-Sequeles 65-25) Take 1 tablet by mouth 3 times daily  "(morning, midday, late afternoon). Do not crush, chew, or split.      hydrocortisone 1 % cream Apply topically 2 times a day. 28.4 g 1    lidocaine (LMX 4) 4 % cream Apply topically 4 times a day as needed for mild pain (1 - 3). 120 g 1    lidocaine (Xylocaine) 5 % ointment Apply to perianal area 3-4 times daily as needed 30 g 3    oxyCODONE-acetaminophen (Percocet) 5-325 mg tablet Take 1 tablet by mouth every 6 hours if needed for severe pain (7 - 10) for up to 3 days. 12 tablet 0    Retin-A 0.025 % cream       Rinvoq 45 mg tablet extended release 24 hr TAKE 1 TABLET DAILY X 84 DAYS 84 tablet 0    upadacitinib ER (Rinvoq) 30 mg tablet extended release 24 hr Take 1 tablet (30 mg) by mouth once daily. 90 tablet 3     No current facility-administered medications on file prior to visit.       Allergies:  No Known Allergies     Physical Exam:    Visit Vitals  /81   Pulse 83   Ht 1.6 m (5' 3\")   Wt 56.7 kg (125 lb)   SpO2 100%   BMI 22.14 kg/m²   OB Status Having periods   Smoking Status Never   BSA 1.59 m²      Vitals reviewed    General appearance: Well-appearing well-nourished  Psych: Normal mood and affect    Neuro: Normal sensation to light touch throughout the involved extremities and symmetric  Vascular: No extremity edema or discoloration.  Skin: negative.  Lymphatic: no regional lymphadenopathy present.  Eyes: no conjunctival injection.    Bilateral   WRIST EXAM    Inspection:   Erythema none  Swelling about the left wrist and distal forearm  Bruising to the volar aspect of the left distal forearm and wrist  Deformity none    Range of motion:   Extension (70) full and pain free R, limited and painful L  Flexion (80-90) full and pain free R, limited and painful L  Radial deviation (20) full and pain free R, limited and painful L  Ulnar deviation (30-50) full and pain free R, limited and painful L  Forearm pronation (90) full and pain free R, limited and painful L  Forearm supination (90) full and pain free " "R, limited and painful L    Palpation:  TTP distal radius +L  TTP distal ulna +L  TTP of the snuffbox, dorsal and volar scaphoid none   TTP of the dorsal joint line none   TTP of the volar joint line none   TTP of the lunate none   TTP scapho-lunate interval none   TTP of the triquetrum none   TTP trapezium  none   TTP trapezoid  none   TTP capitate none   TTP hamate none   TTP pisiform none   TTP ulnotriquetral joint space  none     TTP 1st dorsal compartment (ext poll brev, abd poll long) none  TTP 2nd dorsal comp (Ext carpi rad longus + brevis) none  TTP 3rd dorsal comp (Ext poll longus) none  TTP 4th dorsal comp (Ext dig + Ext indicis) none  TTP 5th Dorsal comp (Ext dig Minimi) none  TTP 6th dorsal comp (Ext carpi ulnaris) none    Strength:  Extension pain free and 5/5 R, painful and 4/5 L  Flexion pain free and 5/5 R, painful and 4/5 L  Radial deviation pain free and 5/5 R, painful and 4/5 L  Ulnar deviation pain free and 5/5 R, painful and 4/5 L   pain free and 5/5 R, painful and 4/5 L  Forearm pronation pain free and 5/5 R, painful and 4/5 L  Forearm supination pain free and 5/5 R, painful and 4/5 L    Special Tests:  Can perform \"OK\" sign    Imaging:  Left wrist x-rays obtained on 2/8/2025 demonstrate nondisplaced distal radius fracture.    Imaging was personally interpreted and reviewed with the patient and/or family    Impression and Plan:  Kyra Behnfeldt is a 24 y.o. RHD female  with Crohn's who presented on 02/10/2025 with a left nondisplaced distal radius fracture from a hyperextension volleyball injury on 2/8/2025. Exam with swelling and volar ecchymosis to the distal forearm and wrist. TTP distal radius and ulna. Pain and 4/5 strength with all motion and strength testing of the left wrist. Sensation intact and symmetric. Given her need to have both hands to manage her ostomy, we agreed to providing her with a short arm Exos of the left wrist today. In light of her Crohn's and the " mechanism of injury leading to her fracture, we will plan to get a DXA scan after she is healed. Plan to follow-up in 4-6 weeks. She is scheduled for closure of loop ileostomy on 3/3/2025, and she can get repeat x-rays during her admission if feasible; otherwise will plan to get repeat x-rays at her follow-up appointment.    Order DXA when out of cast    Patient was prescribed a short arm no thumb Exos of the left wrist for a left distal radius fracture.    On 3/13/25            I saw and evaluated the patient. I personally obtained the key and critical portions of the history and physical exam or was physically present for key and critical portions performed by the resident/fellow. I reviewed the resident/fellow's documentation and discussed the patient with the resident/fellow. I agree with the resident/fellow's medical decision making as documented in the note.    ** Please excuse any errors in grammar or translation related to this dictation. Voice recognition software was utilized to prepare this document. **

## 2025-03-13 ENCOUNTER — APPOINTMENT (OUTPATIENT)
Dept: SPORTS MEDICINE | Facility: HOSPITAL | Age: 25
End: 2025-03-13
Payer: COMMERCIAL

## 2025-03-13 ENCOUNTER — APPOINTMENT (OUTPATIENT)
Dept: RADIOLOGY | Facility: HOSPITAL | Age: 25
End: 2025-03-13
Payer: COMMERCIAL

## 2025-03-13 DIAGNOSIS — S52.502A CLOSED FRACTURE OF DISTAL END OF LEFT RADIUS, UNSPECIFIED FRACTURE MORPHOLOGY, INITIAL ENCOUNTER: ICD-10-CM

## 2025-03-21 ENCOUNTER — OFFICE VISIT (OUTPATIENT)
Dept: SURGERY | Facility: CLINIC | Age: 25
End: 2025-03-21
Payer: COMMERCIAL

## 2025-03-21 VITALS
DIASTOLIC BLOOD PRESSURE: 73 MMHG | SYSTOLIC BLOOD PRESSURE: 110 MMHG | BODY MASS INDEX: 22.67 KG/M2 | HEART RATE: 97 BPM | WEIGHT: 128 LBS

## 2025-03-21 DIAGNOSIS — K50.111 CROHN'S COLITIS, WITH RECTAL BLEEDING (MULTI): Primary | ICD-10-CM

## 2025-03-21 PROCEDURE — 99211 OFF/OP EST MAY X REQ PHY/QHP: CPT | Performed by: NURSE PRACTITIONER

## 2025-03-21 NOTE — PROGRESS NOTES
History Of Present Illness  Kyra Behnfeldt is a 24 y.o. female who is s/p ileostomy closure on 3/3/25 by Dr. Hopper for a hx of Crohn's.     She is feeling really since her closure and her energy level is almost back to normal.  Her ostomy wound has completely healed now..  She has 4 soft BM's every day.  She takes Metamucil 1-2x/day to keep her stools bulked.  She is eating and drinking well with no n/v.      She takes Rinvoq daily for her Crohn's.  She is to see her GI Dr. Lozano next week.    Past Medical History  She has a past medical history of Benign neoplasm of cecum, CC (Crohn's colitis) (Multi), Colitis, Generalized abdominal pain, Hypokalemia, Internal hemorrhoid, and Rectal bleeding.    Surgical History  She has a past surgical history that includes Colonoscopy and Ileostomy.     Social History  She reports that she has never smoked. She has never been exposed to tobacco smoke. She has never used smokeless tobacco. She reports that she does not currently use alcohol. She reports current drug use. Drug: Marijuana.    Family History  No family history on file.     Allergies  Patient has no known allergies.    Review of Systems   All other systems reviewed and are negative.       Physical Exam  Vitals reviewed. Exam conducted with a chaperone present.   HENT:      Head: Normocephalic.   Cardiovascular:      Rate and Rhythm: Normal rate and regular rhythm.   Pulmonary:      Effort: Pulmonary effort is normal.      Breath sounds: Normal breath sounds.   Abdominal:      General: Abdomen is flat. Bowel sounds are normal.      Palpations: Abdomen is soft.      Comments: Her old stoma site wound has healed up nicely.  Minimal discomfort around the wound   Genitourinary:     Rectum: Normal.   Musculoskeletal:         General: Normal range of motion.   Skin:     General: Skin is warm and dry.   Neurological:      General: No focal deficit present.   Psychiatric:         Mood and Affect: Mood normal.          Behavior: Behavior normal.         Thought Content: Thought content normal.         Judgment: Judgment normal.          Last Recorded Vitals  /73   Pulse 97   Wt 58.1 kg (128 lb)        Assessment/Plan   Amanda has done well over the past 3 weeks since her ostomy was closed.  She will resume a regular diet now.  She will continue to not lift over 10 pounds for 3 more weeks.  She is to see Dr. Lozano next week for her Crohn's disease.  She will call with any issues and will follow up on an as needed basis.         Johanna Mays, APRN-CNP

## 2025-03-24 NOTE — PROGRESS NOTES
REASON FOR VISIT:  Crohn's disease    HPI:  Kyra Behnfeldt is a 24 y.o. female who presents for follow-up of complicated IBD. Likely, Crohn's disease of the colon. Dx 12/2023 with rapid disease progression leading to hospitalization 3/2024.  She had only partial response to rescue therapy with infliximab and went for diverting loop ileostomy during that admission.  Postoperatively, infliximab  was continued and she had partial improvement in symptoms..  Dosed increased to 10 mg/kg every 4 weeks but still only have partial disease control.  She went to [facility] and saw Dr. Wil Haskins in consultation. Recommended continued infliximab and consideration of combination therapy as before with azathioprine or with methotrexate. We added methotrexate 25 mg subcutaneous Q week to IFX 10 mg/kg Q4 at end of 8/2024.      Despite increase infliximab dose and frequency she has continued to feel poorly. C/O persistent prema-anal pain with mucoid and bloody output daily.  Changed to Rinvoq as did not feel infliximab was helping any longer; tarted around 10/11/24.  On Rinvoq significantly better.     12/27/24 colonoscopy shows overall improvement. Nearly normal rectum and rectosigmoid colon to 35 cm then a few pseudopolyps. Pseudopolyps become larger and more confluent in the transverse colon. Procedure stopped due to solid stool in proximal transverse colon. There was diversion colitis but no obvious active mucosal disease (i.e. no ulcerations).     After discussion with patient and Dr. Hopper, went for loop ileostomy closure 3/3/2025.    In follow-up today, she is doing well. In her first week post op, she was having 20 BM per day but the frequency of her BM has improved to 5 daily with metamucil (one to twice daily). Her stool is formed (bristol type 4). She is slowly re-introducing food to her diet that previously upset her digestive system. She otherwise denies blood in her stool, nausea, vomiting, urgency, night time  symptoms, abdominal pain or EIM.     REVIEW OF SYSTEMS  12-point ROS was reviewed and pertinent findings a noted above.    No Known Allergies    Past Medical History:   Diagnosis Date    Benign neoplasm of cecum     CC (Crohn's colitis) (Multi)     Colitis     Generalized abdominal pain     Hypokalemia     Internal hemorrhoid     Rectal bleeding        Past Surgical History:   Procedure Laterality Date    COLONOSCOPY      ILEOSTOMY         Current Outpatient Medications   Medication Sig Dispense Refill    acetaminophen (Tylenol) 325 mg tablet Take 2 tablets (650 mg) by mouth every 6 hours. 60 tablet 0    chlorhexidine (Peridex) 0.12 % solution Rinse mouth with 15 ml after toothbrushing the night before surgery and on the morning of surgery.  Expectorate after rinsing.  Do not swallow. (Patient not taking: Reported on 3/21/2025) 120 mL 0    ferrous fumarate-vitamin C (Mey-Sequeles 65-25) Take 1 tablet by mouth 3 times daily (morning, midday, late afternoon). Do not crush, chew, or split.      gabapentin (Neurontin) 100 mg capsule Take one capsule by mouth starting three days before surgery at bedtime. (Patient not taking: Reported on 3/21/2025) 3 capsule 0    hydrocortisone 1 % cream Apply topically 2 times a day. (Patient not taking: Reported on 3/21/2025) 28.4 g 1    lidocaine (LMX 4) 4 % cream Apply topically 4 times a day as needed for mild pain (1 - 3). (Patient not taking: Reported on 3/21/2025) 120 g 1    lidocaine (Xylocaine) 5 % ointment Apply to perianal area 3-4 times daily as needed (Patient not taking: Reported on 3/21/2025) 30 g 3    ondansetron ODT (Zofran-ODT) 4 mg disintegrating tablet Dissolve 1 tablet (4 mg) in the mouth every 8 hours if needed for nausea or vomiting. (Patient not taking: Reported on 3/21/2025) 20 tablet 0    oxyCODONE (Roxicodone) 5 mg immediate release tablet Take 1 tablet (5 mg) by mouth every 6 hours if needed for severe pain (7 - 10). (Patient not taking: Reported on  3/21/2025) 15 tablet 0    Retin-A 0.025 % cream       upadacitinib ER (Rinvoq) 30 mg tablet extended release 24 hr Take 1 tablet (30 mg) by mouth once daily. 90 tablet 3     No current facility-administered medications for this visit.       PHYSICAL EXAM:  Vitals:    03/25/25 1153   BP: 113/82   Pulse: 99   Temp: 36.7 °C (98 °F)   SpO2: 100%     Physical Exam  HENT:      Mouth/Throat:      Mouth: Mucous membranes are moist.   Eyes:      General: No scleral icterus.     Pupils: Pupils are equal, round, and reactive to light.   Cardiovascular:      Rate and Rhythm: Normal rate and regular rhythm.      Pulses: Normal pulses.   Pulmonary:      Effort: Pulmonary effort is normal.   Abdominal:      General: Abdomen is flat. There is no distension.      Palpations: Abdomen is soft.      Tenderness: There is no abdominal tenderness.   Skin:     General: Skin is warm.      Capillary Refill: Capillary refill takes less than 2 seconds.   Neurological:      General: No focal deficit present.      Mental Status: She is alert.   Psychiatric:         Mood and Affect: Mood normal.            Lab Results   Component Value Date    WBC 8.5 02/26/2025    HGB 13.6 02/26/2025    HCT 41.6 02/26/2025    MCV 87.8 02/26/2025     02/26/2025     Lab Results   Component Value Date    ALT 14 11/07/2024    AST 17 11/07/2024    ALKPHOS 119 (H) 11/07/2024    BILITOT 0.4 11/07/2024     === 03/07/24 ===  CT ABDOMEN PELVIS W IV CONTRAST  - Impression -  1. Dilated small bowel loops are present to the ostomy with mild wall  thickening and mucosal enhancement at the ostomy, suggestive of  inflammation. No discrete fluid collection or fistula is evident.  2. Mild interval increase in segments of large bowel wall  thickening/enhancement.  3. Moderate amount of simple fluid density ascites layering within  the pelvis.    ASSESSMENT  #Crohn's disease - doing well since her loop ileostomy closure 3 weeks ago. Her disease appears to respond well to  Rinvoq 30 mg daily and her symptoms did not worsen with Infliximab discontinuation in October.  Her bowel movement are improving and she is currently experiencing about 5 formed bowel movement daily (bristol type 4), down from 20 in her first week post op. The frequency of her BM may continue to improve as her bowel settles down in the next few weeks following ileostomy reversal. Once things have settled down, we will plan to check FCP and colonoscopy to assess her Crohn's disease on Rinvoq.    PLAN  - Continue Rinvoq 30 mg daily   - Check Fecal calprotectin in June   - Plan for Colonoscopy in October 2025   - RTC in late August or early September 2025

## 2025-03-25 ENCOUNTER — OFFICE VISIT (OUTPATIENT)
Dept: GASTROENTEROLOGY | Facility: HOSPITAL | Age: 25
End: 2025-03-25
Payer: COMMERCIAL

## 2025-03-25 VITALS
TEMPERATURE: 98 F | BODY MASS INDEX: 22.98 KG/M2 | HEART RATE: 99 BPM | OXYGEN SATURATION: 100 % | SYSTOLIC BLOOD PRESSURE: 113 MMHG | WEIGHT: 129.7 LBS | DIASTOLIC BLOOD PRESSURE: 82 MMHG

## 2025-03-25 DIAGNOSIS — K50.119 CROHN'S DISEASE OF COLON WITH COMPLICATION (MULTI): Primary | ICD-10-CM

## 2025-03-25 PROCEDURE — 99213 OFFICE O/P EST LOW 20 MIN: CPT | Performed by: INTERNAL MEDICINE

## 2025-03-25 PROCEDURE — 1036F TOBACCO NON-USER: CPT | Performed by: INTERNAL MEDICINE

## 2025-03-25 SDOH — ECONOMIC STABILITY: FOOD INSECURITY: WITHIN THE PAST 12 MONTHS, YOU WORRIED THAT YOUR FOOD WOULD RUN OUT BEFORE YOU GOT MONEY TO BUY MORE.: NEVER TRUE

## 2025-03-25 SDOH — ECONOMIC STABILITY: FOOD INSECURITY: WITHIN THE PAST 12 MONTHS, THE FOOD YOU BOUGHT JUST DIDN'T LAST AND YOU DIDN'T HAVE MONEY TO GET MORE.: NEVER TRUE

## 2025-03-25 ASSESSMENT — PATIENT HEALTH QUESTIONNAIRE - PHQ9
1. LITTLE INTEREST OR PLEASURE IN DOING THINGS: NOT AT ALL
2. FEELING DOWN, DEPRESSED OR HOPELESS: NOT AT ALL
SUM OF ALL RESPONSES TO PHQ9 QUESTIONS 1 & 2: 0

## 2025-03-25 ASSESSMENT — LIFESTYLE VARIABLES
HOW OFTEN DO YOU HAVE A DRINK CONTAINING ALCOHOL: NEVER
AUDIT-C TOTAL SCORE: 0
SKIP TO QUESTIONS 9-10: 1
HOW MANY STANDARD DRINKS CONTAINING ALCOHOL DO YOU HAVE ON A TYPICAL DAY: PATIENT DOES NOT DRINK
HOW OFTEN DO YOU HAVE SIX OR MORE DRINKS ON ONE OCCASION: NEVER

## 2025-03-25 ASSESSMENT — PAIN SCALES - GENERAL: PAINLEVEL_OUTOF10: 0-NO PAIN

## 2025-03-25 NOTE — PATIENT INSTRUCTIONS
It is great that you are doing well after closure of your loop ileostomy and that your bowel habits are improving.  As we discussed today:    1) continue Rinvoq 30 mg once daily  2) in mid June, please repeat stool fecal calprotectin and labs as ordered  3) we will plan for colonoscopy in October 2025  4) please follow-up in the office sometime in late August or early September 2025  5) contact the office with any worsening symptoms, questions, or concerns

## 2025-04-14 NOTE — PROGRESS NOTES
Kyra Behnfeldt is a 24 year old female who is s/p ileostomy closure on 3/3/25 by Dr. Hopper for a hx of Crohn's.     3/03/2025 Operation:  Closure of Loop Ileostomy.    Followed up with Dr. Lozano after the stoma closure.  Her disease appears to respond well to Rinvoq 30 mg daily and her symptoms did not worsen with Infliximab discontinuation in October.  Her bowel movement are improving and she is currently experiencing about 5 formed bowel movement daily (bristol type 4), down from 20 in her first week post op. T   Check Fecal calprotectin in June .Plan for Colonoscopy in October 2025     Review of Systems  Constitutional: Negative for fever, chills, anorexia, weight loss, malaise             ENMT: Negative for nasal discharge, congestion, ear pain, mouth pain, throat pain                 Respiratory: Negative for cough, hemoptysis, wheezing, shortness of breath                Cardiac: Negative for chest pain, dyspnea on exertion, orthopnea, palpitations, syncope         Gastrointestinal: Negative for nausea, vomiting, diarrhea, constipation, abdominal pain, (+)CROHN'S DISEASE, (+)S/P ILEOSTOMY      Genitourinary: Negative for discharge, dysuria, flank pain, frequency, hematuria          Musculoskeletal: Negative for decreased ROM, pain, swelling, weakness          Neurological: Negative for dizziness, confusion, headache, seizures, syncope               Psychiatric: Negative for mood changes, anxiety, hallucinations, sleep changes, suicidal ideas        Skin: Negative for mass, pain, itching, rash, ulcer            Endocrine: Negative for heat intolerance, cold intolerance, excessive sweating, polyuria, excess thirst         Hematologic/Lymph: Negative for anemia, bruising, easy bleeding, night sweats, petechiae, history of DVT/PE or cancer               Allergic/Immunologic: Negative for anaphylaxis, itchy/ teary eyes, itching, sneezing, swelling     Physical Exam  Constitutional: Well developed,  awake/alert/oriented x3, no distress, alert and cooperative, appears much better than prior visits           Eyes: Sclera anicteric, no conjunctival inflammation, conjugate gaze    ENMT: mucous membranes moist, no apparent injury,            Head/Neck: Neck supple, no apparent injury, No JVD, trachea midline, no bruits              Respiratory/Thorax: Patent airways, CTAB, normal breath sounds with good chest expansion, thorax symmetric         Cardiovascular: Regular, rate and rhythm, no murmurs, normal S1 and S2         Gastrointestinal: Nondistended, soft, non-tender, no rebound tenderness or guarding, no masses palpable, no organomegaly, +BS, stoma in place, soft   Extremities: normal extremities, no cyanosis edema, contusions or wounds, 2+ femoral pulses B/L              Neurological: alert and oriented x3, normal strength, Normal gait          Lymphatic: No palpable inguinal lymphadenopathy   Psychological: Appropriate mood and behavior         Skin: Warm and dry, no lesions, no rashes                   Impression:  Pt with severe Crohn's colitis with atypical anal fissure - better now on Rinvoq      Plan:

## 2025-04-16 ENCOUNTER — PATIENT MESSAGE (OUTPATIENT)
Dept: SURGERY | Facility: CLINIC | Age: 25
End: 2025-04-16
Payer: COMMERCIAL

## 2025-04-23 ENCOUNTER — APPOINTMENT (OUTPATIENT)
Dept: SURGERY | Facility: CLINIC | Age: 25
End: 2025-04-23
Payer: COMMERCIAL

## 2025-07-02 LAB
ALBUMIN SERPL-MCNC: 4.8 G/DL (ref 3.6–5.1)
ALBUMIN/GLOB SERPL: 1.8 (CALC) (ref 1–2.5)
ALP SERPL-CCNC: 67 U/L (ref 31–125)
ALT SERPL-CCNC: 18 U/L (ref 6–29)
AST SERPL-CCNC: 23 U/L (ref 10–30)
BASOPHILS # BLD AUTO: 47 CELLS/UL (ref 0–200)
BASOPHILS NFR BLD AUTO: 0.8 %
BILIRUB DIRECT SERPL-MCNC: 0.1 MG/DL
BILIRUB INDIRECT SERPL-MCNC: 0.3 MG/DL (CALC) (ref 0.2–1.2)
BILIRUB SERPL-MCNC: 0.4 MG/DL (ref 0.2–1.2)
CRP SERPL-MCNC: <3 MG/L
EOSINOPHIL # BLD AUTO: 100 CELLS/UL (ref 15–500)
EOSINOPHIL NFR BLD AUTO: 1.7 %
ERYTHROCYTE [DISTWIDTH] IN BLOOD BY AUTOMATED COUNT: 12.2 % (ref 11–15)
GLOBULIN SER CALC-MCNC: 2.6 G/DL (CALC) (ref 1.9–3.7)
HCT VFR BLD AUTO: 43.5 % (ref 35–45)
HGB BLD-MCNC: 14.5 G/DL (ref 11.7–15.5)
IGNF NEG CNTRL BLD: NORMAL
LYMPHOCYTES # BLD AUTO: 1351 CELLS/UL (ref 850–3900)
LYMPHOCYTES NFR BLD AUTO: 22.9 %
M TB IFN-G BLD-IMP: NEGATIVE
MCH RBC QN AUTO: 30.1 PG (ref 27–33)
MCHC RBC AUTO-ENTMCNC: 33.3 G/DL (ref 32–36)
MCV RBC AUTO: 90.4 FL (ref 80–100)
MITOGEN IGNF.SPOT COUNT BLD: NORMAL
MONOCYTES # BLD AUTO: 360 CELLS/UL (ref 200–950)
MONOCYTES NFR BLD AUTO: 6.1 %
NEUTROPHILS # BLD AUTO: 4042 CELLS/UL (ref 1500–7800)
NEUTROPHILS NFR BLD AUTO: 68.5 %
PLATELET # BLD AUTO: 219 THOUSAND/UL (ref 140–400)
PMV BLD REES-ECKER: 11.9 FL (ref 7.5–12.5)
PROT SERPL-MCNC: 7.4 G/DL (ref 6.1–8.1)
QUEST PANEL A SPOT COUNT: 1
QUEST PANEL B SPOT COUNT: 1
RBC # BLD AUTO: 4.81 MILLION/UL (ref 3.8–5.1)
WBC # BLD AUTO: 5.9 THOUSAND/UL (ref 3.8–10.8)

## 2025-07-06 LAB — CALPROTECTIN STL-MCNT: 25 MCG/G

## 2025-11-21 ENCOUNTER — APPOINTMENT (OUTPATIENT)
Dept: GASTROENTEROLOGY | Facility: HOSPITAL | Age: 25
End: 2025-11-21
Payer: COMMERCIAL

## (undated) DEVICE — CATHETER TRAY, SURESTEP, 16FR, URINE METER W/STATLOCK

## (undated) DEVICE — SUTURE, VICRYL, 3-0, 27 IN, SH, VIOLET

## (undated) DEVICE — DRAPE, SHEET, ENDOSCOPY, GENERAL, FENESTRATED, ARMBOARD COVER, 98 X 123.5 IN, DISPOSABLE, LF, STERILE

## (undated) DEVICE — SUTURE, VICRYL PLUS 3-0, SH, 27IN

## (undated) DEVICE — TOWEL, OR, XRAY DETECT 5 PK, WHITE, 17X26, W/DMT TAG, ST

## (undated) DEVICE — Device

## (undated) DEVICE — MANIFOLD, 4 PORT NEPTUNE STANDARD

## (undated) DEVICE — SUTURE, PDSII, 1, TP-1, VIL, MONO, 48LP

## (undated) DEVICE — SPONGE, LAP, XRAY DECT, 18IN X 18IN, W/MASTER DMT, STERILE

## (undated) DEVICE — DRESSING, ADHESIVE, ISLAND, TELFA, 4 X 10 IN

## (undated) DEVICE — GOWN, SURGICAL, SMARTGOWN, XLARGE, STERILE

## (undated) DEVICE — PAD, GROUNDING, ELECTROSURGICAL, W/9 FT CABLE, POLYHESIVE II, ADULT, LF

## (undated) DEVICE — SUTURE, CHROMIC GUT, 3-0, SH 27"

## (undated) DEVICE — APPLICATOR, CHLORAPREP, W/ORANGE TINT, 26ML

## (undated) DEVICE — DRAPE, SHEET, UTILITY, NON ABSORBENT, 18 X 26 IN, LF

## (undated) DEVICE — TOWEL, SURGICAL, NEURO, O/R, 16 X 26, BLUE, STERILE

## (undated) DEVICE — GOWN, ASTOUND, XL

## (undated) DEVICE — GLOVE, SURGICAL, PROTEXIS PI BLUE W/NEUTHERA, 6.5, PF, LF

## (undated) DEVICE — SUTURE, VICRYL, 3-0, 27 IN, SH

## (undated) DEVICE — COVER, CART, 45 X 27 X 48 IN, CLEAR

## (undated) DEVICE — SYSTEM, FIOS FIRST ENTRY, 5 X 100MM, KII ADVANCED FIXATION

## (undated) DEVICE — SUTURE, PDS II, 0, 36 IN, CT, VIOLET

## (undated) DEVICE — SUTURE, ETHIBOND XTRA, 3-0, 30 IN, SH

## (undated) DEVICE — TIP, SUCTION, YANKAUER, FLEXIBLE